# Patient Record
Sex: MALE | Race: WHITE | NOT HISPANIC OR LATINO | Employment: UNEMPLOYED | URBAN - NONMETROPOLITAN AREA
[De-identification: names, ages, dates, MRNs, and addresses within clinical notes are randomized per-mention and may not be internally consistent; named-entity substitution may affect disease eponyms.]

---

## 2018-05-25 ENCOUNTER — OFFICE VISIT (OUTPATIENT)
Dept: UROLOGY | Facility: CLINIC | Age: 48
End: 2018-05-25

## 2018-05-25 VITALS — HEIGHT: 72 IN | WEIGHT: 163 LBS | BODY MASS INDEX: 22.08 KG/M2

## 2018-05-25 DIAGNOSIS — Z98.52 VASECTOMY STATUS: Primary | ICD-10-CM

## 2018-05-25 DIAGNOSIS — R53.83 FATIGUE, UNSPECIFIED TYPE: ICD-10-CM

## 2018-05-25 LAB
BASOPHILS # BLD AUTO: 0.03 10*3/MM3 (ref 0–0.3)
BASOPHILS NFR BLD AUTO: 0.4 % (ref 0–2)
DEPRECATED RDW RBC AUTO: 45.3 FL (ref 37–54)
EOSINOPHIL # BLD AUTO: 0.14 10*3/MM3 (ref 0–0.7)
EOSINOPHIL NFR BLD AUTO: 1.9 % (ref 0–5)
ERYTHROCYTE [DISTWIDTH] IN BLOOD BY AUTOMATED COUNT: 12.8 % (ref 11.5–14.5)
HCT VFR BLD AUTO: 40.3 % (ref 42–52)
HGB BLD-MCNC: 13.8 G/DL (ref 14–18)
IMM GRANULOCYTES # BLD: 0.01 10*3/MM3 (ref 0–0.03)
IMM GRANULOCYTES NFR BLD: 0.1 % (ref 0–0.5)
LYMPHOCYTES # BLD AUTO: 3 10*3/MM3 (ref 1–3)
LYMPHOCYTES NFR BLD AUTO: 40.7 % (ref 21–51)
MCH RBC QN AUTO: 33.2 PG (ref 27–33)
MCHC RBC AUTO-ENTMCNC: 34.2 G/DL (ref 33–37)
MCV RBC AUTO: 96.9 FL (ref 80–94)
MONOCYTES # BLD AUTO: 0.87 10*3/MM3 (ref 0.1–0.9)
MONOCYTES NFR BLD AUTO: 11.8 % (ref 0–10)
NEUTROPHILS # BLD AUTO: 3.33 10*3/MM3 (ref 1.4–6.5)
NEUTROPHILS NFR BLD AUTO: 45.1 % (ref 30–70)
PLATELET # BLD AUTO: 289 10*3/MM3 (ref 130–400)
PMV BLD AUTO: 9 FL (ref 6–10)
PSA SERPL-MCNC: 0.57 NG/ML (ref 0–4)
RBC # BLD AUTO: 4.16 10*6/MM3 (ref 4.7–6.1)
TESTOST SERPL-MCNC: 1395.92 NG/DL (ref 123.06–813.86)
WBC NRBC COR # BLD: 7.38 10*3/MM3 (ref 4.5–12.5)

## 2018-05-25 PROCEDURE — 84153 ASSAY OF PSA TOTAL: CPT | Performed by: UROLOGY

## 2018-05-25 PROCEDURE — 85025 COMPLETE CBC W/AUTO DIFF WBC: CPT | Performed by: UROLOGY

## 2018-05-25 PROCEDURE — 99213 OFFICE O/P EST LOW 20 MIN: CPT | Performed by: UROLOGY

## 2018-05-25 PROCEDURE — 84403 ASSAY OF TOTAL TESTOSTERONE: CPT | Performed by: UROLOGY

## 2018-05-25 PROCEDURE — 99406 BEHAV CHNG SMOKING 3-10 MIN: CPT | Performed by: UROLOGY

## 2018-05-25 RX ORDER — TESTOSTERONE CYPIONATE 200 MG/ML
INJECTION, SOLUTION INTRAMUSCULAR
Qty: 10 ML | Refills: 2 | Status: SHIPPED | OUTPATIENT
Start: 2018-05-25 | End: 2020-09-14

## 2018-05-25 RX ORDER — GABAPENTIN 600 MG/1
800 TABLET ORAL 3 TIMES DAILY
COMMUNITY
End: 2022-01-01

## 2018-05-25 RX ORDER — DIAZEPAM 5 MG/1
TABLET ORAL EVERY 8 HOURS SCHEDULED
COMMUNITY
End: 2020-08-10 | Stop reason: SDUPTHER

## 2018-05-25 RX ORDER — IBUPROFEN 800 MG/1
TABLET ORAL
COMMUNITY

## 2018-05-25 RX ORDER — HYDROCODONE BITARTRATE AND ACETAMINOPHEN 10; 325 MG/1; MG/1
TABLET ORAL EVERY 8 HOURS SCHEDULED
COMMUNITY
End: 2020-09-15 | Stop reason: SDUPTHER

## 2018-05-25 NOTE — PROGRESS NOTES
"Chief Complaint:          Chief Complaint   Patient presents with   • Flank Pain       HPI:   47 y.o. male.  7-year-old white male well known to me from the [facility] with right testalgia over several years.  He was on testosterone but he was changed by Karyn River to half a cc once a month and is not doing well he has a seizure disorders testosterone started at 89 he had an ultrasound showing a left abnormal testis in the right testis was normal as PSA was 0.54.  Today he complains of persistent right-sided testalgia we have previously discussed orchiectomy I went ahead after appropriate informed consent did a right cord block with 10 cc of 1% Xylocaine and complete resolution of the right testicular pain I'm very hesitant to remove a right testicle is perfectly more normal and in fact his more volume than the contralateral testicle I will also restart him and testosterone for a positive JUDSON-androgen deficiency in the age male questionnaire  The patient was queried regarding the androgen deficiency in the age male questionnaire.  This is a validated questionnaire that was performed on a set of 314 [place] male physicians when it was positive it correlated directly with a 94% chance of low testosterone.  Patient indicates there is a decrease in libido or sex drive, a lack of energy, Decreased  strength and endurance, a decreased \"enjoyment of life\", sad and grumpy feelings with significant difficulty maintaining erections.  He is also been a recent deterioration regarding work performance.  Low TestosteroneThis pleasant male patient presents today with signs and symptoms that are consistent with low testosterone he has positive Judson questionnaire by history this includes both the sexual and nonsexual side effects.  Sexual side effects include inability to achieve and maintain an erection, in ability to maintain his erection and decreased interest and sexual activity.  Nonsexual " symptomatology includes fatigue, difficulty completing a job, tiredness.  He has a discussion of the various forms testosterone available including parenteral, topical, and the form of a patch.  We discussed the efficacy of the gels, and the injections.  As well as the cost and benefits analysis.  We discussed the the studies a talked about heart disease and its effect on prostate cancer both of which are negligible.  He gives verbal consent to proceed with treatment.  He understands the risks and benefits of length he also completed his attempts at fertility he understands the partial effect on spermatogenesis    Past Medical History:      History reviewed. No pertinent past medical history.      Current Meds:     Current Outpatient Prescriptions   Medication Sig Dispense Refill   • aspirin 81 MG tablet aspirin 81 mg tablet,delayed release   Take 1 tablet every day by oral route.     • diazePAM (VALIUM) 5 MG tablet Every 8 (Eight) Hours.     • gabapentin (NEURONTIN) 600 MG tablet Every 8 (Eight) Hours.     • HYDROcodone-acetaminophen (NORCO)  MG per tablet Every 8 (Eight) Hours.     • ibuprofen (ADVIL,MOTRIN) 800 MG tablet ibuprofen 800 mg tablet   TAKE ONE TABLET BY MOUTH THREE TIMES A DAY       No current facility-administered medications for this visit.         Allergies:      Allergies   Allergen Reactions   • Tramadol Hives        Past Surgical History:     Past Surgical History:   Procedure Laterality Date   • HERNIA REPAIR           Social History:     Social History     Social History   • Marital status:      Spouse name: N/A   • Number of children: N/A   • Years of education: N/A     Occupational History   • Not on file.     Social History Main Topics   • Smoking status: Current Every Day Smoker   • Smokeless tobacco: Former User   • Alcohol use No   • Drug use: No   • Sexual activity: Not on file     Other Topics Concern   • Not on file     Social History Narrative   • No narrative on file        Family History:     Family History   Problem Relation Age of Onset   • No Known Problems Father    • No Known Problems Mother        Review of Systems:     Review of Systems   Constitutional: Positive for fatigue.   HENT: Negative.    Eyes: Negative.    Respiratory: Negative.    Cardiovascular: Negative.    Gastrointestinal: Negative.    Endocrine: Negative.    Genitourinary: Positive for testicular pain.   Musculoskeletal: Negative.    Allergic/Immunologic: Negative.    Neurological: Negative.    Hematological: Negative.    Psychiatric/Behavioral: Negative.        Physical Exam:     Physical Exam   Constitutional: He is oriented to person, place, and time. He appears well-developed and well-nourished.   HENT:   Head: Normocephalic and atraumatic.   Eyes: Conjunctivae and EOM are normal. Pupils are equal, round, and reactive to light.   Neck: Normal range of motion.   Cardiovascular: Normal rate, regular rhythm, normal heart sounds and intact distal pulses.    Pulmonary/Chest: Effort normal and breath sounds normal.   Abdominal: Soft. Bowel sounds are normal.   Genitourinary:   Genitourinary Comments: Phallus with a right testalgia posteriorly normal contralateral testicle   Musculoskeletal: Normal range of motion.   Neurological: He is alert and oriented to person, place, and time. He has normal reflexes.   Skin: Skin is warm and dry.   Psychiatric: He has a normal mood and affect. His behavior is normal. Judgment and thought content normal.   Nursing note and vitals reviewed.      I have reviewed the following portions of the patient's history: allergies, current medications, past family history, past medical history, past social history, past surgical history, problem list and ROS and confirm it's accurate.      Procedure:       Assessment/Plan:   Testalgia- status post a testicular block with great results indicating likely epididymal focus of infection  Low TestosteroneThis pleasant male patient presents  today with signs and symptoms that are consistent with low testosterone he has positive Judson questionnaire by history this includes both the sexual and nonsexual side effects.  Sexual side effects include inability to achieve and maintain an erection, in ability to maintain his erection and decreased interest and sexual activity.  Nonsexual symptomatology includes fatigue, difficulty completing a job, tiredness.  He has a discussion of the various forms testosterone available including parenteral, topical, and the form of a patch.  We discussed the efficacy of the gels, and the injections.  As well as the cost and benefits analysis.  We discussed the the studies a talked about heart disease and its effect on prostate cancer both of which are negligible.  He gives verbal consent to proceed with treatment.  He understands the risks and benefits of length he also completed his attempts at fertility he understands the partial effect on spermatogenesis     Patient's Body mass index is 22.11 kg/m². BMI is within normal parameters. No follow-up required.    I advised the patient of the risks in continuing to use tobacco, and I provided this patient with smoking cessation educational materials.    During this visit, I spent 3-10 minutes counseling the patient regarding smoking cessation.        This document has been electronically signed by ANNE CORNELIUS MD May 25, 2018 8:24 AM

## 2018-06-04 ENCOUNTER — OFFICE VISIT (OUTPATIENT)
Dept: UROLOGY | Facility: CLINIC | Age: 48
End: 2018-06-04

## 2018-06-04 VITALS — BODY MASS INDEX: 22.08 KG/M2 | WEIGHT: 163 LBS | HEIGHT: 72 IN

## 2018-06-04 DIAGNOSIS — N45.1 EPIDIDYMITIS: Primary | ICD-10-CM

## 2018-06-04 PROCEDURE — 99214 OFFICE O/P EST MOD 30 MIN: CPT | Performed by: UROLOGY

## 2018-06-04 RX ORDER — OXYCODONE AND ACETAMINOPHEN 10; 325 MG/1; MG/1
1 TABLET ORAL EVERY 6 HOURS PRN
Qty: 15 TABLET | Refills: 0 | Status: SHIPPED | OUTPATIENT
Start: 2018-06-04 | End: 2018-06-25

## 2018-06-04 NOTE — PROGRESS NOTES
Chief Complaint:          Chief Complaint   Patient presents with   • Pain After Block       HPI:   47 y.o. male.  47-year-old white male returns today with significant pain.  He had a cord block that took the pain away completely but it came back he would like surgical therapy.  He has had 2 years of conservative treatment which include warm soaks and elevation.  We'll arrange an epididymectomy for him I discussed the risks and benefits of the surgery including anesthesia, bleeding, infection, failure of relief of the pain.  Repeated his block per his request    Past Medical History:      History reviewed. No pertinent past medical history.      Current Meds:     Current Outpatient Prescriptions   Medication Sig Dispense Refill   • aspirin 81 MG tablet aspirin 81 mg tablet,delayed release   Take 1 tablet every day by oral route.     • diazePAM (VALIUM) 5 MG tablet Every 8 (Eight) Hours.     • gabapentin (NEURONTIN) 600 MG tablet Every 8 (Eight) Hours.     • HYDROcodone-acetaminophen (NORCO)  MG per tablet Every 8 (Eight) Hours.     • ibuprofen (ADVIL,MOTRIN) 800 MG tablet ibuprofen 800 mg tablet   TAKE ONE TABLET BY MOUTH THREE TIMES A DAY     • Syringe, Disposable, 3 ML misc Use 3 ml syringe with a 25 gauge  5/8 inch needle 24 each 6   • Testosterone Cypionate (DEPO-TESTOSTERONE) 200 MG/ML injection He is to use 1/2 cc every Monday and Thursday SQ 10 mL 2     No current facility-administered medications for this visit.         Allergies:      Allergies   Allergen Reactions   • Tramadol Hives        Past Surgical History:     Past Surgical History:   Procedure Laterality Date   • HERNIA REPAIR           Social History:     Social History     Social History   • Marital status:      Spouse name: N/A   • Number of children: N/A   • Years of education: N/A     Occupational History   • Not on file.     Social History Main Topics   • Smoking status: Current Every Day Smoker   • Smokeless tobacco: Former User    • Alcohol use No   • Drug use: No   • Sexual activity: Not on file     Other Topics Concern   • Not on file     Social History Narrative   • No narrative on file       Family History:     Family History   Problem Relation Age of Onset   • No Known Problems Father    • No Known Problems Mother        Review of Systems:     Review of Systems   Constitutional: Negative.  Negative for chills, fatigue and fever.   HENT: Negative.    Eyes: Negative.    Respiratory: Negative.  Negative for cough, shortness of breath and wheezing.    Cardiovascular: Negative.  Negative for leg swelling.   Gastrointestinal: Negative.  Negative for abdominal pain, nausea and vomiting.   Endocrine: Negative.    Genitourinary: Positive for testicular pain.   Musculoskeletal: Negative.  Negative for back pain and joint swelling.   Allergic/Immunologic: Negative.    Neurological: Negative.  Negative for dizziness and headaches.   Hematological: Negative.    Psychiatric/Behavioral: Negative.  Negative for confusion.       Physical Exam:     Physical Exam   Constitutional: He is oriented to person, place, and time. He appears well-developed and well-nourished.   HENT:   Head: Normocephalic and atraumatic.   Eyes: Conjunctivae and EOM are normal. Pupils are equal, round, and reactive to light.   Neck: Normal range of motion.   Cardiovascular: Normal rate, regular rhythm, normal heart sounds and intact distal pulses.    Pulmonary/Chest: Effort normal and breath sounds normal.   Abdominal: Soft. Bowel sounds are normal.   Genitourinary: Penis normal.   Genitourinary Comments: Right epididymitis   Musculoskeletal: Normal range of motion.   Neurological: He is alert and oriented to person, place, and time. He has normal reflexes.   Skin: Skin is warm and dry.   Psychiatric: He has a normal mood and affect. His behavior is normal. Judgment and thought content normal.   Nursing note and vitals reviewed.      I have reviewed the following portions of the  patient's history: allergies, current medications, past family history, past medical history, past social history, past surgical history, problem list and ROS and confirm it's accurate.      Procedure:       Assessment/Plan:   Epididymitis-he is desirous of surgical correction he had extensive discussion of the significant risks and benefits of this procedure     Patient's Body mass index is 22.1 kg/m². BMI is within normal parameters. No follow-up required.          This document has been electronically signed by ANNE CORNELIUS MD June 4, 2018 1:21 PM

## 2018-06-06 PROBLEM — N45.1 EPIDIDYMITIS: Status: ACTIVE | Noted: 2018-06-06

## 2018-06-18 ENCOUNTER — TELEPHONE (OUTPATIENT)
Dept: GASTROENTEROLOGY | Facility: CLINIC | Age: 48
End: 2018-06-18

## 2018-06-18 NOTE — TELEPHONE ENCOUNTER
Patient is calling to speak to Jacqueline, he said someone was supposed to call him about getting a surgery scheduled. Please give him a call @ [phone]

## 2018-06-19 ENCOUNTER — TELEPHONE (OUTPATIENT)
Dept: UROLOGY | Facility: CLINIC | Age: 48
End: 2018-06-19

## 2018-06-19 NOTE — TELEPHONE ENCOUNTER
I called the patient to let him know that his surgery was scheduled for 6/27 and I would call him back on Friday with times and dates. That I was off for vacation last week.

## 2018-06-20 ENCOUNTER — TELEPHONE (OUTPATIENT)
Dept: UROLOGY | Facility: CLINIC | Age: 48
End: 2018-06-20

## 2018-06-20 RX ORDER — GENTAMICIN SULFATE 80 MG/100ML
80 INJECTION, SOLUTION INTRAVENOUS ONCE
Status: CANCELLED | OUTPATIENT
Start: 2018-06-27 | End: 2018-06-27

## 2018-06-20 NOTE — TELEPHONE ENCOUNTER
I called the patient with PAT and Surgery time. I also gave the patient detailed instructions for the night before and morning of procedure. Patient expressed understanding.

## 2018-06-25 ENCOUNTER — APPOINTMENT (OUTPATIENT)
Dept: PREADMISSION TESTING | Facility: HOSPITAL | Age: 48
End: 2018-06-25

## 2018-06-25 LAB
DEPRECATED RDW RBC AUTO: 40.5 FL (ref 37–54)
ERYTHROCYTE [DISTWIDTH] IN BLOOD BY AUTOMATED COUNT: 11.8 % (ref 11.5–14.5)
HCT VFR BLD AUTO: 39.9 % (ref 42–52)
HGB BLD-MCNC: 14 G/DL (ref 14–18)
MCH RBC QN AUTO: 33.5 PG (ref 27–33)
MCHC RBC AUTO-ENTMCNC: 35.1 G/DL (ref 33–37)
MCV RBC AUTO: 95.5 FL (ref 80–94)
PLATELET # BLD AUTO: 312 10*3/MM3 (ref 130–400)
PMV BLD AUTO: 9.4 FL (ref 6–10)
RBC # BLD AUTO: 4.18 10*6/MM3 (ref 4.7–6.1)
WBC NRBC COR # BLD: 10.39 10*3/MM3 (ref 4.5–12.5)

## 2018-06-25 PROCEDURE — 36415 COLL VENOUS BLD VENIPUNCTURE: CPT

## 2018-06-25 PROCEDURE — 85027 COMPLETE CBC AUTOMATED: CPT | Performed by: UROLOGY

## 2018-06-25 NOTE — DISCHARGE INSTRUCTIONS
TAKE the following medications the morning of surgery:  All heart or blood pressure medications    Please discontinue all blood thinners and anticoagulants (except aspirin) prior to surgery as per your surgeon and cardiologist instructions.  Aspirin may be continued up to the day prior to surgery.    HOLD all diabetic medications the morning of surgery as order by physician.    Arrival time for surgery on 6/27/2018 will be called to you by Dr. liu's office.    General Instructions:  • Do NOT eat or drink after midnight 6/26/2018 which includes water, mints, or gum.  • You may brush your teeth. Dental appliances that are removable must be taken out day of surgery.  • Do NOT smoke, chew tobacco, or drink alcohol within 24 hours prior to surgery.  • Bring medications in original bottles, any inhalers and if applicable your C-PAP/BI-PAP machine  • Bring any papers given to you in the doctor’s office  • Wear clean, comfortable clothes and socks  • Do NOT wear contact lenses or make-up or dark nail polish.  Bring a case for your glasses if applicable.  • Bring crutches or walker if applicable  • Leave all other valuables and jewelry at home  • If you were given a blood bank armband, continue to wear it until discharged.    Preventing a Surgical Site Infection:  • Shower the night before surgery (unless instructed otherwise) using a fresh bar of anti-bacterial soap (such as Dial) and clean washcloth.  Dry with a clean towel and dress in clean clothing.  • For 2 to 3 days before surgery, avoid shaving with a razor near where you will have surgery because the razor can irritate skin and make it easier to develop an infection.  Ask your surgeon if you will be receiving antibiotics prior to surgery.  • Make sure you, your family, and all healthcare providers clean their hands with soap and water or an alcohol-based hand  before caring for you or your wound.  • If at all possible, quit smoking as many days before  surgery as you can.    Day of Surgery:  Upon arrival, a pre-op nurse and anesthesiologist will review your health history, obtain vital signs, and answer questions you may have.  The only belongings needed at this time will be your home medications and if applicable you C-PAP/BI-PAP machine.  If you are staying overnight, your family can leave the rest of your belongings in the car and bring them to your room later.  A pre-op nurse will start an IV and you may receive medication in preparation for surgery.  Due to patient privacy and limited space, only one member of your family will be able to accompany you in the pre-op area.  While you are in surgery your family should notify the waiting room  if they leave the waiting room area and provide a contact number.  Please be aware that surgery does come with discomfort.  We want to make every effort to control your discomfort so please discuss any uncontrolled symptoms with your nurse.  Your doctor will most likely have prescribed pain medications.  If you are going home after surgery you will receive individualized written care instructions before being discharged.  A responsible adult must drive you to and from the hospital on the day of surgery and stay with you for 24 hours.  If you are staying overnight following surgery, you will be transported to your hospital room following the recovery period.

## 2018-06-27 ENCOUNTER — ANESTHESIA (OUTPATIENT)
Dept: PERIOP | Facility: HOSPITAL | Age: 48
End: 2018-06-27

## 2018-06-27 ENCOUNTER — ANESTHESIA EVENT (OUTPATIENT)
Dept: PERIOP | Facility: HOSPITAL | Age: 48
End: 2018-06-27

## 2018-06-27 ENCOUNTER — HOSPITAL ENCOUNTER (OUTPATIENT)
Facility: HOSPITAL | Age: 48
Discharge: HOME | End: 2018-06-27
Attending: UROLOGY | Admitting: UROLOGY

## 2018-06-27 VITALS
OXYGEN SATURATION: 99 % | BODY MASS INDEX: 22.08 KG/M2 | RESPIRATION RATE: 14 BRPM | DIASTOLIC BLOOD PRESSURE: 74 MMHG | SYSTOLIC BLOOD PRESSURE: 120 MMHG | WEIGHT: 163 LBS | HEIGHT: 72 IN | TEMPERATURE: 97.6 F | HEART RATE: 76 BPM

## 2018-06-27 DIAGNOSIS — N45.1 EPIDIDYMITIS: ICD-10-CM

## 2018-06-27 PROCEDURE — 25010000002 ONDANSETRON PER 1 MG: Performed by: NURSE ANESTHETIST, CERTIFIED REGISTERED

## 2018-06-27 PROCEDURE — 25010000002 GENTAMICIN PER 80 MG: Performed by: UROLOGY

## 2018-06-27 PROCEDURE — 25010000002 PROPOFOL 10 MG/ML EMULSION: Performed by: NURSE ANESTHETIST, CERTIFIED REGISTERED

## 2018-06-27 PROCEDURE — 25010000002 FENTANYL CITRATE (PF) 100 MCG/2ML SOLUTION: Performed by: NURSE ANESTHETIST, CERTIFIED REGISTERED

## 2018-06-27 PROCEDURE — 25010000002 DEXAMETHASONE PER 1 MG: Performed by: NURSE ANESTHETIST, CERTIFIED REGISTERED

## 2018-06-27 PROCEDURE — 25010000002 MIDAZOLAM PER 1 MG: Performed by: NURSE ANESTHETIST, CERTIFIED REGISTERED

## 2018-06-27 PROCEDURE — 54860 REMOVAL OF EPIDIDYMIS: CPT | Performed by: UROLOGY

## 2018-06-27 RX ORDER — IPRATROPIUM BROMIDE AND ALBUTEROL SULFATE 2.5; .5 MG/3ML; MG/3ML
3 SOLUTION RESPIRATORY (INHALATION) ONCE AS NEEDED
Status: DISCONTINUED | OUTPATIENT
Start: 2018-06-27 | End: 2018-06-27 | Stop reason: HOSPADM

## 2018-06-27 RX ORDER — FENTANYL CITRATE 50 UG/ML
INJECTION, SOLUTION INTRAMUSCULAR; INTRAVENOUS AS NEEDED
Status: DISCONTINUED | OUTPATIENT
Start: 2018-06-27 | End: 2018-06-27 | Stop reason: SURG

## 2018-06-27 RX ORDER — MAGNESIUM HYDROXIDE 1200 MG/15ML
LIQUID ORAL AS NEEDED
Status: DISCONTINUED | OUTPATIENT
Start: 2018-06-27 | End: 2018-06-27 | Stop reason: HOSPADM

## 2018-06-27 RX ORDER — MEPERIDINE HYDROCHLORIDE 50 MG/ML
12.5 INJECTION INTRAMUSCULAR; INTRAVENOUS; SUBCUTANEOUS
Status: DISCONTINUED | OUTPATIENT
Start: 2018-06-27 | End: 2018-06-27 | Stop reason: HOSPADM

## 2018-06-27 RX ORDER — LIDOCAINE HYDROCHLORIDE 20 MG/ML
INJECTION, SOLUTION EPIDURAL; INFILTRATION; INTRACAUDAL; PERINEURAL AS NEEDED
Status: DISCONTINUED | OUTPATIENT
Start: 2018-06-27 | End: 2018-06-27 | Stop reason: SURG

## 2018-06-27 RX ORDER — FENTANYL CITRATE 50 UG/ML
50 INJECTION, SOLUTION INTRAMUSCULAR; INTRAVENOUS
Status: DISCONTINUED | OUTPATIENT
Start: 2018-06-27 | End: 2018-06-27 | Stop reason: HOSPADM

## 2018-06-27 RX ORDER — OXYCODONE AND ACETAMINOPHEN 10; 325 MG/1; MG/1
1 TABLET ORAL EVERY 4 HOURS PRN
Qty: 15 TABLET | Refills: 0 | Status: SHIPPED | OUTPATIENT
Start: 2018-06-27 | End: 2020-09-14

## 2018-06-27 RX ORDER — CEPHALEXIN 500 MG/1
500 CAPSULE ORAL 2 TIMES DAILY
Qty: 8 CAPSULE | Refills: 0 | Status: SHIPPED | OUTPATIENT
Start: 2018-06-27 | End: 2018-07-01

## 2018-06-27 RX ORDER — CARBAMAZEPINE 200 MG/1
200 TABLET ORAL 2 TIMES DAILY
COMMUNITY

## 2018-06-27 RX ORDER — ONDANSETRON 2 MG/ML
4 INJECTION INTRAMUSCULAR; INTRAVENOUS ONCE AS NEEDED
Status: DISCONTINUED | OUTPATIENT
Start: 2018-06-27 | End: 2018-06-27 | Stop reason: HOSPADM

## 2018-06-27 RX ORDER — DEXAMETHASONE SODIUM PHOSPHATE 4 MG/ML
INJECTION, SOLUTION INTRA-ARTICULAR; INTRALESIONAL; INTRAMUSCULAR; INTRAVENOUS; SOFT TISSUE AS NEEDED
Status: DISCONTINUED | OUTPATIENT
Start: 2018-06-27 | End: 2018-06-27 | Stop reason: SURG

## 2018-06-27 RX ORDER — SODIUM CHLORIDE, SODIUM LACTATE, POTASSIUM CHLORIDE, CALCIUM CHLORIDE 600; 310; 30; 20 MG/100ML; MG/100ML; MG/100ML; MG/100ML
125 INJECTION, SOLUTION INTRAVENOUS CONTINUOUS
Status: DISCONTINUED | OUTPATIENT
Start: 2018-06-27 | End: 2018-06-27 | Stop reason: HOSPADM

## 2018-06-27 RX ORDER — SODIUM CHLORIDE 0.9 % (FLUSH) 0.9 %
1-10 SYRINGE (ML) INJECTION AS NEEDED
Status: DISCONTINUED | OUTPATIENT
Start: 2018-06-27 | End: 2018-06-27 | Stop reason: HOSPADM

## 2018-06-27 RX ORDER — GENTAMICIN SULFATE 80 MG/100ML
80 INJECTION, SOLUTION INTRAVENOUS ONCE
Status: COMPLETED | OUTPATIENT
Start: 2018-06-27 | End: 2018-06-27

## 2018-06-27 RX ORDER — PROPOFOL 10 MG/ML
VIAL (ML) INTRAVENOUS AS NEEDED
Status: DISCONTINUED | OUTPATIENT
Start: 2018-06-27 | End: 2018-06-27 | Stop reason: SURG

## 2018-06-27 RX ORDER — BUPIVACAINE HYDROCHLORIDE 2.5 MG/ML
INJECTION, SOLUTION INFILTRATION; PERINEURAL AS NEEDED
Status: DISCONTINUED | OUTPATIENT
Start: 2018-06-27 | End: 2018-06-27 | Stop reason: HOSPADM

## 2018-06-27 RX ORDER — OXYCODONE HYDROCHLORIDE AND ACETAMINOPHEN 5; 325 MG/1; MG/1
1 TABLET ORAL ONCE
Status: COMPLETED | OUTPATIENT
Start: 2018-06-27 | End: 2018-06-27

## 2018-06-27 RX ORDER — MIDAZOLAM HYDROCHLORIDE 1 MG/ML
INJECTION INTRAMUSCULAR; INTRAVENOUS AS NEEDED
Status: DISCONTINUED | OUTPATIENT
Start: 2018-06-27 | End: 2018-06-27 | Stop reason: SURG

## 2018-06-27 RX ORDER — ONDANSETRON 2 MG/ML
INJECTION INTRAMUSCULAR; INTRAVENOUS AS NEEDED
Status: DISCONTINUED | OUTPATIENT
Start: 2018-06-27 | End: 2018-06-27 | Stop reason: SURG

## 2018-06-27 RX ADMIN — LIDOCAINE HYDROCHLORIDE 40 MG: 20 INJECTION, SOLUTION EPIDURAL; INFILTRATION; INTRACAUDAL; PERINEURAL at 07:34

## 2018-06-27 RX ADMIN — GENTAMICIN SULFATE 80 MG: 80 INJECTION, SOLUTION INTRAVENOUS at 07:28

## 2018-06-27 RX ADMIN — OXYCODONE HYDROCHLORIDE AND ACETAMINOPHEN 1 TABLET: 5; 325 TABLET ORAL at 09:16

## 2018-06-27 RX ADMIN — PROPOFOL 100 MG: 10 INJECTION, EMULSION INTRAVENOUS at 07:34

## 2018-06-27 RX ADMIN — PROPOFOL 30 MG: 10 INJECTION, EMULSION INTRAVENOUS at 08:03

## 2018-06-27 RX ADMIN — DEXAMETHASONE SODIUM PHOSPHATE 8 MG: 4 INJECTION, SOLUTION INTRAMUSCULAR; INTRAVENOUS at 07:36

## 2018-06-27 RX ADMIN — FENTANYL CITRATE 50 MCG: 50 INJECTION INTRAMUSCULAR; INTRAVENOUS at 07:30

## 2018-06-27 RX ADMIN — ONDANSETRON 4 MG: 2 INJECTION, SOLUTION INTRAMUSCULAR; INTRAVENOUS at 07:28

## 2018-06-27 RX ADMIN — FENTANYL CITRATE 50 MCG: 50 INJECTION INTRAMUSCULAR; INTRAVENOUS at 07:32

## 2018-06-27 RX ADMIN — SODIUM CHLORIDE, POTASSIUM CHLORIDE, SODIUM LACTATE AND CALCIUM CHLORIDE: 600; 310; 30; 20 INJECTION, SOLUTION INTRAVENOUS at 07:28

## 2018-06-27 RX ADMIN — MIDAZOLAM HYDROCHLORIDE 2 MG: 1 INJECTION, SOLUTION INTRAMUSCULAR; INTRAVENOUS at 07:28

## 2018-06-27 NOTE — ANESTHESIA PREPROCEDURE EVALUATION
Anesthesia Evaluation     Patient summary reviewed and Nursing notes reviewed   no history of anesthetic complications:  NPO Solid Status: > 8 hours  NPO Liquid Status: > 8 hours           Airway   Mallampati: II  TM distance: >3 FB  Neck ROM: full  no difficulty expected  Dental - normal exam   (+) edentulous    Pulmonary - normal exam   (+) a smoker Current Smoked day of surgery,   (-) asthma  Cardiovascular - normal exam  Exercise tolerance: good (4-7 METS)    NYHA Classification: II    (+) hyperlipidemia,       Neuro/Psych  (+) seizures (last one 10 years ago), numbness,     GI/Hepatic/Renal/Endo    (+)  GERD,      Musculoskeletal     (+) back pain, chronic pain,   Abdominal  - normal exam    Bowel sounds: normal.   Substance History - negative use     OB/GYN negative ob/gyn ROS         Other   (+) arthritis                     Anesthesia Plan    ASA 2     general     intravenous induction   Anesthetic plan and risks discussed with patient.  Use of blood products discussed with patient  Consented to blood products.

## 2018-06-27 NOTE — ANESTHESIA PROCEDURE NOTES
Airway  Urgency: elective    Airway not difficult    General Information and Staff    Patient location during procedure: OR    Indications and Patient Condition  Indications for airway management: airway protection    Preoxygenated: yes  MILS maintained throughout  Mask difficulty assessment: 0 - not attempted    Final Airway Details  Final airway type: supraglottic airway      Successful airway: unique  Size 4

## 2018-06-27 NOTE — ANESTHESIA POSTPROCEDURE EVALUATION
Patient: Vipin Oakley    Procedure Summary     Date:  06/27/18 Room / Location:  [facility] OR 02 /  [facility] OR    Anesthesia Start:  0728 Anesthesia Stop:  0812    Procedure:  EPIDIDYMECTOMY (Right ) Diagnosis:       Epididymitis      (Epididymitis [N45.1])    Surgeon:  Chino Aviles MD Provider:  Andre Dela Cruz MD    Anesthesia Type:  general ASA Status:  2          Anesthesia Type: general  Last vitals  BP   95/56 (06/27/18 0828)   Temp   97.5 °F (36.4 °C) (06/27/18 0813)   Pulse   59 (06/27/18 0828)   Resp   12 (06/27/18 0828)     SpO2   97 % (06/27/18 0828)     Post Anesthesia Care and Evaluation    Patient location during evaluation: PHASE II  Patient participation: complete - patient participated  Level of consciousness: awake and alert  Pain score: 1  Pain management: adequate  Airway patency: patent  Anesthetic complications: No anesthetic complications  PONV Status: controlled  Cardiovascular status: acceptable  Respiratory status: acceptable  Hydration status: acceptable

## 2018-07-02 LAB
LAB AP CASE REPORT: NORMAL
PATH REPORT.FINAL DX SPEC: NORMAL

## 2018-07-05 ENCOUNTER — OFFICE VISIT (OUTPATIENT)
Dept: UROLOGY | Facility: CLINIC | Age: 48
End: 2018-07-05

## 2018-07-05 ENCOUNTER — TELEPHONE (OUTPATIENT)
Dept: UROLOGY | Facility: CLINIC | Age: 48
End: 2018-07-05

## 2018-07-05 VITALS — WEIGHT: 163 LBS | BODY MASS INDEX: 22.08 KG/M2 | HEIGHT: 72 IN

## 2018-07-05 DIAGNOSIS — N45.1 EPIDIDYMITIS: Primary | ICD-10-CM

## 2018-07-05 PROCEDURE — 99024 POSTOP FOLLOW-UP VISIT: CPT | Performed by: UROLOGY

## 2018-07-05 NOTE — PROGRESS NOTES
Chief Complaint:          Chief Complaint   Patient presents with   • Surgery Follow Up       HPI:   47 y.o. male.  47-year-old white male status post a right epididymectomy.  His pain is completely resolved he feels great.  There is no erythema or induration or tenderness.    Past Medical History:        Past Medical History:   Diagnosis Date   • Arthritis    • Back pain    • Carpal tunnel syndrome     bilateral   • Elevated cholesterol    • Frequency of urination    • GERD (gastroesophageal reflux disease)    • Heartburn    • Seizures (CMS/HCC)          Current Meds:     Current Outpatient Prescriptions   Medication Sig Dispense Refill   • aspirin 81 MG tablet aspirin 81 mg tablet,delayed release   Take 1 tablet every day by oral route.     • carBAMazepine (TEGretol) 200 MG tablet Take 200 mg by mouth 2 (Two) Times a Day. Takes 1.5 tabs in am and 2 tabs at hs     • diazePAM (VALIUM) 5 MG tablet Every 8 (Eight) Hours.     • gabapentin (NEURONTIN) 600 MG tablet Every 8 (Eight) Hours.     • HYDROcodone-acetaminophen (NORCO)  MG per tablet Every 8 (Eight) Hours.     • ibuprofen (ADVIL,MOTRIN) 800 MG tablet ibuprofen 800 mg tablet   TAKE ONE TABLET BY MOUTH THREE TIMES A DAY     • oxyCODONE-acetaminophen (PERCOCET)  MG per tablet Take 1 tablet by mouth Every 4 (Four) Hours As Needed for Moderate Pain  (Pain). 15 tablet 0   • Syringe, Disposable, 3 ML misc Use 3 ml syringe with a 25 gauge  5/8 inch needle 24 each 6   • Testosterone Cypionate (DEPO-TESTOSTERONE) 200 MG/ML injection He is to use 1/2 cc every Monday and Thursday SQ 10 mL 2     No current facility-administered medications for this visit.         Allergies:      Allergies   Allergen Reactions   • Tramadol Other (See Comments)     Seizures  Ultram          Past Surgical History:     Past Surgical History:   Procedure Laterality Date   • ABDOMINAL SURGERY     • EPIDIDYMECTOMY Right 6/27/2018    Procedure: EPIDIDYMECTOMY;  Surgeon: Chino CLOUD  MD Traci;  Location: [facility];  Service: Urology   • HERNIA REPAIR     • MOUTH SURGERY      teeth          Social History:     Social History     Social History   • Marital status:      Spouse name: N/A   • Number of children: N/A   • Years of education: N/A     Occupational History   • Not on file.     Social History Main Topics   • Smoking status: Current Every Day Smoker     Packs/day: 0.50     Years: 14.00     Types: Cigarettes   • Smokeless tobacco: Current User     Types: Snuff   • Alcohol use No   • Drug use: No   • Sexual activity: Defer     Other Topics Concern   • Not on file     Social History Narrative   • No narrative on file       Family History:     Family History   Problem Relation Age of Onset   • Cancer Father    • Hypertension Father    • Diabetes Father    • Hypertension Mother    • Cancer Maternal Uncle    • Heart disease Maternal Uncle    • Heart disease Paternal Aunt    • Cancer Maternal Grandmother    • Heart disease Maternal Grandmother        Review of Systems:     Review of Systems   Constitutional: Negative.    HENT: Negative.    Eyes: Negative.    Respiratory: Negative.    Cardiovascular: Negative.    Gastrointestinal: Negative.    Endocrine: Negative.    Musculoskeletal: Negative.    Allergic/Immunologic: Negative.    Neurological: Negative.    Hematological: Negative.    Psychiatric/Behavioral: Negative.        Physical Exam:     Physical Exam   Constitutional: He is oriented to person, place, and time. He appears well-developed and well-nourished.   HENT:   Head: Normocephalic and atraumatic.   Eyes: Conjunctivae and EOM are normal. Pupils are equal, round, and reactive to light.   Neck: Normal range of motion.   Cardiovascular: Normal rate, regular rhythm, normal heart sounds and intact distal pulses.    Pulmonary/Chest: Effort normal and breath sounds normal.   Abdominal: Soft. Bowel sounds are normal.   Genitourinary:   Genitourinary Comments: Well-healed right  hemiscrotal incision without erythema or induration tenderness   Musculoskeletal: Normal range of motion.   Neurological: He is alert and oriented to person, place, and time. He has normal reflexes.   Skin: Skin is warm and dry.   Psychiatric: He has a normal mood and affect. His behavior is normal. Judgment and thought content normal.   Nursing note and vitals reviewed.      I have reviewed the following portions of the patient's history: allergies, current medications, past family history, past medical history, past social history, past surgical history, problem list and ROS and confirm it's accurate.      Procedure:       Assessment/Plan:   Epididymitis-for postop check     Patient's Body mass index is 22.11 kg/m². BMI is within normal parameters. No follow-up required.      I advised Vipin of the risks of continuing to use tobacco, and I provided him with tobacco cessation educational materials in the After Visit Summary.     During this visit, I spent 3-10 minutes counseling the patient regarding tobacco cessation.      This document has been electronically signed by ANNE CORNELIUS MD July 5, 2018 8:19 AM

## 2018-07-10 ENCOUNTER — TELEPHONE (OUTPATIENT)
Dept: GASTROENTEROLOGY | Facility: CLINIC | Age: 48
End: 2018-07-10

## 2018-07-10 NOTE — TELEPHONE ENCOUNTER
Francy, patients wife called today to check on status of FMLA that was given to you by Liliya on Friday 7/05/18, I looked on your desk , in the faxes , Sonya looked with her stuff, we couldn't find it. I told patient you probably had not got a chance to get it filled out yet if you just got it on Friday. Told her you would probably be here on Monday 7/16/18 and she could call then . They said they need these faxed to ATTN: Julianne [phone] as soon as you can. Thank you!

## 2018-07-16 ENCOUNTER — DOCUMENTATION (OUTPATIENT)
Dept: UROLOGY | Facility: CLINIC | Age: 48
End: 2018-07-16

## 2018-07-16 NOTE — PROGRESS NOTES
Patient notified of completed [facility] paperwork has been faxed to his employer, the original has been left at  for , patient paid the paperwork completion fee when he dropped the papers off.

## 2018-10-01 ENCOUNTER — APPOINTMENT (OUTPATIENT)
Dept: GENERAL RADIOLOGY | Facility: HOSPITAL | Age: 48
End: 2018-10-01

## 2018-10-01 ENCOUNTER — HOSPITAL ENCOUNTER (EMERGENCY)
Facility: HOSPITAL | Age: 48
Discharge: HOME | End: 2018-10-02
Attending: FAMILY MEDICINE | Admitting: FAMILY MEDICINE

## 2018-10-01 DIAGNOSIS — R07.9 CHEST PAIN WITH LOW RISK FOR CARDIAC ETIOLOGY: Primary | ICD-10-CM

## 2018-10-01 LAB
ALBUMIN SERPL-MCNC: 4.4 G/DL (ref 3.5–5)
ALBUMIN/GLOB SERPL: 1.6 G/DL (ref 1.5–2.5)
ALP SERPL-CCNC: 69 U/L (ref 40–129)
ALT SERPL W P-5'-P-CCNC: 10 U/L (ref 10–44)
ANION GAP SERPL CALCULATED.3IONS-SCNC: 5 MMOL/L (ref 3.6–11.2)
AST SERPL-CCNC: 13 U/L (ref 10–34)
BASOPHILS # BLD AUTO: 0.04 10*3/MM3 (ref 0–0.3)
BASOPHILS NFR BLD AUTO: 0.5 % (ref 0–2)
BILIRUB SERPL-MCNC: 0.3 MG/DL (ref 0.2–1.8)
BUN BLD-MCNC: 10 MG/DL (ref 7–21)
BUN/CREAT SERPL: 11 (ref 7–25)
CALCIUM SPEC-SCNC: 9.3 MG/DL (ref 7.7–10)
CHLORIDE SERPL-SCNC: 107 MMOL/L (ref 99–112)
CK MB SERPL-CCNC: 0.48 NG/ML (ref 0–5)
CK MB SERPL-RTO: 0.2 % (ref 0–3)
CK SERPL-CCNC: 202 U/L (ref 24–204)
CO2 SERPL-SCNC: 27 MMOL/L (ref 24.3–31.9)
CREAT BLD-MCNC: 0.91 MG/DL (ref 0.43–1.29)
DEPRECATED RDW RBC AUTO: 43 FL (ref 37–54)
EOSINOPHIL # BLD AUTO: 0.12 10*3/MM3 (ref 0–0.7)
EOSINOPHIL NFR BLD AUTO: 1.4 % (ref 0–5)
ERYTHROCYTE [DISTWIDTH] IN BLOOD BY AUTOMATED COUNT: 12.4 % (ref 11.5–14.5)
GFR SERPL CREATININE-BSD FRML MDRD: 90 ML/MIN/1.73
GLOBULIN UR ELPH-MCNC: 2.8 GM/DL
GLUCOSE BLD-MCNC: 126 MG/DL (ref 70–110)
HCT VFR BLD AUTO: 40.6 % (ref 42–52)
HGB BLD-MCNC: 13.9 G/DL (ref 14–18)
HOLD SPECIMEN: NORMAL
HOLD SPECIMEN: NORMAL
IMM GRANULOCYTES # BLD: 0.01 10*3/MM3 (ref 0–0.03)
IMM GRANULOCYTES NFR BLD: 0.1 % (ref 0–0.5)
LYMPHOCYTES # BLD AUTO: 3.61 10*3/MM3 (ref 1–3)
LYMPHOCYTES NFR BLD AUTO: 42.9 % (ref 21–51)
MCH RBC QN AUTO: 33.3 PG (ref 27–33)
MCHC RBC AUTO-ENTMCNC: 34.2 G/DL (ref 33–37)
MCV RBC AUTO: 97.1 FL (ref 80–94)
MONOCYTES # BLD AUTO: 0.74 10*3/MM3 (ref 0.1–0.9)
MONOCYTES NFR BLD AUTO: 8.8 % (ref 0–10)
NEUTROPHILS # BLD AUTO: 3.89 10*3/MM3 (ref 1.4–6.5)
NEUTROPHILS NFR BLD AUTO: 46.3 % (ref 30–70)
OSMOLALITY SERPL CALC.SUM OF ELEC: 278.1 MOSM/KG (ref 273–305)
PLATELET # BLD AUTO: 305 10*3/MM3 (ref 130–400)
PMV BLD AUTO: 8.7 FL (ref 6–10)
POTASSIUM BLD-SCNC: 3.6 MMOL/L (ref 3.5–5.3)
PROT SERPL-MCNC: 7.2 G/DL (ref 6–8)
RBC # BLD AUTO: 4.18 10*6/MM3 (ref 4.7–6.1)
SODIUM BLD-SCNC: 139 MMOL/L (ref 135–153)
TROPONIN I SERPL-MCNC: <0.006 NG/ML
WBC NRBC COR # BLD: 8.41 10*3/MM3 (ref 4.5–12.5)
WHOLE BLOOD HOLD SPECIMEN: NORMAL
WHOLE BLOOD HOLD SPECIMEN: NORMAL

## 2018-10-01 PROCEDURE — 85025 COMPLETE CBC W/AUTO DIFF WBC: CPT | Performed by: FAMILY MEDICINE

## 2018-10-01 PROCEDURE — 71046 X-RAY EXAM CHEST 2 VIEWS: CPT

## 2018-10-01 PROCEDURE — 93010 ELECTROCARDIOGRAM REPORT: CPT | Performed by: INTERNAL MEDICINE

## 2018-10-01 PROCEDURE — 93005 ELECTROCARDIOGRAM TRACING: CPT | Performed by: FAMILY MEDICINE

## 2018-10-01 PROCEDURE — 36415 COLL VENOUS BLD VENIPUNCTURE: CPT

## 2018-10-01 PROCEDURE — 82553 CREATINE MB FRACTION: CPT | Performed by: FAMILY MEDICINE

## 2018-10-01 PROCEDURE — 71046 X-RAY EXAM CHEST 2 VIEWS: CPT | Performed by: RADIOLOGY

## 2018-10-01 PROCEDURE — 84484 ASSAY OF TROPONIN QUANT: CPT | Performed by: FAMILY MEDICINE

## 2018-10-01 PROCEDURE — 99284 EMERGENCY DEPT VISIT MOD MDM: CPT

## 2018-10-01 PROCEDURE — 80053 COMPREHEN METABOLIC PANEL: CPT | Performed by: FAMILY MEDICINE

## 2018-10-01 PROCEDURE — 82550 ASSAY OF CK (CPK): CPT | Performed by: FAMILY MEDICINE

## 2018-10-01 RX ORDER — ACETAMINOPHEN 500 MG
1000 TABLET ORAL ONCE
Status: COMPLETED | OUTPATIENT
Start: 2018-10-01 | End: 2018-10-02

## 2018-10-02 ENCOUNTER — TRANSCRIBE ORDERS (OUTPATIENT)
Dept: ADMINISTRATIVE | Facility: HOSPITAL | Age: 48
End: 2018-10-02

## 2018-10-02 VITALS
TEMPERATURE: 98 F | OXYGEN SATURATION: 98 % | WEIGHT: 161 LBS | RESPIRATION RATE: 18 BRPM | SYSTOLIC BLOOD PRESSURE: 131 MMHG | DIASTOLIC BLOOD PRESSURE: 82 MMHG | BODY MASS INDEX: 21.81 KG/M2 | HEIGHT: 72 IN | HEART RATE: 90 BPM

## 2018-10-02 DIAGNOSIS — R07.9 CHEST PAIN WITH LOW RISK FOR CARDIAC ETIOLOGY: Primary | ICD-10-CM

## 2018-10-02 LAB
CK MB SERPL-CCNC: 0.5 NG/ML (ref 0–5)
CK MB SERPL-RTO: 0.5 % (ref 0–3)
CK SERPL-CCNC: 101 U/L (ref 24–204)
TROPONIN I SERPL-MCNC: <0.006 NG/ML

## 2018-10-02 PROCEDURE — 82553 CREATINE MB FRACTION: CPT | Performed by: FAMILY MEDICINE

## 2018-10-02 PROCEDURE — 82550 ASSAY OF CK (CPK): CPT | Performed by: FAMILY MEDICINE

## 2018-10-02 PROCEDURE — 84484 ASSAY OF TROPONIN QUANT: CPT | Performed by: FAMILY MEDICINE

## 2018-10-02 RX ADMIN — ACETAMINOPHEN 1000 MG: 500 TABLET, FILM COATED ORAL at 00:34

## 2018-10-02 NOTE — ED PROVIDER NOTES
Subjective   The white male with past medical history of hypertension, gastroesophageal reflux disease presents emergency department after leaving a ED in [place] diagnosis of pleuritic chest pain        History provided by:  Patient  Chest Pain   Pain location:  L chest  Pain quality: aching    Pain radiates to:  L shoulder  Pain severity:  Mild  Onset quality:  Gradual  Duration:  2 weeks  Timing:  Intermittent  Progression:  Worsening  Chronicity:  New  Context: at rest    Relieved by:  Nothing  Worsened by:  Certain positions  Ineffective treatments:  Rest  Associated symptoms: shortness of breath    Associated symptoms: no abdominal pain, no altered mental status, no anorexia, no anxiety, no back pain, no dizziness, no dysphagia, no fatigue, no numbness, no orthopnea, no palpitations and no PND        Review of Systems   Constitutional: Negative for fatigue.   HENT: Negative for trouble swallowing.    Respiratory: Positive for shortness of breath.    Cardiovascular: Positive for chest pain. Negative for palpitations, orthopnea and PND.   Gastrointestinal: Negative for abdominal pain and anorexia.   Musculoskeletal: Negative for back pain.   Neurological: Negative for dizziness and numbness.       Past Medical History:   Diagnosis Date   • Arthritis    • Back pain    • Carpal tunnel syndrome     bilateral   • Elevated cholesterol    • Frequency of urination    • GERD (gastroesophageal reflux disease)    • Heartburn    • Seizures (CMS/HCC)        Allergies   Allergen Reactions   • Tramadol Other (See Comments)     Seizures  Ultram         Past Surgical History:   Procedure Laterality Date   • ABDOMINAL SURGERY     • EPIDIDYMECTOMY Right 6/27/2018    Procedure: EPIDIDYMECTOMY;  Surgeon: Chino Aviles MD;  Location: [facility];  Service: Urology   • HERNIA REPAIR     • MOUTH SURGERY      teeth        Family History   Problem Relation Age of Onset   • Cancer Father    • Hypertension Father    • Diabetes Father     • Hypertension Mother    • Cancer Maternal Uncle    • Heart disease Maternal Uncle    • Heart disease Paternal Aunt    • Cancer Maternal Grandmother    • Heart disease Maternal Grandmother        Social History     Social History   • Marital status:      Social History Main Topics   • Smoking status: Current Every Day Smoker     Packs/day: 0.50     Years: 14.00     Types: Cigarettes   • Smokeless tobacco: Current User     Types: Snuff   • Alcohol use No   • Drug use: No   • Sexual activity: Defer     Other Topics Concern   • Not on file           Objective   Physical Exam   Constitutional: He is oriented to person, place, and time. He appears well-developed and well-nourished.   HENT:   Head: Normocephalic and atraumatic.   Right Ear: External ear normal.   Left Ear: External ear normal.   Mouth/Throat: Oropharynx is clear and moist.   Eyes: Pupils are equal, round, and reactive to light. EOM are normal.   Neck: Neck supple.   Cardiovascular: Normal rate and regular rhythm.    Pulmonary/Chest: Effort normal.   Abdominal: Soft. Bowel sounds are normal.   Musculoskeletal: Normal range of motion.   Neurological: He is alert and oriented to person, place, and time.   Skin: Skin is warm. Capillary refill takes less than 2 seconds.   Psychiatric: He has a normal mood and affect. His behavior is normal. Judgment and thought content normal.   Nursing note and vitals reviewed.      Procedures           ED Course  ED Course as of Oct 04 0847   Mon Oct 01, 2018   2131 EKG interpretation time is 2131 normal sinus rhythm 65 bpm QRS duration is 98  QTc is 386 no evidence of acute ischemic change ECG 12 Lead []   Tue Oct 02, 2018   0000 Records reviewed from [facility] patient had 2 sets of cardiac enzymes and EKG done home similar story was diagnosed with pleuritic chest pain and advised to follow up outpatient with cardiology  []   0200 Heart score- 3  [MH]      ED Course User Index  [MH] Ritu Casiano  DO Nikolay                  [facility]  Number of Diagnoses or Management Options  Chest pain with low risk for cardiac etiology: new and requires workup     Amount and/or Complexity of Data Reviewed  Clinical lab tests: ordered and reviewed  Tests in the radiology section of CPT®: ordered and reviewed  Tests in the medicine section of CPT®: ordered and reviewed  Discuss the patient with other providers: yes  Independent visualization of images, tracings, or specimens: yes    Risk of Complications, Morbidity, and/or Mortality  Presenting problems: high  Diagnostic procedures: moderate  Management options: moderate    Patient Progress  Patient progress: stable        Final diagnoses:   Chest pain with low risk for cardiac etiology            Ritu Casiano DO  10/04/18 0848

## 2018-10-04 ENCOUNTER — HOSPITAL ENCOUNTER (OUTPATIENT)
Dept: NUCLEAR MEDICINE | Facility: HOSPITAL | Age: 48
Discharge: HOME | End: 2018-10-04
Attending: FAMILY MEDICINE

## 2018-10-04 ENCOUNTER — HOSPITAL ENCOUNTER (OUTPATIENT)
Dept: CARDIOLOGY | Facility: HOSPITAL | Age: 48
Discharge: HOME | End: 2018-10-04
Attending: FAMILY MEDICINE

## 2018-10-04 DIAGNOSIS — R07.9 CHEST PAIN WITH LOW RISK FOR CARDIAC ETIOLOGY: ICD-10-CM

## 2018-10-04 LAB
BH CV NUCLEAR PRIOR STUDY: 3
BH CV STRESS BP STAGE 1: NORMAL
BH CV STRESS BP STAGE 2: NORMAL
BH CV STRESS BP STAGE 3: NORMAL
BH CV STRESS BP STAGE 4: NORMAL
BH CV STRESS DURATION MIN STAGE 1: 3
BH CV STRESS DURATION MIN STAGE 2: 3
BH CV STRESS DURATION MIN STAGE 3: 3
BH CV STRESS DURATION MIN STAGE 4: 3
BH CV STRESS DURATION SEC STAGE 1: 0
BH CV STRESS DURATION SEC STAGE 2: 0
BH CV STRESS DURATION SEC STAGE 3: 0
BH CV STRESS DURATION SEC STAGE 4: 0
BH CV STRESS GRADE STAGE 1: 10
BH CV STRESS GRADE STAGE 2: 12
BH CV STRESS GRADE STAGE 3: 14
BH CV STRESS GRADE STAGE 4: 16
BH CV STRESS HR STAGE 1: 102
BH CV STRESS HR STAGE 2: 109
BH CV STRESS HR STAGE 3: 130
BH CV STRESS HR STAGE 4: 154
BH CV STRESS METS STAGE 1: 5
BH CV STRESS METS STAGE 2: 7.5
BH CV STRESS METS STAGE 3: 10
BH CV STRESS METS STAGE 4: 13.5
BH CV STRESS PROTOCOL 1: NORMAL
BH CV STRESS RECOVERY BP: NORMAL MMHG
BH CV STRESS RECOVERY HR: 77 BPM
BH CV STRESS SPEED STAGE 1: 1.7
BH CV STRESS SPEED STAGE 2: 2.5
BH CV STRESS SPEED STAGE 3: 3.4
BH CV STRESS SPEED STAGE 4: 4.2
BH CV STRESS STAGE 1: 1
BH CV STRESS STAGE 2: 2
BH CV STRESS STAGE 3: 3
BH CV STRESS STAGE 4: 4
LV EF NUC BP: 65 %
MAXIMAL PREDICTED HEART RATE: 174 BPM
PERCENT MAX PREDICTED HR: 88.51 %
STRESS BASELINE BP: NORMAL MMHG
STRESS BASELINE HR: 80 BPM
STRESS PERCENT HR: 104 %
STRESS POST ESTIMATED WORKLOAD: 12.8 METS
STRESS POST EXERCISE DUR MIN: 10 MIN
STRESS POST EXERCISE DUR SEC: 30 SEC
STRESS POST PEAK BP: NORMAL MMHG
STRESS POST PEAK HR: 154 BPM
STRESS TARGET HR: 148 BPM

## 2018-10-04 PROCEDURE — 78452 HT MUSCLE IMAGE SPECT MULT: CPT

## 2018-10-04 PROCEDURE — A9500 TC99M SESTAMIBI: HCPCS | Performed by: FAMILY MEDICINE

## 2018-10-04 PROCEDURE — 93018 CV STRESS TEST I&R ONLY: CPT | Performed by: INTERNAL MEDICINE

## 2018-10-04 PROCEDURE — 0 TECHNETIUM SESTAMIBI: Performed by: FAMILY MEDICINE

## 2018-10-04 PROCEDURE — 93017 CV STRESS TEST TRACING ONLY: CPT

## 2018-10-04 PROCEDURE — 78452 HT MUSCLE IMAGE SPECT MULT: CPT | Performed by: INTERNAL MEDICINE

## 2018-10-04 RX ADMIN — TECHNETIUM TC 99M SESTAMIBI 1 DOSE: 1 INJECTION INTRAVENOUS at 09:15

## 2018-10-04 RX ADMIN — TECHNETIUM TC 99M SESTAMIBI 1 DOSE: 1 INJECTION INTRAVENOUS at 07:40

## 2019-02-05 DIAGNOSIS — Z98.52 VASECTOMY STATUS: ICD-10-CM

## 2019-02-05 RX ORDER — TESTOSTERONE CYPIONATE 200 MG/ML
INJECTION, SOLUTION INTRAMUSCULAR
Qty: 10 ML | Refills: 2 | OUTPATIENT
Start: 2019-02-05

## 2019-02-18 ENCOUNTER — TELEPHONE (OUTPATIENT)
Dept: UROLOGY | Facility: CLINIC | Age: 49
End: 2019-02-18

## 2020-08-10 ENCOUNTER — OFFICE VISIT (OUTPATIENT)
Dept: UROLOGY | Facility: CLINIC | Age: 50
End: 2020-08-10

## 2020-08-10 VITALS — HEIGHT: 72 IN | WEIGHT: 165 LBS | BODY MASS INDEX: 22.35 KG/M2

## 2020-08-10 DIAGNOSIS — N42.9 DISORDER OF PROSTATE: Primary | ICD-10-CM

## 2020-08-10 DIAGNOSIS — N45.1 EPIDIDYMITIS: ICD-10-CM

## 2020-08-10 DIAGNOSIS — R53.83 OTHER FATIGUE: ICD-10-CM

## 2020-08-10 PROCEDURE — 85027 COMPLETE CBC AUTOMATED: CPT | Performed by: UROLOGY

## 2020-08-10 PROCEDURE — 84153 ASSAY OF PSA TOTAL: CPT | Performed by: UROLOGY

## 2020-08-10 PROCEDURE — 82670 ASSAY OF TOTAL ESTRADIOL: CPT | Performed by: UROLOGY

## 2020-08-10 PROCEDURE — 99213 OFFICE O/P EST LOW 20 MIN: CPT | Performed by: UROLOGY

## 2020-08-10 PROCEDURE — 84403 ASSAY OF TOTAL TESTOSTERONE: CPT | Performed by: UROLOGY

## 2020-08-10 RX ORDER — DIAZEPAM 5 MG/1
5 TABLET ORAL EVERY 12 HOURS PRN
Qty: 30 TABLET | Refills: 3 | Status: SHIPPED | OUTPATIENT
Start: 2020-08-10 | End: 2020-10-12 | Stop reason: SDUPTHER

## 2020-08-10 RX ORDER — DIAZEPAM 5 MG/1
5 TABLET ORAL EVERY 12 HOURS PRN
Qty: 30 TABLET | Refills: 3 | Status: SHIPPED | OUTPATIENT
Start: 2020-08-10 | End: 2020-08-10

## 2020-08-10 NOTE — PROGRESS NOTES
Chief Complaint:          Chief Complaint   Patient presents with   • Testicle Pain       HPI:   50 y.o. male known to me having had an epididymectomy is done well he is referred for spasm and severe pain he says the area is painful but is perfectly normal palpably.  I am to go ahead and set up block form and follow-up with him based on this      Past Medical History:        Past Medical History:   Diagnosis Date   • Arthritis    • Back pain    • Carpal tunnel syndrome     bilateral   • Elevated cholesterol    • Frequency of urination    • GERD (gastroesophageal reflux disease)    • Heartburn    • Seizures (CMS/HCC)          Current Meds:     Current Outpatient Medications   Medication Sig Dispense Refill   • aspirin 81 MG tablet aspirin 81 mg tablet,delayed release   Take 1 tablet every day by oral route.     • carBAMazepine (TEGretol) 200 MG tablet Take 200 mg by mouth 2 (Two) Times a Day. Takes 1.5 tabs in am and 2 tabs at hs     • diazePAM (VALIUM) 5 MG tablet Every 8 (Eight) Hours.     • gabapentin (NEURONTIN) 600 MG tablet Every 8 (Eight) Hours.     • HYDROcodone-acetaminophen (NORCO)  MG per tablet Every 8 (Eight) Hours.     • ibuprofen (ADVIL,MOTRIN) 800 MG tablet ibuprofen 800 mg tablet   TAKE ONE TABLET BY MOUTH THREE TIMES A DAY     • oxyCODONE-acetaminophen (PERCOCET)  MG per tablet Take 1 tablet by mouth Every 4 (Four) Hours As Needed for Moderate Pain  (Pain). 15 tablet 0   • Syringe, Disposable, 3 ML misc Use 3 ml syringe with a 25 gauge  5/8 inch needle 24 each 6   • Testosterone Cypionate (DEPO-TESTOSTERONE) 200 MG/ML injection He is to use 1/2 cc every Monday and Thursday SQ 10 mL 2     No current facility-administered medications for this visit.         Allergies:      Allergies   Allergen Reactions   • Tramadol Other (See Comments)     Seizures  Ultram          Past Surgical History:     Past Surgical History:   Procedure Laterality Date   • ABDOMINAL SURGERY     • EPIDIDYMECTOMY  Right 6/27/2018    Procedure: EPIDIDYMECTOMY;  Surgeon: Chino Aviles MD;  Location: [facility];  Service: Urology   • HERNIA REPAIR     • MOUTH SURGERY      teeth          Social History:     Social History     Socioeconomic History   • Marital status:      Spouse name: Not on file   • Number of children: Not on file   • Years of education: Not on file   • Highest education level: Not on file   Tobacco Use   • Smoking status: Current Every Day Smoker     Packs/day: 0.50     Years: 14.00     Pack years: 7.00     Types: Cigarettes   • Smokeless tobacco: Current User     Types: Snuff   Substance and Sexual Activity   • Alcohol use: No   • Drug use: No   • Sexual activity: Defer     Birth control/protection: None       Family History:     Family History   Problem Relation Age of Onset   • Cancer Father    • Hypertension Father    • Diabetes Father    • Hypertension Mother    • Cancer Maternal Uncle    • Heart disease Maternal Uncle    • Heart disease Paternal Aunt    • Cancer Maternal Grandmother    • Heart disease Maternal Grandmother        Review of Systems:     Review of Systems   Constitutional: Negative.  Negative for chills, fatigue and fever.   HENT: Negative.    Eyes: Negative.    Respiratory: Negative.  Negative for cough, shortness of breath and wheezing.    Cardiovascular: Negative.  Negative for leg swelling.   Gastrointestinal: Positive for abdominal pain.   Endocrine: Negative.    Genitourinary: Positive for testicular pain.   Musculoskeletal: Negative.    Allergic/Immunologic: Negative.    Neurological: Negative.    Hematological: Negative.    Psychiatric/Behavioral: Negative.        Physical Exam:     Physical Exam   Constitutional: He is oriented to person, place, and time. He appears well-developed and well-nourished.   HENT:   Head: Normocephalic and atraumatic.   Eyes: Pupils are equal, round, and reactive to light. Conjunctivae and EOM are normal.   Neck: Normal range of motion.      Cardiovascular: Normal rate, regular rhythm, normal heart sounds and intact distal pulses.   Pulmonary/Chest: Effort normal and breath sounds normal.   Abdominal: Soft. Bowel sounds are normal.   Genitourinary:   Genitourinary Comments: Normal right testicle with a painful area over the epididymis used to be   Musculoskeletal: Normal range of motion.   Neurological: He is alert and oriented to person, place, and time. He has normal reflexes.   Skin: Skin is warm and dry.   Psychiatric: He has a normal mood and affect. His behavior is normal. Judgment and thought content normal.   Nursing note and vitals reviewed.      I have reviewed the following portions of the patient's history: allergies, current medications, past family history, past medical history, past social history, past surgical history, problem list and ROS and confirm it's accurate.      Procedure:       Assessment/Plan:   Testalgia consequence of a right epididymectomy that was doing very well until recently.  I am to go ahead and set him up for a block            Patient's There is no height or weight on file to calculate BMI. BMI is 22.4 this is normal.              This document has been electronically signed by ANNE CORNELIUS MD August 10, 2020 14:35

## 2020-08-11 LAB
DEPRECATED RDW RBC AUTO: 45.1 FL (ref 37–54)
ERYTHROCYTE [DISTWIDTH] IN BLOOD BY AUTOMATED COUNT: 12.7 % (ref 12.3–15.4)
ESTRADIOL SERPL HS-MCNC: 27.2 PG/ML
HCT VFR BLD AUTO: 40.6 % (ref 37.5–51)
HGB BLD-MCNC: 14.3 G/DL (ref 13–17.7)
MCH RBC QN AUTO: 34 PG (ref 26.6–33)
MCHC RBC AUTO-ENTMCNC: 35.2 G/DL (ref 31.5–35.7)
MCV RBC AUTO: 96.7 FL (ref 79–97)
PLATELET # BLD AUTO: 339 10*3/MM3 (ref 140–450)
PMV BLD AUTO: 9.4 FL (ref 6–12)
PSA SERPL-MCNC: 1.38 NG/ML (ref 0–4)
RBC # BLD AUTO: 4.2 10*6/MM3 (ref 4.14–5.8)
TESTOST SERPL-MCNC: 653 NG/DL (ref 249–836)
WBC # BLD AUTO: 9.18 10*3/MM3 (ref 3.4–10.8)

## 2020-09-14 ENCOUNTER — PROCEDURE VISIT (OUTPATIENT)
Dept: UROLOGY | Facility: CLINIC | Age: 50
End: 2020-09-14

## 2020-09-14 VITALS — TEMPERATURE: 97.9 F | BODY MASS INDEX: 22.21 KG/M2 | WEIGHT: 164 LBS | HEIGHT: 72 IN

## 2020-09-14 DIAGNOSIS — N45.1 EPIDIDYMITIS: Primary | ICD-10-CM

## 2020-09-14 PROCEDURE — 64425 NJX AA&/STRD II IH NERVES: CPT | Performed by: UROLOGY

## 2020-09-14 PROCEDURE — 99024 POSTOP FOLLOW-UP VISIT: CPT | Performed by: UROLOGY

## 2020-09-14 RX ORDER — SILDENAFIL CITRATE 20 MG/1
TABLET ORAL
Qty: 40 TABLET | Refills: 6 | Status: ON HOLD | OUTPATIENT
Start: 2020-09-14 | End: 2021-01-01

## 2020-09-14 RX ORDER — ASPIRIN 81 MG/1
81 TABLET, FILM COATED ORAL DAILY
Status: ON HOLD | COMMUNITY
Start: 2020-08-10 | End: 2021-01-01

## 2020-09-14 NOTE — PROGRESS NOTES
Chief Complaint:          Chief Complaint   Patient presents with   • Epididymitis       HPI:   50 y.o. male who is status post a prior epididymectomy.  He had significant onset of right testalgia presents for testicular block I did an informed consent did a right sided cord block with 10 cc of 1% Xylocaine completely uncomplicated and complete resolution of the pain.  He will follow back up with me based on this.      Past Medical History:        Past Medical History:   Diagnosis Date   • Arthritis    • Back pain    • Carpal tunnel syndrome     bilateral   • Elevated cholesterol    • Frequency of urination    • GERD (gastroesophageal reflux disease)    • Heartburn    • Seizures (CMS/HCC)          Current Meds:     Current Outpatient Medications   Medication Sig Dispense Refill   • Adult Aspirin Regimen 81 MG EC tablet Take 81 mg by mouth Daily.     • carBAMazepine (TEGretol) 200 MG tablet Take 200 mg by mouth 2 (Two) Times a Day. Takes 1.5 tabs in am and 2 tabs at hs     • diazePAM (VALIUM) 5 MG tablet Take 1 tablet by mouth Every 12 (Twelve) Hours As Needed for Anxiety. 30 tablet 3   • gabapentin (NEURONTIN) 600 MG tablet Every 8 (Eight) Hours.     • HYDROcodone-acetaminophen (NORCO)  MG per tablet Every 8 (Eight) Hours.     • ibuprofen (ADVIL,MOTRIN) 800 MG tablet ibuprofen 800 mg tablet   TAKE ONE TABLET BY MOUTH THREE TIMES A DAY       No current facility-administered medications for this visit.         Allergies:      Allergies   Allergen Reactions   • Tramadol Other (See Comments)     Seizures  Ultram          Past Surgical History:     Past Surgical History:   Procedure Laterality Date   • ABDOMINAL SURGERY     • EPIDIDYMECTOMY Right 6/27/2018    Procedure: EPIDIDYMECTOMY;  Surgeon: Chino Aviles MD;  Location: [facility];  Service: Urology   • HERNIA REPAIR     • MOUTH SURGERY      teeth          Social History:     Social History     Socioeconomic History   • Marital status:       Spouse name: Not on file   • Number of children: Not on file   • Years of education: Not on file   • Highest education level: Not on file   Tobacco Use   • Smoking status: Current Every Day Smoker     Packs/day: 0.50     Years: 14.00     Pack years: 7.00     Types: Cigarettes   • Smokeless tobacco: Current User     Types: Snuff   Substance and Sexual Activity   • Alcohol use: No   • Drug use: No   • Sexual activity: Defer     Birth control/protection: None       Family History:     Family History   Problem Relation Age of Onset   • Cancer Father    • Hypertension Father    • Diabetes Father    • Hypertension Mother    • Cancer Maternal Uncle    • Heart disease Maternal Uncle    • Heart disease Paternal Aunt    • Cancer Maternal Grandmother    • Heart disease Maternal Grandmother        Review of Systems:     Review of Systems   Constitutional: Negative.    HENT: Negative.    Eyes: Negative.    Respiratory: Negative.    Cardiovascular: Negative.    Gastrointestinal: Negative.    Endocrine: Negative.    Genitourinary: Positive for scrotal swelling and testicular pain.   Musculoskeletal: Negative.    Allergic/Immunologic: Negative.    Neurological: Negative.    Hematological: Negative.    Psychiatric/Behavioral: Negative.        Physical Exam:     Physical Exam  Vitals signs and nursing note reviewed.   Constitutional:       Appearance: He is well-developed.   HENT:      Head: Normocephalic and atraumatic.   Eyes:      Conjunctiva/sclera: Conjunctivae normal.      Pupils: Pupils are equal, round, and reactive to light.   Neck:      Musculoskeletal: Normal range of motion.   Cardiovascular:      Rate and Rhythm: Normal rate and regular rhythm.      Heart sounds: Normal heart sounds.   Pulmonary:      Effort: Pulmonary effort is normal.      Breath sounds: Normal breath sounds.   Abdominal:      General: Bowel sounds are normal.      Palpations: Abdomen is soft.   Genitourinary:     Comments: Although palpably normal  there is severe testalgia  Musculoskeletal: Normal range of motion.   Skin:     General: Skin is warm and dry.   Neurological:      Mental Status: He is alert and oriented to person, place, and time.      Deep Tendon Reflexes: Reflexes are normal and symmetric.   Psychiatric:         Behavior: Behavior normal.         Thought Content: Thought content normal.         Judgment: Judgment normal.         I have reviewed the following portions of the patient's history: allergies, current medications, past family history, past medical history, past social history, past surgical history, problem list and ROS and confirm it's accurate.      Procedure:     Total cord block: After appropriate informed consent including the risk of exacerbation etc. he is prepped and draped in a sterile fashion I used a 27-gauge needle to the skin wheal and injected a total of 5 cc of 1% Xylocaine with epinephrine and around the cord without complication with complete resolution of the pain  Assessment/Plan:   Right-sided testalgia- scrotal cord block performed without complication            Patient's Body mass index is 22.24 kg/m². BMI is within normal parameters. No follow-up required..              This document has been electronically signed by ANNE CORNELIUS MD September 14, 2020 14:11 EDT

## 2020-09-15 ENCOUNTER — OFFICE VISIT (OUTPATIENT)
Dept: UROLOGY | Facility: CLINIC | Age: 50
End: 2020-09-15

## 2020-09-15 VITALS — HEIGHT: 72 IN | BODY MASS INDEX: 22.21 KG/M2 | WEIGHT: 164 LBS | TEMPERATURE: 98.1 F

## 2020-09-15 DIAGNOSIS — N45.1 EPIDIDYMITIS: Primary | ICD-10-CM

## 2020-09-15 PROCEDURE — 99213 OFFICE O/P EST LOW 20 MIN: CPT | Performed by: UROLOGY

## 2020-09-15 RX ORDER — HYDROCODONE BITARTRATE AND ACETAMINOPHEN 10; 325 MG/1; MG/1
1 TABLET ORAL EVERY 6 HOURS PRN
Qty: 16 TABLET | Refills: 0 | Status: SHIPPED | OUTPATIENT
Start: 2020-09-15 | End: 2020-10-14 | Stop reason: SDUPTHER

## 2020-09-15 NOTE — PROGRESS NOTES
Chief Complaint:          Chief Complaint   Patient presents with   • Testicle Pain       HPI:   50 y.o. male returns today 1 day after his block howling in pain stating his cystoscopy is hurt his urethra I explained to him extensively he did not have a cystoscopy he and I had nothing but a block his exam is completely normal he would like orchiectomy I told him this is really not an option with a normal feeling testicle I gave him a short course of pain medication per his request and I will see him back in 1 month.  In the absence of demonstrable pathology I do not recommend intervention      Past Medical History:        Past Medical History:   Diagnosis Date   • Arthritis    • Back pain    • Carpal tunnel syndrome     bilateral   • Elevated cholesterol    • Frequency of urination    • GERD (gastroesophageal reflux disease)    • Heartburn    • Seizures (CMS/HCC)          Current Meds:     Current Outpatient Medications   Medication Sig Dispense Refill   • Adult Aspirin Regimen 81 MG EC tablet Take 81 mg by mouth Daily.     • carBAMazepine (TEGretol) 200 MG tablet Take 200 mg by mouth 2 (Two) Times a Day. Takes 1.5 tabs in am and 2 tabs at hs     • diazePAM (VALIUM) 5 MG tablet Take 1 tablet by mouth Every 12 (Twelve) Hours As Needed for Anxiety. 30 tablet 3   • gabapentin (NEURONTIN) 600 MG tablet Every 8 (Eight) Hours.     • HYDROcodone-acetaminophen (NORCO)  MG per tablet Every 8 (Eight) Hours.     • ibuprofen (ADVIL,MOTRIN) 800 MG tablet ibuprofen 800 mg tablet   TAKE ONE TABLET BY MOUTH THREE TIMES A DAY     • sildenafil (REVATIO) 20 MG tablet May take up to 5 by mouth one hour prior to intercourse on an empty stomach 40 tablet 6     No current facility-administered medications for this visit.         Allergies:      Allergies   Allergen Reactions   • Tramadol Other (See Comments)     Seizures  Ultram          Past Surgical History:     Past Surgical History:   Procedure Laterality Date   • ABDOMINAL  SURGERY     • EPIDIDYMECTOMY Right 6/27/2018    Procedure: EPIDIDYMECTOMY;  Surgeon: Chino Aviles MD;  Location: [facility];  Service: Urology   • HERNIA REPAIR     • MOUTH SURGERY      teeth          Social History:     Social History     Socioeconomic History   • Marital status:      Spouse name: Not on file   • Number of children: Not on file   • Years of education: Not on file   • Highest education level: Not on file   Tobacco Use   • Smoking status: Current Every Day Smoker     Packs/day: 0.50     Years: 14.00     Pack years: 7.00     Types: Cigarettes   • Smokeless tobacco: Current User     Types: Snuff   Substance and Sexual Activity   • Alcohol use: No   • Drug use: No   • Sexual activity: Defer     Birth control/protection: None       Family History:     Family History   Problem Relation Age of Onset   • Cancer Father    • Hypertension Father    • Diabetes Father    • Hypertension Mother    • Cancer Maternal Uncle    • Heart disease Maternal Uncle    • Heart disease Paternal Aunt    • Cancer Maternal Grandmother    • Heart disease Maternal Grandmother        Review of Systems:     Review of Systems   Constitutional: Negative.  Negative for chills, fatigue and fever.   HENT: Negative.    Eyes: Negative.    Respiratory: Negative.  Negative for cough, shortness of breath and wheezing.    Cardiovascular: Negative.  Negative for leg swelling.   Gastrointestinal: Negative.  Negative for abdominal pain, nausea and vomiting.   Endocrine: Negative.    Genitourinary: Positive for testicular pain.   Musculoskeletal: Negative.  Negative for back pain and joint swelling.   Allergic/Immunologic: Negative.    Neurological: Negative.  Negative for dizziness and headaches.   Hematological: Negative.    Psychiatric/Behavioral: Negative.  Negative for confusion.       Physical Exam:     Physical Exam  Vitals signs and nursing note reviewed.   Constitutional:       Appearance: He is well-developed.   HENT:       Head: Normocephalic and atraumatic.   Eyes:      Conjunctiva/sclera: Conjunctivae normal.      Pupils: Pupils are equal, round, and reactive to light.   Neck:      Musculoskeletal: Normal range of motion.   Cardiovascular:      Rate and Rhythm: Normal rate and regular rhythm.      Heart sounds: Normal heart sounds.   Pulmonary:      Effort: Pulmonary effort is normal.      Breath sounds: Normal breath sounds.   Abdominal:      General: Bowel sounds are normal.      Palpations: Abdomen is soft.   Genitourinary:     Penis: Normal.       Scrotum/Testes: Normal.   Musculoskeletal: Normal range of motion.   Skin:     General: Skin is warm and dry.   Neurological:      Mental Status: He is alert and oriented to person, place, and time.      Deep Tendon Reflexes: Reflexes are normal and symmetric.   Psychiatric:         Behavior: Behavior normal.         Thought Content: Thought content normal.         Judgment: Judgment normal.         I have reviewed the following portions of the patient's history: allergies, current medications, past family history, past medical history, past social history, past surgical history, problem list and ROS and confirm it's accurate.      Procedure:       Assessment/Plan:   Testalgia-she had a prescription lidocaine block of minimal significance and no complications coming back wanting pain medication because he thought he had a cystoscopy.  I explained to him he did not have a cystoscopy I am not going to do an orchiectomy on a palpably normal testis.  Narcotic pain medication-patient has significant acute pain that I believe would be an indication for the use of narcotic pain medication.  I discussed the significant risks of pain medication and the fact that this will be a short only option and I will give her no more than a three-day supply of pain medication.  And I will not plan long-term medication and that this will be sent to a pain clinic if at all becomes necessary.  We discussed  signing a pain medication agreement and the fact that we're going to run a state JUNAID review to be sure the patient is not getting pain medication from elsewhere.  If this is the case we will not give pain medication.  As part of the patient's treatment plan of there being prescribed a controlled substance with potential for abuse.  This patient has been wait aware of the appropriate dose of such medications including, the risk for somnolence, limited ability to drive and/or safety and the significant potential for overdose.  It is been made clear that these medications are for the prescribed patient only without concomitant use of alcohol or other sepsis unless prescribed by the medical provider.  Has completed prescribing agreement detailing the terms of continue prescribing him a controlled substance.  Including monitoring Junaid ports, the possibility of urine drug screens and pedal counts.  The patient is aware that we reviewed JUNAID reports on a regular basis and scan them into the chart.  History and physical examination exhibited continued safe and appropriate use of controlled substances.  Also discussed the fact that the new [place] legislation allows only a three-day prescription for pain medication.  In this situation he will be referred to a chronic pain clinic.            Patient's Body mass index is 22.24 kg/m². BMI is within normal parameters. No follow-up required..              This document has been electronically signed by ANNE CORNELIUS MD September 15, 2020 13:15 EDT

## 2020-09-21 ENCOUNTER — TELEPHONE (OUTPATIENT)
Dept: GASTROENTEROLOGY | Facility: CLINIC | Age: 50
End: 2020-09-21

## 2020-09-21 NOTE — TELEPHONE ENCOUNTER
"Patient called and said that he is still having severe testicle pain and wants to know if he can just get scheduled to have \"it out\"? He wants someone to call him please.    He can be reached at [phone]  "

## 2020-10-12 ENCOUNTER — OFFICE VISIT (OUTPATIENT)
Dept: UROLOGY | Facility: CLINIC | Age: 50
End: 2020-10-12

## 2020-10-12 VITALS — TEMPERATURE: 98.7 F | BODY MASS INDEX: 21.67 KG/M2 | HEIGHT: 72 IN | WEIGHT: 160 LBS

## 2020-10-12 DIAGNOSIS — N42.9 DISORDER OF PROSTATE: ICD-10-CM

## 2020-10-12 DIAGNOSIS — N45.1 EPIDIDYMITIS: Primary | ICD-10-CM

## 2020-10-12 DIAGNOSIS — N50.811 PAIN IN RIGHT TESTICLE: ICD-10-CM

## 2020-10-12 PROCEDURE — 99214 OFFICE O/P EST MOD 30 MIN: CPT | Performed by: UROLOGY

## 2020-10-12 RX ORDER — DIAZEPAM 5 MG/1
5 TABLET ORAL EVERY 12 HOURS PRN
Qty: 30 TABLET | Refills: 3 | Status: SHIPPED | OUTPATIENT
Start: 2020-10-12 | End: 2021-01-01 | Stop reason: SDUPTHER

## 2020-10-12 RX ORDER — FAMOTIDINE 20 MG/1
1 TABLET, FILM COATED ORAL 2 TIMES DAILY
COMMUNITY
Start: 2020-09-15

## 2020-10-12 NOTE — H&P (VIEW-ONLY)
Chief Complaint:          Chief Complaint   Patient presents with   • Epididymitis     1 mnth f/u       HPI:   50 y.o. male returns today he has severe testalgia despite of block.  He says he cannot function he wants an orchiectomy he understands that he will even with a solitary testicle that has long-term complications of low testosterone but he Apsley does not care he cannot live on pain medication.  He can even wash himself set him up for an orchiectomy as soon as possible per his request      Past Medical History:        Past Medical History:   Diagnosis Date   • Arthritis    • Back pain    • Carpal tunnel syndrome     bilateral   • Elevated cholesterol    • Frequency of urination    • GERD (gastroesophageal reflux disease)    • Heartburn    • Seizures (CMS/[facility])          Current Meds:     Current Outpatient Medications   Medication Sig Dispense Refill   • Adult Aspirin Regimen 81 MG EC tablet Take 81 mg by mouth Daily.     • carBAMazepine (TEGretol) 200 MG tablet Take 200 mg by mouth 2 (Two) Times a Day. Takes 1.5 tabs in am and 2 tabs at hs     • diazePAM (VALIUM) 5 MG tablet Take 1 tablet by mouth Every 12 (Twelve) Hours As Needed for Anxiety. 30 tablet 3   • famotidine (PEPCID) 20 MG tablet Take 1 tablet by mouth 2 (two) times a day.     • gabapentin (NEURONTIN) 600 MG tablet Every 8 (Eight) Hours.     • HYDROcodone-acetaminophen (NORCO)  MG per tablet Take 1 tablet by mouth Every 6 (Six) Hours As Needed for Moderate Pain . 16 tablet 0   • ibuprofen (ADVIL,MOTRIN) 800 MG tablet ibuprofen 800 mg tablet   TAKE ONE TABLET BY MOUTH THREE TIMES A DAY     • sildenafil (REVATIO) 20 MG tablet May take up to 5 by mouth one hour prior to intercourse on an empty stomach 40 tablet 6     No current facility-administered medications for this visit.         Allergies:      Allergies   Allergen Reactions   • Tramadol Other (See Comments)     Seizures  Ultram          Past Surgical History:     Past Surgical History:    Procedure Laterality Date   • ABDOMINAL SURGERY     • EPIDIDYMECTOMY Right 6/27/2018    Procedure: EPIDIDYMECTOMY;  Surgeon: Chino Aviles MD;  Location: [facility];  Service: Urology   • HERNIA REPAIR     • MOUTH SURGERY      teeth          Social History:     Social History     Socioeconomic History   • Marital status:      Spouse name: Not on file   • Number of children: Not on file   • Years of education: Not on file   • Highest education level: Not on file   Tobacco Use   • Smoking status: Current Every Day Smoker     Packs/day: 0.50     Years: 14.00     Pack years: 7.00     Types: Cigarettes   • Smokeless tobacco: Current User     Types: Snuff   Substance and Sexual Activity   • Alcohol use: No   • Drug use: No   • Sexual activity: Defer     Birth control/protection: None       Family History:     Family History   Problem Relation Age of Onset   • Cancer Father    • Hypertension Father    • Diabetes Father    • Hypertension Mother    • Cancer Maternal Uncle    • Heart disease Maternal Uncle    • Heart disease Paternal Aunt    • Cancer Maternal Grandmother    • Heart disease Maternal Grandmother        Review of Systems:     Review of Systems   Constitutional: Negative.    HENT: Negative.    Eyes: Negative.    Respiratory: Negative.    Cardiovascular: Negative.    Gastrointestinal: Negative.    Endocrine: Negative.    Genitourinary: Positive for testicular pain.   Musculoskeletal: Negative.    Allergic/Immunologic: Negative.    Neurological: Negative.    Hematological: Negative.    Psychiatric/Behavioral: Negative.        Physical Exam:     Physical Exam  Vitals signs and nursing note reviewed.   Constitutional:       Appearance: He is well-developed.   HENT:      Head: Normocephalic and atraumatic.   Eyes:      Conjunctiva/sclera: Conjunctivae normal.      Pupils: Pupils are equal, round, and reactive to light.   Neck:      Musculoskeletal: Normal range of motion.   Cardiovascular:      Rate  and Rhythm: Normal rate and regular rhythm.      Heart sounds: Normal heart sounds.   Pulmonary:      Effort: Pulmonary effort is normal.      Breath sounds: Normal breath sounds.   Abdominal:      General: Bowel sounds are normal.      Palpations: Abdomen is soft.   Genitourinary:     Comments: Phallus bilateral descended testicles with severe right-sided epididymal pain at the site of the prior epididymectomy done several years ago.  Musculoskeletal: Normal range of motion.   Skin:     General: Skin is warm and dry.   Neurological:      Mental Status: He is alert and oriented to person, place, and time.      Deep Tendon Reflexes: Reflexes are normal and symmetric.   Psychiatric:         Behavior: Behavior normal.         Thought Content: Thought content normal.         Judgment: Judgment normal.         I have reviewed the following portions of the patient's history: allergies, current medications, past family history, past medical history, past social history, past surgical history, problem list and ROS and confirm it's accurate.      Procedure:       Assessment/Plan:   End-stage right testalgia he is desirous of an orchiectomy, he understands the long-term consequences of pain, low testosterone manifestations etc. he is adamant about a orchiectomy            Patient's Body mass index is 22.24 kg/m². BMI is within normal parameters. No follow-up required..              This document has been electronically signed by ANNE CORNELIUS MD October 12, 2020 13:29 EDT

## 2020-10-12 NOTE — PROGRESS NOTES
Chief Complaint:          Chief Complaint   Patient presents with   • Epididymitis     1 mnth f/u       HPI:   50 y.o. male returns today he has severe testalgia despite of block.  He says he cannot function he wants an orchiectomy he understands that he will even with a solitary testicle that has long-term complications of low testosterone but he A[place]ley does not care he cannot live on pain medication.  He can even wash himself set him up for an orchiectomy as soon as possible per his request      Past Medical History:        Past Medical History:   Diagnosis Date   • Arthritis    • Back pain    • Carpal tunnel syndrome     bilateral   • Elevated cholesterol    • Frequency of urination    • GERD (gastroesophageal reflux disease)    • Heartburn    • Seizures (CMS/[facility])          Current Meds:     Current Outpatient Medications   Medication Sig Dispense Refill   • Adult Aspirin Regimen 81 MG EC tablet Take 81 mg by mouth Daily.     • carBAMazepine (TEGretol) 200 MG tablet Take 200 mg by mouth 2 (Two) Times a Day. Takes 1.5 tabs in am and 2 tabs at hs     • diazePAM (VALIUM) 5 MG tablet Take 1 tablet by mouth Every 12 (Twelve) Hours As Needed for Anxiety. 30 tablet 3   • famotidine (PEPCID) 20 MG tablet Take 1 tablet by mouth 2 (two) times a day.     • gabapentin (NEURONTIN) 600 MG tablet Every 8 (Eight) Hours.     • HYDROcodone-acetaminophen (NORCO)  MG per tablet Take 1 tablet by mouth Every 6 (Six) Hours As Needed for Moderate Pain . 16 tablet 0   • ibuprofen (ADVIL,MOTRIN) 800 MG tablet ibuprofen 800 mg tablet   TAKE ONE TABLET BY MOUTH THREE TIMES A DAY     • sildenafil (REVATIO) 20 MG tablet May take up to 5 by mouth one hour prior to intercourse on an empty stomach 40 tablet 6     No current facility-administered medications for this visit.         Allergies:      Allergies   Allergen Reactions   • Tramadol Other (See Comments)     Seizures  Ultram          Past Surgical History:     Past Surgical History:      Procedure Laterality Date   • ABDOMINAL SURGERY     • EPIDIDYMECTOMY Right 6/27/2018    Procedure: EPIDIDYMECTOMY;  Surgeon: Chino Aviles MD;  Location: [facility];  Service: Urology   • HERNIA REPAIR     • MOUTH SURGERY      teeth          Social History:     Social History     Socioeconomic History   • Marital status:      Spouse name: Not on file   • Number of children: Not on file   • Years of education: Not on file   • Highest education level: Not on file   Tobacco Use   • Smoking status: Current Every Day Smoker     Packs/day: 0.50     Years: 14.00     Pack years: 7.00     Types: Cigarettes   • Smokeless tobacco: Current User     Types: Snuff   Substance and Sexual Activity   • Alcohol use: No   • Drug use: No   • Sexual activity: Defer     Birth control/protection: None       Family History:     Family History   Problem Relation Age of Onset   • Cancer Father    • Hypertension Father    • Diabetes Father    • Hypertension Mother    • Cancer Maternal Uncle    • Heart disease Maternal Uncle    • Heart disease Paternal Aunt    • Cancer Maternal Grandmother    • Heart disease Maternal Grandmother        Review of Systems:     Review of Systems   Constitutional: Negative.    HENT: Negative.    Eyes: Negative.    Respiratory: Negative.    Cardiovascular: Negative.    Gastrointestinal: Negative.    Endocrine: Negative.    Genitourinary: Positive for testicular pain.   Musculoskeletal: Negative.    Allergic/Immunologic: Negative.    Neurological: Negative.    Hematological: Negative.    Psychiatric/Behavioral: Negative.        Physical Exam:     Physical Exam  Vitals signs and nursing note reviewed.   Constitutional:       Appearance: He is well-developed.   HENT:      Head: Normocephalic and atraumatic.   Eyes:      Conjunctiva/sclera: Conjunctivae normal.      Pupils: Pupils are equal, round, and reactive to light.   Neck:      Musculoskeletal: Normal range of motion.   Cardiovascular:      Rate  and Rhythm: Normal rate and regular rhythm.      Heart sounds: Normal heart sounds.   Pulmonary:      Effort: Pulmonary effort is normal.      Breath sounds: Normal breath sounds.   Abdominal:      General: Bowel sounds are normal.      Palpations: Abdomen is soft.   Genitourinary:     Comments: Phallus bilateral descended testicles with severe right-sided epididymal pain at the site of the prior epididymectomy done several years ago.  Musculoskeletal: Normal range of motion.   Skin:     General: Skin is warm and dry.   Neurological:      Mental Status: He is alert and oriented to person, place, and time.      Deep Tendon Reflexes: Reflexes are normal and symmetric.   Psychiatric:         Behavior: Behavior normal.         Thought Content: Thought content normal.         Judgment: Judgment normal.         I have reviewed the following portions of the patient's history: allergies, current medications, past family history, past medical history, past social history, past surgical history, problem list and ROS and confirm it's accurate.      Procedure:       Assessment/Plan:   End-stage right testalgia he is desirous of an orchiectomy, he understands the long-term consequences of pain, low testosterone manifestations etc. he is adamant about a orchiectomy            Patient's Body mass index is 22.24 kg/m². BMI is within normal parameters. No follow-up required..              This document has been electronically signed by ANNE CORNELIUS MD October 12, 2020 13:29 EDT

## 2020-10-13 DIAGNOSIS — N45.1 EPIDIDYMITIS: Primary | ICD-10-CM

## 2020-10-13 PROBLEM — N50.811 PAIN IN RIGHT TESTICLE: Status: ACTIVE | Noted: 2020-10-13

## 2020-10-13 RX ORDER — GENTAMICIN SULFATE 80 MG/100ML
80 INJECTION, SOLUTION INTRAVENOUS ONCE
Status: CANCELLED | OUTPATIENT
Start: 2020-10-13 | End: 2020-10-13

## 2020-10-14 RX ORDER — HYDROCODONE BITARTRATE AND ACETAMINOPHEN 10; 325 MG/1; MG/1
1 TABLET ORAL EVERY 6 HOURS PRN
Qty: 16 TABLET | Refills: 0 | Status: SHIPPED | OUTPATIENT
Start: 2020-10-14 | End: 2021-01-01

## 2020-10-16 ENCOUNTER — LAB (OUTPATIENT)
Dept: LAB | Facility: HOSPITAL | Age: 50
End: 2020-10-16

## 2020-10-16 ENCOUNTER — APPOINTMENT (OUTPATIENT)
Dept: PREADMISSION TESTING | Facility: HOSPITAL | Age: 50
End: 2020-10-16

## 2020-10-16 DIAGNOSIS — N45.1 EPIDIDYMITIS: ICD-10-CM

## 2020-10-16 LAB
ANION GAP SERPL CALCULATED.3IONS-SCNC: 7.5 MMOL/L (ref 5–15)
BUN SERPL-MCNC: 16 MG/DL (ref 6–20)
BUN/CREAT SERPL: 20.8 (ref 7–25)
CALCIUM SPEC-SCNC: 9.7 MG/DL (ref 8.6–10.5)
CHLORIDE SERPL-SCNC: 106 MMOL/L (ref 98–107)
CO2 SERPL-SCNC: 26.5 MMOL/L (ref 22–29)
CREAT SERPL-MCNC: 0.77 MG/DL (ref 0.76–1.27)
DEPRECATED RDW RBC AUTO: 44.2 FL (ref 37–54)
ERYTHROCYTE [DISTWIDTH] IN BLOOD BY AUTOMATED COUNT: 12.4 % (ref 12.3–15.4)
GFR SERPL CREATININE-BSD FRML MDRD: 108 ML/MIN/1.73
GLUCOSE SERPL-MCNC: 89 MG/DL (ref 65–99)
HCT VFR BLD AUTO: 41.4 % (ref 37.5–51)
HGB BLD-MCNC: 13.8 G/DL (ref 13–17.7)
MCH RBC QN AUTO: 32.5 PG (ref 26.6–33)
MCHC RBC AUTO-ENTMCNC: 33.3 G/DL (ref 31.5–35.7)
MCV RBC AUTO: 97.4 FL (ref 79–97)
PLATELET # BLD AUTO: 321 10*3/MM3 (ref 140–450)
PMV BLD AUTO: 8.5 FL (ref 6–12)
POTASSIUM SERPL-SCNC: 3.9 MMOL/L (ref 3.5–5.2)
RBC # BLD AUTO: 4.25 10*6/MM3 (ref 4.14–5.8)
SARS-COV-2 RNA RESP QL NAA+PROBE: NOT DETECTED
SODIUM SERPL-SCNC: 140 MMOL/L (ref 136–145)
WBC # BLD AUTO: 11.04 10*3/MM3 (ref 3.4–10.8)

## 2020-10-16 PROCEDURE — C9803 HOPD COVID-19 SPEC COLLECT: HCPCS

## 2020-10-16 PROCEDURE — U0004 COV-19 TEST NON-CDC HGH THRU: HCPCS

## 2020-10-16 PROCEDURE — 85027 COMPLETE CBC AUTOMATED: CPT | Performed by: ANESTHESIOLOGY

## 2020-10-16 PROCEDURE — 36415 COLL VENOUS BLD VENIPUNCTURE: CPT

## 2020-10-16 PROCEDURE — 80048 BASIC METABOLIC PNL TOTAL CA: CPT | Performed by: ANESTHESIOLOGY

## 2020-10-16 RX ORDER — EPINASTINE HCL 0.05 %
1 DROPS OPHTHALMIC (EYE) 2 TIMES DAILY
COMMUNITY
End: 2020-10-16

## 2020-10-16 NOTE — DISCHARGE INSTRUCTIONS
TAKE the following medications the morning of surgery:    All heart or blood pressure medications    Please discontinue all blood thinners and anticoagulants (except aspirin) prior to surgery as per your surgeon and cardiologist instructions.  Aspirin may be continued up to the day prior to surgery.    HOLD all diabetic medications the morning of surgery as order by physician.    Arrival time for surgery on 10/19/20 will be given to you by Dr. Aviles's office.    A RESPONSIBLE PERSON MUST REMAIN IN THE WAITING ROOM DURING YOUR PROCEDURE AND A RESPONSIBLE  MUST BE AVAILABLE UPON YOUR DISCHARGE.    General Instructions:  • Do NOT eat or drink after midnight 10/18/20 which includes water, mints, or gum.  • You may brush your teeth. Dental appliances that are removable must be taken out day of surgery.  • Do NOT smoke, chew tobacco, or drink alcohol within 24 hours prior to surgery.  • Bring medications in original bottles, any inhalers and if applicable your C-PAP/BI-PAP machine  • Bring any papers given to you in the doctor’s office  • Wear clean, comfortable clothes and socks  • Do NOT wear contact lenses or make-up or dark nail polish.  Bring a case for your glasses if applicable.  • Bring crutches or walker if applicable  • Leave all other valuables and jewelry at home  • If you were given a blood bank armband, continue to wear it until discharged.    Preventing a Surgical Site Infection:  • Shower the night before surgery (unless instructed otherwise) using a fresh bar of anti-bacterial soap (such as Dial) and clean washcloth.  Dry with a clean towel and dress in clean clothing.  • For 2 to 3 days before surgery, avoid shaving with a razor near where you will have surgery because the razor can irritate skin and make it easier to develop an infection.  Ask your surgeon if you will be receiving antibiotics prior to surgery.  • Make sure you, your family, and all healthcare providers clean their hands with  soap and water or an alcohol-based hand  before caring for you or your wound.  • If at all possible, quit smoking as many days before surgery as you can.    Day of Surgery:  Upon arrival, a pre-op nurse and anesthesiologist will review your health history, obtain vital signs, and answer questions you may have.  The only belongings needed at this time will be your home medications and if applicable you C-PAP/BI-PAP machine.  If you are staying overnight, your family can leave the rest of your belongings in the car and bring them to your room later.  A pre-op nurse will start an IV and you may receive medication in preparation for surgery.  Due to patient privacy and limited space, only one member of your family will be able to accompany you in the pre-op area.  While you are in surgery your family should notify the waiting room  if they leave the waiting room area and provide a contact number.  Please be aware that surgery does come with discomfort.  We want to make every effort to control your discomfort so please discuss any uncontrolled symptoms with your nurse.  Your doctor will most likely have prescribed pain medications.  If you are going home after surgery you will receive individualized written care instructions before being discharged.  A responsible adult must drive you to and from the hospital on the day of surgery and stay with you for 24 hours.  If you are staying overnight following surgery, you will be transported to your hospital room following the recovery period.

## 2020-10-19 ENCOUNTER — ANESTHESIA EVENT (OUTPATIENT)
Dept: PERIOP | Facility: HOSPITAL | Age: 50
End: 2020-10-19

## 2020-10-19 ENCOUNTER — HOSPITAL ENCOUNTER (OUTPATIENT)
Facility: HOSPITAL | Age: 50
Discharge: HOME | End: 2020-10-19
Attending: UROLOGY | Admitting: UROLOGY

## 2020-10-19 ENCOUNTER — ANESTHESIA (OUTPATIENT)
Dept: PERIOP | Facility: HOSPITAL | Age: 50
End: 2020-10-19

## 2020-10-19 VITALS
RESPIRATION RATE: 18 BRPM | TEMPERATURE: 97.4 F | HEIGHT: 72 IN | WEIGHT: 154.25 LBS | DIASTOLIC BLOOD PRESSURE: 87 MMHG | OXYGEN SATURATION: 98 % | SYSTOLIC BLOOD PRESSURE: 125 MMHG | BODY MASS INDEX: 20.89 KG/M2 | HEART RATE: 83 BPM

## 2020-10-19 DIAGNOSIS — N45.1 EPIDIDYMITIS: ICD-10-CM

## 2020-10-19 DIAGNOSIS — N50.811 PAIN IN RIGHT TESTICLE: ICD-10-CM

## 2020-10-19 PROCEDURE — 25010000002 GENTAMICIN PER 80 MG: Performed by: UROLOGY

## 2020-10-19 PROCEDURE — 25010000002 PROPOFOL 10 MG/ML EMULSION: Performed by: NURSE ANESTHETIST, CERTIFIED REGISTERED

## 2020-10-19 PROCEDURE — 54520 REMOVAL OF TESTIS: CPT | Performed by: UROLOGY

## 2020-10-19 PROCEDURE — 25010000002 FENTANYL CITRATE (PF) 100 MCG/2ML SOLUTION: Performed by: NURSE ANESTHETIST, CERTIFIED REGISTERED

## 2020-10-19 PROCEDURE — 25010000002 MIDAZOLAM PER 1 MG: Performed by: NURSE ANESTHETIST, CERTIFIED REGISTERED

## 2020-10-19 PROCEDURE — 25010000002 ONDANSETRON PER 1 MG: Performed by: NURSE ANESTHETIST, CERTIFIED REGISTERED

## 2020-10-19 RX ORDER — SODIUM CHLORIDE, SODIUM LACTATE, POTASSIUM CHLORIDE, CALCIUM CHLORIDE 600; 310; 30; 20 MG/100ML; MG/100ML; MG/100ML; MG/100ML
125 INJECTION, SOLUTION INTRAVENOUS CONTINUOUS
Status: DISCONTINUED | OUTPATIENT
Start: 2020-10-19 | End: 2020-10-19 | Stop reason: HOSPADM

## 2020-10-19 RX ORDER — ONDANSETRON 2 MG/ML
INJECTION INTRAMUSCULAR; INTRAVENOUS AS NEEDED
Status: DISCONTINUED | OUTPATIENT
Start: 2020-10-19 | End: 2020-10-19 | Stop reason: SURG

## 2020-10-19 RX ORDER — HYDROCODONE BITARTRATE AND ACETAMINOPHEN 10; 325 MG/1; MG/1
1 TABLET ORAL EVERY 4 HOURS PRN
Qty: 12 TABLET | Refills: 0 | Status: SHIPPED | OUTPATIENT
Start: 2020-10-19 | End: 2020-11-02 | Stop reason: SDUPTHER

## 2020-10-19 RX ORDER — GENTAMICIN SULFATE 80 MG/100ML
80 INJECTION, SOLUTION INTRAVENOUS ONCE
Status: COMPLETED | OUTPATIENT
Start: 2020-10-19 | End: 2020-10-19

## 2020-10-19 RX ORDER — IPRATROPIUM BROMIDE AND ALBUTEROL SULFATE 2.5; .5 MG/3ML; MG/3ML
3 SOLUTION RESPIRATORY (INHALATION) ONCE AS NEEDED
Status: DISCONTINUED | OUTPATIENT
Start: 2020-10-19 | End: 2020-10-19 | Stop reason: HOSPADM

## 2020-10-19 RX ORDER — FENTANYL CITRATE 50 UG/ML
INJECTION, SOLUTION INTRAMUSCULAR; INTRAVENOUS AS NEEDED
Status: DISCONTINUED | OUTPATIENT
Start: 2020-10-19 | End: 2020-10-19 | Stop reason: SURG

## 2020-10-19 RX ORDER — MEPERIDINE HYDROCHLORIDE 25 MG/ML
12.5 INJECTION INTRAMUSCULAR; INTRAVENOUS; SUBCUTANEOUS
Status: DISCONTINUED | OUTPATIENT
Start: 2020-10-19 | End: 2020-10-19 | Stop reason: HOSPADM

## 2020-10-19 RX ORDER — LIDOCAINE HYDROCHLORIDE AND EPINEPHRINE 10; 10 MG/ML; UG/ML
INJECTION, SOLUTION INFILTRATION; PERINEURAL AS NEEDED
Status: DISCONTINUED | OUTPATIENT
Start: 2020-10-19 | End: 2020-10-19 | Stop reason: HOSPADM

## 2020-10-19 RX ORDER — SODIUM CHLORIDE 0.9 % (FLUSH) 0.9 %
10 SYRINGE (ML) INJECTION EVERY 12 HOURS SCHEDULED
Status: DISCONTINUED | OUTPATIENT
Start: 2020-10-19 | End: 2020-10-19 | Stop reason: HOSPADM

## 2020-10-19 RX ORDER — LIDOCAINE HYDROCHLORIDE 20 MG/ML
INJECTION, SOLUTION INFILTRATION; PERINEURAL AS NEEDED
Status: DISCONTINUED | OUTPATIENT
Start: 2020-10-19 | End: 2020-10-19 | Stop reason: SURG

## 2020-10-19 RX ORDER — BACITRACIN, NEOMYCIN, POLYMYXIN B 400; 3.5; 5 [USP'U]/G; MG/G; [USP'U]/G
OINTMENT TOPICAL AS NEEDED
Status: DISCONTINUED | OUTPATIENT
Start: 2020-10-19 | End: 2020-10-19 | Stop reason: HOSPADM

## 2020-10-19 RX ORDER — MAGNESIUM HYDROXIDE 1200 MG/15ML
LIQUID ORAL AS NEEDED
Status: DISCONTINUED | OUTPATIENT
Start: 2020-10-19 | End: 2020-10-19 | Stop reason: HOSPADM

## 2020-10-19 RX ORDER — OXYCODONE HYDROCHLORIDE AND ACETAMINOPHEN 5; 325 MG/1; MG/1
1 TABLET ORAL ONCE
Status: COMPLETED | OUTPATIENT
Start: 2020-10-19 | End: 2020-10-19

## 2020-10-19 RX ORDER — SODIUM CHLORIDE 0.9 % (FLUSH) 0.9 %
10 SYRINGE (ML) INJECTION AS NEEDED
Status: DISCONTINUED | OUTPATIENT
Start: 2020-10-19 | End: 2020-10-19 | Stop reason: HOSPADM

## 2020-10-19 RX ORDER — MIDAZOLAM HYDROCHLORIDE 1 MG/ML
1 INJECTION INTRAMUSCULAR; INTRAVENOUS
Status: DISCONTINUED | OUTPATIENT
Start: 2020-10-19 | End: 2020-10-19 | Stop reason: HOSPADM

## 2020-10-19 RX ORDER — PROPOFOL 10 MG/ML
VIAL (ML) INTRAVENOUS AS NEEDED
Status: DISCONTINUED | OUTPATIENT
Start: 2020-10-19 | End: 2020-10-19 | Stop reason: SURG

## 2020-10-19 RX ORDER — MIDAZOLAM HYDROCHLORIDE 1 MG/ML
INJECTION INTRAMUSCULAR; INTRAVENOUS AS NEEDED
Status: DISCONTINUED | OUTPATIENT
Start: 2020-10-19 | End: 2020-10-19 | Stop reason: SURG

## 2020-10-19 RX ORDER — ONDANSETRON 2 MG/ML
4 INJECTION INTRAMUSCULAR; INTRAVENOUS AS NEEDED
Status: DISCONTINUED | OUTPATIENT
Start: 2020-10-19 | End: 2020-10-19 | Stop reason: HOSPADM

## 2020-10-19 RX ORDER — FAMOTIDINE 10 MG/ML
INJECTION, SOLUTION INTRAVENOUS AS NEEDED
Status: DISCONTINUED | OUTPATIENT
Start: 2020-10-19 | End: 2020-10-19 | Stop reason: SURG

## 2020-10-19 RX ORDER — FENTANYL CITRATE 50 UG/ML
50 INJECTION, SOLUTION INTRAMUSCULAR; INTRAVENOUS
Status: DISCONTINUED | OUTPATIENT
Start: 2020-10-19 | End: 2020-10-19 | Stop reason: HOSPADM

## 2020-10-19 RX ADMIN — MIDAZOLAM HYDROCHLORIDE 2 MG: 1 INJECTION, SOLUTION INTRAMUSCULAR; INTRAVENOUS at 10:00

## 2020-10-19 RX ADMIN — LIDOCAINE HYDROCHLORIDE 40 MG: 20 INJECTION, SOLUTION INFILTRATION; PERINEURAL at 10:05

## 2020-10-19 RX ADMIN — PROPOFOL 200 MG: 10 INJECTION, EMULSION INTRAVENOUS at 10:05

## 2020-10-19 RX ADMIN — FENTANYL CITRATE 100 MCG: 50 INJECTION INTRAMUSCULAR; INTRAVENOUS at 10:05

## 2020-10-19 RX ADMIN — FAMOTIDINE 20 MG: 10 INJECTION INTRAVENOUS at 10:00

## 2020-10-19 RX ADMIN — SODIUM CHLORIDE, POTASSIUM CHLORIDE, SODIUM LACTATE AND CALCIUM CHLORIDE 125 ML/HR: 600; 310; 30; 20 INJECTION, SOLUTION INTRAVENOUS at 09:52

## 2020-10-19 RX ADMIN — ONDANSETRON 4 MG: 2 INJECTION INTRAMUSCULAR; INTRAVENOUS at 10:05

## 2020-10-19 RX ADMIN — OXYCODONE HYDROCHLORIDE AND ACETAMINOPHEN 1 TABLET: 5; 325 TABLET ORAL at 11:14

## 2020-10-19 RX ADMIN — GENTAMICIN SULFATE 80 MG: 80 INJECTION, SOLUTION INTRAVENOUS at 10:02

## 2020-10-19 NOTE — ANESTHESIA PREPROCEDURE EVALUATION
Anesthesia Evaluation     Patient summary reviewed and Nursing notes reviewed   no history of anesthetic complications:  NPO Solid Status: > 8 hours  NPO Liquid Status: > 8 hours           Airway   Mallampati: II  TM distance: >3 FB  Neck ROM: full  no difficulty expected  Dental - normal exam   (+) edentulous    Pulmonary - normal exam   (+) a smoker Current Smoked day of surgery,   (-) asthma  Cardiovascular - normal exam  Exercise tolerance: good (4-7 METS)    NYHA Classification: II    (+) hyperlipidemia,       Neuro/Psych  (+) seizures (last one about 12 years ago), numbness,     GI/Hepatic/Renal/Endo    (+)  GERD,      Musculoskeletal     (+) back pain, chronic pain,   Abdominal  - normal exam    Bowel sounds: normal.   Substance History - negative use     OB/GYN negative ob/gyn ROS         Other   arthritis,                      Anesthesia Plan    ASA 2     general     intravenous induction     Anesthetic plan, all risks, benefits, and alternatives have been provided, discussed and informed consent has been obtained with: patient.  Use of blood products discussed with patient  Consented to blood products.

## 2020-10-19 NOTE — ANESTHESIA POSTPROCEDURE EVALUATION
Patient: Vipin Oakley    Procedure Summary     Date: 10/19/20 Room / Location: [facility] OR 06 /  [facility] OR    Anesthesia Start: 0958 Anesthesia Stop: 1034    Procedure: ORCHIECTOMY (Right Scrotum) Diagnosis:       Epididymitis      Pain in right testicle      (Epididymitis [N45.1])      (Pain in right testicle [N50.811])    Surgeon: Chino Aviles MD Provider: Idris Koch MD    Anesthesia Type: general ASA Status: 2          Anesthesia Type: general    Vitals  Vitals Value Taken Time   /76 10/19/20 1101   Temp 97.9 °F (36.6 °C) 10/19/20 1034   Pulse 73 10/19/20 1101   Resp 16 10/19/20 1101   SpO2 100 % 10/19/20 1101           Post Anesthesia Care and Evaluation    Patient location during evaluation: PHASE II  Patient participation: complete - patient participated  Level of consciousness: awake and alert  Pain score: 0  Pain management: adequate  Airway patency: patent  Anesthetic complications: No anesthetic complications    Cardiovascular status: acceptable  Respiratory status: acceptable  Hydration status: acceptable

## 2020-10-19 NOTE — ANESTHESIA PROCEDURE NOTES
Airway  Urgency: elective    Date/Time: 10/19/2020 10:06 AM    General Information and Staff    Patient location during procedure: OR  CRNA: Yulia Fisher CRNA    Indications and Patient Condition    Preoxygenated: yes      Final Airway Details  Final airway type: supraglottic airway      Successful airway: unique  Size 4    Number of attempts at approach: 1  Assessment: lips, teeth, and gum same as pre-op and atraumatic intubation

## 2020-10-19 NOTE — OP NOTE
ORCHIECTOMY  Procedure Note    Vipin Oakley  10/19/2020    Pre-op Diagnosis:   Epididymitis [N45.1]  Pain in right testicle [N50.811]    Post-op Diagnosis:     Post-Op Diagnosis Codes:     * Epididymitis [N45.1]     * Pain in right testicle [N50.811]    Procedure/CPT® Codes:  50-year-old white male with right severe testalgia.  He had an epididymectomy successful for several years presented with severe pain I did a nerve block he said that completely took the pain away but he is insistent on an orchiectomy he understands long-term complications including low testosterone etc. he also understands the possibility of a prosthetic testicle following an extensive informed consent is brought the operative suite after unremarkable general anesthesia the appropriate site was marked being the right side I then shaved and prepped him and draped him in a sterile fashion I made a high inguinal incision.  Whereupon I infiltrated a total of 20 cc of 1% Marcaine with epinephrine and in the subcu area identified the spermatic cord as it exited the external ring and delivered the testis in the operative field the doubly ligated with a suture ligature and 0 silk suture.  Hemostasis was again excellent no complications were encountered I completed a small block with about 5 cc of 1% with epinephrine.  Then closed the incision including Rodri's fascia with an interrupted 0 Vicryl stitch deep subcutaneous sutures and a subcuticular stitch no complications were encountered    Procedure(s):  ORCHIECTOMY    Surgeon(s):  Chino Aviles MD    Anesthesia: see anesthesia record    Staff:   Circulator: Filomena Owen RN  Scrub Person: Dionne Garcia Tracie A    Estimated Blood Loss: none  Urine Voided: * No values recorded between 10/19/2020  9:59 AM and 10/19/2020 10:33 AM *    Specimens:                ID Type Source Tests Collected by Time   A (Not marked as sent) :  Tissue Testis, Right TISSUE PATHOLOGY EXAM  Chino Aviles MD 10/19/2020 1017         Drains: None    Findings: Chronic pain secondary to chronic epididymitis    Blood: N/A    Complications: None    Grafts and Implants: None    Chino Aviles MD     Date: 10/19/2020  Time: 10:37 EDT

## 2020-10-22 LAB
LAB AP CASE REPORT: NORMAL
PATH REPORT.FINAL DX SPEC: NORMAL

## 2020-10-27 ENCOUNTER — TELEPHONE (OUTPATIENT)
Dept: GASTROENTEROLOGY | Facility: CLINIC | Age: 50
End: 2020-10-27

## 2020-10-28 ENCOUNTER — TELEPHONE (OUTPATIENT)
Dept: UROLOGY | Facility: CLINIC | Age: 50
End: 2020-10-28

## 2020-10-28 NOTE — TELEPHONE ENCOUNTER
Called patients /wife in return for her phone call. and to check on how he was doing.     Also answered questions relating to [employer] [facility] paper works. Discussed return to work 11/02/20

## 2020-11-02 ENCOUNTER — TELEPHONE (OUTPATIENT)
Dept: GASTROENTEROLOGY | Facility: CLINIC | Age: 50
End: 2020-11-02

## 2020-11-02 NOTE — TELEPHONE ENCOUNTER
Patient's employer called stating that they received medical records but not his actual FMLA paper work. She said that it was due on last Saturday. Fax number is [phone].

## 2020-11-03 ENCOUNTER — TELEPHONE (OUTPATIENT)
Dept: UROLOGY | Facility: CLINIC | Age: 50
End: 2020-11-03

## 2020-11-03 NOTE — TELEPHONE ENCOUNTER
Patient requesting to extend his FMLA until he is seen on Monday. He was rescheduled due to having a new cough. He said he is still in a lot of pain.

## 2021-01-01 ENCOUNTER — LAB (OUTPATIENT)
Dept: LAB | Facility: HOSPITAL | Age: 51
End: 2021-01-01

## 2021-01-01 ENCOUNTER — APPOINTMENT (OUTPATIENT)
Dept: CT IMAGING | Facility: HOSPITAL | Age: 51
End: 2021-01-01

## 2021-01-01 ENCOUNTER — OFFICE VISIT (OUTPATIENT)
Dept: ONCOLOGY | Facility: CLINIC | Age: 51
End: 2021-01-01

## 2021-01-01 ENCOUNTER — TELEPHONE (OUTPATIENT)
Dept: ONCOLOGY | Facility: CLINIC | Age: 51
End: 2021-01-01

## 2021-01-01 ENCOUNTER — DOCUMENTATION (OUTPATIENT)
Dept: ONCOLOGY | Facility: CLINIC | Age: 51
End: 2021-01-01

## 2021-01-01 ENCOUNTER — HOSPITAL ENCOUNTER (OUTPATIENT)
Dept: RADIATION ONCOLOGY | Facility: HOSPITAL | Age: 51
Setting detail: RADIATION/ONCOLOGY SERIES
Discharge: HOME | End: 2021-11-03

## 2021-01-01 ENCOUNTER — HOSPITAL ENCOUNTER (OUTPATIENT)
Dept: GENERAL RADIOLOGY | Facility: HOSPITAL | Age: 51
Discharge: HOME | End: 2021-10-28

## 2021-01-01 ENCOUNTER — OFFICE VISIT (OUTPATIENT)
Dept: PSYCHIATRY | Facility: CLINIC | Age: 51
End: 2021-01-01

## 2021-01-01 ENCOUNTER — TELEPHONE (OUTPATIENT)
Dept: ONCOLOGY | Facility: HOSPITAL | Age: 51
End: 2021-01-01

## 2021-01-01 ENCOUNTER — DOCUMENTATION (OUTPATIENT)
Dept: PULMONOLOGY | Facility: CLINIC | Age: 51
End: 2021-01-01

## 2021-01-01 ENCOUNTER — TELEPHONE (OUTPATIENT)
Dept: NEUROSURGERY | Facility: CLINIC | Age: 51
End: 2021-01-01

## 2021-01-01 ENCOUNTER — APPOINTMENT (OUTPATIENT)
Dept: ONCOLOGY | Facility: HOSPITAL | Age: 51
End: 2021-01-01

## 2021-01-01 ENCOUNTER — OFFICE VISIT (OUTPATIENT)
Dept: RADIATION ONCOLOGY | Facility: HOSPITAL | Age: 51
End: 2021-01-01

## 2021-01-01 ENCOUNTER — APPOINTMENT (OUTPATIENT)
Dept: RADIATION ONCOLOGY | Facility: HOSPITAL | Age: 51
End: 2021-01-01

## 2021-01-01 ENCOUNTER — LAB (OUTPATIENT)
Dept: ONCOLOGY | Facility: CLINIC | Age: 51
End: 2021-01-01

## 2021-01-01 ENCOUNTER — NURSE NAVIGATOR (OUTPATIENT)
Dept: ONCOLOGY | Facility: HOSPITAL | Age: 51
End: 2021-01-01

## 2021-01-01 ENCOUNTER — HOSPITAL ENCOUNTER (OUTPATIENT)
Dept: CT IMAGING | Facility: HOSPITAL | Age: 51
Discharge: HOME | End: 2021-06-18

## 2021-01-01 ENCOUNTER — HOSPITAL ENCOUNTER (OUTPATIENT)
Dept: RADIATION ONCOLOGY | Facility: HOSPITAL | Age: 51
Discharge: HOME | End: 2021-07-30

## 2021-01-01 ENCOUNTER — INFUSION (OUTPATIENT)
Dept: ONCOLOGY | Facility: HOSPITAL | Age: 51
End: 2021-01-01

## 2021-01-01 ENCOUNTER — HOSPITAL ENCOUNTER (OUTPATIENT)
Facility: HOSPITAL | Age: 51
Setting detail: HOSPITAL OUTPATIENT SURGERY
Discharge: HOME | End: 2021-05-10
Attending: INTERNAL MEDICINE | Admitting: INTERNAL MEDICINE

## 2021-01-01 ENCOUNTER — ANESTHESIA EVENT (OUTPATIENT)
Dept: PERIOP | Facility: HOSPITAL | Age: 51
End: 2021-01-01

## 2021-01-01 ENCOUNTER — HOSPITAL ENCOUNTER (OUTPATIENT)
Dept: RADIATION ONCOLOGY | Facility: HOSPITAL | Age: 51
Discharge: HOME | End: 2021-07-28

## 2021-01-01 ENCOUNTER — CONSULT (OUTPATIENT)
Dept: ONCOLOGY | Facility: CLINIC | Age: 51
End: 2021-01-01

## 2021-01-01 ENCOUNTER — TELEPHONE (OUTPATIENT)
Dept: UROLOGY | Facility: CLINIC | Age: 51
End: 2021-01-01

## 2021-01-01 ENCOUNTER — CONSULT (OUTPATIENT)
Dept: RADIATION ONCOLOGY | Facility: HOSPITAL | Age: 51
End: 2021-01-01

## 2021-01-01 ENCOUNTER — RADIATION ONCOLOGY WEEKLY ASSESSMENT (OUTPATIENT)
Dept: RADIATION ONCOLOGY | Facility: HOSPITAL | Age: 51
End: 2021-01-01

## 2021-01-01 ENCOUNTER — ANESTHESIA (OUTPATIENT)
Dept: PERIOP | Facility: HOSPITAL | Age: 51
End: 2021-01-01

## 2021-01-01 ENCOUNTER — HOSPITAL ENCOUNTER (OUTPATIENT)
Dept: MRI IMAGING | Facility: HOSPITAL | Age: 51
Discharge: HOME | End: 2021-10-04
Admitting: INTERNAL MEDICINE

## 2021-01-01 ENCOUNTER — TELEPHONE (OUTPATIENT)
Dept: RADIATION ONCOLOGY | Facility: HOSPITAL | Age: 51
End: 2021-01-01

## 2021-01-01 ENCOUNTER — TRANSCRIBE ORDERS (OUTPATIENT)
Dept: ADMINISTRATIVE | Facility: HOSPITAL | Age: 51
End: 2021-01-01

## 2021-01-01 ENCOUNTER — TELEPHONE (OUTPATIENT)
Dept: ONCOLOGY | Facility: OTHER | Age: 51
End: 2021-01-01

## 2021-01-01 ENCOUNTER — HOSPITAL ENCOUNTER (OUTPATIENT)
Facility: HOSPITAL | Age: 51
Setting detail: HOSPITAL OUTPATIENT SURGERY
Discharge: HOME | End: 2021-05-26
Attending: INTERNAL MEDICINE | Admitting: INTERNAL MEDICINE

## 2021-01-01 ENCOUNTER — HOSPITAL ENCOUNTER (OUTPATIENT)
Dept: CT IMAGING | Facility: HOSPITAL | Age: 51
Discharge: HOME | End: 2021-09-22

## 2021-01-01 ENCOUNTER — OFFICE VISIT (OUTPATIENT)
Dept: NEUROSURGERY | Facility: CLINIC | Age: 51
End: 2021-01-01

## 2021-01-01 ENCOUNTER — APPOINTMENT (OUTPATIENT)
Dept: MRI IMAGING | Facility: HOSPITAL | Age: 51
End: 2021-01-01

## 2021-01-01 ENCOUNTER — OFFICE VISIT (OUTPATIENT)
Dept: PULMONOLOGY | Facility: CLINIC | Age: 51
End: 2021-01-01

## 2021-01-01 ENCOUNTER — HOSPITAL ENCOUNTER (OUTPATIENT)
Dept: CT IMAGING | Facility: HOSPITAL | Age: 51
Discharge: HOME | End: 2021-12-30

## 2021-01-01 ENCOUNTER — HOSPITAL ENCOUNTER (OUTPATIENT)
Dept: NUCLEAR MEDICINE | Facility: HOSPITAL | Age: 51
Discharge: HOME | End: 2021-06-21

## 2021-01-01 ENCOUNTER — APPOINTMENT (OUTPATIENT)
Dept: GENERAL RADIOLOGY | Facility: HOSPITAL | Age: 51
End: 2021-01-01

## 2021-01-01 ENCOUNTER — APPOINTMENT (OUTPATIENT)
Dept: PREADMISSION TESTING | Facility: HOSPITAL | Age: 51
End: 2021-01-01

## 2021-01-01 ENCOUNTER — OFFICE VISIT (OUTPATIENT)
Dept: UROLOGY | Facility: CLINIC | Age: 51
End: 2021-01-01

## 2021-01-01 ENCOUNTER — HOSPITAL ENCOUNTER (OUTPATIENT)
Dept: MRI IMAGING | Facility: HOSPITAL | Age: 51
Discharge: HOME | End: 2021-11-03
Admitting: NEUROLOGICAL SURGERY

## 2021-01-01 ENCOUNTER — HOSPITAL ENCOUNTER (OUTPATIENT)
Facility: HOSPITAL | Age: 51
Setting detail: HOSPITAL OUTPATIENT SURGERY
Discharge: HOME | End: 2021-05-18
Attending: OTOLARYNGOLOGY | Admitting: OTOLARYNGOLOGY

## 2021-01-01 ENCOUNTER — HOSPITAL ENCOUNTER (OUTPATIENT)
Dept: CARDIOLOGY | Facility: HOSPITAL | Age: 51
Discharge: HOME | End: 2021-09-24
Admitting: INTERNAL MEDICINE

## 2021-01-01 ENCOUNTER — HOSPITAL ENCOUNTER (OUTPATIENT)
Dept: MRI IMAGING | Facility: HOSPITAL | Age: 51
Discharge: HOME | End: 2021-06-25
Admitting: INTERNAL MEDICINE

## 2021-01-01 ENCOUNTER — PRE-ADMISSION TESTING (OUTPATIENT)
Dept: PREADMISSION TESTING | Facility: HOSPITAL | Age: 51
End: 2021-01-01

## 2021-01-01 ENCOUNTER — HOSPITAL ENCOUNTER (OUTPATIENT)
Dept: NUCLEAR MEDICINE | Facility: HOSPITAL | Age: 51
Discharge: HOME | End: 2021-09-18

## 2021-01-01 ENCOUNTER — HOSPITAL ENCOUNTER (EMERGENCY)
Facility: HOSPITAL | Age: 51
Discharge: HOME | End: 2021-04-05
Attending: EMERGENCY MEDICINE | Admitting: EMERGENCY MEDICINE

## 2021-01-01 ENCOUNTER — HOSPITAL ENCOUNTER (OUTPATIENT)
Dept: RADIATION ONCOLOGY | Facility: HOSPITAL | Age: 51
Discharge: HOME | End: 2021-07-14

## 2021-01-01 ENCOUNTER — HOSPITAL ENCOUNTER (OUTPATIENT)
Facility: HOSPITAL | Age: 51
Setting detail: HOSPITAL OUTPATIENT SURGERY
Discharge: HOME | End: 2021-07-13
Attending: SURGERY | Admitting: SURGERY

## 2021-01-01 ENCOUNTER — HOSPITAL ENCOUNTER (OUTPATIENT)
Dept: RADIATION ONCOLOGY | Facility: HOSPITAL | Age: 51
Setting detail: RADIATION/ONCOLOGY SERIES
Discharge: HOME | End: 2021-07-12

## 2021-01-01 ENCOUNTER — TELEPHONE (OUTPATIENT)
Dept: PULMONOLOGY | Facility: CLINIC | Age: 51
End: 2021-01-01

## 2021-01-01 ENCOUNTER — LAB (OUTPATIENT)
Dept: FAMILY MEDICINE CLINIC | Facility: CLINIC | Age: 51
End: 2021-01-01

## 2021-01-01 ENCOUNTER — OFFICE VISIT (OUTPATIENT)
Dept: SURGERY | Facility: CLINIC | Age: 51
End: 2021-01-01

## 2021-01-01 ENCOUNTER — HOSPITAL ENCOUNTER (OUTPATIENT)
Dept: RADIATION ONCOLOGY | Facility: HOSPITAL | Age: 51
Discharge: HOME | End: 2021-07-29

## 2021-01-01 ENCOUNTER — HOSPITAL ENCOUNTER (OUTPATIENT)
Dept: MRI IMAGING | Facility: HOSPITAL | Age: 51
Discharge: HOME | End: 2021-07-18
Admitting: RADIOLOGY

## 2021-01-01 VITALS
TEMPERATURE: 98.9 F | WEIGHT: 140.1 LBS | BODY MASS INDEX: 19 KG/M2 | SYSTOLIC BLOOD PRESSURE: 117 MMHG | DIASTOLIC BLOOD PRESSURE: 79 MMHG | WEIGHT: 140.1 LBS | SYSTOLIC BLOOD PRESSURE: 117 MMHG | HEART RATE: 114 BPM | OXYGEN SATURATION: 98 % | OXYGEN SATURATION: 98 % | TEMPERATURE: 98.9 F | HEART RATE: 114 BPM | RESPIRATION RATE: 18 BRPM | BODY MASS INDEX: 19 KG/M2 | DIASTOLIC BLOOD PRESSURE: 79 MMHG | RESPIRATION RATE: 18 BRPM

## 2021-01-01 VITALS
RESPIRATION RATE: 18 BRPM | DIASTOLIC BLOOD PRESSURE: 84 MMHG | HEIGHT: 72 IN | BODY MASS INDEX: 20.24 KG/M2 | SYSTOLIC BLOOD PRESSURE: 111 MMHG | TEMPERATURE: 97.5 F | OXYGEN SATURATION: 98 % | WEIGHT: 149.4 LBS | HEART RATE: 103 BPM

## 2021-01-01 VITALS
BODY MASS INDEX: 22.48 KG/M2 | HEIGHT: 72 IN | TEMPERATURE: 97.1 F | SYSTOLIC BLOOD PRESSURE: 110 MMHG | DIASTOLIC BLOOD PRESSURE: 66 MMHG | WEIGHT: 166 LBS

## 2021-01-01 VITALS
SYSTOLIC BLOOD PRESSURE: 104 MMHG | BODY MASS INDEX: 22.24 KG/M2 | HEIGHT: 72 IN | WEIGHT: 164.2 LBS | HEART RATE: 97 BPM | DIASTOLIC BLOOD PRESSURE: 62 MMHG

## 2021-01-01 VITALS
WEIGHT: 148.5 LBS | BODY MASS INDEX: 20.14 KG/M2 | TEMPERATURE: 98.4 F | OXYGEN SATURATION: 97 % | DIASTOLIC BLOOD PRESSURE: 67 MMHG | RESPIRATION RATE: 18 BRPM | HEART RATE: 92 BPM | SYSTOLIC BLOOD PRESSURE: 111 MMHG

## 2021-01-01 VITALS
OXYGEN SATURATION: 97 % | DIASTOLIC BLOOD PRESSURE: 69 MMHG | TEMPERATURE: 97.3 F | WEIGHT: 143 LBS | SYSTOLIC BLOOD PRESSURE: 109 MMHG | RESPIRATION RATE: 18 BRPM | HEART RATE: 85 BPM | BODY MASS INDEX: 19.39 KG/M2

## 2021-01-01 VITALS
SYSTOLIC BLOOD PRESSURE: 111 MMHG | WEIGHT: 149.4 LBS | RESPIRATION RATE: 18 BRPM | TEMPERATURE: 97.5 F | DIASTOLIC BLOOD PRESSURE: 84 MMHG | BODY MASS INDEX: 20.24 KG/M2 | HEART RATE: 103 BPM | OXYGEN SATURATION: 98 % | HEIGHT: 72 IN

## 2021-01-01 VITALS
WEIGHT: 143.8 LBS | HEIGHT: 72 IN | SYSTOLIC BLOOD PRESSURE: 103 MMHG | OXYGEN SATURATION: 97 % | BODY MASS INDEX: 19.48 KG/M2 | HEART RATE: 113 BPM | DIASTOLIC BLOOD PRESSURE: 70 MMHG | TEMPERATURE: 97.6 F | RESPIRATION RATE: 18 BRPM

## 2021-01-01 VITALS
TEMPERATURE: 98 F | OXYGEN SATURATION: 97 % | BODY MASS INDEX: 28.35 KG/M2 | SYSTOLIC BLOOD PRESSURE: 119 MMHG | HEART RATE: 104 BPM | DIASTOLIC BLOOD PRESSURE: 83 MMHG | RESPIRATION RATE: 18 BRPM | WEIGHT: 155 LBS

## 2021-01-01 VITALS
HEIGHT: 72 IN | OXYGEN SATURATION: 97 % | BODY MASS INDEX: 19.48 KG/M2 | RESPIRATION RATE: 18 BRPM | SYSTOLIC BLOOD PRESSURE: 103 MMHG | WEIGHT: 143.8 LBS | DIASTOLIC BLOOD PRESSURE: 70 MMHG | HEART RATE: 113 BPM | TEMPERATURE: 97.6 F

## 2021-01-01 VITALS
WEIGHT: 164 LBS | SYSTOLIC BLOOD PRESSURE: 124 MMHG | BODY MASS INDEX: 22.21 KG/M2 | HEART RATE: 87 BPM | OXYGEN SATURATION: 98 % | DIASTOLIC BLOOD PRESSURE: 87 MMHG | TEMPERATURE: 98.3 F | HEIGHT: 72 IN | RESPIRATION RATE: 18 BRPM

## 2021-01-01 VITALS
WEIGHT: 157.8 LBS | OXYGEN SATURATION: 99 % | BODY MASS INDEX: 21.37 KG/M2 | SYSTOLIC BLOOD PRESSURE: 123 MMHG | HEIGHT: 72 IN | RESPIRATION RATE: 18 BRPM | TEMPERATURE: 97.5 F | HEART RATE: 112 BPM | DIASTOLIC BLOOD PRESSURE: 76 MMHG

## 2021-01-01 VITALS
BODY MASS INDEX: 22.59 KG/M2 | HEART RATE: 68 BPM | RESPIRATION RATE: 18 BRPM | HEIGHT: 72 IN | TEMPERATURE: 97.2 F | WEIGHT: 166.8 LBS | OXYGEN SATURATION: 98 % | DIASTOLIC BLOOD PRESSURE: 88 MMHG | SYSTOLIC BLOOD PRESSURE: 126 MMHG

## 2021-01-01 VITALS
OXYGEN SATURATION: 98 % | RESPIRATION RATE: 18 BRPM | SYSTOLIC BLOOD PRESSURE: 130 MMHG | HEART RATE: 101 BPM | DIASTOLIC BLOOD PRESSURE: 74 MMHG | TEMPERATURE: 98 F

## 2021-01-01 VITALS
RESPIRATION RATE: 14 BRPM | TEMPERATURE: 97.8 F | HEART RATE: 62 BPM | OXYGEN SATURATION: 100 % | DIASTOLIC BLOOD PRESSURE: 78 MMHG | SYSTOLIC BLOOD PRESSURE: 115 MMHG

## 2021-01-01 VITALS
DIASTOLIC BLOOD PRESSURE: 75 MMHG | BODY MASS INDEX: 19.37 KG/M2 | TEMPERATURE: 97.3 F | HEART RATE: 95 BPM | SYSTOLIC BLOOD PRESSURE: 117 MMHG | OXYGEN SATURATION: 99 % | RESPIRATION RATE: 18 BRPM | WEIGHT: 143 LBS | HEIGHT: 72 IN

## 2021-01-01 VITALS
DIASTOLIC BLOOD PRESSURE: 80 MMHG | HEART RATE: 96 BPM | HEART RATE: 106 BPM | SYSTOLIC BLOOD PRESSURE: 128 MMHG | RESPIRATION RATE: 18 BRPM | BODY MASS INDEX: 26.68 KG/M2 | SYSTOLIC BLOOD PRESSURE: 124 MMHG | OXYGEN SATURATION: 98 % | DIASTOLIC BLOOD PRESSURE: 87 MMHG | TEMPERATURE: 98.2 F | WEIGHT: 145.8 LBS | HEIGHT: 62 IN | OXYGEN SATURATION: 99 % | BODY MASS INDEX: 19.77 KG/M2 | WEIGHT: 145 LBS | RESPIRATION RATE: 18 BRPM

## 2021-01-01 VITALS
BODY MASS INDEX: 22.75 KG/M2 | RESPIRATION RATE: 18 BRPM | WEIGHT: 168 LBS | SYSTOLIC BLOOD PRESSURE: 132 MMHG | HEART RATE: 83 BPM | DIASTOLIC BLOOD PRESSURE: 81 MMHG | OXYGEN SATURATION: 98 % | HEIGHT: 72 IN | TEMPERATURE: 98.2 F

## 2021-01-01 VITALS
WEIGHT: 155.4 LBS | DIASTOLIC BLOOD PRESSURE: 79 MMHG | OXYGEN SATURATION: 97 % | BODY MASS INDEX: 21.08 KG/M2 | HEART RATE: 112 BPM | RESPIRATION RATE: 18 BRPM | TEMPERATURE: 98.6 F | SYSTOLIC BLOOD PRESSURE: 120 MMHG

## 2021-01-01 VITALS
OXYGEN SATURATION: 97 % | DIASTOLIC BLOOD PRESSURE: 77 MMHG | SYSTOLIC BLOOD PRESSURE: 123 MMHG | TEMPERATURE: 98.4 F | RESPIRATION RATE: 18 BRPM | WEIGHT: 155 LBS | BODY MASS INDEX: 28.35 KG/M2 | HEART RATE: 108 BPM

## 2021-01-01 VITALS
TEMPERATURE: 98.7 F | OXYGEN SATURATION: 100 % | BODY MASS INDEX: 23.11 KG/M2 | SYSTOLIC BLOOD PRESSURE: 131 MMHG | RESPIRATION RATE: 16 BRPM | WEIGHT: 156 LBS | DIASTOLIC BLOOD PRESSURE: 68 MMHG | HEIGHT: 69 IN | HEART RATE: 80 BPM

## 2021-01-01 VITALS
BODY MASS INDEX: 19.37 KG/M2 | WEIGHT: 143 LBS | HEIGHT: 72 IN | RESPIRATION RATE: 16 BRPM | SYSTOLIC BLOOD PRESSURE: 117 MMHG | OXYGEN SATURATION: 99 % | TEMPERATURE: 97.3 F | DIASTOLIC BLOOD PRESSURE: 75 MMHG | HEART RATE: 95 BPM

## 2021-01-01 VITALS — WEIGHT: 155.2 LBS | BODY MASS INDEX: 21.05 KG/M2

## 2021-01-01 VITALS
SYSTOLIC BLOOD PRESSURE: 114 MMHG | DIASTOLIC BLOOD PRESSURE: 79 MMHG | HEIGHT: 72 IN | TEMPERATURE: 98.4 F | RESPIRATION RATE: 18 BRPM | WEIGHT: 142.8 LBS | OXYGEN SATURATION: 98 % | BODY MASS INDEX: 19.34 KG/M2 | HEART RATE: 109 BPM

## 2021-01-01 VITALS
DIASTOLIC BLOOD PRESSURE: 73 MMHG | HEART RATE: 108 BPM | RESPIRATION RATE: 18 BRPM | WEIGHT: 157.4 LBS | BODY MASS INDEX: 21.35 KG/M2 | OXYGEN SATURATION: 95 % | SYSTOLIC BLOOD PRESSURE: 125 MMHG | TEMPERATURE: 97.5 F

## 2021-01-01 VITALS
HEART RATE: 101 BPM | SYSTOLIC BLOOD PRESSURE: 120 MMHG | TEMPERATURE: 98.7 F | OXYGEN SATURATION: 96 % | DIASTOLIC BLOOD PRESSURE: 79 MMHG | RESPIRATION RATE: 18 BRPM

## 2021-01-01 VITALS — HEIGHT: 72 IN | BODY MASS INDEX: 21.13 KG/M2 | TEMPERATURE: 97.8 F | WEIGHT: 156 LBS

## 2021-01-01 VITALS
HEIGHT: 72 IN | WEIGHT: 164.3 LBS | DIASTOLIC BLOOD PRESSURE: 75 MMHG | BODY MASS INDEX: 22.26 KG/M2 | OXYGEN SATURATION: 97 % | SYSTOLIC BLOOD PRESSURE: 132 MMHG | RESPIRATION RATE: 16 BRPM | HEART RATE: 97 BPM | TEMPERATURE: 97.8 F

## 2021-01-01 VITALS
TEMPERATURE: 98.4 F | WEIGHT: 142.8 LBS | BODY MASS INDEX: 19.34 KG/M2 | RESPIRATION RATE: 18 BRPM | HEIGHT: 72 IN | SYSTOLIC BLOOD PRESSURE: 114 MMHG | HEART RATE: 109 BPM | DIASTOLIC BLOOD PRESSURE: 79 MMHG | OXYGEN SATURATION: 98 %

## 2021-01-01 VITALS
HEIGHT: 72 IN | RESPIRATION RATE: 18 BRPM | BODY MASS INDEX: 20.14 KG/M2 | SYSTOLIC BLOOD PRESSURE: 116 MMHG | TEMPERATURE: 98.4 F | HEART RATE: 101 BPM | DIASTOLIC BLOOD PRESSURE: 72 MMHG | OXYGEN SATURATION: 95 %

## 2021-01-01 VITALS
BODY MASS INDEX: 21.71 KG/M2 | RESPIRATION RATE: 15 BRPM | TEMPERATURE: 98 F | DIASTOLIC BLOOD PRESSURE: 68 MMHG | WEIGHT: 160.3 LBS | SYSTOLIC BLOOD PRESSURE: 107 MMHG | OXYGEN SATURATION: 93 % | HEIGHT: 72 IN | HEART RATE: 105 BPM

## 2021-01-01 VITALS
TEMPERATURE: 98 F | OXYGEN SATURATION: 98 % | HEIGHT: 72 IN | BODY MASS INDEX: 22.16 KG/M2 | SYSTOLIC BLOOD PRESSURE: 130 MMHG | DIASTOLIC BLOOD PRESSURE: 87 MMHG | HEART RATE: 96 BPM | WEIGHT: 163.6 LBS | RESPIRATION RATE: 18 BRPM

## 2021-01-01 VITALS
WEIGHT: 155 LBS | HEART RATE: 108 BPM | DIASTOLIC BLOOD PRESSURE: 77 MMHG | OXYGEN SATURATION: 97 % | TEMPERATURE: 98.4 F | SYSTOLIC BLOOD PRESSURE: 123 MMHG | RESPIRATION RATE: 18 BRPM | BODY MASS INDEX: 28.35 KG/M2

## 2021-01-01 VITALS
HEIGHT: 72 IN | SYSTOLIC BLOOD PRESSURE: 123 MMHG | OXYGEN SATURATION: 99 % | HEART RATE: 112 BPM | WEIGHT: 157.8 LBS | BODY MASS INDEX: 21.37 KG/M2 | TEMPERATURE: 97.5 F | RESPIRATION RATE: 18 BRPM | DIASTOLIC BLOOD PRESSURE: 76 MMHG

## 2021-01-01 VITALS
TEMPERATURE: 97.9 F | DIASTOLIC BLOOD PRESSURE: 87 MMHG | HEART RATE: 87 BPM | SYSTOLIC BLOOD PRESSURE: 118 MMHG | BODY MASS INDEX: 20.99 KG/M2 | HEIGHT: 72 IN | OXYGEN SATURATION: 98 % | WEIGHT: 155 LBS | RESPIRATION RATE: 16 BRPM

## 2021-01-01 VITALS
HEART RATE: 109 BPM | RESPIRATION RATE: 18 BRPM | DIASTOLIC BLOOD PRESSURE: 82 MMHG | OXYGEN SATURATION: 98 % | WEIGHT: 139.6 LBS | BODY MASS INDEX: 18.93 KG/M2 | SYSTOLIC BLOOD PRESSURE: 121 MMHG | TEMPERATURE: 98.4 F

## 2021-01-01 DIAGNOSIS — C79.51 METASTASIS TO BONE (HCC): ICD-10-CM

## 2021-01-01 DIAGNOSIS — C79.31 METASTASIS TO BRAIN (HCC): Primary | ICD-10-CM

## 2021-01-01 DIAGNOSIS — R53.83 FATIGUE, UNSPECIFIED TYPE: ICD-10-CM

## 2021-01-01 DIAGNOSIS — C79.31 METASTASIS TO BRAIN (HCC): ICD-10-CM

## 2021-01-01 DIAGNOSIS — C78.7 METASTASIS TO LIVER (HCC): ICD-10-CM

## 2021-01-01 DIAGNOSIS — C34.92 SQUAMOUS CELL CARCINOMA OF LEFT LUNG (HCC): ICD-10-CM

## 2021-01-01 DIAGNOSIS — G89.3 NEOPLASM RELATED PAIN: ICD-10-CM

## 2021-01-01 DIAGNOSIS — C78.7 METASTASIS TO LIVER (HCC): Primary | ICD-10-CM

## 2021-01-01 DIAGNOSIS — R59.0 HILAR ADENOPATHY: ICD-10-CM

## 2021-01-01 DIAGNOSIS — F43.23 ADJUSTMENT DISORDER WITH MIXED ANXIETY AND DEPRESSED MOOD: ICD-10-CM

## 2021-01-01 DIAGNOSIS — R91.8 LUNG MASS: ICD-10-CM

## 2021-01-01 DIAGNOSIS — F43.0 ACUTE REACTION TO STRESS: Primary | ICD-10-CM

## 2021-01-01 DIAGNOSIS — R59.0 MEDIASTINAL LYMPHADENOPATHY: ICD-10-CM

## 2021-01-01 DIAGNOSIS — K21.9 CHRONIC GERD: ICD-10-CM

## 2021-01-01 DIAGNOSIS — J38.7 MASS OF EPIGLOTTIS: Primary | ICD-10-CM

## 2021-01-01 DIAGNOSIS — R50.9 FEVER, UNSPECIFIED FEVER CAUSE: ICD-10-CM

## 2021-01-01 DIAGNOSIS — C34.32 MALIGNANT NEOPLASM OF LOWER LOBE OF LEFT LUNG (HCC): Primary | ICD-10-CM

## 2021-01-01 DIAGNOSIS — J38.00 VOCAL CORD PARALYSIS: ICD-10-CM

## 2021-01-01 DIAGNOSIS — N42.9 DISORDER OF PROSTATE: ICD-10-CM

## 2021-01-01 DIAGNOSIS — C78.01 MALIGNANT NEOPLASM METASTATIC TO RIGHT LUNG (HCC): ICD-10-CM

## 2021-01-01 DIAGNOSIS — M62.838 NIGHT MUSCLE SPASMS: ICD-10-CM

## 2021-01-01 DIAGNOSIS — C79.51 METASTASIS TO BONE (HCC): Primary | ICD-10-CM

## 2021-01-01 DIAGNOSIS — R59.0 MEDIASTINAL LYMPHADENOPATHY: Primary | ICD-10-CM

## 2021-01-01 DIAGNOSIS — R63.4 WEIGHT LOSS: ICD-10-CM

## 2021-01-01 DIAGNOSIS — Z01.818 PRE-OPERATIVE CLEARANCE: ICD-10-CM

## 2021-01-01 DIAGNOSIS — C34.92 SQUAMOUS CELL CARCINOMA OF LEFT LUNG (HCC): Primary | ICD-10-CM

## 2021-01-01 DIAGNOSIS — Z01.818 PRE-OP TESTING: Primary | ICD-10-CM

## 2021-01-01 DIAGNOSIS — F32.A DEPRESSION, UNSPECIFIED DEPRESSION TYPE: ICD-10-CM

## 2021-01-01 DIAGNOSIS — F41.9 ANXIETY: ICD-10-CM

## 2021-01-01 DIAGNOSIS — N50.811 PAIN IN RIGHT TESTICLE: ICD-10-CM

## 2021-01-01 DIAGNOSIS — R59.0 MEDIASTINAL ADENOPATHY: ICD-10-CM

## 2021-01-01 DIAGNOSIS — Z01.818 PRE-OPERATIVE CLEARANCE: Primary | ICD-10-CM

## 2021-01-01 DIAGNOSIS — R79.89 LOW TESTOSTERONE: ICD-10-CM

## 2021-01-01 DIAGNOSIS — Z01.812 PRE-OPERATIVE LABORATORY EXAMINATION: Primary | ICD-10-CM

## 2021-01-01 DIAGNOSIS — I31.39 PERICARDIAL EFFUSION: ICD-10-CM

## 2021-01-01 DIAGNOSIS — F32.1 CURRENT MODERATE EPISODE OF MAJOR DEPRESSIVE DISORDER WITHOUT PRIOR EPISODE (HCC): Primary | ICD-10-CM

## 2021-01-01 DIAGNOSIS — Z01.818 OTHER SPECIFIED PRE-OPERATIVE EXAMINATION: Primary | ICD-10-CM

## 2021-01-01 DIAGNOSIS — R45.851 PASSIVE SUICIDAL IDEATIONS: ICD-10-CM

## 2021-01-01 DIAGNOSIS — Z01.818 PRE-OP TESTING: ICD-10-CM

## 2021-01-01 DIAGNOSIS — Z72.0 TOBACCO ABUSE: ICD-10-CM

## 2021-01-01 DIAGNOSIS — J38.7 OTHER DISEASES OF LARYNX: ICD-10-CM

## 2021-01-01 DIAGNOSIS — R59.0 HILAR ADENOPATHY: Primary | ICD-10-CM

## 2021-01-01 DIAGNOSIS — R11.0 NAUSEA: ICD-10-CM

## 2021-01-01 DIAGNOSIS — R13.14 DYSPHAGIA, PHARYNGOESOPHAGEAL PHASE: ICD-10-CM

## 2021-01-01 DIAGNOSIS — N45.1 EPIDIDYMITIS: Primary | ICD-10-CM

## 2021-01-01 DIAGNOSIS — C79.2 MALIGNANT NEOPLASM METASTATIC TO SKIN (HCC): ICD-10-CM

## 2021-01-01 DIAGNOSIS — B37.0 ORAL CANDIDIASIS: Primary | ICD-10-CM

## 2021-01-01 DIAGNOSIS — Z01.812 PRE-OPERATIVE LABORATORY EXAMINATION: ICD-10-CM

## 2021-01-01 DIAGNOSIS — Z01.818 OTHER SPECIFIED PRE-OPERATIVE EXAMINATION: ICD-10-CM

## 2021-01-01 DIAGNOSIS — F17.210 CIGARETTE NICOTINE DEPENDENCE, UNCOMPLICATED: ICD-10-CM

## 2021-01-01 DIAGNOSIS — I31.39 PERICARDIAL EFFUSION: Primary | ICD-10-CM

## 2021-01-01 DIAGNOSIS — R11.2 NON-INTRACTABLE VOMITING WITH NAUSEA, UNSPECIFIED VOMITING TYPE: ICD-10-CM

## 2021-01-01 DIAGNOSIS — R45.851 SUICIDAL IDEATION: ICD-10-CM

## 2021-01-01 DIAGNOSIS — R50.9 FEVER, UNSPECIFIED FEVER CAUSE: Primary | ICD-10-CM

## 2021-01-01 DIAGNOSIS — B37.0 ORAL CANDIDIASIS: ICD-10-CM

## 2021-01-01 DIAGNOSIS — R63.4 LOSS OF WEIGHT: ICD-10-CM

## 2021-01-01 DIAGNOSIS — J44.1 COPD EXACERBATION (HCC): Primary | ICD-10-CM

## 2021-01-01 DIAGNOSIS — F41.9 ANXIETY: Primary | ICD-10-CM

## 2021-01-01 LAB
6-ACETYL MORPHINE: NEGATIVE
ACID FAST STN SPEC: NEGATIVE
ALBUMIN SERPL-MCNC: 3.54 G/DL (ref 3.5–5.2)
ALBUMIN SERPL-MCNC: 3.56 G/DL (ref 3.5–5.2)
ALBUMIN SERPL-MCNC: 3.57 G/DL (ref 3.5–5.2)
ALBUMIN SERPL-MCNC: 3.74 G/DL (ref 3.5–5.2)
ALBUMIN SERPL-MCNC: 3.78 G/DL (ref 3.5–5.2)
ALBUMIN SERPL-MCNC: 3.89 G/DL (ref 3.5–5.2)
ALBUMIN SERPL-MCNC: 4.07 G/DL (ref 3.5–5.2)
ALBUMIN SERPL-MCNC: 4.1 G/DL (ref 3.5–5.2)
ALBUMIN SERPL-MCNC: 4.15 G/DL (ref 3.5–5.2)
ALBUMIN SERPL-MCNC: 4.27 G/DL (ref 3.5–5.2)
ALBUMIN SERPL-MCNC: 4.33 G/DL (ref 3.5–5.2)
ALBUMIN SERPL-MCNC: 4.38 G/DL (ref 3.5–5.2)
ALBUMIN SERPL-MCNC: 4.4 G/DL (ref 3.5–5.2)
ALBUMIN/GLOB SERPL: 0.9 G/DL
ALBUMIN/GLOB SERPL: 1 G/DL
ALBUMIN/GLOB SERPL: 1 G/DL
ALBUMIN/GLOB SERPL: 1.1 G/DL
ALBUMIN/GLOB SERPL: 1.2 G/DL
ALBUMIN/GLOB SERPL: 1.3 G/DL
ALBUMIN/GLOB SERPL: 1.5 G/DL
ALBUMIN/GLOB SERPL: 1.6 G/DL
ALBUMIN/GLOB SERPL: 1.6 G/DL
ALP SERPL-CCNC: 108 U/L (ref 39–117)
ALP SERPL-CCNC: 121 U/L (ref 39–117)
ALP SERPL-CCNC: 122 U/L (ref 39–117)
ALP SERPL-CCNC: 125 U/L (ref 39–117)
ALP SERPL-CCNC: 130 U/L (ref 39–117)
ALP SERPL-CCNC: 144 U/L (ref 39–117)
ALP SERPL-CCNC: 153 U/L (ref 39–117)
ALP SERPL-CCNC: 153 U/L (ref 39–117)
ALP SERPL-CCNC: 190 U/L (ref 39–117)
ALP SERPL-CCNC: 247 U/L (ref 39–117)
ALP SERPL-CCNC: 256 U/L (ref 39–117)
ALP SERPL-CCNC: 343 U/L (ref 39–117)
ALP SERPL-CCNC: 83 U/L (ref 39–117)
ALT SERPL W P-5'-P-CCNC: 11 U/L (ref 1–41)
ALT SERPL W P-5'-P-CCNC: 12 U/L (ref 1–41)
ALT SERPL W P-5'-P-CCNC: 13 U/L (ref 1–41)
ALT SERPL W P-5'-P-CCNC: 14 U/L (ref 1–41)
ALT SERPL W P-5'-P-CCNC: 15 U/L (ref 1–41)
ALT SERPL W P-5'-P-CCNC: 15 U/L (ref 1–41)
ALT SERPL W P-5'-P-CCNC: 17 U/L (ref 1–41)
ALT SERPL W P-5'-P-CCNC: 17 U/L (ref 1–41)
ALT SERPL W P-5'-P-CCNC: 19 U/L (ref 1–41)
ALT SERPL W P-5'-P-CCNC: 26 U/L (ref 1–41)
ALT SERPL W P-5'-P-CCNC: 8 U/L (ref 1–41)
AMPHET+METHAMPHET UR QL: NEGATIVE
ANION GAP SERPL CALCULATED.3IONS-SCNC: 10.8 MMOL/L (ref 5–15)
ANION GAP SERPL CALCULATED.3IONS-SCNC: 11 MMOL/L (ref 5–15)
ANION GAP SERPL CALCULATED.3IONS-SCNC: 11.3 MMOL/L (ref 5–15)
ANION GAP SERPL CALCULATED.3IONS-SCNC: 11.4 MMOL/L (ref 5–15)
ANION GAP SERPL CALCULATED.3IONS-SCNC: 11.7 MMOL/L (ref 5–15)
ANION GAP SERPL CALCULATED.3IONS-SCNC: 12.8 MMOL/L (ref 5–15)
ANION GAP SERPL CALCULATED.3IONS-SCNC: 13.4 MMOL/L (ref 5–15)
ANION GAP SERPL CALCULATED.3IONS-SCNC: 8.4 MMOL/L (ref 5–15)
ANION GAP SERPL CALCULATED.3IONS-SCNC: 9 MMOL/L (ref 5–15)
ANION GAP SERPL CALCULATED.3IONS-SCNC: 9.1 MMOL/L (ref 5–15)
ANION GAP SERPL CALCULATED.3IONS-SCNC: 9.1 MMOL/L (ref 5–15)
ANION GAP SERPL CALCULATED.3IONS-SCNC: 9.4 MMOL/L (ref 5–15)
ANION GAP SERPL CALCULATED.3IONS-SCNC: 9.5 MMOL/L (ref 5–15)
ANION GAP SERPL CALCULATED.3IONS-SCNC: 9.6 MMOL/L (ref 5–15)
ANISOCYTOSIS BLD QL: ABNORMAL
ANISOCYTOSIS BLD QL: ABNORMAL
ANISOCYTOSIS BLD QL: NORMAL
ANISOCYTOSIS BLD QL: NORMAL
AST SERPL-CCNC: 10 U/L (ref 1–40)
AST SERPL-CCNC: 11 U/L (ref 1–40)
AST SERPL-CCNC: 11 U/L (ref 1–40)
AST SERPL-CCNC: 12 U/L (ref 1–40)
AST SERPL-CCNC: 13 U/L (ref 1–40)
AST SERPL-CCNC: 14 U/L (ref 1–40)
AST SERPL-CCNC: 17 U/L (ref 1–40)
AST SERPL-CCNC: 20 U/L (ref 1–40)
AST SERPL-CCNC: 7 U/L (ref 1–40)
AST SERPL-CCNC: 8 U/L (ref 1–40)
BACTERIA SPEC AEROBE CULT: NO GROWTH
BARBITURATES UR QL SCN: NEGATIVE
BASOPHILS # BLD AUTO: 0.02 10*3/MM3 (ref 0–0.2)
BASOPHILS # BLD AUTO: 0.03 10*3/MM3 (ref 0–0.2)
BASOPHILS # BLD AUTO: 0.03 10*3/MM3 (ref 0–0.2)
BASOPHILS # BLD AUTO: 0.04 10*3/MM3 (ref 0–0.2)
BASOPHILS # BLD AUTO: 0.06 10*3/MM3 (ref 0–0.2)
BASOPHILS # BLD AUTO: 0.08 10*3/MM3 (ref 0–0.2)
BASOPHILS # BLD AUTO: 0.09 10*3/MM3 (ref 0–0.2)
BASOPHILS # BLD AUTO: 0.1 10*3/MM3 (ref 0–0.2)
BASOPHILS # BLD AUTO: 0.12 10*3/MM3 (ref 0–0.2)
BASOPHILS NFR BLD AUTO: 0.2 % (ref 0–1.5)
BASOPHILS NFR BLD AUTO: 0.2 % (ref 0–1.5)
BASOPHILS NFR BLD AUTO: 0.4 % (ref 0–1.5)
BASOPHILS NFR BLD AUTO: 0.4 % (ref 0–1.5)
BASOPHILS NFR BLD AUTO: 0.5 % (ref 0–1.5)
BASOPHILS NFR BLD AUTO: 0.5 % (ref 0–1.5)
BASOPHILS NFR BLD AUTO: 0.6 % (ref 0–1.5)
BASOPHILS NFR BLD AUTO: 0.6 % (ref 0–1.5)
BASOPHILS NFR BLD AUTO: 0.9 % (ref 0–1.5)
BASOPHILS NFR BLD AUTO: 1 % (ref 0–1.5)
BASOPHILS NFR BLD AUTO: 1 % (ref 0–1.5)
BENZODIAZ UR QL SCN: NEGATIVE
BH CV ECHO MEAS - % IVS THICK: 1.8 %
BH CV ECHO MEAS - % LVPW THICK: 21.9 %
BH CV ECHO MEAS - ACS: 2.5 CM
BH CV ECHO MEAS - AO MAX PG: 4.8 MMHG
BH CV ECHO MEAS - AO MEAN PG: 2 MMHG
BH CV ECHO MEAS - AO ROOT AREA (BSA CORRECTED): 1.8
BH CV ECHO MEAS - AO ROOT AREA: 8.8 CM^2
BH CV ECHO MEAS - AO ROOT DIAM: 3.4 CM
BH CV ECHO MEAS - AO V2 MAX: 110 CM/SEC
BH CV ECHO MEAS - AO V2 MEAN: 72.3 CM/SEC
BH CV ECHO MEAS - AO V2 VTI: 17.4 CM
BH CV ECHO MEAS - BSA(HAYCOCK): 1.8 M^2
BH CV ECHO MEAS - BSA: 1.8 M^2
BH CV ECHO MEAS - BZI_BMI: 19.3 KILOGRAMS/M^2
BH CV ECHO MEAS - BZI_METRIC_HEIGHT: 182.9 CM
BH CV ECHO MEAS - BZI_METRIC_WEIGHT: 64.4 KG
BH CV ECHO MEAS - EDV(CUBED): 73 ML
BH CV ECHO MEAS - EDV(MOD-SP4): 44.6 ML
BH CV ECHO MEAS - EDV(TEICH): 77.7 ML
BH CV ECHO MEAS - EF(CUBED): 57.4 %
BH CV ECHO MEAS - EF(MOD-SP4): 65.9 %
BH CV ECHO MEAS - EF(TEICH): 49.5 %
BH CV ECHO MEAS - ESV(CUBED): 31.1 ML
BH CV ECHO MEAS - ESV(MOD-SP4): 15.2 ML
BH CV ECHO MEAS - ESV(TEICH): 39.3 ML
BH CV ECHO MEAS - FS: 24.8 %
BH CV ECHO MEAS - IVS/LVPW: 0.79
BH CV ECHO MEAS - IVSD: 0.88 CM
BH CV ECHO MEAS - IVSS: 0.9 CM
BH CV ECHO MEAS - LA DIMENSION: 2.2 CM
BH CV ECHO MEAS - LA/AO: 0.66
BH CV ECHO MEAS - LV DIASTOLIC VOL/BSA (35-75): 24.2 ML/M^2
BH CV ECHO MEAS - LV MASS(C)D: 136.1 GRAMS
BH CV ECHO MEAS - LV MASS(C)DI: 73.9 GRAMS/M^2
BH CV ECHO MEAS - LV MASS(C)S: 105.9 GRAMS
BH CV ECHO MEAS - LV MASS(C)SI: 57.5 GRAMS/M^2
BH CV ECHO MEAS - LV SYSTOLIC VOL/BSA (12-30): 8.3 ML/M^2
BH CV ECHO MEAS - LVIDD: 4.2 CM
BH CV ECHO MEAS - LVIDS: 3.1 CM
BH CV ECHO MEAS - LVLD AP4: 5.8 CM
BH CV ECHO MEAS - LVLS AP4: 5 CM
BH CV ECHO MEAS - LVOT AREA (M): 3.5 CM^2
BH CV ECHO MEAS - LVOT AREA: 3.5 CM^2
BH CV ECHO MEAS - LVOT DIAM: 2.1 CM
BH CV ECHO MEAS - LVPWD: 1.1 CM
BH CV ECHO MEAS - LVPWS: 1.4 CM
BH CV ECHO MEAS - PA ACC TIME: 0.07 SEC
BH CV ECHO MEAS - PA PR(ACCEL): 47.5 MMHG
BH CV ECHO MEAS - RAP SYSTOLE: 10 MMHG
BH CV ECHO MEAS - RVSP: 30.8 MMHG
BH CV ECHO MEAS - SI(AO): 83.3 ML/M^2
BH CV ECHO MEAS - SI(CUBED): 22.8 ML/M^2
BH CV ECHO MEAS - SI(MOD-SP4): 16 ML/M^2
BH CV ECHO MEAS - SI(TEICH): 20.9 ML/M^2
BH CV ECHO MEAS - SV(AO): 153.4 ML
BH CV ECHO MEAS - SV(CUBED): 41.9 ML
BH CV ECHO MEAS - SV(MOD-SP4): 29.4 ML
BH CV ECHO MEAS - SV(TEICH): 38.4 ML
BH CV ECHO MEAS - TR MAX VEL: 228 CM/SEC
BILIRUB SERPL-MCNC: 0.2 MG/DL (ref 0–1.2)
BILIRUB SERPL-MCNC: 0.3 MG/DL (ref 0–1.2)
BILIRUB SERPL-MCNC: 0.4 MG/DL (ref 0–1.2)
BILIRUB SERPL-MCNC: <0.2 MG/DL (ref 0–1.2)
BILIRUB UR QL STRIP: NEGATIVE
BUN SERPL-MCNC: 10 MG/DL (ref 6–20)
BUN SERPL-MCNC: 11 MG/DL (ref 6–20)
BUN SERPL-MCNC: 11 MG/DL (ref 6–20)
BUN SERPL-MCNC: 12 MG/DL (ref 6–20)
BUN SERPL-MCNC: 13 MG/DL (ref 6–20)
BUN SERPL-MCNC: 15 MG/DL (ref 6–20)
BUN SERPL-MCNC: 16 MG/DL (ref 6–20)
BUN SERPL-MCNC: 17 MG/DL (ref 6–20)
BUN SERPL-MCNC: 18 MG/DL (ref 6–20)
BUN SERPL-MCNC: 19 MG/DL (ref 6–20)
BUN SERPL-MCNC: 7 MG/DL (ref 6–20)
BUN SERPL-MCNC: 8 MG/DL (ref 6–20)
BUN SERPL-MCNC: 8 MG/DL (ref 6–20)
BUN SERPL-MCNC: 9 MG/DL (ref 6–20)
BUN/CREAT SERPL: 11.3 (ref 7–25)
BUN/CREAT SERPL: 12.3 (ref 7–25)
BUN/CREAT SERPL: 12.7 (ref 7–25)
BUN/CREAT SERPL: 14.1 (ref 7–25)
BUN/CREAT SERPL: 14.6 (ref 7–25)
BUN/CREAT SERPL: 16.1 (ref 7–25)
BUN/CREAT SERPL: 16.7 (ref 7–25)
BUN/CREAT SERPL: 21.1 (ref 7–25)
BUN/CREAT SERPL: 21.3 (ref 7–25)
BUN/CREAT SERPL: 22.2 (ref 7–25)
BUN/CREAT SERPL: 22.5 (ref 7–25)
BUN/CREAT SERPL: 23.2 (ref 7–25)
BUN/CREAT SERPL: 31.7 (ref 7–25)
BUN/CREAT SERPL: 9.5 (ref 7–25)
BUPRENORPHINE SERPL-MCNC: NEGATIVE NG/ML
CALCIUM SPEC-SCNC: 10.6 MG/DL (ref 8.6–10.5)
CALCIUM SPEC-SCNC: 13.4 MG/DL (ref 8.6–10.5)
CALCIUM SPEC-SCNC: 8.6 MG/DL (ref 8.6–10.5)
CALCIUM SPEC-SCNC: 8.7 MG/DL (ref 8.6–10.5)
CALCIUM SPEC-SCNC: 8.8 MG/DL (ref 8.6–10.5)
CALCIUM SPEC-SCNC: 9.2 MG/DL (ref 8.6–10.5)
CALCIUM SPEC-SCNC: 9.2 MG/DL (ref 8.6–10.5)
CALCIUM SPEC-SCNC: 9.3 MG/DL (ref 8.6–10.5)
CALCIUM SPEC-SCNC: 9.3 MG/DL (ref 8.6–10.5)
CALCIUM SPEC-SCNC: 9.4 MG/DL (ref 8.6–10.5)
CALCIUM SPEC-SCNC: 9.5 MG/DL (ref 8.6–10.5)
CALCIUM SPEC-SCNC: 9.6 MG/DL (ref 8.6–10.5)
CALCIUM SPEC-SCNC: 9.8 MG/DL (ref 8.6–10.5)
CALCIUM SPEC-SCNC: 9.9 MG/DL (ref 8.6–10.5)
CANNABINOIDS SERPL QL: NEGATIVE
CHLORIDE SERPL-SCNC: 100 MMOL/L (ref 98–107)
CHLORIDE SERPL-SCNC: 100 MMOL/L (ref 98–107)
CHLORIDE SERPL-SCNC: 101 MMOL/L (ref 98–107)
CHLORIDE SERPL-SCNC: 101 MMOL/L (ref 98–107)
CHLORIDE SERPL-SCNC: 102 MMOL/L (ref 98–107)
CHLORIDE SERPL-SCNC: 102 MMOL/L (ref 98–107)
CHLORIDE SERPL-SCNC: 104 MMOL/L (ref 98–107)
CHLORIDE SERPL-SCNC: 106 MMOL/L (ref 98–107)
CHLORIDE SERPL-SCNC: 93 MMOL/L (ref 98–107)
CHLORIDE SERPL-SCNC: 94 MMOL/L (ref 98–107)
CHLORIDE SERPL-SCNC: 95 MMOL/L (ref 98–107)
CHLORIDE SERPL-SCNC: 95 MMOL/L (ref 98–107)
CHLORIDE SERPL-SCNC: 98 MMOL/L (ref 98–107)
CHLORIDE SERPL-SCNC: 98 MMOL/L (ref 98–107)
CLARITY UR: CLEAR
CO2 SERPL-SCNC: 19.7 MMOL/L (ref 22–29)
CO2 SERPL-SCNC: 22 MMOL/L (ref 22–29)
CO2 SERPL-SCNC: 22.2 MMOL/L (ref 22–29)
CO2 SERPL-SCNC: 22.3 MMOL/L (ref 22–29)
CO2 SERPL-SCNC: 22.9 MMOL/L (ref 22–29)
CO2 SERPL-SCNC: 23.4 MMOL/L (ref 22–29)
CO2 SERPL-SCNC: 23.6 MMOL/L (ref 22–29)
CO2 SERPL-SCNC: 24.2 MMOL/L (ref 22–29)
CO2 SERPL-SCNC: 24.6 MMOL/L (ref 22–29)
CO2 SERPL-SCNC: 25.5 MMOL/L (ref 22–29)
CO2 SERPL-SCNC: 26 MMOL/L (ref 22–29)
CO2 SERPL-SCNC: 26.6 MMOL/L (ref 22–29)
CO2 SERPL-SCNC: 27.9 MMOL/L (ref 22–29)
CO2 SERPL-SCNC: 30.6 MMOL/L (ref 22–29)
COCAINE UR QL: NEGATIVE
COLOR UR: YELLOW
CREAT SERPL-MCNC: 0.56 MG/DL (ref 0.76–1.27)
CREAT SERPL-MCNC: 0.56 MG/DL (ref 0.76–1.27)
CREAT SERPL-MCNC: 0.6 MG/DL (ref 0.76–1.27)
CREAT SERPL-MCNC: 0.63 MG/DL (ref 0.76–1.27)
CREAT SERPL-MCNC: 0.65 MG/DL (ref 0.76–1.27)
CREAT SERPL-MCNC: 0.66 MG/DL (ref 0.76–1.27)
CREAT SERPL-MCNC: 0.71 MG/DL (ref 0.76–1.27)
CREAT SERPL-MCNC: 0.71 MG/DL (ref 0.76–1.27)
CREAT SERPL-MCNC: 0.72 MG/DL (ref 0.76–1.27)
CREAT SERPL-MCNC: 0.74 MG/DL (ref 0.76–1.27)
CREAT SERPL-MCNC: 0.8 MG/DL (ref 0.76–1.27)
CREAT SERPL-MCNC: 0.8 MG/DL (ref 0.76–1.27)
CREAT SERPL-MCNC: 0.82 MG/DL (ref 0.76–1.27)
CREAT SERPL-MCNC: 0.97 MG/DL (ref 0.76–1.27)
CRP SERPL-MCNC: <0.3 MG/DL (ref 0–0.5)
D DIMER PPP FEU-MCNC: <0.27 MCGFEU/ML (ref 0–0.5)
DEPRECATED RDW RBC AUTO: 41.5 FL (ref 37–54)
DEPRECATED RDW RBC AUTO: 41.8 FL (ref 37–54)
DEPRECATED RDW RBC AUTO: 42.4 FL (ref 37–54)
DEPRECATED RDW RBC AUTO: 42.8 FL (ref 37–54)
DEPRECATED RDW RBC AUTO: 43.9 FL (ref 37–54)
DEPRECATED RDW RBC AUTO: 45.1 FL (ref 37–54)
DEPRECATED RDW RBC AUTO: 46 FL (ref 37–54)
DEPRECATED RDW RBC AUTO: 46.2 FL (ref 37–54)
DEPRECATED RDW RBC AUTO: 48.5 FL (ref 37–54)
DEPRECATED RDW RBC AUTO: 49.5 FL (ref 37–54)
DEPRECATED RDW RBC AUTO: 72.2 FL (ref 37–54)
DEPRECATED RDW RBC AUTO: 73 FL (ref 37–54)
DEPRECATED RDW RBC AUTO: 83.6 FL (ref 37–54)
DEPRECATED RDW RBC AUTO: 86.3 FL (ref 37–54)
EOSINOPHIL # BLD AUTO: 0 10*3/MM3 (ref 0–0.4)
EOSINOPHIL # BLD AUTO: 0.02 10*3/MM3 (ref 0–0.4)
EOSINOPHIL # BLD AUTO: 0.02 10*3/MM3 (ref 0–0.4)
EOSINOPHIL # BLD AUTO: 0.03 10*3/MM3 (ref 0–0.4)
EOSINOPHIL # BLD AUTO: 0.05 10*3/MM3 (ref 0–0.4)
EOSINOPHIL # BLD AUTO: 0.09 10*3/MM3 (ref 0–0.4)
EOSINOPHIL # BLD AUTO: 0.36 10*3/MM3 (ref 0–0.4)
EOSINOPHIL # BLD AUTO: 0.43 10*3/MM3 (ref 0–0.4)
EOSINOPHIL # BLD AUTO: 0.49 10*3/MM3 (ref 0–0.4)
EOSINOPHIL # BLD AUTO: 0.54 10*3/MM3 (ref 0–0.4)
EOSINOPHIL # BLD AUTO: 0.61 10*3/MM3 (ref 0–0.4)
EOSINOPHIL NFR BLD AUTO: 0 % (ref 0.3–6.2)
EOSINOPHIL NFR BLD AUTO: 0.1 % (ref 0.3–6.2)
EOSINOPHIL NFR BLD AUTO: 0.3 % (ref 0.3–6.2)
EOSINOPHIL NFR BLD AUTO: 0.4 % (ref 0.3–6.2)
EOSINOPHIL NFR BLD AUTO: 0.7 % (ref 0.3–6.2)
EOSINOPHIL NFR BLD AUTO: 1.3 % (ref 0.3–6.2)
EOSINOPHIL NFR BLD AUTO: 2.6 % (ref 0.3–6.2)
EOSINOPHIL NFR BLD AUTO: 4 % (ref 0.3–6.2)
EOSINOPHIL NFR BLD AUTO: 4.1 % (ref 0.3–6.2)
EOSINOPHIL NFR BLD AUTO: 4.3 % (ref 0.3–6.2)
EOSINOPHIL NFR BLD AUTO: 5.8 % (ref 0.3–6.2)
ERYTHROCYTE [DISTWIDTH] IN BLOOD BY AUTOMATED COUNT: 11.9 % (ref 12.3–15.4)
ERYTHROCYTE [DISTWIDTH] IN BLOOD BY AUTOMATED COUNT: 12 % (ref 12.3–15.4)
ERYTHROCYTE [DISTWIDTH] IN BLOOD BY AUTOMATED COUNT: 12 % (ref 12.3–15.4)
ERYTHROCYTE [DISTWIDTH] IN BLOOD BY AUTOMATED COUNT: 12.4 % (ref 12.3–15.4)
ERYTHROCYTE [DISTWIDTH] IN BLOOD BY AUTOMATED COUNT: 12.4 % (ref 12.3–15.4)
ERYTHROCYTE [DISTWIDTH] IN BLOOD BY AUTOMATED COUNT: 12.7 % (ref 12.3–15.4)
ERYTHROCYTE [DISTWIDTH] IN BLOOD BY AUTOMATED COUNT: 12.7 % (ref 12.3–15.4)
ERYTHROCYTE [DISTWIDTH] IN BLOOD BY AUTOMATED COUNT: 13 % (ref 12.3–15.4)
ERYTHROCYTE [DISTWIDTH] IN BLOOD BY AUTOMATED COUNT: 13.2 % (ref 12.3–15.4)
ERYTHROCYTE [DISTWIDTH] IN BLOOD BY AUTOMATED COUNT: 17.1 % (ref 12.3–15.4)
ERYTHROCYTE [DISTWIDTH] IN BLOOD BY AUTOMATED COUNT: 21 % (ref 12.3–15.4)
ERYTHROCYTE [DISTWIDTH] IN BLOOD BY AUTOMATED COUNT: 21.4 % (ref 12.3–15.4)
ERYTHROCYTE [DISTWIDTH] IN BLOOD BY AUTOMATED COUNT: 21.4 % (ref 12.3–15.4)
ERYTHROCYTE [DISTWIDTH] IN BLOOD BY AUTOMATED COUNT: 22.8 % (ref 12.3–15.4)
ERYTHROCYTE [SEDIMENTATION RATE] IN BLOOD: 7 MM/HR (ref 0–15)
FENTANYL SERPL-MCNC: 1.3 NG/ML (ref 0.3–1.5)
FERRITIN SERPL-MCNC: 171.1 NG/ML (ref 30–400)
FLUAV RNA RESP QL NAA+PROBE: NOT DETECTED
FLUBV RNA RESP QL NAA+PROBE: NOT DETECTED
FOLATE SERPL-MCNC: 4.79 NG/ML (ref 4.78–24.2)
FUNGUS SPEC CULT: NORMAL
FUNGUS SPEC FUNGUS STN: NORMAL
GFR SERPL CREATININE-BSD FRML MDRD: 100 ML/MIN/1.73
GFR SERPL CREATININE-BSD FRML MDRD: 103 ML/MIN/1.73
GFR SERPL CREATININE-BSD FRML MDRD: 103 ML/MIN/1.73
GFR SERPL CREATININE-BSD FRML MDRD: 113 ML/MIN/1.73
GFR SERPL CREATININE-BSD FRML MDRD: 116 ML/MIN/1.73
GFR SERPL CREATININE-BSD FRML MDRD: 118 ML/MIN/1.73
GFR SERPL CREATININE-BSD FRML MDRD: 118 ML/MIN/1.73
GFR SERPL CREATININE-BSD FRML MDRD: 128 ML/MIN/1.73
GFR SERPL CREATININE-BSD FRML MDRD: 131 ML/MIN/1.73
GFR SERPL CREATININE-BSD FRML MDRD: 135 ML/MIN/1.73
GFR SERPL CREATININE-BSD FRML MDRD: 143 ML/MIN/1.73
GFR SERPL CREATININE-BSD FRML MDRD: 83 ML/MIN/1.73
GFR SERPL CREATININE-BSD FRML MDRD: >150 ML/MIN/1.73
GFR SERPL CREATININE-BSD FRML MDRD: >150 ML/MIN/1.73
GLOBULIN UR ELPH-MCNC: 2.7 GM/DL
GLOBULIN UR ELPH-MCNC: 2.8 GM/DL
GLOBULIN UR ELPH-MCNC: 3 GM/DL
GLOBULIN UR ELPH-MCNC: 3 GM/DL
GLOBULIN UR ELPH-MCNC: 3.2 GM/DL
GLOBULIN UR ELPH-MCNC: 3.3 GM/DL
GLOBULIN UR ELPH-MCNC: 3.4 GM/DL
GLOBULIN UR ELPH-MCNC: 3.4 GM/DL
GLOBULIN UR ELPH-MCNC: 3.5 GM/DL
GLOBULIN UR ELPH-MCNC: 3.5 GM/DL
GLOBULIN UR ELPH-MCNC: 4 GM/DL
GLUCOSE SERPL-MCNC: 104 MG/DL (ref 65–99)
GLUCOSE SERPL-MCNC: 109 MG/DL (ref 65–99)
GLUCOSE SERPL-MCNC: 113 MG/DL (ref 65–99)
GLUCOSE SERPL-MCNC: 119 MG/DL (ref 65–99)
GLUCOSE SERPL-MCNC: 120 MG/DL (ref 65–99)
GLUCOSE SERPL-MCNC: 121 MG/DL (ref 65–99)
GLUCOSE SERPL-MCNC: 124 MG/DL (ref 65–99)
GLUCOSE SERPL-MCNC: 126 MG/DL (ref 65–99)
GLUCOSE SERPL-MCNC: 127 MG/DL (ref 65–99)
GLUCOSE SERPL-MCNC: 128 MG/DL (ref 65–99)
GLUCOSE SERPL-MCNC: 141 MG/DL (ref 65–99)
GLUCOSE SERPL-MCNC: 97 MG/DL (ref 65–99)
GLUCOSE UR STRIP-MCNC: NEGATIVE MG/DL
GRAM STN SPEC: NORMAL
GRAM STN SPEC: NORMAL
HCT VFR BLD AUTO: 29.6 % (ref 37.5–51)
HCT VFR BLD AUTO: 31.1 % (ref 37.5–51)
HCT VFR BLD AUTO: 31.2 % (ref 37.5–51)
HCT VFR BLD AUTO: 34.6 % (ref 37.5–51)
HCT VFR BLD AUTO: 34.8 % (ref 37.5–51)
HCT VFR BLD AUTO: 35.9 % (ref 37.5–51)
HCT VFR BLD AUTO: 36.5 % (ref 37.5–51)
HCT VFR BLD AUTO: 36.8 % (ref 37.5–51)
HCT VFR BLD AUTO: 36.9 % (ref 37.5–51)
HCT VFR BLD AUTO: 38.2 % (ref 37.5–51)
HCT VFR BLD AUTO: 38.6 % (ref 37.5–51)
HCT VFR BLD AUTO: 39.3 % (ref 37.5–51)
HCT VFR BLD AUTO: 40.1 % (ref 37.5–51)
HCT VFR BLD AUTO: 40.5 % (ref 37.5–51)
HGB BLD-MCNC: 10.2 G/DL (ref 13–17.7)
HGB BLD-MCNC: 10.2 G/DL (ref 13–17.7)
HGB BLD-MCNC: 10.5 G/DL (ref 13–17.7)
HGB BLD-MCNC: 11.5 G/DL (ref 13–17.7)
HGB BLD-MCNC: 11.6 G/DL (ref 13–17.7)
HGB BLD-MCNC: 11.6 G/DL (ref 13–17.7)
HGB BLD-MCNC: 11.9 G/DL (ref 13–17.7)
HGB BLD-MCNC: 12 G/DL (ref 13–17.7)
HGB BLD-MCNC: 12.2 G/DL (ref 13–17.7)
HGB BLD-MCNC: 12.9 G/DL (ref 13–17.7)
HGB BLD-MCNC: 12.9 G/DL (ref 13–17.7)
HGB BLD-MCNC: 13 G/DL (ref 13–17.7)
HGB BLD-MCNC: 13.4 G/DL (ref 13–17.7)
HGB BLD-MCNC: 13.6 G/DL (ref 13–17.7)
HGB UR QL STRIP.AUTO: NEGATIVE
HYPOCHROMIA BLD QL: NORMAL
IMM GRANULOCYTES # BLD AUTO: 0.02 10*3/MM3 (ref 0–0.05)
IMM GRANULOCYTES # BLD AUTO: 0.02 10*3/MM3 (ref 0–0.05)
IMM GRANULOCYTES # BLD AUTO: 0.03 10*3/MM3 (ref 0–0.05)
IMM GRANULOCYTES # BLD AUTO: 0.04 10*3/MM3 (ref 0–0.05)
IMM GRANULOCYTES # BLD AUTO: 0.07 10*3/MM3 (ref 0–0.05)
IMM GRANULOCYTES # BLD AUTO: 0.08 10*3/MM3 (ref 0–0.05)
IMM GRANULOCYTES # BLD AUTO: 0.09 10*3/MM3 (ref 0–0.05)
IMM GRANULOCYTES NFR BLD AUTO: 0.2 % (ref 0–0.5)
IMM GRANULOCYTES NFR BLD AUTO: 0.3 % (ref 0–0.5)
IMM GRANULOCYTES NFR BLD AUTO: 0.5 % (ref 0–0.5)
IMM GRANULOCYTES NFR BLD AUTO: 0.6 % (ref 0–0.5)
IMM GRANULOCYTES NFR BLD AUTO: 0.6 % (ref 0–0.5)
IMM GRANULOCYTES NFR BLD AUTO: 1.1 % (ref 0–0.5)
IRON 24H UR-MRATE: 35 MCG/DL (ref 59–158)
IRON SATN MFR SERPL: 11 % (ref 20–50)
KETONES UR QL STRIP: NEGATIVE
LAB AP CASE REPORT: NORMAL
LDH SERPL-CCNC: 278 U/L (ref 135–225)
LEUKOCYTE ESTERASE UR QL STRIP.AUTO: NEGATIVE
LYMPHOCYTES # BLD AUTO: 1.21 10*3/MM3 (ref 0.7–3.1)
LYMPHOCYTES # BLD AUTO: 1.4 10*3/MM3 (ref 0.7–3.1)
LYMPHOCYTES # BLD AUTO: 1.4 10*3/MM3 (ref 0.7–3.1)
LYMPHOCYTES # BLD AUTO: 1.48 10*3/MM3 (ref 0.7–3.1)
LYMPHOCYTES # BLD AUTO: 1.57 10*3/MM3 (ref 0.7–3.1)
LYMPHOCYTES # BLD AUTO: 1.57 10*3/MM3 (ref 0.7–3.1)
LYMPHOCYTES # BLD AUTO: 2.07 10*3/MM3 (ref 0.7–3.1)
LYMPHOCYTES # BLD AUTO: 2.27 10*3/MM3 (ref 0.7–3.1)
LYMPHOCYTES # BLD AUTO: 2.48 10*3/MM3 (ref 0.7–3.1)
LYMPHOCYTES # BLD AUTO: 2.6 10*3/MM3 (ref 0.7–3.1)
LYMPHOCYTES # BLD AUTO: 2.95 10*3/MM3 (ref 0.7–3.1)
LYMPHOCYTES # BLD MANUAL: 1.74 10*3/MM3 (ref 0.7–3.1)
LYMPHOCYTES # BLD MANUAL: 2 10*3/MM3 (ref 0.7–3.1)
LYMPHOCYTES NFR BLD AUTO: 12 % (ref 19.6–45.3)
LYMPHOCYTES NFR BLD AUTO: 17.4 % (ref 19.6–45.3)
LYMPHOCYTES NFR BLD AUTO: 18.5 % (ref 19.6–45.3)
LYMPHOCYTES NFR BLD AUTO: 19 % (ref 19.6–45.3)
LYMPHOCYTES NFR BLD AUTO: 19.3 % (ref 19.6–45.3)
LYMPHOCYTES NFR BLD AUTO: 19.6 % (ref 19.6–45.3)
LYMPHOCYTES NFR BLD AUTO: 19.8 % (ref 19.6–45.3)
LYMPHOCYTES NFR BLD AUTO: 20.7 % (ref 19.6–45.3)
LYMPHOCYTES NFR BLD AUTO: 26.2 % (ref 19.6–45.3)
LYMPHOCYTES NFR BLD AUTO: 30.1 % (ref 19.6–45.3)
LYMPHOCYTES NFR BLD AUTO: 8.6 % (ref 19.6–45.3)
LYMPHOCYTES NFR BLD MANUAL: 12 % (ref 5–12)
LYMPHOCYTES NFR BLD MANUAL: 13 % (ref 5–12)
LYMPHOCYTES NFR BLD MANUAL: 24 % (ref 19.6–45.3)
MACROCYTES BLD QL SMEAR: ABNORMAL
MACROCYTES BLD QL SMEAR: ABNORMAL
MACROCYTES BLD QL SMEAR: NORMAL
MACROCYTES BLD QL SMEAR: NORMAL
MAGNESIUM SERPL-MCNC: 1.4 MG/DL (ref 1.6–2.6)
MAGNESIUM SERPL-MCNC: 1.9 MG/DL (ref 1.6–2.6)
MAGNESIUM SERPL-MCNC: 2 MG/DL (ref 1.6–2.6)
MAGNESIUM SERPL-MCNC: 2 MG/DL (ref 1.6–2.6)
MAGNESIUM SERPL-MCNC: 2.2 MG/DL (ref 1.6–2.6)
MAXIMAL PREDICTED HEART RATE: 171 BPM
MCH RBC QN AUTO: 30.1 PG (ref 26.6–33)
MCH RBC QN AUTO: 30.7 PG (ref 26.6–33)
MCH RBC QN AUTO: 30.9 PG (ref 26.6–33)
MCH RBC QN AUTO: 31.3 PG (ref 26.6–33)
MCH RBC QN AUTO: 31.6 PG (ref 26.6–33)
MCH RBC QN AUTO: 31.8 PG (ref 26.6–33)
MCH RBC QN AUTO: 32.6 PG (ref 26.6–33)
MCH RBC QN AUTO: 32.8 PG (ref 26.6–33)
MCH RBC QN AUTO: 33.1 PG (ref 26.6–33)
MCH RBC QN AUTO: 33.2 PG (ref 26.6–33)
MCH RBC QN AUTO: 33.6 PG (ref 26.6–33)
MCH RBC QN AUTO: 33.9 PG (ref 26.6–33)
MCH RBC QN AUTO: 34.3 PG (ref 26.6–33)
MCH RBC QN AUTO: 36.7 PG (ref 26.6–33)
MCHC RBC AUTO-ENTMCNC: 31.4 G/DL (ref 31.5–35.7)
MCHC RBC AUTO-ENTMCNC: 32.4 G/DL (ref 31.5–35.7)
MCHC RBC AUTO-ENTMCNC: 32.6 G/DL (ref 31.5–35.7)
MCHC RBC AUTO-ENTMCNC: 32.8 G/DL (ref 31.5–35.7)
MCHC RBC AUTO-ENTMCNC: 33 G/DL (ref 31.5–35.7)
MCHC RBC AUTO-ENTMCNC: 33.1 G/DL (ref 31.5–35.7)
MCHC RBC AUTO-ENTMCNC: 33.4 G/DL (ref 31.5–35.7)
MCHC RBC AUTO-ENTMCNC: 33.4 G/DL (ref 31.5–35.7)
MCHC RBC AUTO-ENTMCNC: 33.5 G/DL (ref 31.5–35.7)
MCHC RBC AUTO-ENTMCNC: 33.6 G/DL (ref 31.5–35.7)
MCHC RBC AUTO-ENTMCNC: 33.7 G/DL (ref 31.5–35.7)
MCHC RBC AUTO-ENTMCNC: 33.8 G/DL (ref 31.5–35.7)
MCHC RBC AUTO-ENTMCNC: 34.1 G/DL (ref 31.5–35.7)
MCHC RBC AUTO-ENTMCNC: 34.5 G/DL (ref 31.5–35.7)
MCV RBC AUTO: 104.7 FL (ref 79–97)
MCV RBC AUTO: 111.2 FL (ref 79–97)
MCV RBC AUTO: 93.3 FL (ref 79–97)
MCV RBC AUTO: 94.6 FL (ref 79–97)
MCV RBC AUTO: 94.6 FL (ref 79–97)
MCV RBC AUTO: 94.8 FL (ref 79–97)
MCV RBC AUTO: 95.8 FL (ref 79–97)
MCV RBC AUTO: 96 FL (ref 79–97)
MCV RBC AUTO: 96.5 FL (ref 79–97)
MCV RBC AUTO: 97.5 FL (ref 79–97)
MCV RBC AUTO: 98.3 FL (ref 79–97)
MCV RBC AUTO: 98.5 FL (ref 79–97)
MCV RBC AUTO: 98.5 FL (ref 79–97)
MCV RBC AUTO: 99.5 FL (ref 79–97)
METAMYELOCYTES NFR BLD MANUAL: 1 % (ref 0–0)
METHADONE UR QL SCN: NEGATIVE
MONOCYTES # BLD AUTO: 0.53 10*3/MM3 (ref 0.1–0.9)
MONOCYTES # BLD AUTO: 0.54 10*3/MM3 (ref 0.1–0.9)
MONOCYTES # BLD AUTO: 0.69 10*3/MM3 (ref 0.1–0.9)
MONOCYTES # BLD AUTO: 0.69 10*3/MM3 (ref 0.1–0.9)
MONOCYTES # BLD AUTO: 0.83 10*3/MM3 (ref 0.1–0.9)
MONOCYTES # BLD AUTO: 0.87 10*3/MM3 (ref 0.1–0.9)
MONOCYTES # BLD AUTO: 1.01 10*3/MM3 (ref 0.1–0.9)
MONOCYTES # BLD AUTO: 1.03 10*3/MM3 (ref 0.1–0.9)
MONOCYTES # BLD AUTO: 1.08 10*3/MM3 (ref 0.1–0.9)
MONOCYTES # BLD AUTO: 1.14 10*3/MM3 (ref 0.1–0.9)
MONOCYTES # BLD AUTO: 1.2 10*3/MM3 (ref 0.1–0.9)
MONOCYTES # BLD AUTO: 1.58 10*3/MM3 (ref 0.1–0.9)
MONOCYTES # BLD: 0.67 10*3/MM3 (ref 0.1–0.9)
MONOCYTES NFR BLD AUTO: 11.5 % (ref 5–12)
MONOCYTES NFR BLD AUTO: 12.1 % (ref 5–12)
MONOCYTES NFR BLD AUTO: 13.3 % (ref 5–12)
MONOCYTES NFR BLD AUTO: 7 % (ref 5–12)
MONOCYTES NFR BLD AUTO: 7.2 % (ref 5–12)
MONOCYTES NFR BLD AUTO: 8.4 % (ref 5–12)
MONOCYTES NFR BLD AUTO: 8.4 % (ref 5–12)
MONOCYTES NFR BLD AUTO: 8.6 % (ref 5–12)
MONOCYTES NFR BLD AUTO: 9.7 % (ref 5–12)
MYCOBACTERIUM SPEC QL CULT: NEGATIVE
MYELOCYTES NFR BLD MANUAL: 2 % (ref 0–0)
NEUTROPHILS # BLD AUTO: 2.36 10*3/MM3 (ref 1.7–7)
NEUTROPHILS # BLD AUTO: 4.57 10*3/MM3 (ref 1.7–7)
NEUTROPHILS NFR BLD AUTO: 11.33 10*3/MM3 (ref 1.7–7)
NEUTROPHILS NFR BLD AUTO: 11.7 10*3/MM3 (ref 1.7–7)
NEUTROPHILS NFR BLD AUTO: 4.65 10*3/MM3 (ref 1.7–7)
NEUTROPHILS NFR BLD AUTO: 4.87 10*3/MM3 (ref 1.7–7)
NEUTROPHILS NFR BLD AUTO: 5.33 10*3/MM3 (ref 1.7–7)
NEUTROPHILS NFR BLD AUTO: 5.39 10*3/MM3 (ref 1.7–7)
NEUTROPHILS NFR BLD AUTO: 5.91 10*3/MM3 (ref 1.7–7)
NEUTROPHILS NFR BLD AUTO: 5.95 10*3/MM3 (ref 1.7–7)
NEUTROPHILS NFR BLD AUTO: 59.9 % (ref 42.7–76)
NEUTROPHILS NFR BLD AUTO: 6.47 10*3/MM3 (ref 1.7–7)
NEUTROPHILS NFR BLD AUTO: 61.7 % (ref 42.7–76)
NEUTROPHILS NFR BLD AUTO: 61.7 % (ref 42.7–76)
NEUTROPHILS NFR BLD AUTO: 63.9 % (ref 42.7–76)
NEUTROPHILS NFR BLD AUTO: 66 % (ref 42.7–76)
NEUTROPHILS NFR BLD AUTO: 68.2 % (ref 42.7–76)
NEUTROPHILS NFR BLD AUTO: 70 % (ref 42.7–76)
NEUTROPHILS NFR BLD AUTO: 73.3 % (ref 42.7–76)
NEUTROPHILS NFR BLD AUTO: 73.4 % (ref 42.7–76)
NEUTROPHILS NFR BLD AUTO: 74.3 % (ref 42.7–76)
NEUTROPHILS NFR BLD AUTO: 8.3 10*3/MM3 (ref 1.7–7)
NEUTROPHILS NFR BLD AUTO: 80.7 % (ref 42.7–76)
NEUTROPHILS NFR BLD AUTO: 9.17 10*3/MM3 (ref 1.7–7)
NEUTROPHILS NFR BLD MANUAL: 46 % (ref 42.7–76)
NEUTROPHILS NFR BLD MANUAL: 61 % (ref 42.7–76)
NEUTS BAND NFR BLD MANUAL: 2 % (ref 0–5)
NITRITE UR QL STRIP: NEGATIVE
NORFENTANYL SERPL-MCNC: 1.3 NG/ML
NRBC BLD AUTO-RTO: 0 /100 WBC (ref 0–0.2)
NRBC SPEC MANUAL: 1 /100 WBC (ref 0–0.2)
NT-PROBNP SERPL-MCNC: 35.4 PG/ML (ref 0–450)
OPIATES UR QL: POSITIVE
OXYCODONE SERPL-MCNC: 8 NG/ML (ref 10–100)
OXYCODONE UR QL SCN: NEGATIVE
OXYMORPHONE SERPL-MCNC: <1 NG/ML (ref 1–8)
PCP UR QL SCN: NEGATIVE
PH UR STRIP.AUTO: 6.5 [PH] (ref 5–8)
PHOSPHATE SERPL-MCNC: 2.1 MG/DL (ref 2.5–4.5)
PHOSPHATE SERPL-MCNC: 2.8 MG/DL (ref 2.5–4.5)
PHOSPHATE SERPL-MCNC: 2.8 MG/DL (ref 2.5–4.5)
PHOSPHATE SERPL-MCNC: 3 MG/DL (ref 2.5–4.5)
PHOSPHATE SERPL-MCNC: 3.3 MG/DL (ref 2.5–4.5)
PHOSPHATE SERPL-MCNC: 3.8 MG/DL (ref 2.5–4.5)
PLAT MORPH BLD: NORMAL
PLATELET # BLD AUTO: 118 10*3/MM3 (ref 140–450)
PLATELET # BLD AUTO: 136 10*3/MM3 (ref 140–450)
PLATELET # BLD AUTO: 171 10*3/MM3 (ref 140–450)
PLATELET # BLD AUTO: 295 10*3/MM3 (ref 140–450)
PLATELET # BLD AUTO: 324 10*3/MM3 (ref 140–450)
PLATELET # BLD AUTO: 384 10*3/MM3 (ref 140–450)
PLATELET # BLD AUTO: 385 10*3/MM3 (ref 140–450)
PLATELET # BLD AUTO: 388 10*3/MM3 (ref 140–450)
PLATELET # BLD AUTO: 397 10*3/MM3 (ref 140–450)
PLATELET # BLD AUTO: 433 10*3/MM3 (ref 140–450)
PLATELET # BLD AUTO: 441 10*3/MM3 (ref 140–450)
PLATELET # BLD AUTO: 446 10*3/MM3 (ref 140–450)
PLATELET # BLD AUTO: 464 10*3/MM3 (ref 140–450)
PLATELET # BLD AUTO: 484 10*3/MM3 (ref 140–450)
PMV BLD AUTO: 7.4 FL (ref 6–12)
PMV BLD AUTO: 7.4 FL (ref 6–12)
PMV BLD AUTO: 7.6 FL (ref 6–12)
PMV BLD AUTO: 7.8 FL (ref 6–12)
PMV BLD AUTO: 7.9 FL (ref 6–12)
PMV BLD AUTO: 8.2 FL (ref 6–12)
PMV BLD AUTO: 8.3 FL (ref 6–12)
PMV BLD AUTO: 8.4 FL (ref 6–12)
PMV BLD AUTO: 8.7 FL (ref 6–12)
POLYCHROMASIA BLD QL SMEAR: ABNORMAL
POTASSIUM SERPL-SCNC: 2.9 MMOL/L (ref 3.5–5.2)
POTASSIUM SERPL-SCNC: 3.4 MMOL/L (ref 3.5–5.2)
POTASSIUM SERPL-SCNC: 3.5 MMOL/L (ref 3.5–5.2)
POTASSIUM SERPL-SCNC: 3.7 MMOL/L (ref 3.5–5.2)
POTASSIUM SERPL-SCNC: 4 MMOL/L (ref 3.5–5.2)
POTASSIUM SERPL-SCNC: 4 MMOL/L (ref 3.5–5.2)
POTASSIUM SERPL-SCNC: 4.1 MMOL/L (ref 3.5–5.2)
POTASSIUM SERPL-SCNC: 4.1 MMOL/L (ref 3.5–5.2)
POTASSIUM SERPL-SCNC: 4.2 MMOL/L (ref 3.5–5.2)
POTASSIUM SERPL-SCNC: 4.4 MMOL/L (ref 3.5–5.2)
POTASSIUM SERPL-SCNC: 4.5 MMOL/L (ref 3.5–5.2)
POTASSIUM SERPL-SCNC: 4.7 MMOL/L (ref 3.5–5.2)
POTASSIUM SERPL-SCNC: 4.8 MMOL/L (ref 3.5–5.2)
POTASSIUM SERPL-SCNC: 4.8 MMOL/L (ref 3.5–5.2)
PROT SERPL-MCNC: 7 G/DL (ref 6–8.5)
PROT SERPL-MCNC: 7.1 G/DL (ref 6–8.5)
PROT SERPL-MCNC: 7.2 G/DL (ref 6–8.5)
PROT SERPL-MCNC: 7.3 G/DL (ref 6–8.5)
PROT SERPL-MCNC: 7.4 G/DL (ref 6–8.5)
PROT SERPL-MCNC: 7.5 G/DL (ref 6–8.5)
PROT SERPL-MCNC: 7.6 G/DL (ref 6–8.5)
PROT SERPL-MCNC: 7.7 G/DL (ref 6–8.5)
PROT UR QL STRIP: NEGATIVE
QT INTERVAL: 310 MS
QTC INTERVAL: 401 MS
RBC # BLD AUTO: 2.97 10*6/MM3 (ref 4.14–5.8)
RBC # BLD AUTO: 3.01 10*6/MM3 (ref 4.14–5.8)
RBC # BLD AUTO: 3.13 10*6/MM3 (ref 4.14–5.8)
RBC # BLD AUTO: 3.2 10*6/MM3 (ref 4.14–5.8)
RBC # BLD AUTO: 3.71 10*6/MM3 (ref 4.14–5.8)
RBC # BLD AUTO: 3.74 10*6/MM3 (ref 4.14–5.8)
RBC # BLD AUTO: 3.85 10*6/MM3 (ref 4.14–5.8)
RBC # BLD AUTO: 3.86 10*6/MM3 (ref 4.14–5.8)
RBC # BLD AUTO: 3.88 10*6/MM3 (ref 4.14–5.8)
RBC # BLD AUTO: 3.88 10*6/MM3 (ref 4.14–5.8)
RBC # BLD AUTO: 3.96 10*6/MM3 (ref 4.14–5.8)
RBC # BLD AUTO: 3.99 10*6/MM3 (ref 4.14–5.8)
RBC # BLD AUTO: 4.11 10*6/MM3 (ref 4.14–5.8)
RBC # BLD AUTO: 4.24 10*6/MM3 (ref 4.14–5.8)
SARS-COV-2 RNA NOSE QL NAA+PROBE: NOT DETECTED
SARS-COV-2 RNA PNL SPEC NAA+PROBE: NOT DETECTED
SARS-COV-2 RNA RESP QL NAA+PROBE: NOT DETECTED
SARS-COV-2 RNA RESP QL NAA+PROBE: NOT DETECTED
SCAN SLIDE: NORMAL
SODIUM SERPL-SCNC: 126 MMOL/L (ref 136–145)
SODIUM SERPL-SCNC: 128 MMOL/L (ref 136–145)
SODIUM SERPL-SCNC: 130 MMOL/L (ref 136–145)
SODIUM SERPL-SCNC: 134 MMOL/L (ref 136–145)
SODIUM SERPL-SCNC: 134 MMOL/L (ref 136–145)
SODIUM SERPL-SCNC: 135 MMOL/L (ref 136–145)
SODIUM SERPL-SCNC: 136 MMOL/L (ref 136–145)
SODIUM SERPL-SCNC: 138 MMOL/L (ref 136–145)
SODIUM SERPL-SCNC: 139 MMOL/L (ref 136–145)
SP GR UR STRIP: <=1.005 (ref 1–1.03)
SPECIMEN PREPARATION: NORMAL
STRESS TARGET HR: 145 BPM
T4 FREE SERPL-MCNC: 1.51 NG/DL (ref 0.93–1.7)
TIBC SERPL-MCNC: 311 MCG/DL (ref 298–536)
TOXIC GRANULATION: NORMAL
TRANSFERRIN SERPL-MCNC: 209 MG/DL (ref 200–360)
TROPONIN T SERPL-MCNC: <0.01 NG/ML (ref 0–0.03)
TROPONIN T SERPL-MCNC: <0.01 NG/ML (ref 0–0.03)
TSH SERPL DL<=0.05 MIU/L-ACNC: 0.27 UIU/ML (ref 0.27–4.2)
TSH SERPL DL<=0.05 MIU/L-ACNC: 0.5 UIU/ML (ref 0.27–4.2)
URATE SERPL-MCNC: 3.8 MG/DL (ref 3.4–7)
UROBILINOGEN UR QL STRIP: NORMAL
VARIANT LYMPHS NFR BLD MANUAL: 39 % (ref 19.6–45.3)
VIT B12 BLD-MCNC: 511 PG/ML (ref 211–946)
WBC # BLD AUTO: 10.47 10*3/MM3 (ref 3.4–10.8)
WBC # BLD AUTO: 12.33 10*3/MM3 (ref 3.4–10.8)
WBC # BLD AUTO: 13.02 10*3/MM3 (ref 3.4–10.8)
WBC # BLD AUTO: 14.04 10*3/MM3 (ref 3.4–10.8)
WBC # BLD AUTO: 15.93 10*3/MM3 (ref 3.4–10.8)
WBC # BLD AUTO: 7.13 10*3/MM3 (ref 3.4–10.8)
WBC # BLD AUTO: 7.26 10*3/MM3 (ref 3.4–10.8)
WBC # BLD AUTO: 7.53 10*3/MM3 (ref 3.4–10.8)
WBC # BLD AUTO: 7.6 10*3/MM3 (ref 3.4–10.8)
WBC # BLD AUTO: 8.06 10*3/MM3 (ref 3.4–10.8)
WBC # BLD AUTO: 8.15 10*3/MM3 (ref 3.4–10.8)
WBC # BLD AUTO: 8.74 10*3/MM3 (ref 3.4–10.8)
WBC # BLD AUTO: 9.93 10*3/MM3 (ref 3.4–10.8)
WBC NRBC COR # BLD: 5.14 10*3/MM3 (ref 3.4–10.8)

## 2021-01-01 PROCEDURE — 85025 COMPLETE CBC W/AUTO DIFF WBC: CPT | Performed by: INTERNAL MEDICINE

## 2021-01-01 PROCEDURE — 0 GADOBENATE DIMEGLUMINE 529 MG/ML SOLUTION

## 2021-01-01 PROCEDURE — 96367 TX/PROPH/DG ADDL SEQ IV INF: CPT

## 2021-01-01 PROCEDURE — 96415 CHEMO IV INFUSION ADDL HR: CPT

## 2021-01-01 PROCEDURE — 77290 THER RAD SIMULAJ FIELD CPLX: CPT | Performed by: RADIOLOGY

## 2021-01-01 PROCEDURE — 36415 COLL VENOUS BLD VENIPUNCTURE: CPT

## 2021-01-01 PROCEDURE — 77300 RADIATION THERAPY DOSE PLAN: CPT | Performed by: RADIOLOGY

## 2021-01-01 PROCEDURE — 87071 CULTURE AEROBIC QUANT OTHER: CPT | Performed by: INTERNAL MEDICINE

## 2021-01-01 PROCEDURE — 84443 ASSAY THYROID STIM HORMONE: CPT | Performed by: INTERNAL MEDICINE

## 2021-01-01 PROCEDURE — 90834 PSYTX W PT 45 MINUTES: CPT | Performed by: SOCIAL WORKER

## 2021-01-01 PROCEDURE — 99214 OFFICE O/P EST MOD 30 MIN: CPT | Performed by: NURSE PRACTITIONER

## 2021-01-01 PROCEDURE — 77334 RADIATION TREATMENT AID(S): CPT | Performed by: RADIOLOGY

## 2021-01-01 PROCEDURE — 96372 THER/PROPH/DIAG INJ SC/IM: CPT

## 2021-01-01 PROCEDURE — 86140 C-REACTIVE PROTEIN: CPT | Performed by: EMERGENCY MEDICINE

## 2021-01-01 PROCEDURE — 80053 COMPREHEN METABOLIC PANEL: CPT

## 2021-01-01 PROCEDURE — 96413 CHEMO IV INFUSION 1 HR: CPT

## 2021-01-01 PROCEDURE — 85025 COMPLETE CBC W/AUTO DIFF WBC: CPT

## 2021-01-01 PROCEDURE — 77336 RADIATION PHYSICS CONSULT: CPT | Performed by: RADIOLOGY

## 2021-01-01 PROCEDURE — 77412 RADIATION TX DELIVERY LVL 3: CPT

## 2021-01-01 PROCEDURE — 83735 ASSAY OF MAGNESIUM: CPT

## 2021-01-01 PROCEDURE — 76000 FLUOROSCOPY <1 HR PHYS/QHP: CPT

## 2021-01-01 PROCEDURE — 82728 ASSAY OF FERRITIN: CPT | Performed by: INTERNAL MEDICINE

## 2021-01-01 PROCEDURE — 77263 THER RADIOLOGY TX PLNG CPLX: CPT | Performed by: RADIOLOGY

## 2021-01-01 PROCEDURE — 71260 CT THORAX DX C+: CPT

## 2021-01-01 PROCEDURE — 25010000002 DEXAMETHASONE SODIUM PHOSPHATE 20 MG/5ML SOLUTION: Performed by: NURSE PRACTITIONER

## 2021-01-01 PROCEDURE — 96375 TX/PRO/DX INJ NEW DRUG ADDON: CPT

## 2021-01-01 PROCEDURE — 83735 ASSAY OF MAGNESIUM: CPT | Performed by: INTERNAL MEDICINE

## 2021-01-01 PROCEDURE — 78306 BONE IMAGING WHOLE BODY: CPT | Performed by: RADIOLOGY

## 2021-01-01 PROCEDURE — 80307 DRUG TEST PRSMV CHEM ANLYZR: CPT | Performed by: EMERGENCY MEDICINE

## 2021-01-01 PROCEDURE — 25010000002 PACLITAXEL PER 1 MG: Performed by: NURSE PRACTITIONER

## 2021-01-01 PROCEDURE — 87116 MYCOBACTERIA CULTURE: CPT | Performed by: INTERNAL MEDICINE

## 2021-01-01 PROCEDURE — 99205 OFFICE O/P NEW HI 60 MIN: CPT | Performed by: INTERNAL MEDICINE

## 2021-01-01 PROCEDURE — 99214 OFFICE O/P EST MOD 30 MIN: CPT | Performed by: INTERNAL MEDICINE

## 2021-01-01 PROCEDURE — 25010000002 IOPAMIDOL 61 % SOLUTION: Performed by: INTERNAL MEDICINE

## 2021-01-01 PROCEDURE — 25010000002 CARBOPLATIN PER 50 MG: Performed by: NURSE PRACTITIONER

## 2021-01-01 PROCEDURE — 96365 THER/PROPH/DIAG IV INF INIT: CPT

## 2021-01-01 PROCEDURE — 25010000002 DENOSUMAB 120 MG/1.7ML SOLUTION: Performed by: INTERNAL MEDICINE

## 2021-01-01 PROCEDURE — 80053 COMPREHEN METABOLIC PANEL: CPT | Performed by: INTERNAL MEDICINE

## 2021-01-01 PROCEDURE — 99212-NC PR NO CHARGE CBC OFFICE OUTPATIENT VISIT 10 MINUTES

## 2021-01-01 PROCEDURE — 99243 OFF/OP CNSLTJ NEW/EST LOW 30: CPT | Performed by: SURGERY

## 2021-01-01 PROCEDURE — 82746 ASSAY OF FOLIC ACID SERUM: CPT | Performed by: INTERNAL MEDICINE

## 2021-01-01 PROCEDURE — C9803 HOPD COVID-19 SPEC COLLECT: HCPCS

## 2021-01-01 PROCEDURE — 25010000002 NEOSTIGMINE 10 MG/10ML SOLUTION: Performed by: NURSE ANESTHETIST, CERTIFIED REGISTERED

## 2021-01-01 PROCEDURE — 76000 FLUOROSCOPY <1 HR PHYS/QHP: CPT | Performed by: RADIOLOGY

## 2021-01-01 PROCEDURE — 77280 THER RAD SIMULAJ FIELD SMPL: CPT | Performed by: RADIOLOGY

## 2021-01-01 PROCEDURE — 25010000002 MIDAZOLAM PER 1 MG: Performed by: NURSE ANESTHETIST, CERTIFIED REGISTERED

## 2021-01-01 PROCEDURE — U0004 COV-19 TEST NON-CDC HGH THRU: HCPCS

## 2021-01-01 PROCEDURE — 99204 OFFICE O/P NEW MOD 45 MIN: CPT | Performed by: NEUROLOGICAL SURGERY

## 2021-01-01 PROCEDURE — 99204 OFFICE O/P NEW MOD 45 MIN: CPT | Performed by: RADIOLOGY

## 2021-01-01 PROCEDURE — 84100 ASSAY OF PHOSPHORUS: CPT

## 2021-01-01 PROCEDURE — 77295 3-D RADIOTHERAPY PLAN: CPT | Performed by: RADIOLOGY

## 2021-01-01 PROCEDURE — 71045 X-RAY EXAM CHEST 1 VIEW: CPT

## 2021-01-01 PROCEDURE — 0 TECHNETIUM OXIDRONATE KIT: Performed by: INTERNAL MEDICINE

## 2021-01-01 PROCEDURE — 31625 BRONCHOSCOPY W/BIOPSY(S): CPT | Performed by: INTERNAL MEDICINE

## 2021-01-01 PROCEDURE — 99215 OFFICE O/P EST HI 40 MIN: CPT | Performed by: INTERNAL MEDICINE

## 2021-01-01 PROCEDURE — C1788 PORT, INDWELLING, IMP: HCPCS | Performed by: SURGERY

## 2021-01-01 PROCEDURE — U0004 COV-19 TEST NON-CDC HGH THRU: HCPCS | Performed by: SURGERY

## 2021-01-01 PROCEDURE — 25010000002 DIPHENHYDRAMINE PER 50 MG: Performed by: INTERNAL MEDICINE

## 2021-01-01 PROCEDURE — 99215 OFFICE O/P EST HI 40 MIN: CPT | Performed by: NURSE PRACTITIONER

## 2021-01-01 PROCEDURE — 77412 RADIATION TX DELIVERY LVL 3: CPT | Performed by: RADIOLOGY

## 2021-01-01 PROCEDURE — 74177 CT ABD & PELVIS W/CONTRAST: CPT | Performed by: RADIOLOGY

## 2021-01-01 PROCEDURE — 83540 ASSAY OF IRON: CPT | Performed by: INTERNAL MEDICINE

## 2021-01-01 PROCEDURE — 85007 BL SMEAR W/DIFF WBC COUNT: CPT

## 2021-01-01 PROCEDURE — 25010000002 EPINEPHRINE PER 0.1 MG: Performed by: OTOLARYNGOLOGY

## 2021-01-01 PROCEDURE — 77387 GUIDANCE FOR RADJ TX DLVR: CPT | Performed by: RADIOLOGY

## 2021-01-01 PROCEDURE — G0463 HOSPITAL OUTPT CLINIC VISIT: HCPCS

## 2021-01-01 PROCEDURE — 71045 X-RAY EXAM CHEST 1 VIEW: CPT | Performed by: RADIOLOGY

## 2021-01-01 PROCEDURE — 77307 TELETHX ISODOSE PLAN CPLX: CPT | Performed by: RADIOLOGY

## 2021-01-01 PROCEDURE — 94799 UNLISTED PULMONARY SVC/PX: CPT

## 2021-01-01 PROCEDURE — 25010000002 FOSAPREPITANT PER 1 MG: Performed by: INTERNAL MEDICINE

## 2021-01-01 PROCEDURE — 31623 DX BRONCHOSCOPE/BRUSH: CPT | Performed by: INTERNAL MEDICINE

## 2021-01-01 PROCEDURE — 80307 DRUG TEST PRSMV CHEM ANLYZR: CPT

## 2021-01-01 PROCEDURE — 85027 COMPLETE CBC AUTOMATED: CPT

## 2021-01-01 PROCEDURE — 77332 RADIATION TREATMENT AID(S): CPT | Performed by: RADIOLOGY

## 2021-01-01 PROCEDURE — 84439 ASSAY OF FREE THYROXINE: CPT | Performed by: EMERGENCY MEDICINE

## 2021-01-01 PROCEDURE — 96366 THER/PROPH/DIAG IV INF ADDON: CPT

## 2021-01-01 PROCEDURE — 85007 BL SMEAR W/DIFF WBC COUNT: CPT | Performed by: INTERNAL MEDICINE

## 2021-01-01 PROCEDURE — 96417 CHEMO IV INFUS EACH ADDL SEQ: CPT

## 2021-01-01 PROCEDURE — 25010000002 PACLITAXEL PER 1 MG: Performed by: INTERNAL MEDICINE

## 2021-01-01 PROCEDURE — 77300 RADIATION THERAPY DOSE PLAN: CPT

## 2021-01-01 PROCEDURE — 0 GADOBENATE DIMEGLUMINE 529 MG/ML SOLUTION: Performed by: INTERNAL MEDICINE

## 2021-01-01 PROCEDURE — 83735 ASSAY OF MAGNESIUM: CPT | Performed by: EMERGENCY MEDICINE

## 2021-01-01 PROCEDURE — 83880 ASSAY OF NATRIURETIC PEPTIDE: CPT | Performed by: EMERGENCY MEDICINE

## 2021-01-01 PROCEDURE — 25010000002 DEXAMETHASONE SODIUM PHOSPHATE 20 MG/5ML SOLUTION: Performed by: INTERNAL MEDICINE

## 2021-01-01 PROCEDURE — 70553 MRI BRAIN STEM W/O & W/DYE: CPT

## 2021-01-01 PROCEDURE — 25010000002 FENTANYL CITRATE (PF) 100 MCG/2ML SOLUTION: Performed by: NURSE ANESTHETIST, CERTIFIED REGISTERED

## 2021-01-01 PROCEDURE — 77373 STRTCTC BDY RAD THER TX DLVR: CPT | Performed by: RADIOLOGY

## 2021-01-01 PROCEDURE — 71250 CT THORAX DX C-: CPT

## 2021-01-01 PROCEDURE — 77399 UNLISTED PX MED RADJ PHYSICS: CPT | Performed by: RADIOLOGY

## 2021-01-01 PROCEDURE — 82607 VITAMIN B-12: CPT | Performed by: INTERNAL MEDICINE

## 2021-01-01 PROCEDURE — 25010000002 PALONOSETRON PER 25 MCG: Performed by: INTERNAL MEDICINE

## 2021-01-01 PROCEDURE — 73552 X-RAY EXAM OF FEMUR 2/>: CPT | Performed by: RADIOLOGY

## 2021-01-01 PROCEDURE — 25010000002 DENOSUMAB 120 MG/1.7ML SOLUTION: Performed by: NURSE PRACTITIONER

## 2021-01-01 PROCEDURE — 25010000002 HEPARIN LOCK FLUSH PER 10 UNITS: Performed by: NURSE PRACTITIONER

## 2021-01-01 PROCEDURE — 84100 ASSAY OF PHOSPHORUS: CPT | Performed by: INTERNAL MEDICINE

## 2021-01-01 PROCEDURE — 99284 EMERGENCY DEPT VISIT MOD MDM: CPT

## 2021-01-01 PROCEDURE — 99213 OFFICE O/P EST LOW 20 MIN: CPT | Performed by: NURSE PRACTITIONER

## 2021-01-01 PROCEDURE — 81003 URINALYSIS AUTO W/O SCOPE: CPT | Performed by: EMERGENCY MEDICINE

## 2021-01-01 PROCEDURE — 74177 CT ABD & PELVIS W/CONTRAST: CPT

## 2021-01-01 PROCEDURE — 31525 DX LARYNGOSCOPY EXCL NB: CPT | Performed by: INTERNAL MEDICINE

## 2021-01-01 PROCEDURE — 87636 SARSCOV2 & INF A&B AMP PRB: CPT | Performed by: EMERGENCY MEDICINE

## 2021-01-01 PROCEDURE — 85025 COMPLETE CBC W/AUTO DIFF WBC: CPT | Performed by: EMERGENCY MEDICINE

## 2021-01-01 PROCEDURE — 25010000002 PROPOFOL 10 MG/ML EMULSION: Performed by: NURSE ANESTHETIST, CERTIFIED REGISTERED

## 2021-01-01 PROCEDURE — 85652 RBC SED RATE AUTOMATED: CPT | Performed by: EMERGENCY MEDICINE

## 2021-01-01 PROCEDURE — 25010000002 DEXAMETHASONE PER 1 MG: Performed by: NURSE ANESTHETIST, CERTIFIED REGISTERED

## 2021-01-01 PROCEDURE — 25010000002 MAGNESIUM SULFATE 2 GM/50ML SOLUTION: Performed by: NURSE PRACTITIONER

## 2021-01-01 PROCEDURE — 70553 MRI BRAIN STEM W/O & W/DYE: CPT | Performed by: RADIOLOGY

## 2021-01-01 PROCEDURE — 25010000002 FENTANYL CITRATE (PF) 50 MCG/ML SOLUTION: Performed by: NURSE ANESTHETIST, CERTIFIED REGISTERED

## 2021-01-01 PROCEDURE — A9577 INJ MULTIHANCE: HCPCS | Performed by: INTERNAL MEDICINE

## 2021-01-01 PROCEDURE — 36561 INSERT TUNNELED CV CATH: CPT | Performed by: SURGERY

## 2021-01-01 PROCEDURE — 71260 CT THORAX DX C+: CPT | Performed by: RADIOLOGY

## 2021-01-01 PROCEDURE — 31652 BRONCH EBUS SAMPLNG 1/2 NODE: CPT | Performed by: INTERNAL MEDICINE

## 2021-01-01 PROCEDURE — 84466 ASSAY OF TRANSFERRIN: CPT | Performed by: INTERNAL MEDICINE

## 2021-01-01 PROCEDURE — 90839 PSYTX CRISIS INITIAL 60 MIN: CPT | Performed by: SOCIAL WORKER

## 2021-01-01 PROCEDURE — 77295 3-D RADIOTHERAPY PLAN: CPT

## 2021-01-01 PROCEDURE — A9561 TC99M OXIDRONATE: HCPCS | Performed by: INTERNAL MEDICINE

## 2021-01-01 PROCEDURE — 61797 SRS CRAN LES SIMPLE ADDL: CPT | Performed by: NEUROLOGICAL SURGERY

## 2021-01-01 PROCEDURE — 25010000002 ONDANSETRON PER 1 MG: Performed by: NURSE ANESTHETIST, CERTIFIED REGISTERED

## 2021-01-01 PROCEDURE — 87102 FUNGUS ISOLATION CULTURE: CPT | Performed by: INTERNAL MEDICINE

## 2021-01-01 PROCEDURE — 78306 BONE IMAGING WHOLE BODY: CPT

## 2021-01-01 PROCEDURE — A9577 INJ MULTIHANCE: HCPCS

## 2021-01-01 PROCEDURE — 25010000002 CARBOPLATIN PER 50 MG: Performed by: INTERNAL MEDICINE

## 2021-01-01 PROCEDURE — A9577 INJ MULTIHANCE: HCPCS | Performed by: RADIOLOGY

## 2021-01-01 PROCEDURE — 87206 SMEAR FLUORESCENT/ACID STAI: CPT | Performed by: INTERNAL MEDICINE

## 2021-01-01 PROCEDURE — 72170 X-RAY EXAM OF PELVIS: CPT | Performed by: RADIOLOGY

## 2021-01-01 PROCEDURE — 77427 RADIATION TX MANAGEMENT X5: CPT | Performed by: RADIOLOGY

## 2021-01-01 PROCEDURE — 31624 DX BRONCHOSCOPE/LAVAGE: CPT | Performed by: INTERNAL MEDICINE

## 2021-01-01 PROCEDURE — 77470 SPECIAL RADIATION TREATMENT: CPT | Performed by: RADIOLOGY

## 2021-01-01 PROCEDURE — 87205 SMEAR GRAM STAIN: CPT | Performed by: INTERNAL MEDICINE

## 2021-01-01 PROCEDURE — 25010000003 LIDOCAINE 1 % SOLUTION: Performed by: SURGERY

## 2021-01-01 PROCEDURE — 80053 COMPREHEN METABOLIC PANEL: CPT | Performed by: EMERGENCY MEDICINE

## 2021-01-01 PROCEDURE — 90832 PSYTX W PT 30 MINUTES: CPT | Performed by: SOCIAL WORKER

## 2021-01-01 PROCEDURE — 0 GADOBENATE DIMEGLUMINE 529 MG/ML SOLUTION: Performed by: RADIOLOGY

## 2021-01-01 PROCEDURE — 93010 ELECTROCARDIOGRAM REPORT: CPT | Performed by: INTERNAL MEDICINE

## 2021-01-01 PROCEDURE — 93306 TTE W/DOPPLER COMPLETE: CPT

## 2021-01-01 PROCEDURE — 77001 FLUOROGUIDE FOR VEIN DEVICE: CPT | Performed by: SURGERY

## 2021-01-01 PROCEDURE — 84443 ASSAY THYROID STIM HORMONE: CPT | Performed by: EMERGENCY MEDICINE

## 2021-01-01 PROCEDURE — 80048 BASIC METABOLIC PNL TOTAL CA: CPT

## 2021-01-01 PROCEDURE — 0 GADOBENATE DIMEGLUMINE 529 MG/ML SOLUTION: Performed by: NEUROLOGICAL SURGERY

## 2021-01-01 PROCEDURE — 94640 AIRWAY INHALATION TREATMENT: CPT

## 2021-01-01 PROCEDURE — 87635 SARS-COV-2 COVID-19 AMP PRB: CPT | Performed by: UROLOGY

## 2021-01-01 PROCEDURE — 25010000002 HEPARIN (PORCINE) PER 1000 UNITS: Performed by: SURGERY

## 2021-01-01 PROCEDURE — 99214 OFFICE O/P EST MOD 30 MIN: CPT | Performed by: NEUROLOGICAL SURGERY

## 2021-01-01 PROCEDURE — U0004 COV-19 TEST NON-CDC HGH THRU: HCPCS | Performed by: INTERNAL MEDICINE

## 2021-01-01 PROCEDURE — G0480 DRUG TEST DEF 1-7 CLASSES: HCPCS

## 2021-01-01 PROCEDURE — 84550 ASSAY OF BLOOD/URIC ACID: CPT | Performed by: INTERNAL MEDICINE

## 2021-01-01 PROCEDURE — 93005 ELECTROCARDIOGRAM TRACING: CPT | Performed by: EMERGENCY MEDICINE

## 2021-01-01 PROCEDURE — 25010000002 DIPHENHYDRAMINE PER 50 MG: Performed by: NURSE PRACTITIONER

## 2021-01-01 PROCEDURE — 25010000002 FOSAPREPITANT PER 1 MG: Performed by: NURSE PRACTITIONER

## 2021-01-01 PROCEDURE — A9577 INJ MULTIHANCE: HCPCS | Performed by: NEUROLOGICAL SURGERY

## 2021-01-01 PROCEDURE — 93306 TTE W/DOPPLER COMPLETE: CPT | Performed by: INTERNAL MEDICINE

## 2021-01-01 PROCEDURE — 72170 X-RAY EXAM OF PELVIS: CPT

## 2021-01-01 PROCEDURE — 73552 X-RAY EXAM OF FEMUR 2/>: CPT

## 2021-01-01 PROCEDURE — 25010000002 PALONOSETRON PER 25 MCG: Performed by: NURSE PRACTITIONER

## 2021-01-01 PROCEDURE — 99214 OFFICE O/P EST MOD 30 MIN: CPT | Performed by: UROLOGY

## 2021-01-01 PROCEDURE — 61796 SRS CRANIAL LESION SIMPLE: CPT | Performed by: NEUROLOGICAL SURGERY

## 2021-01-01 PROCEDURE — 85379 FIBRIN DEGRADATION QUANT: CPT | Performed by: EMERGENCY MEDICINE

## 2021-01-01 PROCEDURE — 83615 LACTATE (LD) (LDH) ENZYME: CPT | Performed by: INTERNAL MEDICINE

## 2021-01-01 PROCEDURE — 25010000002 SUCCINYLCHOLINE PER 20 MG: Performed by: NURSE ANESTHETIST, CERTIFIED REGISTERED

## 2021-01-01 PROCEDURE — 25010000002 PALONOSETRON 0.25 MG/5ML SOLUTION PREFILLED SYRINGE: Performed by: INTERNAL MEDICINE

## 2021-01-01 PROCEDURE — 90471 IMMUNIZATION ADMIN: CPT | Performed by: INTERNAL MEDICINE

## 2021-01-01 PROCEDURE — 84484 ASSAY OF TROPONIN QUANT: CPT | Performed by: EMERGENCY MEDICINE

## 2021-01-01 PROCEDURE — 25010000003 CEFAZOLIN SODIUM-DEXTROSE 2-3 GM-%(50ML) RECONSTITUTED SOLUTION: Performed by: SURGERY

## 2021-01-01 PROCEDURE — 90670 PCV13 VACCINE IM: CPT | Performed by: INTERNAL MEDICINE

## 2021-01-01 DEVICE — VACCESS CT POWER-INJECTABLE IMPLANTABLE PORT (WITH SUTURE PLUGS) (8F)
Type: IMPLANTABLE DEVICE | Site: SUBCLAVIAN | Status: FUNCTIONAL
Brand: VACCESS

## 2021-01-01 RX ORDER — PALONOSETRON 0.05 MG/ML
0.25 INJECTION, SOLUTION INTRAVENOUS ONCE
Status: COMPLETED | OUTPATIENT
Start: 2021-01-01 | End: 2021-01-01

## 2021-01-01 RX ORDER — FLUCONAZOLE 100 MG/1
200 TABLET ORAL DAILY
Qty: 14 TABLET | Refills: 0 | Status: SHIPPED | OUTPATIENT
Start: 2021-01-01

## 2021-01-01 RX ORDER — FAMOTIDINE 10 MG/ML
20 INJECTION, SOLUTION INTRAVENOUS ONCE
Status: COMPLETED | OUTPATIENT
Start: 2021-01-01 | End: 2021-01-01

## 2021-01-01 RX ORDER — SODIUM CHLORIDE 9 MG/ML
250 INJECTION, SOLUTION INTRAVENOUS ONCE
Status: COMPLETED | OUTPATIENT
Start: 2021-01-01 | End: 2021-01-01

## 2021-01-01 RX ORDER — ONDANSETRON 2 MG/ML
4 INJECTION INTRAMUSCULAR; INTRAVENOUS AS NEEDED
Status: DISCONTINUED | OUTPATIENT
Start: 2021-01-01 | End: 2021-01-01 | Stop reason: HOSPADM

## 2021-01-01 RX ORDER — FENTANYL 100 UG/H
2 PATCH TRANSDERMAL
Qty: 10 PATCH | Refills: 0 | Status: SHIPPED | OUTPATIENT
Start: 2021-01-01 | End: 2021-01-01 | Stop reason: SDUPTHER

## 2021-01-01 RX ORDER — FENTANYL CITRATE 50 UG/ML
50 INJECTION, SOLUTION INTRAMUSCULAR; INTRAVENOUS
Status: DISCONTINUED | OUTPATIENT
Start: 2021-01-01 | End: 2021-01-01 | Stop reason: HOSPADM

## 2021-01-01 RX ORDER — SODIUM CHLORIDE 9 MG/ML
250 INJECTION, SOLUTION INTRAVENOUS ONCE
Status: CANCELLED | OUTPATIENT
Start: 2021-01-01

## 2021-01-01 RX ORDER — PROPOFOL 10 MG/ML
VIAL (ML) INTRAVENOUS AS NEEDED
Status: DISCONTINUED | OUTPATIENT
Start: 2021-01-01 | End: 2021-01-01 | Stop reason: SURG

## 2021-01-01 RX ORDER — FAMOTIDINE 10 MG/ML
20 INJECTION, SOLUTION INTRAVENOUS AS NEEDED
Status: CANCELLED | OUTPATIENT
Start: 2021-01-01

## 2021-01-01 RX ORDER — FAMOTIDINE 10 MG/ML
20 INJECTION, SOLUTION INTRAVENOUS ONCE
Status: CANCELLED | OUTPATIENT
Start: 2021-01-01

## 2021-01-01 RX ORDER — ACETAMINOPHEN 500 MG
1000 TABLET ORAL ONCE
Status: COMPLETED | OUTPATIENT
Start: 2021-01-01 | End: 2021-01-01

## 2021-01-01 RX ORDER — FENTANYL 100 UG/H
2 PATCH TRANSDERMAL
Qty: 20 PATCH | Refills: 0 | Status: SHIPPED | OUTPATIENT
Start: 2021-01-01 | End: 2021-01-01 | Stop reason: SDUPTHER

## 2021-01-01 RX ORDER — HEPARIN SODIUM (PORCINE) LOCK FLUSH IV SOLN 100 UNIT/ML 100 UNIT/ML
500 SOLUTION INTRAVENOUS AS NEEDED
Status: DISCONTINUED | OUTPATIENT
Start: 2021-01-01 | End: 2021-01-01 | Stop reason: HOSPADM

## 2021-01-01 RX ORDER — ONDANSETRON HYDROCHLORIDE 8 MG/1
8 TABLET, FILM COATED ORAL 3 TIMES DAILY PRN
Qty: 30 TABLET | Refills: 5 | Status: CANCELLED | OUTPATIENT
Start: 2021-01-01

## 2021-01-01 RX ORDER — SODIUM CHLORIDE 0.9 % (FLUSH) 0.9 %
10 SYRINGE (ML) INJECTION AS NEEDED
Status: CANCELLED | OUTPATIENT
Start: 2021-01-01

## 2021-01-01 RX ORDER — NEOSTIGMINE METHYLSULFATE 1 MG/ML
INJECTION, SOLUTION INTRAVENOUS AS NEEDED
Status: DISCONTINUED | OUTPATIENT
Start: 2021-01-01 | End: 2021-01-01 | Stop reason: SURG

## 2021-01-01 RX ORDER — HYDROCODONE BITARTRATE AND ACETAMINOPHEN 7.5; 325 MG/1; MG/1
1 TABLET ORAL 4 TIMES DAILY PRN
Qty: 8 TABLET | Refills: 0 | Status: SHIPPED | OUTPATIENT
Start: 2021-01-01 | End: 2021-01-01

## 2021-01-01 RX ORDER — MIDAZOLAM HYDROCHLORIDE 1 MG/ML
1 INJECTION INTRAMUSCULAR; INTRAVENOUS
Status: DISCONTINUED | OUTPATIENT
Start: 2021-01-01 | End: 2021-01-01 | Stop reason: HOSPADM

## 2021-01-01 RX ORDER — HEPARIN SODIUM 5000 [USP'U]/ML
INJECTION, SOLUTION INTRAVENOUS; SUBCUTANEOUS AS NEEDED
Status: DISCONTINUED | OUTPATIENT
Start: 2021-01-01 | End: 2021-01-01 | Stop reason: HOSPADM

## 2021-01-01 RX ORDER — FAMOTIDINE 10 MG/ML
INJECTION, SOLUTION INTRAVENOUS AS NEEDED
Status: DISCONTINUED | OUTPATIENT
Start: 2021-01-01 | End: 2021-01-01 | Stop reason: SURG

## 2021-01-01 RX ORDER — METHYLPREDNISOLONE SODIUM SUCCINATE 125 MG/2ML
80 INJECTION, POWDER, LYOPHILIZED, FOR SOLUTION INTRAMUSCULAR; INTRAVENOUS ONCE
Status: DISCONTINUED | OUTPATIENT
Start: 2021-01-01 | End: 2021-01-01

## 2021-01-01 RX ORDER — MEPERIDINE HYDROCHLORIDE 25 MG/ML
12.5 INJECTION INTRAMUSCULAR; INTRAVENOUS; SUBCUTANEOUS
Status: DISCONTINUED | OUTPATIENT
Start: 2021-01-01 | End: 2021-01-01 | Stop reason: HOSPADM

## 2021-01-01 RX ORDER — METHOCARBAMOL 500 MG/1
500 TABLET, FILM COATED ORAL 2 TIMES DAILY
COMMUNITY

## 2021-01-01 RX ORDER — DOXYCYCLINE HYCLATE 100 MG/1
100 CAPSULE ORAL DAILY
COMMUNITY
Start: 2021-01-01 | End: 2021-01-01

## 2021-01-01 RX ORDER — DROPERIDOL 2.5 MG/ML
0.62 INJECTION, SOLUTION INTRAMUSCULAR; INTRAVENOUS ONCE AS NEEDED
Status: DISCONTINUED | OUTPATIENT
Start: 2021-01-01 | End: 2021-01-01 | Stop reason: HOSPADM

## 2021-01-01 RX ORDER — POLYETHYLENE GLYCOL 3350 17 G
4 POWDER IN PACKET (EA) ORAL AS NEEDED
Qty: 72 LOZENGE | Refills: 5 | Status: SHIPPED | OUTPATIENT
Start: 2021-01-01

## 2021-01-01 RX ORDER — FLUCONAZOLE 100 MG/1
200 TABLET ORAL DAILY
Qty: 14 TABLET | Refills: 0 | Status: SHIPPED | OUTPATIENT
Start: 2021-01-01 | End: 2021-01-01 | Stop reason: SDUPTHER

## 2021-01-01 RX ORDER — FENTANYL 50 UG/H
1 PATCH TRANSDERMAL
Qty: 10 EACH | Refills: 0 | Status: SHIPPED | OUTPATIENT
Start: 2021-01-01 | End: 2021-01-01 | Stop reason: DRUGHIGH

## 2021-01-01 RX ORDER — SODIUM CHLORIDE 9 MG/ML
INJECTION, SOLUTION INTRAVENOUS AS NEEDED
Status: DISCONTINUED | OUTPATIENT
Start: 2021-01-01 | End: 2021-01-01 | Stop reason: HOSPADM

## 2021-01-01 RX ORDER — ROCURONIUM BROMIDE 10 MG/ML
INJECTION, SOLUTION INTRAVENOUS AS NEEDED
Status: DISCONTINUED | OUTPATIENT
Start: 2021-01-01 | End: 2021-01-01 | Stop reason: SURG

## 2021-01-01 RX ORDER — OXYCODONE HYDROCHLORIDE 20 MG/1
20 TABLET ORAL EVERY 4 HOURS PRN
Qty: 112 TABLET | Refills: 0 | Status: SHIPPED | OUTPATIENT
Start: 2021-01-01 | End: 2021-01-01 | Stop reason: SDUPTHER

## 2021-01-01 RX ORDER — SODIUM CHLORIDE, SODIUM LACTATE, POTASSIUM CHLORIDE, CALCIUM CHLORIDE 600; 310; 30; 20 MG/100ML; MG/100ML; MG/100ML; MG/100ML
100 INJECTION, SOLUTION INTRAVENOUS ONCE AS NEEDED
Status: DISCONTINUED | OUTPATIENT
Start: 2021-01-01 | End: 2021-01-01 | Stop reason: HOSPADM

## 2021-01-01 RX ORDER — CEFAZOLIN SODIUM 2 G/50ML
2 SOLUTION INTRAVENOUS ONCE
Status: COMPLETED | OUTPATIENT
Start: 2021-01-01 | End: 2021-01-01

## 2021-01-01 RX ORDER — DIAZEPAM 5 MG/1
5 TABLET ORAL EVERY 8 HOURS PRN
Qty: 90 TABLET | Refills: 0 | Status: SHIPPED | OUTPATIENT
Start: 2021-01-01 | End: 2021-01-01 | Stop reason: SDUPTHER

## 2021-01-01 RX ORDER — ONDANSETRON 2 MG/ML
INJECTION INTRAMUSCULAR; INTRAVENOUS AS NEEDED
Status: DISCONTINUED | OUTPATIENT
Start: 2021-01-01 | End: 2021-01-01 | Stop reason: SURG

## 2021-01-01 RX ORDER — FENTANYL 75 UG/H
2 PATCH TRANSDERMAL
Qty: 20 EACH | Refills: 0 | Status: SHIPPED | OUTPATIENT
Start: 2021-01-01 | End: 2021-01-01 | Stop reason: DRUGHIGH

## 2021-01-01 RX ORDER — LIDOCAINE AND PRILOCAINE 25; 25 MG/G; MG/G
CREAM TOPICAL
Qty: 30 G | Refills: 3 | Status: SHIPPED | OUTPATIENT
Start: 2021-01-01 | End: 2021-01-01 | Stop reason: SDUPTHER

## 2021-01-01 RX ORDER — OXYCODONE HYDROCHLORIDE 20 MG/1
20 TABLET ORAL EVERY 4 HOURS PRN
Qty: 84 TABLET | Refills: 0 | Status: SHIPPED | OUTPATIENT
Start: 2021-01-01 | End: 2021-01-01 | Stop reason: SDUPTHER

## 2021-01-01 RX ORDER — HEPARIN SODIUM (PORCINE) LOCK FLUSH IV SOLN 100 UNIT/ML 100 UNIT/ML
500 SOLUTION INTRAVENOUS AS NEEDED
Status: CANCELLED | OUTPATIENT
Start: 2021-01-01

## 2021-01-01 RX ORDER — ONDANSETRON 8 MG/1
8 TABLET, ORALLY DISINTEGRATING ORAL EVERY 8 HOURS PRN
Qty: 60 TABLET | Refills: 5 | Status: SHIPPED | OUTPATIENT
Start: 2021-01-01

## 2021-01-01 RX ORDER — OXYCODONE HYDROCHLORIDE 30 MG/1
30 TABLET, FILM COATED, EXTENDED RELEASE ORAL EVERY 12 HOURS SCHEDULED
Qty: 30 TABLET | Refills: 0 | Status: SHIPPED | OUTPATIENT
Start: 2021-01-01 | End: 2021-01-01

## 2021-01-01 RX ORDER — SODIUM CHLORIDE 0.9 % (FLUSH) 0.9 %
10 SYRINGE (ML) INJECTION EVERY 12 HOURS SCHEDULED
Status: DISCONTINUED | OUTPATIENT
Start: 2021-01-01 | End: 2021-01-01 | Stop reason: HOSPADM

## 2021-01-01 RX ORDER — HEPARIN SODIUM (PORCINE) LOCK FLUSH IV SOLN 100 UNIT/ML 100 UNIT/ML
500 SOLUTION INTRAVENOUS AS NEEDED
Status: CANCELLED | OUTPATIENT
Start: 2022-01-01

## 2021-01-01 RX ORDER — METHOCARBAMOL 500 MG/1
500 TABLET, FILM COATED ORAL 2 TIMES DAILY
Status: ON HOLD | COMMUNITY
End: 2021-01-01

## 2021-01-01 RX ORDER — OXYCODONE HYDROCHLORIDE 20 MG/1
20 TABLET ORAL EVERY 4 HOURS PRN
Qty: 84 TABLET | Refills: 0 | OUTPATIENT
Start: 2021-01-01

## 2021-01-01 RX ORDER — SODIUM CHLORIDE 0.9 % (FLUSH) 0.9 %
10 SYRINGE (ML) INJECTION AS NEEDED
Status: DISCONTINUED | OUTPATIENT
Start: 2021-01-01 | End: 2021-01-01 | Stop reason: HOSPADM

## 2021-01-01 RX ORDER — OXYCODONE HYDROCHLORIDE 10 MG/1
TABLET ORAL
Qty: 200 TABLET | Refills: 0 | Status: SHIPPED | OUTPATIENT
Start: 2021-01-01 | End: 2021-01-01 | Stop reason: SDUPTHER

## 2021-01-01 RX ORDER — GLYCOPYRROLATE 0.2 MG/ML
INJECTION INTRAMUSCULAR; INTRAVENOUS AS NEEDED
Status: DISCONTINUED | OUTPATIENT
Start: 2021-01-01 | End: 2021-01-01 | Stop reason: SURG

## 2021-01-01 RX ORDER — PROCHLORPERAZINE MALEATE 10 MG
TABLET ORAL
Qty: 60 TABLET | Refills: 3 | Status: SHIPPED | OUTPATIENT
Start: 2021-01-01 | End: 2021-01-01 | Stop reason: SDUPTHER

## 2021-01-01 RX ORDER — PALONOSETRON 0.05 MG/ML
0.25 INJECTION, SOLUTION INTRAVENOUS ONCE
Status: CANCELLED | OUTPATIENT
Start: 2021-01-01

## 2021-01-01 RX ORDER — ESCITALOPRAM OXALATE 10 MG/1
10 TABLET ORAL DAILY
Qty: 30 TABLET | Refills: 11 | Status: SHIPPED | OUTPATIENT
Start: 2021-01-01

## 2021-01-01 RX ORDER — FENTANYL 100 UG/H
2 PATCH TRANSDERMAL
Qty: 5 PATCH | Refills: 0 | Status: SHIPPED | OUTPATIENT
Start: 2021-01-01 | End: 2021-01-01 | Stop reason: SDUPTHER

## 2021-01-01 RX ORDER — OXYCODONE HYDROCHLORIDE 20 MG/1
20 TABLET ORAL EVERY 4 HOURS PRN
Qty: 28 TABLET | Refills: 0 | Status: SHIPPED | OUTPATIENT
Start: 2021-01-01 | End: 2021-01-01 | Stop reason: SDUPTHER

## 2021-01-01 RX ORDER — MIDAZOLAM HYDROCHLORIDE 1 MG/ML
INJECTION INTRAMUSCULAR; INTRAVENOUS AS NEEDED
Status: DISCONTINUED | OUTPATIENT
Start: 2021-01-01 | End: 2021-01-01 | Stop reason: SURG

## 2021-01-01 RX ORDER — MORPHINE SULFATE 30 MG/1
30 TABLET, FILM COATED, EXTENDED RELEASE ORAL EVERY 8 HOURS SCHEDULED
Qty: 90 TABLET | Refills: 0 | Status: SHIPPED | OUTPATIENT
Start: 2021-01-01 | End: 2021-01-01

## 2021-01-01 RX ORDER — KETOROLAC TROMETHAMINE 30 MG/ML
30 INJECTION, SOLUTION INTRAMUSCULAR; INTRAVENOUS ONCE
Status: DISCONTINUED | OUTPATIENT
Start: 2021-01-01 | End: 2021-01-01

## 2021-01-01 RX ORDER — IPRATROPIUM BROMIDE AND ALBUTEROL SULFATE 2.5; .5 MG/3ML; MG/3ML
3 SOLUTION RESPIRATORY (INHALATION) ONCE AS NEEDED
Status: DISCONTINUED | OUTPATIENT
Start: 2021-01-01 | End: 2021-01-01 | Stop reason: HOSPADM

## 2021-01-01 RX ORDER — SUCCINYLCHOLINE CHLORIDE 20 MG/ML
INJECTION INTRAMUSCULAR; INTRAVENOUS AS NEEDED
Status: DISCONTINUED | OUTPATIENT
Start: 2021-01-01 | End: 2021-01-01 | Stop reason: SURG

## 2021-01-01 RX ORDER — POTASSIUM CHLORIDE 20 MEQ/1
TABLET, EXTENDED RELEASE ORAL
Qty: 14 TABLET | Refills: 0 | Status: SHIPPED | OUTPATIENT
Start: 2021-01-01 | End: 2021-01-01

## 2021-01-01 RX ORDER — MAGNESIUM HYDROXIDE 1200 MG/15ML
LIQUID ORAL AS NEEDED
Status: DISCONTINUED | OUTPATIENT
Start: 2021-01-01 | End: 2021-01-01 | Stop reason: HOSPADM

## 2021-01-01 RX ORDER — LIDOCAINE HYDROCHLORIDE 10 MG/ML
INJECTION, SOLUTION INFILTRATION; PERINEURAL AS NEEDED
Status: DISCONTINUED | OUTPATIENT
Start: 2021-01-01 | End: 2021-01-01 | Stop reason: HOSPADM

## 2021-01-01 RX ORDER — MAGNESIUM SULFATE HEPTAHYDRATE 40 MG/ML
2 INJECTION, SOLUTION INTRAVENOUS ONCE
Status: COMPLETED | OUTPATIENT
Start: 2021-01-01 | End: 2021-01-01

## 2021-01-01 RX ORDER — SODIUM PHOSPHATE, DIBASIC, ANHYDROUS, POTASSIUM PHOSPHATE, MONOBASIC, AND SODIUM PHOSPHATE, MONOBASIC, MONOHYDRATE 852; 155; 130 MG/1; MG/1; MG/1
TABLET, COATED ORAL
COMMUNITY

## 2021-01-01 RX ORDER — CEFAZOLIN SODIUM 2 G/50ML
2 SOLUTION INTRAVENOUS ONCE
Status: CANCELLED | OUTPATIENT
Start: 2021-01-01 | End: 2021-01-01

## 2021-01-01 RX ORDER — ALBUTEROL SULFATE 2.5 MG/3ML
2.5 SOLUTION RESPIRATORY (INHALATION) ONCE
Status: COMPLETED | OUTPATIENT
Start: 2021-01-01 | End: 2021-01-01

## 2021-01-01 RX ORDER — OXYCODONE HYDROCHLORIDE 20 MG/1
20 TABLET ORAL EVERY 4 HOURS PRN
Qty: 180 TABLET | Refills: 0 | Status: SHIPPED | OUTPATIENT
Start: 2021-01-01 | End: 2021-01-01 | Stop reason: SDUPTHER

## 2021-01-01 RX ORDER — SODIUM CHLORIDE, SODIUM LACTATE, POTASSIUM CHLORIDE, CALCIUM CHLORIDE 600; 310; 30; 20 MG/100ML; MG/100ML; MG/100ML; MG/100ML
INJECTION, SOLUTION INTRAVENOUS CONTINUOUS PRN
Status: DISCONTINUED | OUTPATIENT
Start: 2021-01-01 | End: 2021-01-01 | Stop reason: SURG

## 2021-01-01 RX ORDER — KETOROLAC TROMETHAMINE 30 MG/ML
30 INJECTION, SOLUTION INTRAMUSCULAR; INTRAVENOUS EVERY 6 HOURS PRN
Status: DISCONTINUED | OUTPATIENT
Start: 2021-01-01 | End: 2021-01-01 | Stop reason: HOSPADM

## 2021-01-01 RX ORDER — DIAZEPAM 5 MG/1
5 TABLET ORAL NIGHTLY PRN
Qty: 30 TABLET | Refills: 0 | Status: SHIPPED | OUTPATIENT
Start: 2021-01-01 | End: 2021-01-01 | Stop reason: SDUPTHER

## 2021-01-01 RX ORDER — LACTULOSE 10 G/15ML
20 SOLUTION ORAL 2 TIMES DAILY PRN
Qty: 473 ML | Refills: 1 | Status: SHIPPED | OUTPATIENT
Start: 2021-01-01

## 2021-01-01 RX ORDER — FENTANYL CITRATE 50 UG/ML
INJECTION, SOLUTION INTRAMUSCULAR; INTRAVENOUS AS NEEDED
Status: DISCONTINUED | OUTPATIENT
Start: 2021-01-01 | End: 2021-01-01 | Stop reason: SURG

## 2021-01-01 RX ORDER — SODIUM CHLORIDE, SODIUM LACTATE, POTASSIUM CHLORIDE, CALCIUM CHLORIDE 600; 310; 30; 20 MG/100ML; MG/100ML; MG/100ML; MG/100ML
125 INJECTION, SOLUTION INTRAVENOUS ONCE
Status: COMPLETED | OUTPATIENT
Start: 2021-01-01 | End: 2021-01-01

## 2021-01-01 RX ORDER — ONDANSETRON HYDROCHLORIDE 8 MG/1
8 TABLET, FILM COATED ORAL EVERY 8 HOURS PRN
Qty: 60 TABLET | Refills: 6 | Status: SHIPPED | OUTPATIENT
Start: 2021-01-01

## 2021-01-01 RX ORDER — DIAZEPAM 5 MG/1
5 TABLET ORAL EVERY 8 HOURS PRN
Qty: 90 TABLET | Refills: 0 | Status: SHIPPED | OUTPATIENT
Start: 2021-01-01 | End: 2022-01-01 | Stop reason: SDUPTHER

## 2021-01-01 RX ORDER — SODIUM CHLORIDE 0.9 % (FLUSH) 0.9 %
10 SYRINGE (ML) INJECTION AS NEEDED
Status: CANCELLED | OUTPATIENT
Start: 2022-01-01

## 2021-01-01 RX ORDER — DEXAMETHASONE 4 MG/1
TABLET ORAL
Qty: 6 TABLET | Refills: 3 | Status: CANCELLED | OUTPATIENT
Start: 2021-01-01

## 2021-01-01 RX ORDER — OXYCODONE HYDROCHLORIDE 10 MG/1
10 TABLET ORAL EVERY 4 HOURS PRN
Qty: 180 TABLET | Refills: 0 | Status: CANCELLED | OUTPATIENT
Start: 2021-01-01

## 2021-01-01 RX ORDER — DEXAMETHASONE SODIUM PHOSPHATE 10 MG/ML
INJECTION INTRAMUSCULAR; INTRAVENOUS AS NEEDED
Status: DISCONTINUED | OUTPATIENT
Start: 2021-01-01 | End: 2021-01-01 | Stop reason: SURG

## 2021-01-01 RX ORDER — DOXYCYCLINE 100 MG/1
100 CAPSULE ORAL 2 TIMES DAILY
Qty: 20 CAPSULE | Refills: 0 | Status: SHIPPED | OUTPATIENT
Start: 2021-01-01 | End: 2021-01-01

## 2021-01-01 RX ORDER — DOXYCYCLINE 100 MG/1
100 CAPSULE ORAL 2 TIMES DAILY
Qty: 20 CAPSULE | Refills: 0 | Status: SHIPPED | OUTPATIENT
Start: 2021-01-01 | End: 2021-01-01 | Stop reason: SDUPTHER

## 2021-01-01 RX ORDER — DEXAMETHASONE 1 MG
TABLET ORAL
Qty: 39 TABLET | Refills: 0 | Status: SHIPPED | OUTPATIENT
Start: 2021-01-01 | End: 2022-01-01 | Stop reason: SDUPTHER

## 2021-01-01 RX ORDER — BACTERIOSTATIC SODIUM CHLORIDE 0.9 %
VIAL (ML) INJECTION AS NEEDED
Status: DISCONTINUED | OUTPATIENT
Start: 2021-01-01 | End: 2021-01-01 | Stop reason: HOSPADM

## 2021-01-01 RX ORDER — MORPHINE SULFATE 15 MG/1
15 TABLET, FILM COATED, EXTENDED RELEASE ORAL EVERY 8 HOURS SCHEDULED
Qty: 90 TABLET | Refills: 0 | Status: SHIPPED | OUTPATIENT
Start: 2021-01-01 | End: 2021-01-01 | Stop reason: SDUPTHER

## 2021-01-01 RX ORDER — FENTANYL 100 UG/H
2 PATCH TRANSDERMAL
Qty: 10 PATCH | Refills: 0 | Status: SHIPPED | OUTPATIENT
Start: 2021-01-01 | End: 2022-01-01 | Stop reason: SDUPTHER

## 2021-01-01 RX ORDER — METHOCARBAMOL 750 MG/1
750 TABLET, FILM COATED ORAL 4 TIMES DAILY PRN
COMMUNITY
End: 2021-01-01

## 2021-01-01 RX ORDER — DIPHENHYDRAMINE HYDROCHLORIDE 50 MG/ML
50 INJECTION INTRAMUSCULAR; INTRAVENOUS AS NEEDED
Status: CANCELLED | OUTPATIENT
Start: 2021-01-01

## 2021-01-01 RX ORDER — HYDROCODONE BITARTRATE AND ACETAMINOPHEN 10; 325 MG/1; MG/1
1 TABLET ORAL EVERY 6 HOURS PRN
Qty: 20 TABLET | Refills: 0 | Status: ON HOLD | OUTPATIENT
Start: 2021-01-01 | End: 2021-01-01

## 2021-01-01 RX ORDER — SODIUM CHLORIDE, SODIUM LACTATE, POTASSIUM CHLORIDE, CALCIUM CHLORIDE 600; 310; 30; 20 MG/100ML; MG/100ML; MG/100ML; MG/100ML
125 INJECTION, SOLUTION INTRAVENOUS ONCE
Status: DISCONTINUED | OUTPATIENT
Start: 2021-01-01 | End: 2021-01-01 | Stop reason: HOSPADM

## 2021-01-01 RX ORDER — TESTOSTERONE CYPIONATE 200 MG/ML
INJECTION, SOLUTION INTRAMUSCULAR
Qty: 10 ML | Refills: 2 | Status: SHIPPED | OUTPATIENT
Start: 2021-01-01

## 2021-01-01 RX ORDER — OXYCODONE HYDROCHLORIDE 10 MG/1
TABLET ORAL
Qty: 200 TABLET | Refills: 0 | Status: CANCELLED | OUTPATIENT
Start: 2021-01-01

## 2021-01-01 RX ORDER — VECURONIUM BROMIDE 1 MG/ML
INJECTION, POWDER, LYOPHILIZED, FOR SOLUTION INTRAVENOUS AS NEEDED
Status: DISCONTINUED | OUTPATIENT
Start: 2021-01-01 | End: 2021-01-01 | Stop reason: SURG

## 2021-01-01 RX ORDER — PROCHLORPERAZINE MALEATE 10 MG
10 TABLET ORAL EVERY 6 HOURS PRN
Qty: 60 TABLET | Refills: 3 | Status: SHIPPED | OUTPATIENT
Start: 2021-01-01 | End: 2021-01-01 | Stop reason: SDUPTHER

## 2021-01-01 RX ORDER — NICOTINE 21 MG/24HR
1 PATCH, TRANSDERMAL 24 HOURS TRANSDERMAL EVERY 24 HOURS
Qty: 28 EACH | Refills: 0 | Status: SHIPPED | OUTPATIENT
Start: 2021-01-01

## 2021-01-01 RX ORDER — LIDOCAINE HYDROCHLORIDE 20 MG/ML
INJECTION, SOLUTION INFILTRATION; PERINEURAL AS NEEDED
Status: DISCONTINUED | OUTPATIENT
Start: 2021-01-01 | End: 2021-01-01 | Stop reason: SURG

## 2021-01-01 RX ORDER — PROPOFOL 10 MG/ML
VIAL (ML) INTRAVENOUS CONTINUOUS PRN
Status: DISCONTINUED | OUTPATIENT
Start: 2021-01-01 | End: 2021-01-01 | Stop reason: SURG

## 2021-01-01 RX ORDER — ONDANSETRON HYDROCHLORIDE 8 MG/1
8 TABLET, FILM COATED ORAL EVERY 8 HOURS PRN
Qty: 60 TABLET | Refills: 6 | Status: SHIPPED | OUTPATIENT
Start: 2021-01-01 | End: 2021-01-01 | Stop reason: SDUPTHER

## 2021-01-01 RX ORDER — OXYCODONE HYDROCHLORIDE 10 MG/1
10 TABLET ORAL EVERY 4 HOURS PRN
Qty: 180 TABLET | Refills: 0 | Status: SHIPPED | OUTPATIENT
Start: 2021-01-01 | End: 2021-01-01 | Stop reason: SDUPTHER

## 2021-01-01 RX ORDER — FENTANYL 100 UG/H
1 PATCH TRANSDERMAL
Qty: 10 EACH | Refills: 0 | Status: SHIPPED | OUTPATIENT
Start: 2021-01-01 | End: 2021-01-01 | Stop reason: SDUPTHER

## 2021-01-01 RX ORDER — EPINEPHRINE 1 MG/ML
INJECTION, SOLUTION, CONCENTRATE INTRAVENOUS AS NEEDED
Status: DISCONTINUED | OUTPATIENT
Start: 2021-01-01 | End: 2021-01-01 | Stop reason: HOSPADM

## 2021-01-01 RX ORDER — OXYCODONE HYDROCHLORIDE 5 MG/1
TABLET ORAL
Qty: 100 TABLET | Refills: 0 | Status: SHIPPED | OUTPATIENT
Start: 2021-01-01 | End: 2021-01-01

## 2021-01-01 RX ORDER — FENTANYL 100 UG/H
1 PATCH TRANSDERMAL
Qty: 10 EACH | Refills: 0 | Status: SHIPPED | OUTPATIENT
Start: 2021-01-01 | End: 2021-01-01 | Stop reason: DRUGHIGH

## 2021-01-01 RX ORDER — OXYCODONE AND ACETAMINOPHEN 10; 325 MG/1; MG/1
1 TABLET ORAL EVERY 6 HOURS PRN
Qty: 60 TABLET | Refills: 0 | Status: ON HOLD | OUTPATIENT
Start: 2021-01-01 | End: 2021-01-01

## 2021-01-01 RX ORDER — PROCHLORPERAZINE MALEATE 10 MG
10 TABLET ORAL EVERY 6 HOURS PRN
Qty: 60 TABLET | Refills: 3 | Status: SHIPPED | OUTPATIENT
Start: 2021-01-01 | End: 2021-01-01

## 2021-01-01 RX ORDER — OXYCODONE HCL 40 MG/1
40 TABLET, FILM COATED, EXTENDED RELEASE ORAL EVERY 12 HOURS
Qty: 60 TABLET | Refills: 0 | Status: SHIPPED | OUTPATIENT
Start: 2021-01-01 | End: 2021-01-01

## 2021-01-01 RX ORDER — PROCHLORPERAZINE MALEATE 10 MG
10 TABLET ORAL EVERY 6 HOURS PRN
Qty: 60 TABLET | Refills: 3 | Status: SHIPPED | OUTPATIENT
Start: 2021-01-01

## 2021-01-01 RX ORDER — DOCUSATE SODIUM 50 MG AND SENNOSIDES 8.6 MG 8.6; 5 MG/1; MG/1
TABLET, FILM COATED ORAL
Qty: 120 TABLET | Refills: 5 | Status: SHIPPED | OUTPATIENT
Start: 2021-01-01

## 2021-01-01 RX ORDER — OXYCODONE HYDROCHLORIDE 10 MG/1
TABLET ORAL
Qty: 60 TABLET | Refills: 0 | Status: SHIPPED | OUTPATIENT
Start: 2021-01-01 | End: 2021-01-01 | Stop reason: DRUGHIGH

## 2021-01-01 RX ORDER — FENTANYL 100 UG/H
1 PATCH TRANSDERMAL
Qty: 10 EACH | Refills: 0 | Status: CANCELLED | OUTPATIENT
Start: 2021-01-01

## 2021-01-01 RX ORDER — OXYCODONE HYDROCHLORIDE 5 MG/1
TABLET ORAL
Qty: 200 TABLET | Refills: 0 | Status: SHIPPED | OUTPATIENT
Start: 2021-01-01 | End: 2021-01-01 | Stop reason: DRUGHIGH

## 2021-01-01 RX ORDER — AMOXICILLIN 250 MG
2 CAPSULE ORAL 2 TIMES DAILY
Qty: 120 TABLET | Refills: 5 | Status: SHIPPED | OUTPATIENT
Start: 2021-01-01 | End: 2021-01-01

## 2021-01-01 RX ORDER — FENTANYL 100 UG/H
2 PATCH TRANSDERMAL
Qty: 10 PATCH | Refills: 0 | Status: CANCELLED | OUTPATIENT
Start: 2021-01-01

## 2021-01-01 RX ORDER — DIAZEPAM 5 MG/1
5 TABLET ORAL EVERY 12 HOURS PRN
Qty: 30 TABLET | Refills: 3 | Status: ON HOLD | OUTPATIENT
Start: 2021-01-01 | End: 2021-01-01

## 2021-01-01 RX ORDER — OXYMETAZOLINE HYDROCHLORIDE 0.05 G/100ML
SPRAY NASAL AS NEEDED
Status: DISCONTINUED | OUTPATIENT
Start: 2021-01-01 | End: 2021-01-01 | Stop reason: HOSPADM

## 2021-01-01 RX ORDER — DEXAMETHASONE 4 MG/1
4 TABLET ORAL 2 TIMES DAILY WITH MEALS
Qty: 60 TABLET | Refills: 3 | Status: SHIPPED | OUTPATIENT
Start: 2021-01-01 | End: 2021-01-01 | Stop reason: SDUPTHER

## 2021-01-01 RX ORDER — OXYCODONE HYDROCHLORIDE 20 MG/1
20 TABLET ORAL EVERY 4 HOURS PRN
Qty: 112 TABLET | Refills: 0 | Status: SHIPPED | OUTPATIENT
Start: 2021-01-01 | End: 2022-01-01 | Stop reason: SDUPTHER

## 2021-01-01 RX ORDER — LIDOCAINE AND PRILOCAINE 25; 25 MG/G; MG/G
CREAM TOPICAL
Qty: 30 G | Refills: 3 | Status: SHIPPED | OUTPATIENT
Start: 2021-01-01

## 2021-01-01 RX ORDER — ALBUTEROL SULFATE 2.5 MG/3ML
2.5 SOLUTION RESPIRATORY (INHALATION) 2 TIMES DAILY
COMMUNITY
Start: 2021-01-01

## 2021-01-01 RX ORDER — FENTANYL 100 UG/H
2 PATCH TRANSDERMAL
Qty: 20 PATCH | Refills: 0 | OUTPATIENT
Start: 2021-01-01

## 2021-01-01 RX ORDER — PREDNISONE 20 MG/1
20 TABLET ORAL
Qty: 15 TABLET | Refills: 0 | Status: SHIPPED | OUTPATIENT
Start: 2021-01-01 | End: 2021-01-01

## 2021-01-01 RX ORDER — FENTANYL 100 UG/H
2 PATCH TRANSDERMAL
Qty: 4 PATCH | Refills: 0 | Status: SHIPPED | OUTPATIENT
Start: 2021-01-01 | End: 2021-01-01 | Stop reason: SDUPTHER

## 2021-01-01 RX ORDER — AMOXICILLIN 250 MG
2 CAPSULE ORAL 2 TIMES DAILY
Qty: 120 TABLET | Refills: 5 | Status: SHIPPED | OUTPATIENT
Start: 2021-01-01

## 2021-01-01 RX ORDER — SODIUM CHLORIDE 0.9 % (FLUSH) 0.9 %
10 SYRINGE (ML) INJECTION AS NEEDED
Status: DISCONTINUED | OUTPATIENT
Start: 2021-01-01 | End: 2021-01-01

## 2021-01-01 RX ADMIN — FENTANYL CITRATE 100 MCG: 50 INJECTION INTRAMUSCULAR; INTRAVENOUS at 09:22

## 2021-01-01 RX ADMIN — FAMOTIDINE 20 MG: 10 INJECTION INTRAVENOUS at 10:28

## 2021-01-01 RX ADMIN — PALONOSETRON HYDROCHLORIDE 0.25 MG: 0.25 INJECTION INTRAVENOUS at 09:50

## 2021-01-01 RX ADMIN — PALONOSETRON HYDROCHLORIDE 0.25 MG: 0.25 INJECTION INTRAVENOUS at 10:27

## 2021-01-01 RX ADMIN — FAMOTIDINE 20 MG: 10 INJECTION INTRAVENOUS at 09:50

## 2021-01-01 RX ADMIN — PACLITAXEL 345 MG: 6 INJECTION, SOLUTION INTRAVENOUS at 11:17

## 2021-01-01 RX ADMIN — NEOSTIGMINE 3 MG: 1 INJECTION INTRAVENOUS at 09:44

## 2021-01-01 RX ADMIN — FAMOTIDINE 20 MG: 10 INJECTION INTRAVENOUS at 12:41

## 2021-01-01 RX ADMIN — SODIUM CHLORIDE 250 ML: 9 INJECTION, SOLUTION INTRAVENOUS at 09:49

## 2021-01-01 RX ADMIN — CARBOPLATIN 900 MG: 10 INJECTION, SOLUTION INTRAVENOUS at 14:35

## 2021-01-01 RX ADMIN — PACLITAXEL 370 MG: 6 INJECTION, SOLUTION INTRAVENOUS at 12:40

## 2021-01-01 RX ADMIN — SODIUM CHLORIDE, PRESERVATIVE FREE 10 ML: 5 INJECTION INTRAVENOUS at 15:40

## 2021-01-01 RX ADMIN — GADOBENATE DIMEGLUMINE 14 ML: 529 INJECTION, SOLUTION INTRAVENOUS at 10:27

## 2021-01-01 RX ADMIN — DEXAMETHASONE SODIUM PHOSPHATE 20 MG: 4 INJECTION, SOLUTION INTRAMUSCULAR; INTRAVENOUS at 11:30

## 2021-01-01 RX ADMIN — SODIUM CHLORIDE 250 ML: 9 INJECTION, SOLUTION INTRAVENOUS at 10:27

## 2021-01-01 RX ADMIN — Medication 500 UNITS: at 14:36

## 2021-01-01 RX ADMIN — NEOSTIGMINE 2 MG: 1 INJECTION INTRAVENOUS at 13:12

## 2021-01-01 RX ADMIN — DENOSUMAB 120 MG: 120 INJECTION SUBCUTANEOUS at 14:32

## 2021-01-01 RX ADMIN — SODIUM CHLORIDE, POTASSIUM CHLORIDE, SODIUM LACTATE AND CALCIUM CHLORIDE: 600; 310; 30; 20 INJECTION, SOLUTION INTRAVENOUS at 09:45

## 2021-01-01 RX ADMIN — PROPOFOL 150 MG: 10 INJECTION, EMULSION INTRAVENOUS at 09:50

## 2021-01-01 RX ADMIN — FENTANYL CITRATE 100 MCG: 50 INJECTION INTRAMUSCULAR; INTRAVENOUS at 12:45

## 2021-01-01 RX ADMIN — FAMOTIDINE 20 MG: 10 INJECTION INTRAVENOUS at 09:51

## 2021-01-01 RX ADMIN — GADOBENATE DIMEGLUMINE 12 ML: 529 INJECTION, SOLUTION INTRAVENOUS at 13:50

## 2021-01-01 RX ADMIN — CARBOPLATIN 820 MG: 10 INJECTION, SOLUTION INTRAVENOUS at 13:49

## 2021-01-01 RX ADMIN — SODIUM CHLORIDE, PRESERVATIVE FREE 10 ML: 5 INJECTION INTRAVENOUS at 16:40

## 2021-01-01 RX ADMIN — DENOSUMAB 120 MG: 120 INJECTION SUBCUTANEOUS at 12:21

## 2021-01-01 RX ADMIN — LIDOCAINE HYDROCHLORIDE 60 MG: 20 INJECTION, SOLUTION INFILTRATION; PERINEURAL at 09:50

## 2021-01-01 RX ADMIN — PALONOSETRON HYDROCHLORIDE 0.25 MG: 0.25 INJECTION INTRAVENOUS at 09:16

## 2021-01-01 RX ADMIN — IOPAMIDOL 90 ML: 612 INJECTION, SOLUTION INTRAVENOUS at 11:22

## 2021-01-01 RX ADMIN — ACETAMINOPHEN 1000 MG: 500 TABLET ORAL at 05:07

## 2021-01-01 RX ADMIN — TECHNETIUM TC 99M OXIDRONATE 1 DOSE: 3.15 INJECTION, POWDER, LYOPHILIZED, FOR SOLUTION INTRAVENOUS at 11:25

## 2021-01-01 RX ADMIN — CARBOPLATIN 900 MG: 10 INJECTION, SOLUTION INTRAVENOUS at 16:31

## 2021-01-01 RX ADMIN — SODIUM CHLORIDE, POTASSIUM CHLORIDE, SODIUM LACTATE AND CALCIUM CHLORIDE: 600; 310; 30; 20 INJECTION, SOLUTION INTRAVENOUS at 12:41

## 2021-01-01 RX ADMIN — DEXAMETHASONE SODIUM PHOSPHATE 20 MG: 4 INJECTION, SOLUTION INTRAMUSCULAR; INTRAVENOUS at 09:36

## 2021-01-01 RX ADMIN — FAMOTIDINE 20 MG: 10 INJECTION INTRAVENOUS at 09:16

## 2021-01-01 RX ADMIN — LIDOCAINE HYDROCHLORIDE 60 MG: 20 INJECTION, SOLUTION INFILTRATION; PERINEURAL at 09:22

## 2021-01-01 RX ADMIN — MIDAZOLAM 2 MG: 1 INJECTION INTRAMUSCULAR; INTRAVENOUS at 12:41

## 2021-01-01 RX ADMIN — CARBOPLATIN 900 MG: 10 INJECTION, SOLUTION INTRAVENOUS at 14:52

## 2021-01-01 RX ADMIN — FAMOTIDINE 20 MG: 10 INJECTION INTRAVENOUS at 11:28

## 2021-01-01 RX ADMIN — PALONOSETRON HYDROCHLORIDE 0.25 MG: 0.25 INJECTION INTRAVENOUS at 11:25

## 2021-01-01 RX ADMIN — GADOBENATE DIMEGLUMINE 15 ML: 529 INJECTION, SOLUTION INTRAVENOUS at 12:20

## 2021-01-01 RX ADMIN — FOSAPREPITANT 150 MG: 150 INJECTION, POWDER, LYOPHILIZED, FOR SOLUTION INTRAVENOUS at 10:59

## 2021-01-01 RX ADMIN — DIPHENHYDRAMINE HYDROCHLORIDE 50 MG: 50 INJECTION, SOLUTION INTRAMUSCULAR; INTRAVENOUS at 10:42

## 2021-01-01 RX ADMIN — GADOBENATE DIMEGLUMINE 15 ML: 529 INJECTION, SOLUTION INTRAVENOUS at 11:35

## 2021-01-01 RX ADMIN — SODIUM CHLORIDE, PRESERVATIVE FREE 10 ML: 5 INJECTION INTRAVENOUS at 17:03

## 2021-01-01 RX ADMIN — DIPHENHYDRAMINE HYDROCHLORIDE 50 MG: 50 INJECTION, SOLUTION INTRAMUSCULAR; INTRAVENOUS at 10:10

## 2021-01-01 RX ADMIN — SODIUM CHLORIDE, POTASSIUM CHLORIDE, SODIUM LACTATE AND CALCIUM CHLORIDE 125 ML/HR: 600; 310; 30; 20 INJECTION, SOLUTION INTRAVENOUS at 11:47

## 2021-01-01 RX ADMIN — ONDANSETRON 4 MG: 2 INJECTION INTRAMUSCULAR; INTRAVENOUS at 09:22

## 2021-01-01 RX ADMIN — MIDAZOLAM 2 MG: 1 INJECTION INTRAMUSCULAR; INTRAVENOUS at 09:16

## 2021-01-01 RX ADMIN — ALBUTEROL SULFATE 2.5 MG: 2.5 SOLUTION RESPIRATORY (INHALATION) at 12:11

## 2021-01-01 RX ADMIN — PACLITAXEL 370 MG: 300 INJECTION, SOLUTION INTRAVENOUS at 11:22

## 2021-01-01 RX ADMIN — FAMOTIDINE 20 MG: 10 INJECTION INTRAVENOUS at 11:48

## 2021-01-01 RX ADMIN — DEXAMETHASONE SODIUM PHOSPHATE 20 MG: 4 INJECTION, SOLUTION INTRAMUSCULAR; INTRAVENOUS at 10:15

## 2021-01-01 RX ADMIN — DIPHENHYDRAMINE HYDROCHLORIDE 50 MG: 50 INJECTION INTRAMUSCULAR; INTRAVENOUS at 11:46

## 2021-01-01 RX ADMIN — Medication 500 UNITS: at 15:20

## 2021-01-01 RX ADMIN — SODIUM CHLORIDE, PRESERVATIVE FREE 10 ML: 5 INJECTION INTRAVENOUS at 14:35

## 2021-01-01 RX ADMIN — PACLITAXEL 385 MG: 6 INJECTION, SOLUTION INTRAVENOUS at 13:15

## 2021-01-01 RX ADMIN — IOPAMIDOL 100 ML: 612 INJECTION, SOLUTION INTRAVENOUS at 12:14

## 2021-01-01 RX ADMIN — ONDANSETRON 4 MG: 2 INJECTION INTRAMUSCULAR; INTRAVENOUS at 12:41

## 2021-01-01 RX ADMIN — PROPOFOL 150 MG: 10 INJECTION, EMULSION INTRAVENOUS at 09:22

## 2021-01-01 RX ADMIN — DENOSUMAB 120 MG: 120 INJECTION SUBCUTANEOUS at 15:34

## 2021-01-01 RX ADMIN — FENTANYL CITRATE 100 MCG: 50 INJECTION INTRAMUSCULAR; INTRAVENOUS at 12:41

## 2021-01-01 RX ADMIN — PACLITAXEL 370 MG: 6 INJECTION, SOLUTION INTRAVENOUS at 11:41

## 2021-01-01 RX ADMIN — DEXAMETHASONE SODIUM PHOSPHATE 8 MG: 10 INJECTION INTRAMUSCULAR; INTRAVENOUS at 09:22

## 2021-01-01 RX ADMIN — CEFAZOLIN SODIUM 2 G: 2 SOLUTION INTRAVENOUS at 12:53

## 2021-01-01 RX ADMIN — DENOSUMAB 120 MG: 120 INJECTION SUBCUTANEOUS at 14:03

## 2021-01-01 RX ADMIN — DENOSUMAB 120 MG: 120 INJECTION SUBCUTANEOUS at 09:33

## 2021-01-01 RX ADMIN — HYDROCODONE BITARTRATE AND ACETAMINOPHEN 15 ML: 7.5; 325 SOLUTION ORAL at 10:51

## 2021-01-01 RX ADMIN — DIPHENHYDRAMINE HYDROCHLORIDE 50 MG: 50 INJECTION, SOLUTION INTRAMUSCULAR; INTRAVENOUS at 12:10

## 2021-01-01 RX ADMIN — ROCURONIUM BROMIDE 25 MG: 10 SOLUTION INTRAVENOUS at 12:45

## 2021-01-01 RX ADMIN — PALONOSETRON HYDROCHLORIDE 0.25 MG: 0.05 INJECTION, SOLUTION INTRAVENOUS at 11:47

## 2021-01-01 RX ADMIN — Medication 500 UNITS: at 16:10

## 2021-01-01 RX ADMIN — SODIUM CHLORIDE, POTASSIUM CHLORIDE, SODIUM LACTATE AND CALCIUM CHLORIDE: 600; 310; 30; 20 INJECTION, SOLUTION INTRAVENOUS at 09:16

## 2021-01-01 RX ADMIN — HEPARIN 500 UNITS: 100 SYRINGE at 17:03

## 2021-01-01 RX ADMIN — Medication 500 UNITS: at 15:28

## 2021-01-01 RX ADMIN — FAMOTIDINE 20 MG: 10 INJECTION INTRAVENOUS at 09:17

## 2021-01-01 RX ADMIN — MAGNESIUM SULFATE HEPTAHYDRATE 2 G: 40 INJECTION, SOLUTION INTRAVENOUS at 14:10

## 2021-01-01 RX ADMIN — DIPHENHYDRAMINE HYDROCHLORIDE 50 MG: 50 INJECTION, SOLUTION INTRAMUSCULAR; INTRAVENOUS at 09:58

## 2021-01-01 RX ADMIN — CARBOPLATIN 890 MG: 10 INJECTION, SOLUTION INTRAVENOUS at 14:41

## 2021-01-01 RX ADMIN — SODIUM CHLORIDE 150 MG: 9 INJECTION, SOLUTION INTRAVENOUS at 12:26

## 2021-01-01 RX ADMIN — MIDAZOLAM 2 MG: 1 INJECTION INTRAMUSCULAR; INTRAVENOUS at 09:45

## 2021-01-01 RX ADMIN — TECHNETIUM TC 99M OXIDRONATE 1 DOSE: 3.15 INJECTION, POWDER, LYOPHILIZED, FOR SOLUTION INTRAVENOUS at 09:30

## 2021-01-01 RX ADMIN — FOSAPREPITANT 150 MG: 150 INJECTION, POWDER, LYOPHILIZED, FOR SOLUTION INTRAVENOUS at 10:32

## 2021-01-01 RX ADMIN — SODIUM CHLORIDE 1000 ML: 9 INJECTION, SOLUTION INTRAVENOUS at 14:10

## 2021-01-01 RX ADMIN — SODIUM CHLORIDE, PRESERVATIVE FREE 10 ML: 5 INJECTION INTRAVENOUS at 15:28

## 2021-01-01 RX ADMIN — PROPOFOL 140 MG: 10 INJECTION, EMULSION INTRAVENOUS at 12:45

## 2021-01-01 RX ADMIN — SODIUM CHLORIDE, POTASSIUM CHLORIDE, SODIUM LACTATE AND CALCIUM CHLORIDE: 600; 310; 30; 20 INJECTION, SOLUTION INTRAVENOUS at 12:45

## 2021-01-01 RX ADMIN — EPHEDRINE SULFATE 10 MG: 50 INJECTION, SOLUTION INTRAVENOUS at 09:30

## 2021-01-01 RX ADMIN — PALONOSETRON HYDROCHLORIDE 0.25 MG: 0.25 INJECTION INTRAVENOUS at 09:54

## 2021-01-01 RX ADMIN — DEXAMETHASONE SODIUM PHOSPHATE 20 MG: 4 INJECTION, SOLUTION INTRAMUSCULAR; INTRAVENOUS at 09:54

## 2021-01-01 RX ADMIN — DEXAMETHASONE SODIUM PHOSPHATE 20 MG: 4 INJECTION, SOLUTION INTRAMUSCULAR; INTRAVENOUS at 11:51

## 2021-01-01 RX ADMIN — IOPAMIDOL 100 ML: 612 INJECTION, SOLUTION INTRAVENOUS at 11:11

## 2021-01-01 RX ADMIN — GLYCOPYRROLATE 0.4 MG: 0.2 INJECTION, SOLUTION INTRAMUSCULAR; INTRAVENOUS at 09:44

## 2021-01-01 RX ADMIN — SODIUM CHLORIDE 250 ML: 9 INJECTION, SOLUTION INTRAVENOUS at 09:16

## 2021-01-01 RX ADMIN — DEXAMETHASONE SODIUM PHOSPHATE 20 MG: 4 INJECTION, SOLUTION INTRAMUSCULAR; INTRAVENOUS at 10:30

## 2021-01-01 RX ADMIN — GLYCOPYRROLATE 0.4 MG: 0.2 INJECTION INTRAMUSCULAR; INTRAVENOUS at 13:12

## 2021-01-01 RX ADMIN — SODIUM CHLORIDE 250 ML: 9 INJECTION, SOLUTION INTRAVENOUS at 11:25

## 2021-01-01 RX ADMIN — VECURONIUM BROMIDE 3 MG: 1 INJECTION, POWDER, LYOPHILIZED, FOR SOLUTION INTRAVENOUS at 09:31

## 2021-01-01 RX ADMIN — SODIUM CHLORIDE, PRESERVATIVE FREE 10 ML: 5 INJECTION INTRAVENOUS at 15:20

## 2021-01-01 RX ADMIN — CARBOPLATIN 900 MG: 10 INJECTION, SOLUTION INTRAVENOUS at 15:56

## 2021-01-01 RX ADMIN — FOSAPREPITANT 150 MG: 150 INJECTION, POWDER, LYOPHILIZED, FOR SOLUTION INTRAVENOUS at 10:33

## 2021-01-01 RX ADMIN — SODIUM CHLORIDE 150 MG: 9 INJECTION, SOLUTION INTRAVENOUS at 09:54

## 2021-01-01 RX ADMIN — DIPHENHYDRAMINE HYDROCHLORIDE 50 MG: 50 INJECTION, SOLUTION INTRAMUSCULAR; INTRAVENOUS at 09:20

## 2021-01-01 RX ADMIN — Medication 500 UNITS: at 16:40

## 2021-01-01 RX ADMIN — SODIUM CHLORIDE 150 MG: 9 INJECTION, SOLUTION INTRAVENOUS at 12:02

## 2021-01-01 RX ADMIN — SODIUM CHLORIDE 250 ML: 9 INJECTION, SOLUTION INTRAVENOUS at 09:53

## 2021-01-01 RX ADMIN — SUCCINYLCHOLINE CHLORIDE 100 MG: 20 INJECTION, SOLUTION INTRAMUSCULAR; INTRAVENOUS at 09:50

## 2021-01-01 RX ADMIN — PACLITAXEL 370 MG: 300 INJECTION, SOLUTION INTRAVENOUS at 10:33

## 2021-01-01 RX ADMIN — FENTANYL CITRATE 100 MCG: 50 INJECTION INTRAMUSCULAR; INTRAVENOUS at 09:45

## 2021-01-01 RX ADMIN — MIDAZOLAM 2 MG: 1 INJECTION INTRAMUSCULAR; INTRAVENOUS at 12:45

## 2021-01-01 RX ADMIN — FAMOTIDINE 20 MG: 10 INJECTION INTRAVENOUS at 09:55

## 2021-01-01 RX ADMIN — PROPOFOL 200 MCG/KG/MIN: 10 INJECTION, EMULSION INTRAVENOUS at 12:49

## 2021-01-01 RX ADMIN — SODIUM CHLORIDE 250 ML: 9 INJECTION, SOLUTION INTRAVENOUS at 11:46

## 2021-01-01 RX ADMIN — Medication 500 UNITS: at 15:40

## 2021-02-02 ENCOUNTER — OFFICE VISIT (OUTPATIENT)
Dept: UROLOGY | Facility: CLINIC | Age: 51
End: 2021-02-02

## 2021-02-02 VITALS — HEIGHT: 72 IN | WEIGHT: 156 LBS | BODY MASS INDEX: 21.13 KG/M2 | TEMPERATURE: 98.3 F

## 2021-02-02 DIAGNOSIS — N42.9 DISORDER OF PROSTATE: Primary | ICD-10-CM

## 2021-02-02 DIAGNOSIS — R35.1 BENIGN PROSTATIC HYPERPLASIA WITH NOCTURIA: ICD-10-CM

## 2021-02-02 DIAGNOSIS — N45.1 EPIDIDYMITIS: ICD-10-CM

## 2021-02-02 DIAGNOSIS — N40.1 BENIGN PROSTATIC HYPERPLASIA WITH NOCTURIA: ICD-10-CM

## 2021-02-02 DIAGNOSIS — R53.83 OTHER FATIGUE: ICD-10-CM

## 2021-02-02 DIAGNOSIS — N50.811 PAIN IN RIGHT TESTICLE: ICD-10-CM

## 2021-02-02 LAB
DEPRECATED RDW RBC AUTO: 45.8 FL (ref 37–54)
ERYTHROCYTE [DISTWIDTH] IN BLOOD BY AUTOMATED COUNT: 13.1 % (ref 12.3–15.4)
HCT VFR BLD AUTO: 39 % (ref 37.5–51)
HGB BLD-MCNC: 13.8 G/DL (ref 13–17.7)
MCH RBC QN AUTO: 34.2 PG (ref 26.6–33)
MCHC RBC AUTO-ENTMCNC: 35.4 G/DL (ref 31.5–35.7)
MCV RBC AUTO: 96.8 FL (ref 79–97)
PLATELET # BLD AUTO: 363 10*3/MM3 (ref 140–450)
PMV BLD AUTO: 9.3 FL (ref 6–12)
PSA SERPL-MCNC: 0.86 NG/ML (ref 0–4)
RBC # BLD AUTO: 4.03 10*6/MM3 (ref 4.14–5.8)
TESTOST SERPL-MCNC: 101 NG/DL (ref 249–836)
WBC # BLD AUTO: 9.5 10*3/MM3 (ref 3.4–10.8)

## 2021-02-02 PROCEDURE — 85027 COMPLETE CBC AUTOMATED: CPT | Performed by: UROLOGY

## 2021-02-02 PROCEDURE — 84403 ASSAY OF TOTAL TESTOSTERONE: CPT | Performed by: UROLOGY

## 2021-02-02 PROCEDURE — 99213 OFFICE O/P EST LOW 20 MIN: CPT | Performed by: UROLOGY

## 2021-02-02 PROCEDURE — 84153 ASSAY OF PSA TOTAL: CPT | Performed by: UROLOGY

## 2021-02-02 RX ORDER — TAMSULOSIN HYDROCHLORIDE 0.4 MG/1
1 CAPSULE ORAL NIGHTLY
Qty: 30 CAPSULE | Refills: 5 | Status: ON HOLD | OUTPATIENT
Start: 2021-02-02 | End: 2021-01-01

## 2021-02-02 NOTE — PROGRESS NOTES
Chief Complaint:          Chief Complaint   Patient presents with   • epididymitis     Post op        HPI:   50 y.o. male returns today status post a right-sided orchiectomy for severe pain now presents with frequency in the range of 6-7 times per night and cold sweats I will try him on Flomax for BPH symptomatology.  I will go ahead and check a testosterone level regarding vasomotor instability.      Past Medical History:        Past Medical History:   Diagnosis Date   • Arthritis    • Back pain    • Carpal tunnel syndrome     bilateral   • Elevated cholesterol    • Frequency of urination    • GERD (gastroesophageal reflux disease)    • Heartburn    • Seizures (CMS/HCC)          Current Meds:     Current Outpatient Medications   Medication Sig Dispense Refill   • Adult Aspirin Regimen 81 MG EC tablet Take 81 mg by mouth Daily.     • carBAMazepine (TEGretol) 200 MG tablet Take 200 mg by mouth 2 (Two) Times a Day. Takes 1.5 tabs in am and 2 tabs at hs     • diazePAM (VALIUM) 5 MG tablet Take 1 tablet by mouth Every 12 (Twelve) Hours As Needed for Anxiety. 30 tablet 3   • EPINEPHrine (ADRENALIN) 1 MG/ML injection Inject 1 mcg/mL under the skin into the appropriate area as directed. FOR BEE STINGS     • famotidine (PEPCID) 20 MG tablet Take 1 tablet by mouth 2 (two) times a day.     • gabapentin (NEURONTIN) 600 MG tablet Every 8 (Eight) Hours.     • HYDROcodone-acetaminophen (NORCO)  MG per tablet Take 1 tablet by mouth Every 6 (Six) Hours As Needed for Moderate Pain . 16 tablet 0   • HYDROcodone-acetaminophen (NORCO)  MG per tablet Take 1 tablet by mouth Every 4 (Four) Hours As Needed for Moderate Pain 16 tablet 0   • ibuprofen (ADVIL,MOTRIN) 800 MG tablet ibuprofen 800 mg tablet   TAKE ONE TABLET BY MOUTH THREE TIMES A DAY     • sildenafil (REVATIO) 20 MG tablet May take up to 5 by mouth one hour prior to intercourse on an empty stomach 40 tablet 6     No current facility-administered medications for this  visit.         Allergies:      Allergies   Allergen Reactions   • Bee Venom Anaphylaxis   • Tramadol Other (See Comments)     Seizures  Ultram          Past Surgical History:     Past Surgical History:   Procedure Laterality Date   • ABDOMINAL SURGERY     • CARDIAC CATHETERIZATION     • EPIDIDYMECTOMY Right 6/27/2018    Procedure: EPIDIDYMECTOMY;  Surgeon: Chino Aviles MD;  Location: [facility];  Service: Urology   • FRACTURE SURGERY     • HERNIA REPAIR     • MOUTH SURGERY      teeth    • ORCHIECTOMY Right 10/19/2020    Procedure: ORCHIECTOMY;  Surgeon: Chino Aviles MD;  Location: [facility];  Service: Urology;  Laterality: Right;         Social History:     Social History     Socioeconomic History   • Marital status:      Spouse name: Not on file   • Number of children: Not on file   • Years of education: Not on file   • Highest education level: Not on file   Tobacco Use   • Smoking status: Current Every Day Smoker     Packs/day: 0.50     Years: 14.00     Pack years: 7.00     Types: Cigarettes   • Smokeless tobacco: Current User     Types: Snuff   Substance and Sexual Activity   • Alcohol use: No   • Drug use: No   • Sexual activity: Defer     Birth control/protection: None       Family History:     Family History   Problem Relation Age of Onset   • Cancer Father    • Hypertension Father    • Diabetes Father    • Hypertension Mother    • Cancer Maternal Uncle    • Heart disease Maternal Uncle    • Heart disease Paternal Aunt    • Cancer Maternal Grandmother    • Heart disease Maternal Grandmother        Review of Systems:     Review of Systems   Constitutional: Negative.    HENT: Negative.    Eyes: Negative.    Respiratory: Negative.    Cardiovascular: Negative.    Gastrointestinal: Negative.    Endocrine: Negative.    Musculoskeletal: Negative.    Allergic/Immunologic: Negative.    Neurological: Negative.    Hematological: Negative.    Psychiatric/Behavioral: Negative.        Physical  Exam:     Physical Exam  Vitals signs and nursing note reviewed.   Constitutional:       Appearance: He is well-developed.   HENT:      Head: Normocephalic and atraumatic.   Eyes:      Conjunctiva/sclera: Conjunctivae normal.      Pupils: Pupils are equal, round, and reactive to light.   Neck:      Musculoskeletal: Normal range of motion.   Cardiovascular:      Rate and Rhythm: Normal rate and regular rhythm.      Heart sounds: Normal heart sounds.   Pulmonary:      Effort: Pulmonary effort is normal.      Breath sounds: Normal breath sounds.   Abdominal:      General: Bowel sounds are normal.      Palpations: Abdomen is soft.   Genitourinary:     Comments: Well-healed right hemiscrotum neuro erythema induration tenderness  Musculoskeletal: Normal range of motion.   Skin:     General: Skin is warm and dry.   Neurological:      Mental Status: He is alert and oriented to person, place, and time.      Deep Tendon Reflexes: Reflexes are normal and symmetric.   Psychiatric:         Behavior: Behavior normal.         Thought Content: Thought content normal.         Judgment: Judgment normal.         I have reviewed the following portions of the patient's history: allergies, current medications, past family history, past medical history, past social history, past surgical history, problem list and ROS and confirm it's accurate.      Procedure:       Assessment/Plan:   Chronic epididymitis-status post right-sided orchiectomy  BPH: Discussed the pathophysiology of BPH and obstruction.  We discussed the static and dynamic effect effects of BPH as well as using 5 alpha reductase inhibitors versus alpha blockade.  We discussed the indications for transurethral surgery as well.  And/ or other therapeutic options available including all of the newer techniques.  Start Flomax  Vasomotor instability-check testosterone level            Patient's Body mass index is 21.16 kg/m². BMI is within normal parameters. No follow-up  required..              This document has been electronically signed by ANNE CORNELIUS MD February 2, 2021 08:14 EST

## 2021-02-03 PROBLEM — N40.1 BENIGN PROSTATIC HYPERPLASIA WITH NOCTURIA: Status: ACTIVE | Noted: 2021-02-03

## 2021-02-03 PROBLEM — R35.1 BENIGN PROSTATIC HYPERPLASIA WITH NOCTURIA: Status: ACTIVE | Noted: 2021-02-03

## 2021-03-16 NOTE — PROGRESS NOTES
"Chief Complaint:          Chief Complaint   Patient presents with   • Prostate disorder     follow up        HPI:   50 y.o. male returns today status post an unremarkable circumcision still with extreme tiredness and a positive JUDSON-androgen deficiency in the age male questionnaire  The patient was queried regarding the androgen deficiency in the age male questionnaire.  This is a validated questionnaire that was performed on a set of 314 [place] male physicians when it was positive it correlated directly with a 94% chance of low testosterone.  Patient indicates there is a decrease in libido or sex drive, a lack of energy, Decreased  strength and endurance, a decreased \"enjoyment of life\", sad and grumpy feelings with significant difficulty maintaining erections.  He is also been a recent deterioration regarding work performance. His labs show a profoundly low testosterone. At 89 with a PSA of 0.863 he is desirous of beginning testosterone I think he be a great candidate. Low TestosteroneThis pleasant male patient presents today with signs and symptoms that are consistent with low testosterone he has positive Judson questionnaire by history this includes both the sexual and nonsexual side effects.  Sexual side effects include inability to achieve and maintain an erection, in ability to maintain his erection and decreased interest and sexual activity.  Nonsexual symptomatology includes fatigue, difficulty completing a job, tiredness.  He has a discussion of the various forms testosterone available including parenteral, topical, and the form of a patch.  We discussed the efficacy of the gels, and the injections.  As well as the cost and benefits analysis.  We discussed the the studies a talked about heart disease and its effect on prostate cancer both of which are negligible.  He gives verbal consent to proceed with treatment.  He understands the risks and benefits of length he also completed his attempts at fertility " he understands the partial effect on spermatogenesis      Past Medical History:        Past Medical History:   Diagnosis Date   • Arthritis    • Back pain    • Carpal tunnel syndrome     bilateral   • Elevated cholesterol    • Frequency of urination    • GERD (gastroesophageal reflux disease)    • Heartburn    • Seizures (CMS/HCC)          Current Meds:     Current Outpatient Medications   Medication Sig Dispense Refill   • Adult Aspirin Regimen 81 MG EC tablet Take 81 mg by mouth Daily.     • carBAMazepine (TEGretol) 200 MG tablet Take 200 mg by mouth 2 (Two) Times a Day. Takes 1.5 tabs in am and 2 tabs at hs     • diazePAM (VALIUM) 5 MG tablet Take 1 tablet by mouth Every 12 (Twelve) Hours As Needed for Anxiety. 30 tablet 3   • EPINEPHrine (ADRENALIN) 1 MG/ML injection Inject 1 mcg/mL under the skin into the appropriate area as directed. FOR BEE STINGS     • famotidine (PEPCID) 20 MG tablet Take 1 tablet by mouth 2 (two) times a day.     • gabapentin (NEURONTIN) 600 MG tablet Every 8 (Eight) Hours.     • HYDROcodone-acetaminophen (NORCO)  MG per tablet Take 1 tablet by mouth Every 6 (Six) Hours As Needed for Moderate Pain . 16 tablet 0   • HYDROcodone-acetaminophen (NORCO)  MG per tablet Take 1 tablet by mouth Every 4 (Four) Hours As Needed for Moderate Pain 16 tablet 0   • ibuprofen (ADVIL,MOTRIN) 800 MG tablet ibuprofen 800 mg tablet   TAKE ONE TABLET BY MOUTH THREE TIMES A DAY     • sildenafil (REVATIO) 20 MG tablet May take up to 5 by mouth one hour prior to intercourse on an empty stomach 40 tablet 6   • tamsulosin (Flomax) 0.4 MG capsule 24 hr capsule Take 1 capsule by mouth Every Night. 30 capsule 5     No current facility-administered medications for this visit.        Allergies:      Allergies   Allergen Reactions   • Bee Venom Anaphylaxis   • Tramadol Other (See Comments)     Seizures  Ultram          Past Surgical History:     Past Surgical History:   Procedure Laterality Date   • ABDOMINAL  SURGERY     • CARDIAC CATHETERIZATION     • EPIDIDYMECTOMY Right 6/27/2018    Procedure: EPIDIDYMECTOMY;  Surgeon: Chino Aviles MD;  Location: [facility] OR;  Service: Urology   • FRACTURE SURGERY     • HERNIA REPAIR     • MOUTH SURGERY      teeth    • ORCHIECTOMY Right 10/19/2020    Procedure: ORCHIECTOMY;  Surgeon: Chino Aviles MD;  Location: [facility] OR;  Service: Urology;  Laterality: Right;         Social History:     Social History     Socioeconomic History   • Marital status:      Spouse name: Not on file   • Number of children: Not on file   • Years of education: Not on file   • Highest education level: Not on file   Tobacco Use   • Smoking status: Current Every Day Smoker     Packs/day: 0.50     Years: 14.00     Pack years: 7.00     Types: Cigarettes   • Smokeless tobacco: Current User     Types: Snuff   Substance and Sexual Activity   • Alcohol use: No   • Drug use: No   • Sexual activity: Defer     Birth control/protection: None       Family History:     Family History   Problem Relation Age of Onset   • Cancer Father    • Hypertension Father    • Diabetes Father    • Hypertension Mother    • Cancer Maternal Uncle    • Heart disease Maternal Uncle    • Heart disease Paternal Aunt    • Cancer Maternal Grandmother    • Heart disease Maternal Grandmother        Review of Systems:     Review of Systems   Constitutional: Negative.    HENT: Negative.    Eyes: Negative.    Respiratory: Negative.    Cardiovascular: Negative.    Gastrointestinal: Negative.    Endocrine: Negative.    Musculoskeletal: Negative.    Allergic/Immunologic: Negative.    Neurological: Negative.    Hematological: Negative.    Psychiatric/Behavioral: Negative.        Physical Exam:     Physical Exam  Vitals and nursing note reviewed.   Constitutional:       Appearance: He is well-developed.   HENT:      Head: Normocephalic and atraumatic.   Eyes:      Conjunctiva/sclera: Conjunctivae normal.      Pupils: Pupils are  equal, round, and reactive to light.   Cardiovascular:      Rate and Rhythm: Normal rate and regular rhythm.      Heart sounds: Normal heart sounds.   Pulmonary:      Effort: Pulmonary effort is normal.      Breath sounds: Normal breath sounds.   Abdominal:      General: Bowel sounds are normal.      Palpations: Abdomen is soft.   Genitourinary:     Comments: Solitary testicle  Musculoskeletal:         General: Normal range of motion.      Cervical back: Normal range of motion.   Skin:     General: Skin is warm and dry.   Neurological:      Mental Status: He is alert and oriented to person, place, and time.      Deep Tendon Reflexes: Reflexes are normal and symmetric.   Psychiatric:         Behavior: Behavior normal.         Thought Content: Thought content normal.         Judgment: Judgment normal.         I have reviewed the following portions of the patient's history: allergies, current medications, past family history, past medical history, past social history, past surgical history, problem list and ROS and confirm it's accurate.      Procedure:       Assessment/Plan:   Epididymitis-we discussed the etiology of epididymitis from lifting heavy objects to the full spectrum of epididymoorchitis.  I discussed the staging and grading system including a stage I epididymitis meaning confined to the epididymis but in a separation between the epididymis and testicle grade 2 being a concretion of both layers were I can feel the difference and finally grade 3 with scrotal fixation of the skin we discussed the recommendation of warm soaks, elevation and antibiotics were indicated.  I discussed the indication for aggressive intervention such as the presence of an abscess in the presence of significant systemic symptomatology such as fevers and chills.  We also discussed the fact that the thickening and swelling may persist beyond the pain and that sometimes a prolonged course of antibiotics may be indicated finally we  discussed ultimately there may be significant atrophy of the testicle after the episode of epididymitis and its important to watch closely for that so better required an orchiectomy  Low TestosteroneThis pleasant male patient presents today with signs and symptoms that are consistent with low testosterone he has positive Judson questionnaire by history this includes both the sexual and nonsexual side effects.  Sexual side effects include inability to achieve and maintain an erection, in ability to maintain his erection and decreased interest and sexual activity.  Nonsexual symptomatology includes fatigue, difficulty completing a job, tiredness.  He has a discussion of the various forms testosterone available including parenteral, topical, and the form of a patch.  We discussed the efficacy of the gels, and the injections.  As well as the cost and benefits analysis.  We discussed the the studies a talked about heart disease and its effect on prostate cancer both of which are negligible.  He gives verbal consent to proceed with treatment.  He understands the risks and benefits of length he also completed his attempts at fertility he understands the partial effect on spermatogenesis has a profoundly low testosterone I am going initiate therapy today.  Postop muscle spasms in the region of the incision we have been using short courses of diazepam  Narcotic pain medication-patient has significant acute pain that I believe would be an indication for the use of narcotic pain medication.  I discussed the significant risks of pain medication and the fact that this will be a short only option and I will give her no more than a three-day supply of pain medication.  And I will not plan long-term medication and that this will be sent to a pain clinic if at all becomes necessary.  We discussed signing a pain medication agreement and the fact that we're going to run a state JUNAID review to be sure the patient is not getting pain  medication from elsewhere.  If this is the case we will not give pain medication.  As part of the patient's treatment plan of there being prescribed a controlled substance with potential for abuse.  This patient has been wait aware of the appropriate dose of such medications including, the risk for somnolence, limited ability to drive and/or safety and the significant potential for overdose.  It is been made clear that these medications are for the prescribed patient only without concomitant use of alcohol or other sepsis unless prescribed by the medical provider.  Has completed prescribing agreement detailing the terms of continue prescribing him a controlled substance.  Including monitoring Junaid ports, the possibility of urine drug screens and pedal counts.  The patient is aware that we reviewed JUNAID reports on a regular basis and scan them into the chart.  History and physical examination exhibited continued safe and appropriate use of controlled substances.  Also discussed the fact that the new [place] legislation allows only a three-day prescription for pain medication.  In this situation he will be referred to a chronic pain clinic.  Was given a limited prescription for diazepam because of muscle spasms.            Patient's Body mass index is 21.16 kg/m². BMI is within normal parameters. No follow-up required..              This document has been electronically signed by ANNE CORNELIUS MD March 16, 2021 15:14 EDT

## 2021-03-18 PROBLEM — M62.838 NIGHT MUSCLE SPASMS: Status: ACTIVE | Noted: 2021-01-01

## 2021-03-18 PROBLEM — R79.89 LOW TESTOSTERONE: Status: ACTIVE | Noted: 2021-01-01

## 2021-04-05 NOTE — ED PROVIDER NOTES
Subjective     History provided by:  Patient   used: No    Shortness of Breath  Severity:  Mild  Onset quality:  Gradual  Timing:  Constant  Progression:  Worsening  Chronicity:  New  Context: activity    Context: not animal exposure, not emotional upset, not fumes, not known allergens, not occupational exposure, not pollens, not smoke exposure, not strong odors, not URI and not weather changes    Relieved by:  Nothing  Worsened by:  Activity, coughing, exertion, movement and deep breathing  Ineffective treatments:  None tried  Associated symptoms: chest pain and wheezing    Associated symptoms: no abdominal pain, no claudication, no cough, no diaphoresis, no ear pain, no fever, no headaches, no hemoptysis, no neck pain, no PND, no rash, no sore throat, no sputum production, no syncope, no swollen glands and no vomiting    Risk factors: tobacco use    Risk factors: no recent alcohol use, no family hx of DVT, no hx of cancer, no hx of PE/DVT, no obesity, no prolonged immobilization and no recent surgery        Review of Systems   Constitutional: Negative for activity change, appetite change, chills, diaphoresis, fatigue and fever.   HENT: Negative for congestion, ear pain and sore throat.    Eyes: Negative for redness.   Respiratory: Positive for shortness of breath and wheezing. Negative for cough, hemoptysis, sputum production and chest tightness.    Cardiovascular: Positive for chest pain. Negative for palpitations, claudication, leg swelling, syncope and PND.   Gastrointestinal: Negative for abdominal pain, diarrhea, nausea and vomiting.   Genitourinary: Negative for dysuria and urgency.   Musculoskeletal: Negative for arthralgias, back pain, myalgias and neck pain.   Skin: Negative for pallor, rash and wound.   Neurological: Negative for dizziness, speech difficulty, weakness and headaches.   Psychiatric/Behavioral: Negative for agitation, behavioral problems, confusion and decreased  concentration.   All other systems reviewed and are negative.      Past Medical History:   Diagnosis Date   • Arthritis    • Back pain    • Carpal tunnel syndrome     bilateral   • Elevated cholesterol    • Frequency of urination    • GERD (gastroesophageal reflux disease)    • Heartburn    • Seizures (CMS/HCC)        Allergies   Allergen Reactions   • Bee Venom Anaphylaxis   • Tramadol Other (See Comments)     Seizures  Ultram         Past Surgical History:   Procedure Laterality Date   • ABDOMINAL SURGERY     • CARDIAC CATHETERIZATION     • EPIDIDYMECTOMY Right 6/27/2018    Procedure: EPIDIDYMECTOMY;  Surgeon: Chino Aviles MD;  Location: [facility];  Service: Urology   • FRACTURE SURGERY     • HERNIA REPAIR     • MOUTH SURGERY      teeth    • ORCHIECTOMY Right 10/19/2020    Procedure: ORCHIECTOMY;  Surgeon: Chino Aviles MD;  Location: [facility];  Service: Urology;  Laterality: Right;       Family History   Problem Relation Age of Onset   • Cancer Father    • Hypertension Father    • Diabetes Father    • Hypertension Mother    • Cancer Maternal Uncle    • Heart disease Maternal Uncle    • Heart disease Paternal Aunt    • Cancer Maternal Grandmother    • Heart disease Maternal Grandmother        Social History     Socioeconomic History   • Marital status:      Spouse name: Not on file   • Number of children: Not on file   • Years of education: Not on file   • Highest education level: Not on file   Tobacco Use   • Smoking status: Current Every Day Smoker     Packs/day: 0.50     Years: 14.00     Pack years: 7.00     Types: Cigarettes   • Smokeless tobacco: Current User     Types: Snuff   Substance and Sexual Activity   • Alcohol use: No   • Drug use: No   • Sexual activity: Defer     Birth control/protection: None           Objective   Physical Exam  Vitals and nursing note reviewed.   Constitutional:       General: He is not in acute distress.     Appearance: Normal appearance. He is  well-developed. He is not toxic-appearing or diaphoretic.   HENT:      Head: Normocephalic and atraumatic.      Right Ear: External ear normal.      Left Ear: External ear normal.      Nose: Nose normal.      Mouth/Throat:      Pharynx: No oropharyngeal exudate.      Tonsils: No tonsillar exudate.   Eyes:      General: Lids are normal.      Conjunctiva/sclera: Conjunctivae normal.      Pupils: Pupils are equal, round, and reactive to light.   Neck:      Thyroid: No thyromegaly.   Cardiovascular:      Rate and Rhythm: Normal rate and regular rhythm.      Pulses: Normal pulses.      Heart sounds: Normal heart sounds, S1 normal and S2 normal.   Pulmonary:      Effort: Pulmonary effort is normal. No tachypnea or respiratory distress.      Breath sounds: Wheezing present. No decreased breath sounds or rales.   Chest:      Chest wall: Tenderness present.   Abdominal:      General: Bowel sounds are normal. There is no distension.      Palpations: Abdomen is soft.      Tenderness: There is no abdominal tenderness. There is no guarding or rebound.   Musculoskeletal:         General: No tenderness or deformity. Normal range of motion.      Cervical back: Full passive range of motion without pain, normal range of motion and neck supple.   Lymphadenopathy:      Cervical: No cervical adenopathy.   Skin:     General: Skin is warm and dry.      Coloration: Skin is not pale.      Findings: No erythema or rash.   Neurological:      Mental Status: He is alert and oriented to person, place, and time.      GCS: GCS eye subscore is 4. GCS verbal subscore is 5. GCS motor subscore is 6.      Cranial Nerves: No cranial nerve deficit.      Sensory: No sensory deficit.   Psychiatric:         Speech: Speech normal.         Behavior: Behavior normal.         Thought Content: Thought content normal.         Judgment: Judgment normal.         Procedures           ED Course  ED Course as of Apr 05 0424 Mon Apr 05, 2021   2769 IMPRESSION:  No  acute cardiopulmonary findings.   XR Chest 1 View [ES]   0349 Sinus tachycardia  Possible Left atrial enlargement  Borderline ECG  When compared with ECG of 01-OCT-2018 21:28,  Vent. rate has increased BY 36 BPM  Vent. rate 101 BPM  WI interval 138 ms  QRS duration 90 ms  QT/QTc 310/401 ms  P-R-T axes 66 76 68   ECG 12 Lead [ES]   0739 IMPRESSION:     1. COPD with no evidence of acute pneumonia.  2. AP window and probably left hilar and perihilar adenopathy concerning for malignancy. There is some mild extrinsic compression of the proximal left upper and left lower lobe bronchi. Bronchoscopy may be beneficial. While a contrast-enhanced chest CT  will provide some additional diagnostic information, PET CT would be more beneficial.   CT Chest Without Contrast Diagnostic [ES]      ED Course User Index  [ES] Larry Guerra MD                                           MDM  Number of Diagnoses or Management Options  COPD exacerbation (CMS/HCC): new and requires workup  Lung mass: new and requires workup     Amount and/or Complexity of Data Reviewed  Clinical lab tests: reviewed and ordered  Tests in the radiology section of CPT®: reviewed and ordered  Tests in the medicine section of CPT®: reviewed and ordered  Review and summarize past medical records: yes  Independent visualization of images, tracings, or specimens: yes    Risk of Complications, Morbidity, and/or Mortality  Presenting problems: moderate  Diagnostic procedures: moderate  Management options: moderate    Patient Progress  Patient progress: stable      Final diagnoses:   COPD exacerbation (CMS/HCC)   Lung mass       ED Disposition  ED Disposition     ED Disposition Condition Comment    Discharge Stable           Boris Zhang MD  [address] [address]  [phone]    Schedule an appointment as soon as possible for a visit in 1 day  EVALUATE         Medication List      New Prescriptions    doxycycline 100 MG  capsule  Commonly known as: MONODOX  Take 1 capsule by mouth 2 (Two) Times a Day.     predniSONE 20 MG tablet  Commonly known as: DELTASONE  Take 1 tablet by mouth 3 (Three) Times a Day With Meals.           Where to Get Your Medications      These medications were sent to [facility] - [address], [place] [address]    Hours: 8AM-6PM Mon-Fri Phone: [phone]   · doxycycline 100 MG capsule     You can get these medications from any pharmacy    Bring a paper prescription for each of these medications  · predniSONE 20 MG tablet          Larry Guerra MD  04/05/21 0424

## 2021-04-05 NOTE — ED NOTES
pt updated on wait times; verbalized understanding; denies any needs at this time; will continue to monitor; advised to notify RN of any change in condition.       Patel Olivas, RN  04/05/21 0262

## 2021-04-05 NOTE — ED NOTES
pt updated on wait times; verbalized understanding; denies any needs at this time; will continue to monitor; advised to notify RN of any change in condition.       Patel Olivas, RN  04/05/21 024

## 2021-04-07 NOTE — TELEPHONE ENCOUNTER
I talked to Traci and he said the pt had normal range PSA and there was no reason the pt told me that he wanted to have one because he went tot he ER and they found a mass in his lungs and the ER Doc mentioned it. I told him with out the PSA markers there was no indication as to why to do it and the insurance company wouldn't cover it either.

## 2021-04-26 PROBLEM — Z72.0 TOBACCO ABUSE: Status: ACTIVE | Noted: 2021-01-01

## 2021-04-26 PROBLEM — K21.9 CHRONIC GERD: Status: ACTIVE | Noted: 2021-01-01

## 2021-04-26 PROBLEM — R59.0 MEDIASTINAL LYMPHADENOPATHY: Status: ACTIVE | Noted: 2021-01-01

## 2021-04-26 NOTE — PROGRESS NOTES
PULMONARY  NOTE    Chief Complaint     Abnormal chest CT, smoker, cough, GERD    History of Present Illness     51yo WM referred for evaluation of abnormal chest CT.    He has a long history of ongoing tobacco abuse up to 1-1/2ppd.  No plans for smoking cessation at this time.    Became sill a few weeks ago. Was weak and coughing  Initially went to [facility] and was given steroids and Doxy.  Didn't feel better so went to [facility] where he had chest imaging and was given a Z-Pack.    CXR was abnormal and followed with a Chest CT with results as noted below.    Overall he feels better.  Less cough and no sputum production.  He has never had hemoptysis.    He doesn't feel limited by dyspnea.    Weight has been without change.    He has flagrant reflux symptoms and doesn't follow reflux precautions.    Patient Active Problem List   Diagnosis   • Epididymitis   • Pain in right testicle   • Benign prostatic hyperplasia with nocturia   • Low testosterone   • Night muscle spasms   • Left Hilar & mediastinal lymphadenopathy   • Tobacco abuse   • GERD     Allergies   Allergen Reactions   • Bee Venom Anaphylaxis   • Tramadol Other (See Comments)     Seizures  Ultram         Current Outpatient Medications:   •  Adult Aspirin Regimen 81 MG EC tablet, Take 81 mg by mouth Daily., Disp: , Rfl:   •  carBAMazepine (TEGretol) 200 MG tablet, Take 200 mg by mouth 2 (Two) Times a Day. Takes 1.5 tabs in am and 2 tabs at hs, Disp: , Rfl:   •  diazePAM (VALIUM) 5 MG tablet, Take 1 tablet by mouth Every 12 (Twelve) Hours As Needed for Anxiety., Disp: 30 tablet, Rfl: 3  •  doxycycline (MONODOX) 100 MG capsule, Take 1 capsule by mouth 2 (Two) Times a Day., Disp: 20 capsule, Rfl: 0  •  EPINEPHrine (ADRENALIN) 1 MG/ML injection, Inject 1 mcg/mL under the skin into the appropriate area as directed. FOR BEE STINGS, Disp: , Rfl:   •  famotidine (PEPCID) 20 MG tablet, Take 1 tablet by mouth 2 (two) times a day., Disp: , Rfl:   •  gabapentin (NEURONTIN)  "600 MG tablet, Every 8 (Eight) Hours., Disp: , Rfl:   •  HYDROcodone-acetaminophen (NORCO)  MG per tablet, Take 1 tablet by mouth Every 6 (Six) Hours As Needed for Moderate Pain ., Disp: 16 tablet, Rfl: 0  •  HYDROcodone-acetaminophen (NORCO)  MG per tablet, Take 1 tablet by mouth Every 4 (Four) Hours As Needed for Moderate Pain, Disp: 16 tablet, Rfl: 0  •  ibuprofen (ADVIL,MOTRIN) 800 MG tablet, ibuprofen 800 mg tablet  TAKE ONE TABLET BY MOUTH THREE TIMES A DAY, Disp: , Rfl:   •  predniSONE (DELTASONE) 20 MG tablet, Take 1 tablet by mouth 3 (Three) Times a Day With Meals., Disp: 15 tablet, Rfl: 0  •  sildenafil (REVATIO) 20 MG tablet, May take up to 5 by mouth one hour prior to intercourse on an empty stomach, Disp: 40 tablet, Rfl: 6  •  Syringe 25G X 5/8\" 3 ML misc, Use as directed 2 x weekly, Disp: 24 each, Rfl: 3  •  tamsulosin (Flomax) 0.4 MG capsule 24 hr capsule, Take 1 capsule by mouth Every Night., Disp: 30 capsule, Rfl: 5  •  Testosterone Cypionate (Depo-Testosterone) 200 MG/ML injection, Inject 1/2 cc subcutaneously every Monday and Thursday, Disp: 10 mL, Rfl: 2  MEDICATION LIST AND ALLERGIES REVIEWED.    Family History   Problem Relation Age of Onset   • Cancer Father    • Hypertension Father    • Diabetes Father    • Hypertension Mother    • Cancer Maternal Uncle    • Heart disease Maternal Uncle    • Heart disease Paternal Aunt    • Cancer Maternal Grandmother    • Heart disease Maternal Grandmother      Social History     Tobacco Use   • Smoking status: Current Every Day Smoker     Packs/day: 0.50     Years: 14.00     Pack years: 7.00     Types: Cigarettes   • Smokeless tobacco: Current User     Types: Snuff   Substance Use Topics   • Alcohol use: No   • Drug use: No     Social History     Social History Narrative        Works for [employer]    Smokes 1-1/2 ppd    Drinks rarely     FAMILY AND SOCIAL HISTORY REVIEWED.    Review of Systems  ALSO REFER TO SCANNED ROS SHEET FROM SAME " "DATE.    /81   Pulse 83   Temp 98.2 °F (36.8 °C) (Temporal)   Resp 18   Ht 182.9 cm (72\")   Wt 76.2 kg (168 lb)   SpO2 98%   BMI 22.78 kg/m²   Physical Exam  Vitals and nursing note reviewed.   Constitutional:       General: He is not in acute distress.     Appearance: He is well-developed. He is not diaphoretic.   HENT:      Head: Normocephalic and atraumatic.   Neck:      Thyroid: No thyromegaly.   Cardiovascular:      Rate and Rhythm: Normal rate and regular rhythm.      Heart sounds: Normal heart sounds. No murmur heard.     Pulmonary:      Effort: Pulmonary effort is normal.      Breath sounds: Normal breath sounds. No stridor.   Abdominal:      General: Bowel sounds are normal.      Palpations: Abdomen is soft.   Musculoskeletal:         General: Normal range of motion.      Right lower leg: No edema.      Left lower leg: No edema.   Lymphadenopathy:      Cervical: No cervical adenopathy.      Upper Body:      Right upper body: No supraclavicular or epitrochlear adenopathy.      Left upper body: No supraclavicular or epitrochlear adenopathy.   Skin:     General: Skin is warm and dry.   Neurological:      Mental Status: He is alert and oriented to person, place, and time.   Psychiatric:         Behavior: Behavior normal.         Results     CT Chest reviewed on PACS  AP window demonstrates mass or adenopathy. Not much in subcarinal area. Left perihilar fullness.  No discrete parenchymal lesion    There is no immunization history on file for this patient.  Problem List       ICD-10-CM ICD-9-CM   1. Left Hilar & mediastinal lymphadenopathy  R59.0 785.6   2. Tobacco abuse  Z72.0 305.1   3. GERD  K21.9 530.81       Discussion     CT findings worrisome for malignancy although granulomatous disease (or sequela of such) also a consideration.  Given smoking history this needs to be pursued.  PET/CT and labs can be considered and might be performed in the future but won't be diagnostic.  Discussed biopsy " options with patient/wife. Basically surgical vs. Bronch.    Our plan is to do a Bronch with EBUS. Will send specimen for flow cytometry as well.    Discussed smoking cessation strategies.    Discussed reflux precautions.    Level of service justified based on 65 minutes spent in patient care on this date of service including, but not limited to: preparing to see the patient, obtaining and/or reviewing history, performing medically appropriate examination, ordering tests/medicine/procedures, independently interpreting results, documenting clinical information in EHR, and counseling/education of patient/family/caregiver. (Level 4 45-59 minutes; Level 5 60-74 minutes)    Saurabh Johnson MD  Note electronically signed    CC: Kassie River, VITOR

## 2021-04-26 NOTE — PROGRESS NOTES
Nurse Navigator met with patient on this date.  Patient is a 51 y/o male referred for evaluation of abnormal chest CT.  Nurse Navigator spoke with patient and patient's spouse. Provided patient with contact information and patient is aware to call with any questions or concerns.  Nurse Navigator will continue to follow and assist as needed.

## 2021-04-27 NOTE — PROGRESS NOTES
Nurse Navigator spoke to patient on this date.  Patient telephoned Nurse Navigator requesting something stronger for pain.  Patient currently takes Norco  MG every 6 hours as needed for pain prescribed by PMD.  Instructed patient to notify PMD regarding  any medicine dose changes. Patient denies any other complaints   Encouraged patient to try OTC Ibuprofen.  Nurse Navigator made patient aware of Pre-Admission testing appointment on 5/7/21 @ 3:15 pm and is aware to have COVID test performed that day as well.  Questions Answered.  Pt aware of scheduled Bronchoscopy on 5/10/21 and is aware to arrive at 8:30 am.  Patient has Nurse Navigator contact information and is aware to call with any other questions or concerns.  Nurse Navigator will continue to follow and assist as needed.

## 2021-05-10 NOTE — ANESTHESIA POSTPROCEDURE EVALUATION
Patient: Vipin Oakley    Procedure Summary     Date: 05/10/21 Room / Location: [facility] OR 09 /  [facility] OR    Anesthesia Start: 0945 Anesthesia Stop: 1000    Procedure: BRONCHOSCOPY WITH ENDOBRONCHIAL ULTRASOUND (Left Bronchus) Diagnosis:       Mediastinal lymphadenopathy      (Mediastinal lymphadenopathy [R59.0])    Surgeons: Saurabh Johnson MD Provider: Boogie Messina MD    Anesthesia Type: MAC ASA Status: 3          Anesthesia Type: MAC    Vitals  Vitals Value Taken Time   /88 05/10/21 1031   Temp 97.2 °F (36.2 °C) 05/10/21 1001   Pulse 68 05/10/21 1031   Resp 18 05/10/21 1031   SpO2 98 % 05/10/21 1031           Post Anesthesia Care and Evaluation    Patient location during evaluation: PHASE II  Patient participation: complete - patient participated  Level of consciousness: awake  Pain score: 0  Pain management: adequate  Airway patency: patent  Anesthetic complications: No anesthetic complications  PONV Status: none  Cardiovascular status: acceptable  Respiratory status: acceptable  Hydration status: acceptable

## 2021-05-10 NOTE — ANESTHESIA PREPROCEDURE EVALUATION
Anesthesia Evaluation     Patient summary reviewed and Nursing notes reviewed   NPO Solid Status: > 8 hours  NPO Liquid Status: > 4 hours           Airway   Mallampati: II  Dental    (+) edentulous    Pulmonary     breath sounds clear to auscultation  (+) COPD,   Cardiovascular     Rhythm: regular  Rate: normal        Neuro/Psych  (+) seizures (none for 2 years), numbness,     GI/Hepatic/Renal/Endo    (+)  GERD,      Musculoskeletal     (+) back pain,   Abdominal     Abdomen: soft.   Substance History      OB/GYN          Other   arthritis,                      Anesthesia Plan    ASA 3     MAC     intravenous induction     Anesthetic plan, all risks, benefits, and alternatives have been provided, discussed and informed consent has been obtained with: patient.    Plan discussed with CRNA.

## 2021-05-12 PROBLEM — J38.00 VOCAL CORD PARALYSIS: Status: ACTIVE | Noted: 2021-01-01

## 2021-05-12 PROBLEM — J38.7 OTHER DISEASES OF LARYNX: Status: ACTIVE | Noted: 2021-01-01

## 2021-05-12 PROBLEM — R13.14 DYSPHAGIA, PHARYNGOESOPHAGEAL PHASE: Status: ACTIVE | Noted: 2021-01-01

## 2021-05-12 PROBLEM — R63.4 LOSS OF WEIGHT: Status: ACTIVE | Noted: 2021-01-01

## 2021-05-18 NOTE — ANESTHESIA PREPROCEDURE EVALUATION
Anesthesia Evaluation     Patient summary reviewed and Nursing notes reviewed   NPO Solid Status: > 8 hours  NPO Liquid Status: > 8 hours           Airway   Mallampati: II  TM distance: >3 FB  Neck ROM: full  Possible difficult intubation  Dental    (+) edentulous    Pulmonary     breath sounds clear to auscultation  (+) COPD,   Cardiovascular     Rhythm: regular  Rate: normal        Neuro/Psych  (+) seizures, numbness,     GI/Hepatic/Renal/Endo    (+)  GERD,      Musculoskeletal     (+) back pain,   Abdominal     Abdomen: soft.   Substance History      OB/GYN          Other   arthritis,                      Anesthesia Plan    ASA 2     general     intravenous induction     Anesthetic plan, all risks, benefits, and alternatives have been provided, discussed and informed consent has been obtained with: patient.

## 2021-05-18 NOTE — ANESTHESIA PROCEDURE NOTES
Airway  Urgency: elective    Date/Time: 5/18/2021 9:22 AM  Airway not difficult    General Information and Staff    Patient location during procedure: OR  CRNA: Yulia Fisher CRNA    Indications and Patient Condition  Indications for airway management: airway protection    Preoxygenated: yes  MILS maintained throughout  Mask difficulty assessment: 0 - not attempted    Final Airway Details  Final airway type: endotracheal airway      Successful airway: ETT  Cuffed: yes   Successful intubation technique: direct laryngoscopy  Facilitating devices/methods: intubating stylet  Endotracheal tube insertion site: oral  Blade: CMAC  Blade size: 3  ETT size (mm): 5.5  Cormack-Lehane Classification: grade IIa - partial view of glottis  Placement verified by: chest auscultation   Measured from: lips  ETT/EBT  to lips (cm): 21  Number of attempts at approach: 1  Assessment: lips, teeth, and gum same as pre-op and atraumatic intubation

## 2021-05-18 NOTE — ANESTHESIA POSTPROCEDURE EVALUATION
Patient: Vipin Oakley    Procedure Summary     Date: 05/18/21 Room / Location: [facility] OR 09 /  [facility] OR    Anesthesia Start: 0914 Anesthesia Stop: 1005    Procedures:       MICRODIRECT LARYNGOSCOPY (N/A )      BIOPSY SOFT TISSUE BACK / FLANK (N/A ) Diagnosis:       Dysphagia, pharyngoesophageal phase      Loss of weight      Vocal cord paralysis      Other diseases of larynx      (Dysphagia, pharyngoesophageal phase [R13.14])      (Loss of weight [R63.4])      (Vocal cord paralysis [J38.00])      (Other diseases of larynx [J38.7])    Surgeons: Hans Malone MD Provider: Boogie Messina MD    Anesthesia Type: general ASA Status: 2          Anesthesia Type: general    Vitals  Vitals Value Taken Time   /80 05/18/21 1021   Temp 97.4 °F (36.3 °C) 05/18/21 1006   Pulse 74 05/18/21 1021   Resp 11 05/18/21 1021   SpO2 100 % 05/18/21 1021           Post Anesthesia Care and Evaluation    Patient location during evaluation: PHASE II  Patient participation: complete - patient participated  Level of consciousness: responsive to verbal stimuli  Pain score: 2  Pain management: adequate  Airway patency: patent  Anesthetic complications: No anesthetic complications  PONV Status: none  Cardiovascular status: hemodynamically stable  Respiratory status: acceptable  Hydration status: acceptable

## 2021-05-20 NOTE — PROGRESS NOTES
Nurse Navigator telephoned patient on this date to inform him of his upcoming appointments.  Patient is scheduled for PAT/ COVID test on 5/24/21 at 10:15 and a bronchoscopy with endobronchial ultrasound on 5/26/21 with Dr. Johnson and is aware to arrive at 10:00 am. Patient verified understanding.  Questions answered.  Patient has NN contact information and is aware to call with any questions or concerns.  Nurse Navigator will continue to follow and assist patient as needed.

## 2021-05-26 NOTE — ANESTHESIA POSTPROCEDURE EVALUATION
Patient: Vipin Oakley    Procedure Summary     Date: 05/26/21 Room / Location:  [facility] OR 08 /  [facility] OR    Anesthesia Start: 1241 Anesthesia Stop: 1320    Procedure: BRONCHOSCOPY WITH ENDOBRONCHIAL ULTRASOUND (N/A Bronchus) Diagnosis:       Mediastinal lymphadenopathy      (Mediastinal lymphadenopathy [R59.0])    Surgeons: Saurabh Johnson MD Provider: Jonah Barth MD    Anesthesia Type: general ASA Status: 2          Anesthesia Type: general    Vitals  Vitals Value Taken Time   /87 05/26/21 1350   Temp 98.3 °F (36.8 °C) 05/26/21 1320   Pulse 87 05/26/21 1350   Resp 18 05/26/21 1350   SpO2 98 % 05/26/21 1350           Post Anesthesia Care and Evaluation    Patient location during evaluation: PHASE II  Patient participation: complete - patient participated  Level of consciousness: awake and alert  Pain score: 0  Pain management: adequate  Airway patency: patent  Anesthetic complications: No anesthetic complications    Cardiovascular status: acceptable  Respiratory status: acceptable  Hydration status: acceptable

## 2021-05-26 NOTE — ANESTHESIA PROCEDURE NOTES
Airway  Urgency: elective    Date/Time: 5/26/2021 12:46 PM  Airway not difficult    General Information and Staff    Patient location during procedure: OR  CRNA: Mallorie Gamino CRNA    Indications and Patient Condition  Indications for airway management: airway protection    Preoxygenated: yes  MILS maintained throughout  Mask difficulty assessment: 0 - not attempted    Final Airway Details  Final airway type: endotracheal airway      Successful airway: ETT  Cuffed: yes   Successful intubation technique: direct laryngoscopy  Endotracheal tube insertion site: oral  Blade: Aburto  Blade size: 2  ETT size (mm): 8.5  Cormack-Lehane Classification: grade IIa - partial view of glottis  Placement verified by: chest auscultation, capnometry and palpation of cuff   Cuff volume (mL): 8  Measured from: lips  ETT/EBT  to lips (cm): 22  Number of attempts at approach: 1  Assessment: lips, teeth, and gum same as pre-op and atraumatic intubation

## 2021-05-26 NOTE — ANESTHESIA PREPROCEDURE EVALUATION
Anesthesia Evaluation     Patient summary reviewed and Nursing notes reviewed   no history of anesthetic complications:  NPO Solid Status: > 8 hours  NPO Liquid Status: > 8 hours           Airway   Mallampati: II  TM distance: >3 FB  Neck ROM: full  Possible difficult intubation  Dental    (+) edentulous    Pulmonary     breath sounds clear to auscultation  (+) COPD,   Cardiovascular     Rhythm: regular  Rate: normal        Neuro/Psych  (+) seizures, numbness,     GI/Hepatic/Renal/Endo    (+)  GERD,      Musculoskeletal     (+) back pain,   Abdominal     Abdomen: soft.   Substance History      OB/GYN          Other   arthritis,                        Anesthesia Plan    ASA 2     general     intravenous induction     Anesthetic plan, all risks, benefits, and alternatives have been provided, discussed and informed consent has been obtained with: patient.

## 2021-05-27 NOTE — PROGRESS NOTES
Leelee Givens RN Thompson, John Randall, MD Dr. Thompson,   The wife wants you to send the prescription for his oxycontin to [facility] in [place], the other drug store is getting ready to close.    Thank you   Leelee       From: Leelee Givens RN   Sent: 5/26/2021   3:31 PM EDT   To: Saurabh Johnson MD, *     I called the pharmacy and they also told me that they did not receive the E-prescription.  I verified the Drug Store and the information in Epic was correct.  The Status of the E-Prescription says it was received and confirmed by the pharmacy.  So, I am not sure what the issue is?   Dr. Johnson- Do you want to try re-sending it or should I call the wife and see if there is another pharmacy that we can try?   Thank you   Leelee   ----- Message -----   From: Yulia Levi MA   Sent: 5/26/2021   3:06 PM EDT   To: Saurabh Johnson MD, Leelee Givens RN, *     Patients wife is calling said that patient was supposed to receive a prescription for a longer lasting pain medication. I can see that there is an oxycodone prescription in the system that was sent to [facility] but the patients wife is saying that the pharmacy has not received it. Call back number is [phone]

## 2021-06-01 NOTE — PROGRESS NOTES
"The patient underwent a bronchoscopy with EBUS and specimens were positive for Squamous Cell Ca which is similar to the back lesion that was aspirated the week before.  This most likely is a lung primary. He doesn't have discrete parenchymal mass but there was tumor in the PERLA.    I discussed these results with the patients wife on the phone. The patient had instructed me to talk with his wife and \"give her the news\".  The wife and I talked about ways she can communicate these results to Mr. Oakley but ultimately he has to be informed to move forward.    We will work on a H/O referral for consideration of Chemo/XRT.    He will need to have someone take over his pain management; I discussed palliative care with the wife.   "

## 2021-06-08 NOTE — PROGRESS NOTES
"NAME: Vipin Oakley    : 1970    DATE OF CONSULTATION:  2021    REASON FOR REFERRAL: Squamous cell lung cancer    REFERRING PHYSICIAN:  Dr. Saurabh Johnson     CHIEF COMPLAINT:  Squamous Cell lung cancer  Cough       HISTORY OF PRESENT ILLNESS:   Vipin Oakley is a very pleasant 50 y.o. male who is being seen today at the request of Dr. Johnson for evaluation and treatment of squamous cell lung cancer.  He initially started to feel unwell in 2021.  At that time, he presented to an urgent care with cough and was diagnosed with Flu B.  His cough worsened and his voice started to become hoarse.  He ultimately came to the ER where he was diagnosed with FluB again as well as pneumonia and was told he had a lung mass.  From there he saw Dr. Johnson.  Bronchoscopy was delayed because of mucus retention cyst which was removed by Dr. Malone and then he returned to Dr. Johnson and had bronchoscopy.  This revealed heaped up / friable mucosa in thedistal L bronchus.  Brushings and biopsies were done and EBUS biopsies of Station 4L/10L LNs.  This revealed squamous cell carcinoma.  Meanwhile he was found to have a soft tissue lesion on his L upper back and FNA showed metastatic squamous cell carcinoma.  He is here today for further evaluation and treatment.  He tells me he doesn't know his diagnosis because he says he hasn't let anyone tell him yet what it is.  His wife accompanies him and says she knows but said he wouldn't let her tell him.  His wife says he has lost weight, though he doesn't think he has.  He complains of hoarse voice and  Laryngoscopy did show a paralyzed L vocal cord.  .  He has had pain in his back, worse over the L side and cough which is dry or productive of clear sputum.  He has also had R groin pain.  He is agitated today and admits that he has been down / worried.  He completed PHQ9 screening and reported, \"I could kill myself right now.\"  He complains of pain which causes " some difficulty sleeping.  He also says he needs help calm him down. He takes Valium 5 mg prescribed by Dr. Aviles.  He said he wouldn't want to try Lexapro because he had heard too much about this medication.  He reports he used to take Thorazine at some point a long time ago. After some discussion, it seems he may have a  but the details of this aren't clear.  He is very upset about how his illness may affect his ability to work and says he has always worked hard since the age of 14 and says he feels like he is letting his family down and became tearful and also agitated, throwing his hat on the floor.  He has been taking Percocet 10 4x/day for pain, but pain is at times out of control and he sometimes wakes up in pain.        PAST MEDICAL HISTORY:  Past Medical History:   Diagnosis Date   • Arthritis    • Back pain    • Carpal tunnel syndrome     bilateral   • COPD (chronic obstructive pulmonary disease) (CMS/HCC)    • Frequency of urination    • GERD (gastroesophageal reflux disease)    • Heartburn    • Lung cancer (CMS/[facility])    • Seizures (CMS/[facility])    anxiety  Herniated disk neck  Seizures treated with tegretol as child/teenager since 9 months old, well controlled      PAST SURGICAL HISTORY:  Past Surgical History:   Procedure Laterality Date   • ABDOMINAL SURGERY     • BIOPSY OF BACK/FLANK N/A 5/18/2021    Procedure: BIOPSY SOFT TISSUE BACK / FLANK;  Surgeon: Hans Malone MD;  Location: [facility] OR;  Service: ENT;  Laterality: N/A;   • BRONCHOSCOPY N/A 5/26/2021    Procedure: BRONCHOSCOPY WITH ENDOBRONCHIAL ULTRASOUND;  Surgeon: Saurabh Johnson MD;  Location: [facility] OR;  Service: Pulmonary;  Laterality: N/A;   • CARDIAC CATHETERIZATION     • EPIDIDYMECTOMY Right 6/27/2018    Procedure: EPIDIDYMECTOMY;  Surgeon: Chino Aviles MD;  Location: [facility] OR;  Service: Urology   • HERNIA REPAIR     • LARYNGOSCOPY N/A 5/18/2021    Procedure: MICRODIRECT LARYNGOSCOPY;  Surgeon: Kira  Hans SALDAÑA MD;  Location: [facility] OR;  Service: ENT;  Laterality: N/A;   • MOUTH SURGERY      teeth    • ORCHIECTOMY Right 10/19/2020    Procedure: ORCHIECTOMY;  Surgeon: Chino Aviles MD;  Location: [facility] OR;  Service: Urology;  Laterality: Right;   • TESTICLE SURGERY         FAMILY HISTORY:  Family History   Problem Relation Age of Onset   • Cancer Father    • Hypertension Father    • Diabetes Father    • Hypertension Mother    • Cancer Maternal Uncle    • Heart disease Maternal Uncle    • Heart disease Paternal Aunt    • Cancer Maternal Grandmother    • Heart disease Maternal Grandmother    liver father    SOCIAL HISTORY:  Social History     Socioeconomic History   • Marital status:      Spouse name: Not on file   • Number of children: Not on file   • Years of education: Not on file   • Highest education level: Not on file   Tobacco Use   • Smoking status: Current Every Day Smoker     Packs/day: 0.25     Years: 14.00     Pack years: 3.50     Types: Cigarettes   • Smokeless tobacco: Current User     Types: Snuff   Vaping Use   • Vaping Use: Never used   Substance and Sexual Activity   • Alcohol use: No   • Drug use: No   • Sexual activity: Defer     Birth control/protection: None     , lives with wife. 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  Significant findings as mentioned above.  All other systems reviewed and are negative.      MEDICATIONS:  The current medication list was reviewed in the EMR    Current Outpatient Medications:   •  Adult Aspirin Regimen 81 MG EC tablet, Take 81 mg by mouth Daily., Disp: , Rfl:   •  carBAMazepine (TEGretol) 200 MG tablet, Take 200 mg by mouth 2 (Two) Times a Day. Takes 1.5 tabs in am and 2 tabs at hs, Disp: , Rfl:   •  diazePAM (VALIUM) 5 MG tablet, Take 1 tablet by mouth Every 12 (Twelve) Hours As  "Needed for Anxiety., Disp: 30 tablet, Rfl: 3  •  gabapentin (NEURONTIN) 600 MG tablet, 800 mg 3 (Three) Times a Day., Disp: , Rfl:   •  ibuprofen (ADVIL,MOTRIN) 800 MG tablet, ibuprofen 800 mg tablet  TAKE ONE TABLET BY MOUTH THREE TIMES A DAY, Disp: , Rfl:   •  methocarbamol (ROBAXIN) 500 MG tablet, Take 500 mg by mouth 2 (Two) Times a Day., Disp: , Rfl:   •  oxyCODONE-acetaminophen (Percocet)  MG per tablet, Take 1 tablet by mouth Every 6 (Six) Hours As Needed for Moderate Pain ., Disp: 60 tablet, Rfl: 0  •  Syringe 25G X 5/8\" 3 ML misc, Use as directed 2 x weekly, Disp: 24 each, Rfl: 3  •  tamsulosin (Flomax) 0.4 MG capsule 24 hr capsule, Take 1 capsule by mouth Every Night., Disp: 30 capsule, Rfl: 5  •  Testosterone Cypionate (Depo-Testosterone) 200 MG/ML injection, Inject 1/2 cc subcutaneously every Monday and Thursday, Disp: 10 mL, Rfl: 2  •  EPINEPHrine (ADRENALIN) 1 MG/ML injection, Inject 1 mcg/mL under the skin into the appropriate area as directed. FOR BEE STINGS, Disp: , Rfl:   •  famotidine (PEPCID) 20 MG tablet, Take 1 tablet by mouth 2 (two) times a day., Disp: , Rfl:   •  HYDROcodone-acetaminophen (NORCO)  MG per tablet, Take 1 tablet by mouth Every 6 (Six) Hours As Needed for Moderate Pain ., Disp: 16 tablet, Rfl: 0  •  Lidocaine Viscous HCl (XYLOCAINE) 2 % solution, SWISH, SPIT, OR SWALLOW 5 TO 10 ML BY MOUTH EVERY 3 HOURS AS NEEDED., Disp: 100 mL, Rfl: 5  •  oxyCODONE HCl ER (oxyCONTIN) 30 MG tablet extended-release 12 hour, Take 1 tablet by mouth Every 12 (Twelve) Hours., Disp: 30 tablet, Rfl: 0    ALLERGIES:    Allergies   Allergen Reactions   • Bee Venom Anaphylaxis   • Tramadol Other (See Comments)     Seizures  Ultram         PHYSICAL EXAM:  Vitals:    06/09/21 1307   BP: 130/87   Pulse: 96   Resp: 18   Temp: 98 °F (36.7 °C)   SpO2: 98%     Pain Score    06/09/21 1307   PainSc:   5     ECOG score: 0     PHQ-9 Depression Screening  Little interest or pleasure in doing things? 3 "   Feeling down, depressed, or hopeless? 2   Trouble falling or staying asleep, or sleeping too much? 3   Feeling tired or having little energy? 3   Poor appetite or overeating? 0   Feeling bad about yourself - or that you are a failure or have let yourself or your family down? 3   Trouble concentrating on things, such as reading the newspaper or watching television? 0   Moving or speaking so slowly that other people could have noticed? Or the opposite - being so fidgety or restless that you have been moving around a lot more than usual? 0   Thoughts that you would be better off dead, or of hurting yourself in some way? 3   PHQ-9 Total Score 17   If you checked off any problems, how difficult have these problems made it for you to do your work, take care of things at home, or get along with other people? Somewhat difficult     PHQ-9 Total Score: 17        General:  Awake, alert and oriented, somewhat agitated at times, tearful at times, calm at other times.  Cooperative.  HEENT:  Pupils are equal, round and reactive to light and accommodation, Extra-ocular movements full, Oropharyx clear, mucous membranes moist  Neck:  No JVD, thyromegaly or lymphadenopathy  CV:  Regular rate and rhythm, no murmurs, rubs or gallops  Resp:  Lungs are clear to auscultation bilaterally, no wheezes  Abd:  Soft, non-tender, non-distended, bowel sounds present, no organomegaly or masses  Back:  Over his L  Upper back in the archie-scapular area he has a rounded ST mass, approximately 3.5 cm in diameter.  Ext:  No clubbing, cyanosis or edema  Lymph:  No cervical, supraclavicular, axillary, inguinal or femoral adenopathy  Neuro:  MS as above, CN II-XII intact, grossly non-focal exam      PATHOLOGY:  05-18-21 05-26-21              ENDOSCOPY:  Bronchoscopy 05-26-21  Findings: Irregular, friable mucosa in the distal left main stem bronchus at the left upper lobe takeoff      IMAGING:  CT Chest wo contrast 04-05-21  FINDINGS:  Small  pericardial effusion is noted. There is no pleural effusion. There is AP window adenopathy measuring 2.1 cm. There is probably some left hilar adenopathy as well, poorly characterized without IV contrast. There is also some soft tissue abutting the  distal aortic arch and proximal descending aorta suggesting tumor or adenopathy. Upper abdominal images show a contracted gallbladder.     Lung windows show COPD. There is some mild narrowing of both proximal upper and lower lobe bronchi on the left side possibly due to extrinsic compression. Bronchoscopy may be beneficial. No suspicious pulmonary nodules are identified. There is some  scarring in the left upper lobe. The lungs are otherwise clear. No CT findings present to indicate pneumonia. Note: CT may be negative in the earliest stages of COVID-19. There are no suspicious osseous lesions.     IMPRESSION:     1. COPD with no evidence of acute pneumonia.  2. AP window and probably left hilar and perihilar adenopathy concerning for malignancy. There is some mild extrinsic compression of the proximal left upper and left lower lobe bronchi. Bronchoscopy may be beneficial. While a contrast-enhanced chest CT will provide some additional diagnostic information, PET CT would be more beneficial.      RECENT LABS:  Lab Results   Component Value Date    WBC 8.74 05/06/2021    HGB 12.9 (L) 05/06/2021    HCT 38.6 05/06/2021    MCV 99.5 (H) 05/06/2021    RDW 13.2 05/06/2021     05/06/2021    NEUTRORELPCT 73.4 04/05/2021    LYMPHORELPCT 18.5 (L) 04/05/2021    MONORELPCT 7.2 04/05/2021    EOSRELPCT 0.1 (L) 04/05/2021    BASORELPCT 0.2 04/05/2021    NEUTROABS 11.70 (H) 04/05/2021    LYMPHSABS 2.95 04/05/2021       Lab Results   Component Value Date     05/06/2021    K 4.0 05/06/2021    CO2 24.6 05/06/2021     05/06/2021    BUN 17 05/06/2021    CREATININE 0.80 05/06/2021    EGFRIFNONA 103 05/06/2021    GLUCOSE 104 (H) 05/06/2021    CALCIUM 9.3 05/06/2021    ALKPHOS  "83 04/05/2021    AST 13 04/05/2021    ALT 19 04/05/2021    BILITOT 0.2 04/05/2021    ALBUMIN 4.27 04/05/2021    PROTEINTOT 7.0 04/05/2021    MG 2.0 04/05/2021         ASSESSMENT & PLAN:  Vipin Oakley is a very pleasant 50 y.o. male with metastatic squamous cell carcinoma of the LLL with metastases to hilar and mediastinal LNs as well as a soft tissue metastasis to his L posterior back.    1.  Metastatic squamous cell Carcinoma of the lung:  -  Will stage with CT CAP and bone scan as well as MRI Brain so we have a clear understanding of the extent of his disease.  -  Will send tumor biopsy for CARIS testing to evaluate for actionable mutations.  - Check CBCD, CMP    2.  Suicidal ideation:  -  Mr. Oakley is understandably upset given his recent diagnosis.  He is somewhat agitated / irritable and also tearful at times.  He hints at other stressors (\"I could tell stories that would make your  cry.\") and also prior h/o significant mental illness (he reports prior h/o Thorazine use, talks about not wanting a 72 hour hold or to be arrested (it seems this happened previously) and it seems he may have a  though he can't tell me who that is).    -  Given his history and current suicidal ideation, our  Sonam met with him and we made urgent referral to the [facility] where he eventually agreed to be seen today (walked to the facility by our , security staff).    -  Check TSH    3.  Difficulty with working:  -  I think this is at least part of Mr. Oakley's stress.  He says someone filled out paperwork and he is currently on STD.  Given his condition, metastatic disease, very physical job and need for narcotic pain medication, I told him I didn't think he should work at  This time.  Will d/w our . May need to apply for disability.  Will work through this with him over time.  Encouraged him to send us any paperwork that needs to be filled out to keep him off of work at the present " time.    4.  Neoplasm related pain:  -  Currently taking Percocet 10 every 6 hrs with poor pain control.  Will start Morphine ER 15 mg q8h with Oxycodone 5 mg 1-2 tabs q4-6h prn pain.  Take Senna/Colace ii BID for prevention of constipation.     5.  H/o Seizure d/o from the age of 9 months:  -  Says he hasn't had a seizure in a very long time.  Takes Tegretol to prevent seizures.  Will check MRI brain.    6.  Prophylaxis:  Will give 2020 influenza vaccine and Prevnar 13 today.  Will need COVID vaccination in the future.    7.  ACO / VIRI/Other  Quality measures  -  Vipin Oakley did not receive 2020 flu vaccine.  Will give this today.  -  Vipin Oakley reports a pain score of 5.  Given his pain assessment as noted, treatment options were discussed and the following options were decided upon as a follow-up plan to address the patient's pain: prescription for opiod analgesics.  -  Current outpatient and discharge medications have been reconciled for the patient.  Reviewed by: Yu Foster MD     8.  F/u:  -  Check CT CAP, Bone scan, MRI Brain  -  Check lab:  CBCD, CMP, LDH, Uric acid, TSH, Ferritin, iron profile, B12, Folate  -  Urgent psychiatry referral  - Start Morphine ER 15 mg q8h with Oxycodone 5 mg 1-2 q 4-6h prn breakthrough pain  -  Senna /Colace ii BID  -  SW consultation.  May need help applying for disability    This note was scribed for Yu Foster MD by Liliya Brandt RN.    I, Yu Foster MD, personally performed the services described in this documentation as scribed by the above named individual in my presence, and it is both accurate and complete.  06/09/2021      I spent 75 minutes with Vipin Oakley today.  In the office today, more than 50% of this time was spent face-to-face with him  in counseling / coordination of care, reviewing his medical history and counseling on the current treatment plan.  All questions were answered to his satisfaction      Electronically Signed by:  Yu Foster MD      CC:     VITOR Mckee MD Frederick Bunge, MD

## 2021-06-09 NOTE — PROGRESS NOTES
"Vipin Oakley is a very pleasant 50 y.o. male who is being seen today by Dr. Foster, at the request of Saurabh Johnson, * for evaluation and treatment of squamous cell lung cancer.    Patient completed PHQ depression screening in oncology office today.      The patient and I discussed the results of his PHQ depression screening.    PHQ-9 Total Score:  17    Patient answered question:    I. Thoughts that you would be better off dead of hurting yourself in some way-nearly every day. Patient informed DEANN Esteban, \"I could really kill myself right now.\"    Provider request for  to follow up with patient regarding suicidal ideations.    SS spoke with patient and spouse Radha.  Patient lives at home with spouse.  Patient and spouse have one daughter Nina 24 Y/O that lives in [place].    Patient doesn't utilize any home health.    Patient doesn't utilize any durable medical equipment.    Patient employed full-time at [employer], [place].    Patient provides transportation for self.    Patient denies any anxiety or depression.  Patient states that he takes Valium 0.5 mg at bedtime.    Patient informed provider that he would be agreeable to going to emergency room for evaluation by intake, but upon speaking with  patient refused to go to the emergency room for intake.    SS made provider and  aware.      Security Spencer Karimi and Bar Johns present during visit.  Security states that unless provider is willing to do a 72 hour hold that they can not touch patient or make him do anything that he doesn't want to do.    Patient snuck out the side door and left building.  CHARLETTE, DEANN Wood, and DEANN Esteban followed patient to front door.  Patient walking across parking lot.      SS spoke with , Ritu Etienne, who was on the phone with [facility] Brandy Sosa and , [facility].  Ritu request for SS to speak with Jeanna.  " Corine asked if SS would go to parking lot to patient's car to talk to him.  SS explained to Corine that SS afraid to walk to patient's car for my own safety.  Corine request for spouse to contact patient on cell phone.  Patient agreeable to come back into clinic today.  Chipion Santoyo agreeable to see patient in office visit at [facility] today.  Patient agreeable.  Security escorted patient, and spouse to [facility].    SS will follow as needed.

## 2021-06-09 NOTE — CODE DOCUMENTATION
"I spoke with patient about the depression screening he had done on paper.  I asked if he meant to lexi nearly every day on the \" thoughts of being dead, or of hurting yourself in some way\".  The patient verbally told me yes he could kill his self right now he did not care.  His wife then stated its more of his attitude than anything and he shook his head no.  I notified Liliya DE ANDA and Sonam the  about the situation.  "

## 2021-06-10 PROBLEM — C34.90 SQUAMOUS CELL LUNG CANCER (HCC): Status: ACTIVE | Noted: 2021-01-01

## 2021-06-10 NOTE — PROGRESS NOTES
"Date of Service: June 9, 2021  Time In: 3:40 PM  Time Out: 4:30 PM      PROGRESS NOTE  Data:  Vipin Oakley is a 50 y.o. male who met 1: 1 with the undersigned for emergent individual outpatient therapy session at the [facility] following patient making suicidal remarks at his appointment at oncology at [facility] where he was diagnosed with lung cancer.  Patient completed depression screening and made a statement \"I could kill myself right now\" which resulted in this session being prompted.     HPI: Patient was at an appointment for oncology at [facility] where he was diagnosed with lung cancer and became upset and distraught and made a self harming statements on a depression screening which triggered further services.  The patient reports he simply feels extremely overwhelmed and states his biggest fear is that he will not be here to care for his wife and 25-year-old daughter.  Patient states \"my wife is old enough to take care of herself but my daughter needs me\".  Patient reports he does not feel he knows where to turn and states he feels extremely overwhelmed.  In addition, he states his short-term and long-term disability which he has been paying for for several years has been slow to be instigated and has caused financial strain.  Patient is having difficulty adjusting to his diagnosis and the possible ramifications and is struggling with symptoms including sad mood, anhedonia, feelings of anger and irritability, feeling overwhelmed, increased heart rate, and a distinct sense of impending doom.  The patient admits to making a statement that he could kill himself but states this was simply out of frustration and reports he would never harm himself or anyone else.  The patient is able to identify protective factors including sense of responsibility for his family, his [employer] beliefs, and his desire not to harm his family.  The patient is in relatively good spirits and laughs and talks " "throughout the session and is able to verbalize \"I want to live\".  Patient also speaks of the future and states he plans to \"fight like hell\".  Patient reports he continues to adhere to medication regimen as prescribed and vehemently denies any substance use.  The patient further adamantly convincingly denies active suicidal ideation, intent, or plan.  The patient states \"I would never harm myself or anyone else\".      Clinical Maneuvering/Intervention:  Assisted patient in processing above session content; acknowledged and normalized patient’s thoughts, feelings, and concerns.  Allow the patient to discuss/process his feelings, fear, and anger concerning his diagnosis and validated his feelings.  Also utilize cognitive behavioral therapy to attempt to assist the patient and organizing his thoughts and encouraged him to consider this is overwhelming news and will take some time to process.  Further utilized analogies from the patient's life for about he has apparently been a hard worker all of his life and has not backed down from challenges and encouraged him to consider this is simply another challenge in life.  Also strongly encouraged the patient to remind himself there are many unknowns and he continues to have assessment and further treatment and encouraged him to attempt to avoid jumping to conclusions and to except the fact that this must be taken 1 step at a time.  Also challenged the patient to consider although he is very important to his family, he is human also and she does not feel guilty for being ill.  Discussed possibility and availability of the patient to continue to see the undersigned in outpatient therapy at [facility] as this is more convenient to the patient as to which he agreed.  Facilitated having appointment scheduled following the session before the patient left the office.  Also discussed this with the patient's wife in the front office and she agreed to assist " the patient with keeping up with his appointments.  Provided unconditional positive regarding a safe, supportive environment.    Allowed patient to freely discuss issues without interruption or judgment. Provided safe, confidential environment to facilitate the development of positive therapeutic relationship and encourage open, honest communication. Assisted patient in identifying risk factors which would indicate the need for higher level of care including thoughts to harm self or others and/or self-harming behavior and encouraged patient to contact this office, call 911, or present to the nearest emergency room should any of these events occur. Discussed crisis intervention services and means to access.  Patient adamantly and convincingly denies current suicidal or homicidal ideation or perceptual disturbance.        Assessment    Patient was diagnosed with lung cancer on this date which resulted in acute reaction to stress and the patient simply feeling overwhelmed and making a statement that he would like to die.  However, there is no evidence of imminent threat of harm to self or others and the patient is able to identify protective factors.  In addition, the patient is able to laugh and feel austin throughout the session as he and the undersigned discussed such things as fishing and is also able to make positive statements about the future.  As result, there is no evidence of imminent threat of harm to self or others and no further immediate action is warranted.  However, the patient does have severe illness which will cause significant issues.  As result, he can be reasonably expected benefit from treatment and would likely be at increased risk for decompensation otherwise.    Diagnoses and all orders for this visit:    1. Acute reaction to stress (Primary)    2. Passive suicidal ideations    3. Adjustment disorder with mixed anxiety and depressed mood               REVIEW OF SYSTEMS:  Review of Systems        Mental Status Exam:   Appearance: Well groomed  Build: Thin  Hygiene:   good  Cooperation:  Cooperative  Eye Contact:  Good  Psychomotor Behavior:  Appropriate  Affect:  Appropriate  Mood: anxious  Hopelessness: 1  Speech:  Normal  Thought Process:  Linear  Thought Content:  Normal  Suicidal:  Passive suicidal ideation as a direct reaction to stress  Homicidal:  None  Hallucinations:  None  Delusion:  None  Memory:  Intact  Orientation:  Person, Place and Time  Reliability:  good  Insight:  Fair  Judgement:  Fair  Impulse Control:  Fair  Behavior: No behavior issues        Patient's Support Network Includes:  wife, daughter and extended family    Progress toward goal: Not at goal    Functional Status: Severe impairment    Prognosis: Guarded with Ongoing Treatment        Plan     Plan:   Patient will continue in individual outpatient therapy session at [facility] in 2 to 3 weeks.  Patient will continue with primary care provider and adhere to medication regimen as prescribed and report any side effects. Patient will contact this office, call 911 or present to the nearest emergency room should suicidal or homicidal ideations occur. Provide Cognitive Behavioral Therapy and Integrative Therapy to improve functioning, maintain stability, and avoid decompensation and the need for higher level of care.  Patient and his wife was instructed to contact this office if suicidal thoughts return or to present at the nearest emergency room.  Patient was also instructed to contact this office if symptomology increases.             Return in about 3 weeks (around 6/30/2021) for Next scheduled follow up.         This document has been electronically signed by Chip Santoyo LCSW, LCADC   Claudette 10, 2021 07:26 EDT

## 2021-06-11 NOTE — TELEPHONE ENCOUNTER
Caller: Radha Oakley    Relationship: Emergency Contact    Best call back number: [phone]    What form or medical record are you requesting: OFFICE NOTES, DOCTORS NOTE. PATIENT MENTIONED THAT THEY NEED SOMETHING SHOWING HIS DIAGNOSIS IS TERMINAL    Who is requesting this form or medical record from you: [employer] OFFICE    How would you like to receive the form or medical records (pick-up, mail, fax): FAX  If fax, what is the fax number: [phone]  If mail, what is the address:   If pick-up, provide patient with address and location details    Timeframe paperwork needed: ASAP    Additional notes:

## 2021-06-11 NOTE — TELEPHONE ENCOUNTER
"    Caller: Radha Oakley    Relationship: Emergency Contact    Best call back number: [phone]    What form or medical record are you requesting: WORK EXCUSE FOR  OFFICE VISIT (21).  THEY ALSO NEED \"DOCTORS NOTE\" FROM ALEXIS BARONE IN ORDER TO HAVE HIS DISABILITY EXTENDED AS IT  ON 6/10/21.     THEY NEED A NOTE THAT COVERS HIM BEING OFF WORK UNTIL HE IS RELEASED TO RETURN TO WORK FOR EMPLOYER AND DISABILITY OFFICE.     Timeframe paperwork needed: AS SOON AS POSSIBLE.     Additional notes: PLEASE CONTACT MRS OAKLEY WITH ANY OTHER QUESTIONS AND TO INFORM HER OF STATUS OF REQUEST AT NUMBER LISTED.      EMPLOYER: [employer] FAX#: [phone]    DISABILITY COMPANY: [employer] FAX #: [phone]  MRS OAKLEY IS NOT SURE WHICH CLAIM NUMBER IS CORRECT.    CLAIM #[id number]  CLAIM#[id number]          "

## 2021-06-17 NOTE — TELEPHONE ENCOUNTER
Caller: VAHID ZENDEJAS    Relationship: WIFE    Best call back number: [phone]    What is the best time to reach you:     Who are you requesting to speak with (clinical staff, provider,  specific staff member): CLINICAL    Do you know the name of the person who called:     What was the call regarding: PT NEEDS DIRECTIONS FOR CONTRAST FOR TOMORROWS CT SCAN. CONTRAST HAS ALREADY BEEN PICKED UP    Do you require a callback: YES

## 2021-06-21 NOTE — TELEPHONE ENCOUNTER
----- Message from Yulia Levi MA sent at 6/21/2021 12:16 PM EDT -----  Patient wants to know if the oxycodone 5mg prescription can be up'd? He said the morphine is helping him but the oxycodone 5mg is not. His call back is [phone]

## 2021-06-21 NOTE — TELEPHONE ENCOUNTER
"Patient reports he is only taking the morphine \"once at bedtime, maybe twice a day\".  Patient made aware to take morphine every 8 hours as it is a long acting pain medication.  Patient reports taking 1 oxycodone 5 mg tablet every 4-6 hours without relief.  Patient made aware he may take 1-2 tablets of the oxycodone every 4-6 hours as needed for breakthrough pain.  Patient advised to take each medication as prescribed to help with pain relief.  Patient made aware if after taking maximum prescribed amount, pain persists to return call to clinic.  Patient expresses understanding and agrees with plan of care.   "

## 2021-06-28 NOTE — PROGRESS NOTES
"Date of Service: June 24, 2021  Time In: 4:30 PM  Time Out: 5:15 PM      PROGRESS NOTE  Data:  Vipin Oakley is a 50 y.o. male who met 1: 1 with the undersigned for emergent individual outpatient therapy session at the [facility] following patient making suicidal remarks at his appointment at oncology at [facility] where he was diagnosed with lung cancer.  Patient completed depression screening and made a statement \"I could kill myself right now\" which resulted in this session being prompted.     HPI: States he feels as though he is doing significantly better than it was a emergent appointment with the undersigned approximately 2 weeks ago and states although he does not like it, he is coming to terms with his diagnosis.  Patient does report he continues to have difficulty with feeling tired more easily but states he continues to get up every day and be active.  He reports he is struggling with adjusting to what he is fearful will be a terminal diagnosis.  The patient reports symptoms and periods of depression including sad mood, anhedonia, anergia, feelings of anger, feelings of hopelessness, and states he has difficulty not focusing on the fact that if he is no longer here his wife and daughter will struggle as they have always been dependent upon him.  Patient also reports he struggles with not jumping to conclusions and states he is attempting to simply keep his appointments and follow all treatment recommendations and \"see where it goes\".  Patient also reports he struggles with periods of significant anxiety and fear including anxious mood, feeling on edge, feeling overwhelmed, increased heart rate, trembling, and periods of a significant sense of impending doom.  Patient reports he continues to adhere to medication regimen as prescribed and vehemently denies any substance use.  The patient further adamantly convincingly denies active suicidal ideation, intent, or plan.  The patient states \"I would " "never harm myself or anyone else\".      Clinical Maneuvering/Intervention:  Assisted patient in processing above session content; acknowledged and normalized patient’s thoughts, feelings, and concerns.  Utilized motivational interviewing techniques including complex reflections to assist the patient in recognizing he is doing relatively well considering his situation and validated his effort.  Also allow him to discuss/vent his feelings, fears, and frustration with his diagnosis and validated his feelings.  Also utilize cognitive behavioral therapy to assist the patient in restructuring his thought process and encouraged him to \"give himself a break\" into realize his stamina might not be what it once was and not to hold himself to the same standards.  Also discussed the importance of healthy skills of daily living and behavioral activation and strongly urged the patient to follow all treatment recommendations including taking proper care of himself with appropriate diet, adequate sleep, and physical activity.  Validated the patient's fear and frustration but also encouraged him to consider at this point all he can do is keep all appointments and adhere to treatment and keep a positive attitude.  Provided unconditional positive regarding a safe, supportive environment.    Allowed patient to freely discuss issues without interruption or judgment. Provided safe, confidential environment to facilitate the development of positive therapeutic relationship and encourage open, honest communication. Assisted patient in identifying risk factors which would indicate the need for higher level of care including thoughts to harm self or others and/or self-harming behavior and encouraged patient to contact this office, call 911, or present to the nearest emergency room should any of these events occur. Discussed crisis intervention services and means to access.  Patient adamantly and convincingly denies current suicidal or " homicidal ideation or perceptual disturbance.        Assessment    Patient appears to maintain relative stability at this time as compared to his baseline.  However, he continues to struggle with significant depression and anxiety secondary to his recent diagnosis of lung cancer and the difficulty of adjusting to this new reality.  However, the patient does have severe illness which will cause significant issues.  As result, he can be reasonably expected benefit from treatment and would likely be at increased risk for decompensation otherwise.    Diagnoses and all orders for this visit:    1. Current moderate episode of major depressive disorder without prior episode (CMS/[facility]) (Primary)    2. Adjustment disorder with mixed anxiety and depressed mood               REVIEW OF SYSTEMS:  Review of Systems       Mental Status Exam:   Appearance: Well groomed  Build: Thin  Hygiene:   good  Cooperation:  Cooperative  Eye Contact:  Good  Psychomotor Behavior:  Appropriate  Affect:  Appropriate  Mood: anxious  Hopelessness: 1  Speech:  Normal  Thought Process:  Linear  Thought Content:  Normal  Suicidal:  Passive suicidal ideation as a direct reaction to stress  Homicidal:  None  Hallucinations:  None  Delusion:  None  Memory:  Intact  Orientation:  Person, Place and Time  Reliability:  good  Insight:  Fair  Judgement:  Fair  Impulse Control:  Fair  Behavior: No behavior issues        Patient's Support Network Includes:  wife, daughter and extended family    Progress toward goal: Not at goal    Functional Status: Severe impairment    Prognosis: Guarded with Ongoing Treatment        Plan     Plan:   Patient will continue in individual outpatient therapy session at [facility] in 2 to 3 weeks.  Patient will continue with primary care provider and adhere to medication regimen as prescribed and report any side effects. Patient will contact this office, call 911 or present to the nearest emergency room  should suicidal or homicidal ideations occur. Provide Cognitive Behavioral Therapy and Integrative Therapy to improve functioning, maintain stability, and avoid decompensation and the need for higher level of care.  Patient and his wife was instructed to contact this office if suicidal thoughts return or to present at the nearest emergency room.  Patient was also instructed to contact this office if symptomology increases.             Return in about 3 weeks (around 7/15/2021) for Next scheduled follow up.         This document has been electronically signed by Chip Santoyo LCSW, CLAUDIO   June 28, 2021 08:36 EDT

## 2021-07-01 PROBLEM — C79.31 METASTASIS TO BRAIN (HCC): Status: ACTIVE | Noted: 2021-01-01

## 2021-07-01 PROBLEM — C79.51 METASTASIS TO BONE (HCC): Status: ACTIVE | Noted: 2021-01-01

## 2021-07-01 PROBLEM — C78.7 METASTASIS TO LIVER: Status: ACTIVE | Noted: 2021-01-01

## 2021-07-02 NOTE — PROGRESS NOTES
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

## 2021-07-02 NOTE — TELEPHONE ENCOUNTER
Caller: ROMEO HEBERT    Relationship: [facility]    Best call back number: [phone]    What form or medical record are you requesting: OFFICE VISIT 6/30/21 AND ANY OTHER INFO FAXED ON 7/2/21 AS IMAGES RECEIVED WERE DARK AND ILLEGIBLE    Who is requesting this form or medical record from you: Petal    How would you like to receive the form or medical records (pick-up, mail, fax): FAX  If fax, what is the fax number: [phone]

## 2021-07-06 NOTE — PROGRESS NOTES
Liliya Brandt, Yulia Hall MA; Anahy Gamino, MSW  Lol so this one was actually for Sonam, but it wasn't about the papers, it was about the program in general :)     Sonam, TAG! You're it!           Previous Messages       ----- Message -----   From: Yulia Levi MA   Sent: 7/6/2021   1:53 PM EDT   To: Liliya Brandt RN, *     I had called patient  and his wife is asking to speak to whoever was helping with his disability/social security papers?? I wasn't sure which one of you it was and she wasn't sure either [phone]   Yulia Levi MA Hutchinson, Tracy Lynne, BABITA; Anahy Gamino, MSW  I had called patient  and his wife is asking to speak to whoever was helping with his disability/social security papers?? I wasn't sure which one of you it was and she wasn't sure either [phone]   Yu Foster MD Gomez, Sonya Peterson RN; Rozina Khan RegSched Rep; [facility] Oncology Pharmacists Pool; [facility] Op Infusion Clinical Pool; Leelee Givens RN; 3 others  Mr. Oakley is a 51 yo M with unfortunate diagnosis of metastatic squamous cell carcinoma of the lung.  He has wide spread metastatic disease (low volume disease in matteo lungs, thoracic LAD, liver metastases, bony metastases, soft tissue metastasis and brain metastases.       Plan is as follows:   -  Referred to Dr. Moura for radiation to the brain and soft tissue mass on his back.   -  Referred to Dr. Lundy for PAC placement.     AFTER radiation, I would like him to have     Carboplatin/ Taxol q 21d with growth factor support as well as Xgeva monthly for bony metastatic disease (he is edentulous so no dental clearance needed).     Pharmacy team, would you please add growth factor to his plan?   Rozina, would you please work on approvals? (JR report negative for all actionable mutations)     He will need chemo teach.  Not yet sure about the timing of radiation (not sure if they will plan for whole brain  radiation or stereotactic  etc) so waiting on this before systemic therapy.     As an FYI, he struggled badly when he came for initial consult, but has since really settled down.  He has been following with outpatient therapy and is in a MUCH better state of mind.     Thanks!   Jacquelyn OVALLES contacted patient's spouse Radha [phone].  Spouse states that patient applied for social security disability benefits.  Patient has appointment with radiation Dr. Moura tomorrow, July 7, 2021.  Spouse to  letter from last office visit to take to social security office to speed up the disability process.    Spouse had questions about stage and time.    SS will follow.

## 2021-07-07 NOTE — PROGRESS NOTES
Chief Complaint  Lung Cancer (mets to back and brain)     CHIEF COMPLAINT:  Follow up of metastatic squamous cell lung cancer     HISTORY OF PRESENT ILLNESS:   Vipin Oakley is a very pleasant 50 y.o. male who was referred by Dr. Johnson for evaluation and treatment of squamous cell lung cancer.  He initially started to feel unwell in February of 2021.  At that time, he presented to an urgent care with cough and was diagnosed with Flu B.  His cough worsened and his voice started to become hoarse.  He ultimately came to the ER where he was diagnosed with FluB again as well as pneumonia and was told he had a lung mass.  From there he saw Dr. Johnson.  Bronchoscopy was delayed because of mucus retention cyst which was removed by Dr. Malone and then he returned to Dr. Johnson and had bronchoscopy.  This revealed heaped up / friable mucosa in thedistal L bronchus.  Brushings and biopsies were done and EBUS biopsies of Station 4L/10L LNs.  This revealed squamous cell carcinoma.  Meanwhile he was found to have a soft tissue lesion on his L upper back and FNA showed metastatic squamous cell carcinoma.      Path showed: Metastatic squamous cell carcinoma of the LLL with low volume primary disease in the LLL, metastases to hilar and mediastinal LNs, as well as R lung, liver, bone and brain and a soft tissue metastasis to his L posterior back.     Tumor sent for CARIS testing and was negative for ALK, BRAF, EGFR, RET, ROS1, MET, HER2, PD-L1.  TMB was low.  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        Bone scan showed: EXAMINATION: NM BONE SCAN WHOLE BODY-  6/9/21  IMPRESSION:  1. Abnormal tracer activity in the right femoral neck region and left  posterior eighth rib compatible with metastatic disease. Please note,  these appear predominantly lytic on CT.  2. Otherwise physiologic distribution of tracer.    On fentanyl path for his leg and back pain.       PAST SURGICAL HISTORY:  Surgical History[]Expand by Default         Past Surgical  History:   Procedure Laterality Date   • ABDOMINAL SURGERY       • BIOPSY OF BACK/FLANK N/A 5/18/2021     Procedure: BIOPSY SOFT TISSUE BACK / FLANK;  Surgeon: Hans Malone MD;  [facility] OR;  Service: ENT;  Laterality: N/A;   • BRONCHOSCOPY N/A 5/26/2021     Procedure: BRONCHOSCOPY WITH ENDOBRONCHIAL ULTRASOUND;  Surgeon: Saurabh Johnson MD;  Location: [facility] OR;  Service: Pulmonary;  Laterality: N/A;   • CARDIAC CATHETERIZATION       • EPIDIDYMECTOMY Right 6/27/2018     Procedure: EPIDIDYMECTOMY;  Surgeon: Chino Aviles MD;  Location: [facility] OR;  Service: Urology   • HERNIA REPAIR       • LARYNGOSCOPY N/A 5/18/2021     Procedure: MICRODIRECT LARYNGOSCOPY;  Surgeon: Hans Malone MD;  Location: [facility] OR;  Service: ENT;  Laterality: N/A;   • MOUTH SURGERY         teeth    • ORCHIECTOMY Right 10/19/2020     Procedure: ORCHIECTOMY;  Surgeon: Chino Aviles MD;  Location: [facility] OR;  Service: Urology;  Laterality: Right;   • TESTICLE SURGERY                FAMILY HISTORY:        Family History   Problem Relation Age of Onset   • Cancer Father           liver   • Hypertension Father     • Diabetes Father     • Hypertension Mother     • Cancer Maternal Uncle     • Heart disease Maternal Uncle     • Heart disease Paternal Aunt     • Cancer Maternal Grandmother     • Heart disease Maternal Grandmother           SOCIAL HISTORY:  Social History   Social History            Socioeconomic History   • Marital status:        Spouse name: Not on file   • Number of children: Not on file   • Years of education: Not on file   • Highest education level: Not on file   Tobacco Use   • Smoking status: Current Every Day Smoker       Packs/day: 0.25       Years: 14.00       Pack years: 3.50       Types: Cigarettes   • Smokeless tobacco: Current User       Types: Snuff   Vaping Use   • Vaping Use: Never used   Substance and Sexual Activity   • Alcohol use: No   • Drug use: No   • Sexual  "activity: Defer       Birth control/protection: None         Social History          Social History Narrative     , lives with wife. Worked in furniture building, as a salesman,      Exposed to burning rubber at his current job with [employer]     Current smoker 1 ppd         Subjective          Vipin Oakley presents to [facility] RADIATION [facility]COLOGY  History of Present Illness    Objective   Vital Signs:   /75   Pulse 97   Temp 97.8 °F (36.6 °C) (Temporal)   Resp 16   Ht 182.9 cm (72\")   Wt 74.5 kg (164 lb 4.8 oz)   SpO2 97%   BMI 22.28 kg/m²     Physical Exam   Result Review :     General:  Awake, alert and oriented, more relaxed today, not agitated or upset.    HEENT:  Pupils are equal, round and reactive to light and accommodation, Extra-ocular movements full, Oropharyx clear, mucous membranes moist  Neck:  No JVD, thyromegaly or lymphadenopathy  Abd:  Soft, non-tender, non-distended, bowel sounds present, no organomegaly or masses  Back:  Over his L  Upper back in the archie-scapular area he has a rounded ST mass, increased in size, now approximately 5.5 cm in diameter (previously about 3.5 cm).  A spot in 8th rib was very tender to palpation.  Ext:  No clubbing, cyanosis or edema  Right inguinal area and below was very tender to deep palpation, patient had wobbling gait due to right leg pain.  Lymph:  No cervical, supraclavicular, axillary, inguinal or femoral adenopathy  Neuro:  MS as above, CN II-XII intact,  non-focal exam                 CT Chest wo contrast 04-05-21  FINDINGS:  Small pericardial effusion is noted. There is no pleural effusion. There is AP window adenopathy measuring 2.1 cm. There is probably some left hilar adenopathy as well, poorly characterized without IV contrast. There is also some soft tissue abutting the  distal aortic arch and proximal descending aorta suggesting tumor or adenopathy. Upper abdominal images show a contracted gallbladder.     Lung windows show " COPD. There is some mild narrowing of both proximal upper and lower lobe bronchi on the left side possibly due to extrinsic compression. Bronchoscopy may be beneficial. No suspicious pulmonary nodules are identified. There is some  scarring in the left upper lobe. The lungs are otherwise clear. No CT findings present to indicate pneumonia. Note: CT may be negative in the earliest stages of COVID-19. There are no suspicious osseous lesions.     IMPRESSION:     1. COPD with no evidence of acute pneumonia.  2. AP window and probably left hilar and perihilar adenopathy concerning for malignancy. There is some mild extrinsic compression of the proximal left upper and left lower lobe bronchi. Bronchoscopy may be beneficial. While a contrast-enhanced chest CT will provide some additional diagnostic information, PET CT would be more beneficial.        CTCAP 06-18-21  FINDINGS:     LUNGS: 7 mm parenchymal nodule anteriorly in the left lung on image 35  of the axial series.  Airspace disease in the left upper lobe adjacent to the hilum.  1 cm parenchymal nodule in the right lung on image 48 of the axial  series.     HEART: Trace pericardial effusion.     MEDIASTINUM: Abnormal mediastinal adenopathy particularly in the  aorticopulmonary window, azygoesophageal recess and most notably  surrounding the left main pulmonary artery.     PLEURA: No pleural effusion. No pleural mass or abnormal calcification.  No pneumothorax.     VASCULATURE: No evidence of aneurysm. There is extrinsic mass effect on  the left lower lobe pulmonary artery but I do not see a pulmonary  embolus on the presented exam.     BONES: No acute bony abnormality.     VISUALIZED UPPER ABDOMEN:Please see the CT report for the abdomen and  pelvis.     Other: There is bilateral axillary adenopathy. 1.2 cm left axillary  lymph node and a 1.3 cm right axillary lymph node.     IMPRESSION:     1. Mediastinal, left hilar, and bilateral axillary adenopathy.  2. Right  parenchymal nodule and left parenchymal nodule.  3. Also airspace disease in the left upper lobe concerning for  pneumonia.  4. Trace pericardial effusion.     FINDINGS:     Lower thorax: Please see the CT report for the chest.     Abdomen:     Liver: Multiple low-attenuation lesions throughout the liver compatible  with extensive hepatic metastasis.     Gallbladder: No dilation or stone identified.     Pancreas: Unremarkable. No mass or ductal dilatation.     Spleen: Homogeneous. No splenomegaly.     Adrenals: Small bilateral adrenal nodules. Given the history, these are  concerning for metastatic disease.     Kidneys/ureters: No mass. No obstructive uropathy.  No evidence of  urolithiasis.     GI tract: Moderate volume stool.     MESENTERY: No free fluid, walled off fluid collections, mesenteric  stranding, or enlarged lymph nodes         Vasculature: Evidence of atherosclerotic vascular disease.     Abdominal wall: No focal hernia or mass.        Bladder: Mild thickening of the urinary bladder wall.     Reproductive: Unremarkable as visualized     Bones: No acute bony abnormality.     IMPRESSION:     1. Multiple low-attenuation lesions in the liver compatible with  metastatic disease.     2.Small bilateral adrenal nodules, also concerning for metastatic  disease.     3. Mild thickening of the urinary bladder wall.        Bone scan 06-21-21     FINDINGS: Focal area of increased tracer uptake corresponds to known  pathologic lesion of the left posterior eighth rib. Focal area of  increased uptake in the left inguinal region appears to localize to  tracer activity in the adjacent soft tissues or external to the patient.  There is focal area of increased tracer activity in the right femoral  neck which when correlated to previous CT corresponds to pathologic  lesion. Both kidneys are visualized.      IMPRESSION:  1. Abnormal tracer activity in the right femoral neck region and left  posterior eighth rib compatible  with metastatic disease. Please note,  these appear predominantly lytic on CT.  2. Otherwise physiologic distribution of tracer.        MRI Brain w/wo contrast 06-25-21  FINDINGS:    Brain:  There are numerous ring-enhancing cystic type lesions  throughout the cerebral hemispheres compatible with cystic type  metastases in light of patient history.  For example, a right temporal  lobe ring-enhancing lesion is 1.3 cm.  A left inferior medial frontal  lobe lesion is 8.7 mm.  A left parietal lobe region within the  postcentral gyrus is 0.7 cm.  Smaller subcortical and parenchymal  metastases are noted.  No significant mass effect or vasogenic edema  identified.  No obvious cerebellar lesions identified.  No restricted  diffusion to indicate acute infarct.  No hemorrhage.    Midline shift:  There is no midline shift.    Ventricles:  No hydrocephalus.    Bones/joints:  No destructive calvarial lesions identified on MRI.    Sinuses:  Unremarkable as visualized.  No acute sinusitis.    Mastoid air cells:  Fluid in the right mastoid air cells noted.    Orbits:  Unremarkable as visualized.     IMPRESSION:  1.  Numerous predominantly cystic but rim-enhancing lesions throughout  the brain which are most consistent with numerous metastatic lesions.  2.  No significant vasogenic edema identified. No mass effect or midline  shift.  3.  Right mastoid effusion.  4.  No acute intracranial findings identified.    PATHOLOGY:  05-18-21 05-26-21                  Assessment and Plan    There are no diagnoses linked to this encounter.       Vipin Oakley is a very pleasant 50 y.o. 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  Tumor sent for CARIS testing and was negative for ALK, BRAF, EGFR, RET, ROS1, MET,  HER2, PD-L1.  TMB was low.  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    Bone scan showed Abnormal tracer activity in the right femoral neck region and left  posterior eighth rib compatible with metastatic disease. Please note,  these appear predominantly lytic on CT.    He is on fentanyl patch because of his rib and the right femoral neck pain.  He has metastatic disease in the brain the eighth rib and the right femoral neck that needs local therapy which could be delivered by radiation.    It is more beneficial for the patient to have SRS to the multiple brain lesions with [facility] in [facility].  Which will give him better protection for neurotoxicity and bladder disease outcome if combined with proper systemic treatment.  Have referred patient to the [facility] for [facility]Knife to his brain lesions.     We will offer palliative radiation therapy to his right femoral neck with photons and to his eighth rib with possible electrons with a palliative regimen of 4 Gy x 5 the area of interest will be outlined by wires and the markers and CT simulation couch.    Expect minimal toxicity from our local radiation possibly skin irritation  Like sun burn.  We will start simulation next week and hopefully start his local treatment as soon as possible meanwhile he can pursue his had SRS at [facility].      Follow Up   CT sim on 7/13/2021.    Patient was given instructions and counseling regarding his condition or for health maintenance advice. Please see specific information pulled into the AVS if appropriate.

## 2021-07-07 NOTE — PROGRESS NOTES
SS met with patient and spouse this date.  Patient has appointment with radiation physician.  SS provided patient and spouse with last dictation office visit note from Dr. Foster to turn into social security disability office.    Patient request for SS to reschedule counseling appointment with Chip Santoyo LCSW on July 15 th at 4:15 due to having appointment with Dr. Foster on July 15 th at 1:30.    SS contacted Chip Santoyo's office [phone] per Olimpia who states that Chip is only in office on Mondays and Thursdays.  Office personnel request that we call back tomorrow.  Spouse agreeable to calling back if they still want to reschedule.  Spouse states that she thinks they can keep both appointments on July 15 th.  SS provided my name and number to contact if they need any assistance.    SS will follow as needed.

## 2021-07-07 NOTE — TELEPHONE ENCOUNTER
Caller: Vipin Oakley    Relationship: Self    Best call back number: [phone]    What is the best time to reach you: ASAP    Who are you requesting to speak with (clinical staff, provider,  specific staff member): MEGAN    Do you know the name of the person who called:     What was the call regarding: SHORT TERM DISABILITY    Do you require a callback: YES  
tachycardic rate, regular rhythm.  Heart sounds S1, S2.

## 2021-07-08 NOTE — PROGRESS NOTES
SS received call from spouse, Radha, stating that she re-scheduled patient's appointment with Chip Santoyo to August 2 nd at 11:45.    SS will follow.

## 2021-07-09 PROBLEM — C34.92 SQUAMOUS CELL CARCINOMA OF LEFT LUNG: Status: ACTIVE | Noted: 2021-01-01

## 2021-07-09 NOTE — H&P (VIEW-ONLY)
Subjective   Vipin Oakley is a 50 y.o. male here today for possible port placement.    History of Present Illness  Mr. Oakley was seen in the office today for port placement for treatment of his newly diagnosed metastatic squamous cell lung cancer of the left lower lobe.  He has been seen by radiation oncology and medical oncology and port has been recommended.  Allergies   Allergen Reactions   • Bee Venom Anaphylaxis   • Tramadol Other (See Comments)     Seizures  Ultram       Current Outpatient Medications   Medication Sig Dispense Refill   • Adult Aspirin Regimen 81 MG EC tablet Take 81 mg by mouth Daily.     • carBAMazepine (TEGretol) 200 MG tablet Take 200 mg by mouth 2 (Two) Times a Day. Takes 1.5 tabs in am and 2 tabs at hs     • diazePAM (VALIUM) 5 MG tablet Take 1 tablet by mouth Every 12 (Twelve) Hours As Needed for Anxiety. 30 tablet 3   • diazePAM (VALIUM) 5 MG tablet Take 1 tablet by mouth At Night As Needed for Anxiety. 30 tablet 0   • EPINEPHrine (ADRENALIN) 1 MG/ML injection Inject 1 mcg/mL under the skin into the appropriate area as directed. FOR BEE STINGS     • famotidine (PEPCID) 20 MG tablet Take 1 tablet by mouth 2 (two) times a day.     • fentaNYL (DURAGESIC) 50 MCG/HR patch Place 1 patch on the skin as directed by provider Every 72 (Seventy-Two) Hours. 10 each 0   • gabapentin (NEURONTIN) 600 MG tablet 800 mg 3 (Three) Times a Day.     • HYDROcodone-acetaminophen (NORCO)  MG per tablet Take 1 tablet by mouth Every 6 (Six) Hours As Needed for Moderate Pain . 16 tablet 0   • ibuprofen (ADVIL,MOTRIN) 800 MG tablet ibuprofen 800 mg tablet   TAKE ONE TABLET BY MOUTH THREE TIMES A DAY     • Lidocaine Viscous HCl (XYLOCAINE) 2 % solution SWISH, SPIT, OR SWALLOW 5 TO 10 ML BY MOUTH EVERY 3 HOURS AS NEEDED. 100 mL 5   • methocarbamol (ROBAXIN) 500 MG tablet Take 500 mg by mouth 2 (Two) Times a Day.     • ondansetron (Zofran) 8 MG tablet Take 1 tablet by mouth Every 8 (Eight) Hours As Needed for  "Nausea or Vomiting. 60 tablet 6   • oxyCODONE (ROXICODONE) 10 MG tablet Take 1 tablet by mouth Every 4 (Four) Hours As Needed for Moderate Pain . 180 tablet 0   • oxyCODONE-acetaminophen (Percocet)  MG per tablet Take 1 tablet by mouth Every 6 (Six) Hours As Needed for Moderate Pain . 60 tablet 0   • sennosides-docusate (Senna-S) 8.6-50 MG per tablet Take 2 tablets by mouth 2 (Two) Times a Day. 120 tablet 5   • Syringe 25G X 5/8\" 3 ML misc Use as directed 2 x weekly 24 each 3   • tamsulosin (Flomax) 0.4 MG capsule 24 hr capsule Take 1 capsule by mouth Every Night. 30 capsule 5   • Testosterone Cypionate (Depo-Testosterone) 200 MG/ML injection Inject 1/2 cc subcutaneously every Monday and Thursday 10 mL 2     No current facility-administered medications for this visit.     Past Medical History:   Diagnosis Date   • Anxiety    • Arthritis    • Back pain    • Carpal tunnel syndrome     bilateral   • COPD (chronic obstructive pulmonary disease) (CMS/HCC)    • Frequency of urination    • GERD (gastroesophageal reflux disease)    • Heartburn    • Herniated cervical disc    • Lung cancer (CMS/HCC)    • Seizures (CMS/HCC) 2014    treated with Tegretol as a child/teenager since 9 months old, currently well controlled   • Squamous cell lung cancer (CMS/HCC) 6/10/2021     Past Surgical History:   Procedure Laterality Date   • ABDOMINAL SURGERY     • BIOPSY OF BACK/FLANK N/A 5/18/2021    Procedure: BIOPSY SOFT TISSUE BACK / FLANK;  Surgeon: Hnas Malone MD;  Location:  [facility] OR;  Service: ENT;  Laterality: N/A;   • BRONCHOSCOPY N/A 5/26/2021    Procedure: BRONCHOSCOPY WITH ENDOBRONCHIAL ULTRASOUND;  Surgeon: Saurabh Johnson MD;  Location:  [facility] OR;  Service: Pulmonary;  Laterality: N/A;   • CARDIAC CATHETERIZATION     • EPIDIDYMECTOMY Right 6/27/2018    Procedure: EPIDIDYMECTOMY;  Surgeon: Chino Aviles MD;  Location:  [facility] OR;  Service: Urology   • HERNIA REPAIR     • LARYNGOSCOPY N/A 5/18/2021 " "   Procedure: MICRODIRECT LARYNGOSCOPY;  Surgeon: Hans Malone MD;  Location: [facility] OR;  Service: ENT;  Laterality: N/A;   • MOUTH SURGERY      teeth    • ORCHIECTOMY Right 10/19/2020    Procedure: ORCHIECTOMY;  Surgeon: Chino Aviles MD;  Location: [facility] OR;  Service: Urology;  Laterality: Right;   • TESTICLE SURGERY         Pertinent Review of Systems:  Respiratory: Positive for cough  Cardiovascular: no chest pain  Other pertinent:      Objective   /62 (BP Location: Left arm)   Pulse 97   Ht 182.9 cm (72\")   Wt 74.5 kg (164 lb 3.2 oz)   BMI 22.27 kg/m²   Physical Exam  General:  This is a WD WN thin male in no acute distress  Lungs:  Respiratory effort normal. Auscultation: Left wheezes, right clear  Heart:  Regular rate and rhythm, without murmur, gallop, rub.  No pedal edema  Remedies without edema  Procedures     Results/Data:  Imaging:   Notes: Records from radiation oncology were reviewed.  Lab:   Other: Pathology report was reviewed.    Assessment/Plan   Newly diagnosed metastatic squamous cell carcinoma of the lung    Proceed with port placement, left, possible right         Discussion/Summary    Time spent:     Patient's Body mass index is 22.27 kg/m². indicating that he is within normal range (BMI 18.5-24.9). No BMI management plan needed..       Future Appointments   Date Time Provider Department Center   7/12/2021  9:00 AM Cory De Los Santos MD [facility] None   7/13/2021  2:00 PM [facility] SIMULATOR [facility]   7/15/2021  1:30 PM Yu Foster MD [facility]   8/2/2021 11:45 AM Chip Santoyo LCSW [facility] None         Please note that portions of this note were completed with a voice recognition program.  "

## 2021-07-09 NOTE — TELEPHONE ENCOUNTER
Radiation Oncology Appointment reminder-Spoke with patient,, she verbalized understanding of appointment details.

## 2021-07-09 NOTE — PROGRESS NOTES
Subjective   Vipin Oakley is a 50 y.o. male here today for possible port placement.    History of Present Illness  Mr. Oakley was seen in the office today for port placement for treatment of his newly diagnosed metastatic squamous cell lung cancer of the left lower lobe.  He has been seen by radiation oncology and medical oncology and port has been recommended.  Allergies   Allergen Reactions   • Bee Venom Anaphylaxis   • Tramadol Other (See Comments)     Seizures  Ultram       Current Outpatient Medications   Medication Sig Dispense Refill   • Adult Aspirin Regimen 81 MG EC tablet Take 81 mg by mouth Daily.     • carBAMazepine (TEGretol) 200 MG tablet Take 200 mg by mouth 2 (Two) Times a Day. Takes 1.5 tabs in am and 2 tabs at hs     • diazePAM (VALIUM) 5 MG tablet Take 1 tablet by mouth Every 12 (Twelve) Hours As Needed for Anxiety. 30 tablet 3   • diazePAM (VALIUM) 5 MG tablet Take 1 tablet by mouth At Night As Needed for Anxiety. 30 tablet 0   • EPINEPHrine (ADRENALIN) 1 MG/ML injection Inject 1 mcg/mL under the skin into the appropriate area as directed. FOR BEE STINGS     • famotidine (PEPCID) 20 MG tablet Take 1 tablet by mouth 2 (two) times a day.     • fentaNYL (DURAGESIC) 50 MCG/HR patch Place 1 patch on the skin as directed by provider Every 72 (Seventy-Two) Hours. 10 each 0   • gabapentin (NEURONTIN) 600 MG tablet 800 mg 3 (Three) Times a Day.     • HYDROcodone-acetaminophen (NORCO)  MG per tablet Take 1 tablet by mouth Every 6 (Six) Hours As Needed for Moderate Pain . 16 tablet 0   • ibuprofen (ADVIL,MOTRIN) 800 MG tablet ibuprofen 800 mg tablet   TAKE ONE TABLET BY MOUTH THREE TIMES A DAY     • Lidocaine Viscous HCl (XYLOCAINE) 2 % solution SWISH, SPIT, OR SWALLOW 5 TO 10 ML BY MOUTH EVERY 3 HOURS AS NEEDED. 100 mL 5   • methocarbamol (ROBAXIN) 500 MG tablet Take 500 mg by mouth 2 (Two) Times a Day.     • ondansetron (Zofran) 8 MG tablet Take 1 tablet by mouth Every 8 (Eight) Hours As Needed for  "Nausea or Vomiting. 60 tablet 6   • oxyCODONE (ROXICODONE) 10 MG tablet Take 1 tablet by mouth Every 4 (Four) Hours As Needed for Moderate Pain . 180 tablet 0   • oxyCODONE-acetaminophen (Percocet)  MG per tablet Take 1 tablet by mouth Every 6 (Six) Hours As Needed for Moderate Pain . 60 tablet 0   • sennosides-docusate (Senna-S) 8.6-50 MG per tablet Take 2 tablets by mouth 2 (Two) Times a Day. 120 tablet 5   • Syringe 25G X 5/8\" 3 ML misc Use as directed 2 x weekly 24 each 3   • tamsulosin (Flomax) 0.4 MG capsule 24 hr capsule Take 1 capsule by mouth Every Night. 30 capsule 5   • Testosterone Cypionate (Depo-Testosterone) 200 MG/ML injection Inject 1/2 cc subcutaneously every Monday and Thursday 10 mL 2     No current facility-administered medications for this visit.     Past Medical History:   Diagnosis Date   • Anxiety    • Arthritis    • Back pain    • Carpal tunnel syndrome     bilateral   • COPD (chronic obstructive pulmonary disease) (CMS/HCC)    • Frequency of urination    • GERD (gastroesophageal reflux disease)    • Heartburn    • Herniated cervical disc    • Lung cancer (CMS/HCC)    • Seizures (CMS/HCC) 2014    treated with Tegretol as a child/teenager since 9 months old, currently well controlled   • Squamous cell lung cancer (CMS/HCC) 6/10/2021     Past Surgical History:   Procedure Laterality Date   • ABDOMINAL SURGERY     • BIOPSY OF BACK/FLANK N/A 5/18/2021    Procedure: BIOPSY SOFT TISSUE BACK / FLANK;  Surgeon: Hans Malone MD;  Location:  [facility];  Service: ENT;  Laterality: N/A;   • BRONCHOSCOPY N/A 5/26/2021    Procedure: BRONCHOSCOPY WITH ENDOBRONCHIAL ULTRASOUND;  Surgeon: Saurabh Johnson MD;  Location:  [facility] OR;  Service: Pulmonary;  Laterality: N/A;   • CARDIAC CATHETERIZATION     • EPIDIDYMECTOMY Right 6/27/2018    Procedure: EPIDIDYMECTOMY;  Surgeon: Chino Aviles MD;  Location:  [facility];  Service: Urology   • HERNIA REPAIR     • LARYNGOSCOPY N/A 5/18/2021 " "   Procedure: MICRODIRECT LARYNGOSCOPY;  Surgeon: Hans Malone MD;  Location: [facility] OR;  Service: ENT;  Laterality: N/A;   • MOUTH SURGERY      teeth    • ORCHIECTOMY Right 10/19/2020    Procedure: ORCHIECTOMY;  Surgeon: Chino Aviles MD;  Location: [facility] OR;  Service: Urology;  Laterality: Right;   • TESTICLE SURGERY         Pertinent Review of Systems:  Respiratory: Positive for cough  Cardiovascular: no chest pain  Other pertinent:      Objective   /62 (BP Location: Left arm)   Pulse 97   Ht 182.9 cm (72\")   Wt 74.5 kg (164 lb 3.2 oz)   BMI 22.27 kg/m²   Physical Exam  General:  This is a WD WN thin male in no acute distress  Lungs:  Respiratory effort normal. Auscultation: Left wheezes, right clear  Heart:  Regular rate and rhythm, without murmur, gallop, rub.  No pedal edema  Remedies without edema  Procedures     Results/Data:  Imaging:   Notes: Records from radiation oncology were reviewed.  Lab:   Other: Pathology report was reviewed.    Assessment/Plan   Newly diagnosed metastatic squamous cell carcinoma of the lung    Proceed with port placement, left, possible right         Discussion/Summary    Time spent:     Patient's Body mass index is 22.27 kg/m². indicating that he is within normal range (BMI 18.5-24.9). No BMI management plan needed..       Future Appointments   Date Time Provider Department Center   7/12/2021  9:00 AM Cory De Los Santos MD [facility] None   7/13/2021  2:00 PM [facility] SIMULATOR [facility]   7/15/2021  1:30 PM Yu Foster MD [facility]   8/2/2021 11:45 AM Chip Santoyo LCSW [facility] None         Please note that portions of this note were completed with a voice recognition program.  "

## 2021-07-09 NOTE — H&P
Subjective   Vipin Oakley is a 50 y.o. male here today for possible port placement.    History of Present Illness  Mr. Oakley was seen in the office today for port placement for treatment of his newly diagnosed metastatic squamous cell lung cancer of the left lower lobe.  He has been seen by radiation oncology and medical oncology and port has been recommended.  Allergies   Allergen Reactions   • Bee Venom Anaphylaxis   • Tramadol Other (See Comments)     Seizures  Ultram       Current Outpatient Medications   Medication Sig Dispense Refill   • Adult Aspirin Regimen 81 MG EC tablet Take 81 mg by mouth Daily.     • carBAMazepine (TEGretol) 200 MG tablet Take 200 mg by mouth 2 (Two) Times a Day. Takes 1.5 tabs in am and 2 tabs at hs     • diazePAM (VALIUM) 5 MG tablet Take 1 tablet by mouth Every 12 (Twelve) Hours As Needed for Anxiety. 30 tablet 3   • diazePAM (VALIUM) 5 MG tablet Take 1 tablet by mouth At Night As Needed for Anxiety. 30 tablet 0   • EPINEPHrine (ADRENALIN) 1 MG/ML injection Inject 1 mcg/mL under the skin into the appropriate area as directed. FOR BEE STINGS     • famotidine (PEPCID) 20 MG tablet Take 1 tablet by mouth 2 (two) times a day.     • fentaNYL (DURAGESIC) 50 MCG/HR patch Place 1 patch on the skin as directed by provider Every 72 (Seventy-Two) Hours. 10 each 0   • gabapentin (NEURONTIN) 600 MG tablet 800 mg 3 (Three) Times a Day.     • HYDROcodone-acetaminophen (NORCO)  MG per tablet Take 1 tablet by mouth Every 6 (Six) Hours As Needed for Moderate Pain . 16 tablet 0   • ibuprofen (ADVIL,MOTRIN) 800 MG tablet ibuprofen 800 mg tablet   TAKE ONE TABLET BY MOUTH THREE TIMES A DAY     • Lidocaine Viscous HCl (XYLOCAINE) 2 % solution SWISH, SPIT, OR SWALLOW 5 TO 10 ML BY MOUTH EVERY 3 HOURS AS NEEDED. 100 mL 5   • methocarbamol (ROBAXIN) 500 MG tablet Take 500 mg by mouth 2 (Two) Times a Day.     • ondansetron (Zofran) 8 MG tablet Take 1 tablet by mouth Every 8 (Eight) Hours As Needed for  "Nausea or Vomiting. 60 tablet 6   • oxyCODONE (ROXICODONE) 10 MG tablet Take 1 tablet by mouth Every 4 (Four) Hours As Needed for Moderate Pain . 180 tablet 0   • oxyCODONE-acetaminophen (Percocet)  MG per tablet Take 1 tablet by mouth Every 6 (Six) Hours As Needed for Moderate Pain . 60 tablet 0   • sennosides-docusate (Senna-S) 8.6-50 MG per tablet Take 2 tablets by mouth 2 (Two) Times a Day. 120 tablet 5   • Syringe 25G X 5/8\" 3 ML misc Use as directed 2 x weekly 24 each 3   • tamsulosin (Flomax) 0.4 MG capsule 24 hr capsule Take 1 capsule by mouth Every Night. 30 capsule 5   • Testosterone Cypionate (Depo-Testosterone) 200 MG/ML injection Inject 1/2 cc subcutaneously every Monday and Thursday 10 mL 2     No current facility-administered medications for this visit.     Past Medical History:   Diagnosis Date   • Anxiety    • Arthritis    • Back pain    • Carpal tunnel syndrome     bilateral   • COPD (chronic obstructive pulmonary disease) (CMS/HCC)    • Frequency of urination    • GERD (gastroesophageal reflux disease)    • Heartburn    • Herniated cervical disc    • Lung cancer (CMS/HCC)    • Seizures (CMS/HCC) 2014    treated with Tegretol as a child/teenager since 9 months old, currently well controlled   • Squamous cell lung cancer (CMS/HCC) 6/10/2021     Past Surgical History:   Procedure Laterality Date   • ABDOMINAL SURGERY     • BIOPSY OF BACK/FLANK N/A 5/18/2021    Procedure: BIOPSY SOFT TISSUE BACK / FLANK;  Surgeon: Hans Malone MD;  Location:  [facility];  Service: ENT;  Laterality: N/A;   • BRONCHOSCOPY N/A 5/26/2021    Procedure: BRONCHOSCOPY WITH ENDOBRONCHIAL ULTRASOUND;  Surgeon: Saurabh Johnson MD;  [facility];  Service: Pulmonary;  Laterality: N/A;   • CARDIAC CATHETERIZATION     • EPIDIDYMECTOMY Right 6/27/2018    Procedure: EPIDIDYMECTOMY;  Surgeon: Chino Aviles MD;  Location:  [facility];  Service: Urology   • HERNIA REPAIR     • LARYNGOSCOPY N/A 5/18/2021 " "   Procedure: MICRODIRECT LARYNGOSCOPY;  Surgeon: Hans Malone MD;  Location: [facility] OR;  Service: ENT;  Laterality: N/A;   • MOUTH SURGERY      teeth    • ORCHIECTOMY Right 10/19/2020    Procedure: ORCHIECTOMY;  Surgeon: Chino Aviles MD;  Location: [facility] OR;  Service: Urology;  Laterality: Right;   • TESTICLE SURGERY         Pertinent Review of Systems:  Respiratory: Positive for cough  Cardiovascular: no chest pain  Other pertinent:      Objective   /62 (BP Location: Left arm)   Pulse 97   Ht 182.9 cm (72\")   Wt 74.5 kg (164 lb 3.2 oz)   BMI 22.27 kg/m²   Physical Exam  General:  This is a WD WN thin male in no acute distress  Lungs:  Respiratory effort normal. Auscultation: Left wheezes, right clear  Heart:  Regular rate and rhythm, without murmur, gallop, rub.  No pedal edema  Remedies without edema  Procedures     Results/Data:  Imaging:   Notes: Records from radiation oncology were reviewed.  Lab:   Other: Pathology report was reviewed.    Assessment/Plan   Newly diagnosed metastatic squamous cell carcinoma of the lung    Proceed with port placement, left, possible right         Discussion/Summary    Time spent:     Patient's Body mass index is 22.27 kg/m². indicating that he is within normal range (BMI 18.5-24.9). No BMI management plan needed..       Future Appointments   Date Time Provider Department Center   7/12/2021  9:00 AM Cory De Los Santos MD [facility] None   7/13/2021  2:00 PM [facility] SIMULATOR [facility]   7/15/2021  1:30 PM Yu Foster MD [facility]   8/2/2021 11:45 AM Chip Santoyo LCSW [facility] None         Please note that portions of this note were completed with a voice recognition program.    This document has been electronically signed by Eugenia ABDULLAHI MD on July 9, 2021 09:40 EDT  "

## 2021-07-12 NOTE — PROGRESS NOTES
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 male  started to feel sick in 2021.  He was diagnosed with the flu.  He became worse and worse and only went to the ER where he was diagnosed with flu again.  He did have CT scan of the chest without contrast on 2021 that showed a potential lung mass and hilar mediastinal adenopathy concerning for malignancy..  Patient referred to Dr. Johnson who is in the patient to Dr. Malone who removed a mucous retention cyst.  The patient then had his bronchoscopy with Dr. Johnson and was found to have friable mucosa in the distal left bronchus.  The patient had positive involvement of lymph nodes in station 4L and 10 L.  Pathology was consistent with squamous of carcinoma.  Patient seen by Dr. Kam who sent the patient's pathology for [facility] the patient was found to have a low mutational burden he did have a p53 mutation.  Patient had scans for staging consisting of CT chest abdomen pelvis as well as an MRI of the brain and bone scan.  Patient was found to have disease in the ribs liver and femurs.  Additionally the patient had multiple brain lesions on his MRI scan.  On his limited scan he had 5-6 small lesions and 3-4 additional punctate lesions concerning for additional metastases.  Patient was evaluated  by Dr. DARYL FARRAR who plans on treating the patient's painful somatic sites and the patient was referred to our department today for consideration of SBRT to the brain with the CyberKnife.  The patient also has plans for port placement and to start chemotherapy in the near future with Dr. Foster.      BMI:  Body mass index is 21.74 kg/m².      Social History     Substance and Sexual Activity   Alcohol Use Yes   • Alcohol/week: 2.0 standard drinks   • Types: 2 Shots of liquor per week    Comment: occasionally       Allergies   Allergen Reactions   • Bee Venom Anaphylaxis   • Tramadol Other (See Comments)     Seizures  Ultram         Social History     Tobacco Use   • Smoking status: Current Every Day Smoker     Packs/day: 1.00     Years: 14.00     Pack years: 14.00     Types: Cigarettes   • Smokeless tobacco: Current User     Types: Snuff   Vaping Use   • Vaping Use: Never used   Substance Use Topics   • Alcohol use: Yes     Alcohol/week: 2.0 standard drinks     Types: 2 Shots of liquor per week     Comment: occasionally   • Drug use: No       Current Outpatient Medications:   •  albuterol (PROVENTIL) (2.5 MG/3ML) 0.083% nebulizer solution, Take 2.5 mg by nebulization 2 (two) times a day., Disp: , Rfl:   •  carBAMazepine (TEGretol) 200 MG tablet, Take 200 mg by mouth 2 (Two) Times a Day. Takes 1.5 tabs in am and 2 tabs at hs, Disp: , Rfl:   •  diazePAM (VALIUM) 5 MG tablet, Take 1 tablet by mouth Every 12 (Twelve) Hours As Needed for Anxiety., Disp: 30 tablet, Rfl: 3  •  diazePAM (VALIUM) 5 MG tablet, Take 1 tablet by mouth At Night As Needed for Anxiety., Disp: 30 tablet, Rfl: 0  •  EPINEPHrine (ADRENALIN) 1 MG/ML injection, Inject 1 mcg/mL under the skin into the appropriate area as directed. FOR BEE STINGS, Disp: , Rfl:   •  famotidine (PEPCID) 20 MG tablet, Take 1 tablet by mouth 2 (two) times a day., Disp: , Rfl:   •  fentaNYL (DURAGESIC) 50 MCG/HR patch, Place 1 patch on the skin as directed by provider Every 72  "(Seventy-Two) Hours., Disp: 10 each, Rfl: 0  •  gabapentin (NEURONTIN) 600 MG tablet, 800 mg 3 (Three) Times a Day., Disp: , Rfl:   •  ibuprofen (ADVIL,MOTRIN) 800 MG tablet, ibuprofen 800 mg tablet  TAKE ONE TABLET BY MOUTH THREE TIMES A DAY, Disp: , Rfl:   •  Lidocaine Viscous HCl (XYLOCAINE) 2 % solution, SWISH, SPIT, OR SWALLOW 5 TO 10 ML BY MOUTH EVERY 3 HOURS AS NEEDED., Disp: 100 mL, Rfl: 5  •  methocarbamol (ROBAXIN) 500 MG tablet, Take 500 mg by mouth 2 (Two) Times a Day., Disp: , Rfl:   •  ondansetron (Zofran) 8 MG tablet, Take 1 tablet by mouth Every 8 (Eight) Hours As Needed for Nausea or Vomiting., Disp: 60 tablet, Rfl: 6  •  oxyCODONE (ROXICODONE) 10 MG tablet, Take 1 tablet by mouth Every 4 (Four) Hours As Needed for Moderate Pain ., Disp: 180 tablet, Rfl: 0  •  sennosides-docusate (Senna-S) 8.6-50 MG per tablet, Take 2 tablets by mouth 2 (Two) Times a Day., Disp: 120 tablet, Rfl: 5  •  Syringe 25G X 5/8\" 3 ML misc, Use as directed 2 x weekly, Disp: 24 each, Rfl: 3  •  Testosterone Cypionate (Depo-Testosterone) 200 MG/ML injection, Inject 1/2 cc subcutaneously every Monday and Thursday, Disp: 10 mL, Rfl: 2  •  Adult Aspirin Regimen 81 MG EC tablet, Take 81 mg by mouth Daily., Disp: , Rfl:   •  oxyCODONE-acetaminophen (Percocet)  MG per tablet, Take 1 tablet by mouth Every 6 (Six) Hours As Needed for Moderate Pain ., Disp: 60 tablet, Rfl: 0  •  tamsulosin (Flomax) 0.4 MG capsule 24 hr capsule, Take 1 capsule by mouth Every Night., Disp: 30 capsule, Rfl: 5    Past Medical History:   Diagnosis Date   • Anxiety    • Arthritis    • Back pain    • Bone cancer (CMS/HCC)    • Brain cancer (CMS/HCC)    • Carpal tunnel syndrome     bilateral   • COPD (chronic obstructive pulmonary disease) (CMS/HCC)    • Frequency of urination    • GERD (gastroesophageal reflux disease)    • Heartburn    • Herniated cervical disc    • Lung cancer (CMS/HCC)    • Seizures (CMS/HCC) 2014    treated with Tegretol as a " "child/teenager since 9 months old, currently well controlled   • Squamous cell lung cancer (CMS/HCC) 6/10/2021       family history includes Cancer in his father, maternal grandmother, and maternal uncle; Diabetes in his father; Heart disease in his maternal grandmother, maternal uncle, and paternal aunt; Hypertension in his father and mother.     Past Surgical History:   Procedure Laterality Date   • ABDOMINAL SURGERY     • BIOPSY OF BACK/FLANK N/A 5/18/2021    Procedure: BIOPSY SOFT TISSUE BACK / FLANK;  Surgeon: Hans Malone MD;  Location:  [facility] OR;  Service: ENT;  Laterality: N/A;   • BRONCHOSCOPY N/A 5/26/2021    Procedure: BRONCHOSCOPY WITH ENDOBRONCHIAL ULTRASOUND;  Surgeon: Saurabh Johnson MD;  Location: [facility] OR;  Service: Pulmonary;  Laterality: N/A;   • CARDIAC CATHETERIZATION     • EPIDIDYMECTOMY Right 6/27/2018    Procedure: EPIDIDYMECTOMY;  Surgeon: Chino Aviles MD;  Location: [facility] OR;  Service: Urology   • HERNIA REPAIR     • LARYNGOSCOPY N/A 5/18/2021    Procedure: MICRODIRECT LARYNGOSCOPY;  Surgeon: Hans Malone MD;  Location:  [facility] OR;  Service: ENT;  Laterality: N/A;   • MOUTH SURGERY      teeth    • ORCHIECTOMY Right 10/19/2020    Procedure: ORCHIECTOMY;  Surgeon: Chino Aviles MD;  Location: [facility] OR;  Service: Urology;  Laterality: Right;   • OTHER SURGICAL HISTORY  06/2021    mass removal from throat   • TESTICLE SURGERY          Review of Systems   Respiratory: Positive for shortness of breath.    Musculoskeletal: Positive for back pain.        Hip pain    A full 14 point review of systems was performed and was negative except as noted in the HPI.         Objective   VITAL SIGNS:   Vitals:    07/12/21 0912   BP: 107/68   Pulse: 105   Resp: 15   Temp: 98 °F (36.7 °C)   SpO2: 93%  Comment: RA   Weight: 72.7 kg (160 lb 4.8 oz)   Height: 182.9 cm (72\")   PainSc:   7   PainLoc: Back        KPS       90%    Physical Exam  Constitutional:       " General: He is not in acute distress.     Appearance: He is well-developed.   HENT:      Head: Normocephalic and atraumatic.   Eyes:      Conjunctiva/sclera: Conjunctivae normal.      Pupils: Pupils are equal, round, and reactive to light.   Neck:      Trachea: No tracheal deviation.   Cardiovascular:      Comments: Well-perfused  Noncyanotic  No pedal edema  Pulmonary:      Effort: Pulmonary effort is normal. No respiratory distress.      Breath sounds: No wheezing.   Abdominal:      General: There is no distension.      Palpations: Abdomen is soft.   Musculoskeletal:         General: Normal range of motion.      Cervical back: Normal range of motion.   Skin:     General: Skin is warm and dry.   Neurological:      General: No focal deficit present.      Mental Status: He is alert.      Cranial Nerves: No cranial nerve deficit.      Coordination: Coordination normal.   Psychiatric:         Behavior: Behavior normal.         Judgment: Judgment normal.              The following portions of the patient's history were reviewed and updated as appropriate: allergies, current medications, past family history, past medical history, past social history, past surgical history and problem list.  I have personally requested reviewed and interpreted the patient's images and radiology reports and pathology reports listed below:  INDICATION:   Cough and shortness of air for one week.     TECHNIQUE:   CT of the thorax without IV contrast. Coronal and sagittal reconstructions were obtained.  Radiation dose reduction techniques included automated exposure control or exposure modulation based on body size. Count of known CT and cardiac nuc med studies  performed in previous 12 months: 0.      COMPARISON:   None available.     FINDINGS:  Small pericardial effusion is noted. There is no pleural effusion. There is AP window adenopathy measuring 2.1 cm. There is probably some left hilar adenopathy as well, poorly characterized without IV  contrast. There is also some soft tissue abutting the  distal aortic arch and proximal descending aorta suggesting tumor or adenopathy. Upper abdominal images show a contracted gallbladder.     Lung windows show COPD. There is some mild narrowing of both proximal upper and lower lobe bronchi on the left side possibly due to extrinsic compression. Bronchoscopy may be beneficial. No suspicious pulmonary nodules are identified. There is some  scarring in the left upper lobe. The lungs are otherwise clear. No CT findings present to indicate pneumonia. Note: CT may be negative in the earliest stages of COVID-19. There are no suspicious osseous lesions.     IMPRESSION:     1. COPD with no evidence of acute pneumonia.  2. AP window and probably left hilar and perihilar adenopathy concerning for malignancy. There is some mild extrinsic compression of the proximal left upper and left lower lobe bronchi. Bronchoscopy may be beneficial. While a contrast-enhanced chest CT  will provide some additional diagnostic information, PET CT would be more beneficial.     Signer Name: Jovani Mota MD   Signed: 4/5/2021 4:14 AM   Workstation Name: [email or web address]    [facility]    EXAM: CT ABDOMEN PELVIS W CONTRAST-         TECHNIQUE: Multiple axial CT images were obtained from lung bases  through pubic symphysis WITH administration of IV contrast. Reformatted  images in the coronal and/or sagittal plane(s) were generated from the  axial data set to facilitate diagnostic accuracy and/or surgical  planning.  Oral Contrast:NONE.     Radiation dose reduction techniques were utilized per ALARA protocol.  Automated exposure control was initiated through either or [employer]ose or  [employer] software packages by  protocol.    DOSE:     Clinical information squamous cell lung cancer initial staging;  R59.0-Localized enlarged lymph nodes; R59.0-Localized enlarged lymph  nodes; R53.83-Other fatigue; R63.4-Abnormal  weight loss      Comparison none immediately available at this time     FINDINGS:     Lower thorax: Please see the CT report for the chest.     Abdomen:     Liver: Multiple low-attenuation lesions throughout the liver compatible  with extensive hepatic metastasis.     Gallbladder: No dilation or stone identified.     Pancreas: Unremarkable. No mass or ductal dilatation.     Spleen: Homogeneous. No splenomegaly.     Adrenals: Small bilateral adrenal nodules. Given the history, these are  concerning for metastatic disease.     Kidneys/ureters: No mass. No obstructive uropathy.  No evidence of  urolithiasis.     GI tract: Moderate volume stool.     MESENTERY: No free fluid, walled off fluid collections, mesenteric  stranding, or enlarged lymph nodes         Vasculature: Evidence of atherosclerotic vascular disease.     Abdominal wall: No focal hernia or mass.        Bladder: Mild thickening of the urinary bladder wall.     Reproductive: Unremarkable as visualized     Bones: No acute bony abnormality.     IMPRESSION:     1. Multiple low-attenuation lesions in the liver compatible with  metastatic disease.     2.Small bilateral adrenal nodules, also concerning for metastatic  disease.     3. Mild thickening of the urinary bladder wall.              This report was finalized on 6/18/2021 12:44 PM by Dr. Sky Isaacs MD.    EXAM: CT CHEST W CONTRAST DIAGNOSTIC-      CLINICAL INDICATION:squamous cell initial staging; R59.0-Localized  enlarged lymph nodes; R59.0-Localized enlarged lymph nodes; R53.83-Other  fatigue; R63.4-Abnormal weight loss      COMPARISON: 04/05/2021     TECHNIQUE: Multiple axial CT images were obtained from lung apex through  upper abdomen WITH administration of IV contrast. Reformatted images in  the coronal and/or sagittal plane(s) were generated from the axial data  set to facilitate diagnostic accuracy and/or surgical planning.     Radiation dose reduction techniques were utilized per INEZ  protocol.  Automated exposure control was initiated through either or [employer] or  [employer] software packages by  protocol.    DOSE (DLP mGy-cm):        FINDINGS:     LUNGS: 7 mm parenchymal nodule anteriorly in the left lung on image 35  of the axial series.  Airspace disease in the left upper lobe adjacent to the hilum.  1 cm parenchymal nodule in the right lung on image 48 of the axial  series.     HEART: Trace pericardial effusion.     MEDIASTINUM: Abnormal mediastinal adenopathy particularly in the  aorticopulmonary window, azygoesophageal recess and most notably  surrounding the left main pulmonary artery.     PLEURA: No pleural effusion. No pleural mass or abnormal calcification.  No pneumothorax.     VASCULATURE: No evidence of aneurysm. There is extrinsic mass effect on  the left lower lobe pulmonary artery but I do not see a pulmonary  embolus on the presented exam.     BONES: No acute bony abnormality.     VISUALIZED UPPER ABDOMEN:Please see the CT report for the abdomen and  pelvis.     Other: There is bilateral axillary adenopathy. 1.2 cm left axillary  lymph node and a 1.3 cm right axillary lymph node.     IMPRESSION:     1. Mediastinal, left hilar, and bilateral axillary adenopathy.  2. Right parenchymal nodule and left parenchymal nodule.  3. Also airspace disease in the left upper lobe concerning for  pneumonia.  4. Trace pericardial effusion.     This report was finalized on 6/18/2021 12:44 PM by Dr. Sky Isaacs MD.    EXAMINATION: NM BONE SCAN WHOLE BODY-      CLINICAL INDICATION:     squamous cell lung cancer initial staging;  R59.0-Localized enlarged lymph nodes; R59.0-Localized enlarged lymph  nodes; R53.83-Other fatigue; R63.4-Abnormal weight loss     TECHNIQUE: 21.1 mCi of Tc-99m HDP were injected IV. Several hours later,  whole-body images and spot views were obtained.      COMPARISON: NONE      FINDINGS: Focal area of increased tracer uptake corresponds to known  pathologic  lesion of the left posterior eighth rib. Focal area of  increased uptake in the left inguinal region appears to localize to  tracer activity in the adjacent soft tissues or external to the patient.  There is focal area of increased tracer activity in the right femoral  neck which when correlated to previous CT corresponds to pathologic  lesion. Both kidneys are visualized.      IMPRESSION:  1. Abnormal tracer activity in the right femoral neck region and left  posterior eighth rib compatible with metastatic disease. Please note,  these appear predominantly lytic on CT.  2. Otherwise physiologic distribution of tracer.     This report was finalized on 6/21/2021 3:45 PM by Dr. Fortino Vogel MD.     EXAM:    MR Head Without and With Intravenous Contrast     EXAM DATE:    6/25/2021 11:58 AM     CLINICAL HISTORY:    squamous cell lung cancer intial staging; R59.0-Localized enlarged  lymph nodes; R59.0-Localized enlarged lymph nodes; R53.83-Other fatigue;  R63.4-Abnormal weight loss     TECHNIQUE:    Magnetic resonance images of the head/brain without and with  intravenous contrast in multiple planes.     COMPARISON:    No relevant prior studies available.     FINDINGS:    Brain:  There are numerous ring-enhancing cystic type lesions  throughout the cerebral hemispheres compatible with cystic type  metastases in light of patient history.  For example, a right temporal  lobe ring-enhancing lesion is 1.3 cm.  A left inferior medial frontal  lobe lesion is 8.7 mm.  A left parietal lobe region within the  postcentral gyrus is 0.7 cm.  Smaller subcortical and parenchymal  metastases are noted.  No significant mass effect or vasogenic edema  identified.  No obvious cerebellar lesions identified.  No restricted  diffusion to indicate acute infarct.  No hemorrhage.    Midline shift:  There is no midline shift.    Ventricles:  No hydrocephalus.    Bones/joints:  No destructive calvarial lesions identified on MRI.    Sinuses:   Unremarkable as visualized.  No acute sinusitis.    Mastoid air cells:  Fluid in the right mastoid air cells noted.    Orbits:  Unremarkable as visualized.     IMPRESSION:  1.  Numerous predominantly cystic but rim-enhancing lesions throughout  the brain which are most consistent with numerous metastatic lesions.  2.  No significant vasogenic edema identified. No mass effect or midline  shift.  3.  Right mastoid effusion.  4.  No acute intracranial findings identified.     This report was finalized on 6/25/2021 12:38 PM by Dr. Fortino Vogel MD.  The patient has a total of approximately 8-9 very small volume lesions based on his initial 25 slice MRI scan.  The scan is not sufficient for CyberKnife planning.  The patient also has a metastasis within the right hippocampus.    Assessment      IMPRESSION:     Mr. Oakley is a very pleasant 50-year-old gentleman with a good performance status and unfortunately metastatic squamous cell carcinoma of the lung with distant metastasis including spine, liver, and bone as well as brain metastasis.  The patient has 8-9 small lesions in the brain at this time.  The patient is relatively asymptomatic from his brain however does have painful sites including his ribs and right femur as well as a subcutaneous nodule in his back.  Patient has plans to start radiation to these areas with Dr. Bonner.  I discussed case personally with Dr. Moura today.  The patient does have a GPA of 1.5 in the updated GPA index calculator.  The patient has an expected wheeze from time of greater than 3 months at 9.8 months.  Patient would benefit from CyberKnife SRS/SBRT to these lesions in effort to spare his cognition and save whole brain for salvage therapy.  I discussed the case personally with Dr. Jin, who agrees the patient would benefit from pursuing SRS at this time assuming the patient does not have much larger volume disease when he receives his MRI planning scan for CyberKnife.    I discussed  with the patient and his family the role of CyberKnife versus whole brain radiotherapy.  We discussed the side effect profile for each.  We discussed the relative risks and benefits of each.  The patient is interested in pursuing CyberKnife at this time.    Greater than 1 hour was spent preparing for and coordinating this visit. >50% of the time was spent in direct face to face conversation with the patient teaching, answering question, and providing explanations regarding the patient's case.  The decision to treat the patient with radiation is a complex one and carries the risk of long-term side effects and complications.  The patient's malignancy represents a complicated life threatening condition that requires complex multidisciplinary management for treatment and followup.    RECOMMENDATIONS:    Brain metastases  -8-9 on initial MRI  -Recommend high quality CyberKnife planning MRI scan as an outpatient  -Recommend CT simulation with IV contrast  -Recommend referral to Dr. Jin for evaluation of SRS    Systemic disease  -Has not started chemotherapy/immunotherapy at this point  -We will follow up with Dr. Foster for chemo after port placement  -Has a p53 mutation but no other targetable mutations based on [facility] report  -We will need to coordinate with chemo/immunotherapy treatments to avoid overlap as much as possible    Painful rib femur and subcutaneous nodule  -Will be treated in [place] with Dr. Moura.    Smoking cessation/tobacco abuse  -I discussed with the patient the risks of continued smoking the benefits of smoking abstinence in the setting of his malignancy  -The patient has requested nicotine patches and I will help facilitate this.         Cory De Los Santos MD      Errors in dictation may reflect use of voice recognition software and not all errors in transcription may have been detected prior to signing.

## 2021-07-12 NOTE — TELEPHONE ENCOUNTER
----- Message from Yulia Locke sent at 7/12/2021  9:01 AM EDT -----  Regarding: pain medicine not working  Patient left voicemail Friday night stating that his new medicine is not working. States the patches are not helping at all. He would rather take what he was previously taking and take zofran with it. Please call back at [phone].

## 2021-07-12 NOTE — TELEPHONE ENCOUNTER
Spoke with patient; informed Dr. Foster not in the office today.  He wanted to see if she would change his meds back but a little stronger maybe.  He was in agreement to keep a pin log until seen by Dr. Foster on Wednesday but if pain became worse to call back.  No other needs or complaints voiced at this time.

## 2021-07-12 NOTE — PROGRESS NOTES
Subjective     Chief Complaint: New diagnosis of widely metastatic squamous cell carcinoma    Patient ID: Vipin Oakley is a 50 y.o. male seen for consultation today at the request of  Cory De Los Santos MD    History of Present Illness    This is a 50-year-old man who was recently diagnosed with widely metastatic squamous cell carcinoma.  He underwent a MRI of the brain which demonstrated numerous enhancing lesions.  His radiation oncologist, Jacinto De Los Santos, called me this morning and asked if I could see him in clinic.  He is on a fentanyl patch for presumed bony metastatic disease.  He denies any strokelike symptoms, seizures, or TIAs.    The following portions of the patient's history were reviewed and updated as appropriate: allergies, current medications, past family history, past medical history, past social history, past surgical history and problem list.    Family history:   Family History   Problem Relation Age of Onset   • Cancer Father         liver   • Hypertension Father    • Diabetes Father    • Hypertension Mother    • Cancer Maternal Uncle    • Heart disease Maternal Uncle    • Heart disease Paternal Aunt    • Cancer Maternal Grandmother    • Heart disease Maternal Grandmother        Social history:   Social History     Socioeconomic History   • Marital status:      Spouse name: Not on file   • Number of children: Not on file   • Years of education: Not on file   • Highest education level: Not on file   Tobacco Use   • Smoking status: Current Every Day Smoker     Packs/day: 1.00     Years: 14.00     Pack years: 14.00     Types: Cigarettes   • Smokeless tobacco: Current User     Types: Snuff   Vaping Use   • Vaping Use: Never used   Substance and Sexual Activity   • Alcohol use: Yes     Alcohol/week: 2.0 standard drinks     Types: 2 Shots of liquor per week     Comment: occasionally   • Drug use: No   • Sexual activity: Defer     Birth control/protection: None       Review of Systems   Constitutional:  Negative for activity change, appetite change, chills, diaphoresis, fatigue, fever and unexpected weight change.   HENT: Positive for voice change. Negative for congestion, dental problem, drooling, ear discharge, ear pain, facial swelling, hearing loss, mouth sores, nosebleeds, postnasal drip, rhinorrhea, sinus pressure, sneezing, sore throat, tinnitus and trouble swallowing.    Eyes: Negative for photophobia, pain, discharge, redness, itching and visual disturbance.   Respiratory: Positive for shortness of breath and wheezing. Negative for apnea, cough, choking, chest tightness and stridor.    Cardiovascular: Negative for chest pain, palpitations and leg swelling.   Gastrointestinal: Negative for abdominal distention, abdominal pain, anal bleeding, blood in stool, constipation, diarrhea, nausea, rectal pain and vomiting.   Endocrine: Negative for cold intolerance, heat intolerance, polydipsia, polyphagia and polyuria.   Genitourinary: Negative for decreased urine volume, difficulty urinating, dysuria, enuresis, flank pain, frequency, genital sores, hematuria and urgency.   Musculoskeletal: Negative for arthralgias, back pain, gait problem, joint swelling, myalgias, neck pain and neck stiffness.   Skin: Negative for color change, pallor, rash and wound.   Allergic/Immunologic: Negative for environmental allergies, food allergies and immunocompromised state.   Neurological: Positive for seizures. Negative for dizziness, tremors, syncope, facial asymmetry, speech difficulty, weakness, light-headedness, numbness and headaches.   Hematological: Negative for adenopathy. Does not bruise/bleed easily.   Psychiatric/Behavioral: Negative for agitation, behavioral problems, confusion, decreased concentration, dysphoric mood, hallucinations, self-injury, sleep disturbance and suicidal ideas. The patient is nervous/anxious. The patient is not hyperactive.    All other systems reviewed and are negative.      Objective   Blood  "pressure 110/66, temperature 97.1 °F (36.2 °C), temperature source Infrared, height 182.9 cm (72\"), weight 75.3 kg (166 lb).  Body mass index is 22.51 kg/m².    Physical Exam  Vitals reviewed.   Constitutional:       General: He is not in acute distress.     Appearance: He is well-developed. He is not diaphoretic.   HENT:      Head: Normocephalic and atraumatic.   Eyes:      General: No visual field deficit.  Pulmonary:      Effort: Pulmonary effort is normal.   Skin:     General: Skin is warm and dry.   Neurological:      Mental Status: He is alert and oriented to person, place, and time.      GCS: GCS eye subscore is 4. GCS verbal subscore is 5. GCS motor subscore is 6.      Cranial Nerves: Cranial nerves are intact. No cranial nerve deficit, dysarthria or facial asymmetry.      Comments: Cranial nerves II through XII are grossly intact.  He does exhibit some past pointing with finger-to-nose testing, however there is no bradykinesia or dysmetria.    He does report some lethargy today which he attributes to poor sleep in the last 2 days.   Psychiatric:         Behavior: Behavior normal.         Assessment/Plan     Independent Review of Radiographic Studies:      Available for my review is a MRI of the brain with and without contrast that was performed on June 25, 2021.  There are approximately 9 enhancing lesions within the cerebral hemispheres bilaterally.  There is very little in the way of perilesional edema.    Medical Decision Making:      Given the fact that this is a new diagnosis and the patient has not undergone any sort of chemo or immunotherapy, I think stereotactic radiosurgery for these multiple lesions is an entirely reasonable initial treatment strategy.  After discussing the risks and benefits of whole brain versus stereotactic radiosurgery, the patient would like to proceed with stereotactic radiosurgery.  We will schedule him on a semiurgent basis in the near future and begin treatment as soon as " possible.    Diagnoses and all orders for this visit:    1. Metastasis to brain (CMS/HCC) (Primary)        No follow-ups on file.           This document signed by YNES Jin MD July 12, 2021 12:54 EDT

## 2021-07-13 NOTE — ANESTHESIA PREPROCEDURE EVALUATION
Anesthesia Evaluation     Patient summary reviewed and Nursing notes reviewed   no history of anesthetic complications:  NPO Solid Status: > 8 hours  NPO Liquid Status: > 8 hours           Airway   Mallampati: II  TM distance: >3 FB  Neck ROM: full  Possible difficult intubation  Dental    (+) edentulous    Pulmonary     breath sounds clear to auscultation  (+) lung cancer, COPD,   Cardiovascular     Rhythm: regular  Rate: normal        Neuro/Psych  (+) seizures, numbness, psychiatric history,     GI/Hepatic/Renal/Endo    (+)  GERD,  liver disease,     Musculoskeletal     (+) back pain,   Abdominal     Abdomen: soft.   Substance History      OB/GYN          Other   arthritis,    history of cancer active                      Anesthesia Plan    ASA 3     general     intravenous induction     Anesthetic plan, all risks, benefits, and alternatives have been provided, discussed and informed consent has been obtained with: patient.

## 2021-07-13 NOTE — OP NOTE
Rcklda-s-Tmos insertion    Surgeon:  Eugenia Lundy M.D., JAVIER    Assistant: none    Indications: This patient presents for placement of an Onozcd-g-Ancn for chemotherapy    Pre-operative Diagnosis: lung cancer    Post-operative Diagnosis:  Same    Anesthesia: iv general    Procedure Details   After obtaining informed consent and receiving preoperative antibiotic patient was taken to the operating room and placed in the supine position.  After administration of anesthesia the chest and neck were prepped and draped in sterile fashion.  The left subclavian vein was cannulated with use of the Seldinger technique.  The guidewire was passed and position was confirmed under fluoroscopy.  Following this, a transverse incision was made inferior to the site of guidewire entry and carried down to the pectoralis fascia.  A pocket large enough to admit the port without tension was created.  The guidewire was brought through the incision.    The port and catheter were attached and flushed with heparinized saline, 50 units per mL.  The catheter was cut to appropriate length.  Under fluoroscopic guidance, the dilator sheath was passed over the guidewire.  The guidewire and dilator were removed and the catheter was passed over the peel-away sheath which was then removed.  There was no resistance to irrigation or aspiration of blood.  Fluoroscopy confirmed the catheter to be in good position without kinking.  The port was then sutured in with 3-0 Prolene sutures.  After ensuring hemostasis, the skin was closed in a 2 layer closure of 3-0 Vicryl sutures to Rodri's fascia.  The skin was closed with a 4-0 Vicryl subcuticular stitch.  Dressing was placed.     Patient tolerated the procedure well, was taken to the recovery room in stable condition where chest x-ray pending    Instrument, sponge, and needle counts were correct at closure and at the conclusion of the case.     Findings:  Flouroscopy demonstrated good position of the  port    Estimated Blood Loss: 10 mL or less    Blood administered:  none           Drains: None           Specimens: None        Grafts and Implants: yes         Complications: none           Condition: Stable

## 2021-07-13 NOTE — ANESTHESIA POSTPROCEDURE EVALUATION
Patient: Vipin Oakley    Procedure Summary     Date: 07/13/21 Room / Location:  [facility] /  [facility]    Anesthesia Start: 1246 Anesthesia Stop: 1320    Procedure: INSERTION VENOUS ACCESS DEVICE (Left ) Diagnosis:       Squamous cell carcinoma of left lung (CMS/HCC)      (Squamous cell carcinoma of left lung (CMS/HCC) [C34.92])    Surgeons: Eugenia Lundy MD Provider: Nicholas Stock DO    Anesthesia Type: general ASA Status: 3          Anesthesia Type: general    Vitals  Vitals Value Taken Time   /84 07/13/21 1348   Temp 97.7 °F (36.5 °C) 07/13/21 1321   Pulse 83 07/13/21 1348   Resp 12 07/13/21 1348   SpO2 99 % 07/13/21 1348           Post Anesthesia Care and Evaluation    Patient location during evaluation: PHASE II  Patient participation: complete - patient participated  Level of consciousness: awake and alert  Pain score: 1  Pain management: adequate  Airway patency: patent  Anesthetic complications: No anesthetic complications  PONV Status: controlled  Cardiovascular status: acceptable  Respiratory status: acceptable  Hydration status: acceptable

## 2021-07-15 NOTE — PROGRESS NOTES
NAME: Vipin Oakley    : 1970    DATE:  7/15/2021    DIAGNOSIS:   Metastatic squamous cell carcinoma of the LLL with low volume primary disease in the LLL, metastases to hilar and mediastinal LNs, as well as R lung, liver, bone and brain multiple soft tissue metastasis over his posterior torso.    Tumor sent for CARIS testing and was negative for ALK, BRAF, EGFR, RET, ROS1, MET, HER2, PD-L1.  TMB was low.  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      CHIEF COMPLAINT:  Follow up of metastatic squamous cell lung cancer    HISTORY OF PRESENT ILLNESS:   Vipin Oakley is a very pleasant 50 y.o. male who was referred by Dr. Johnson for evaluation and treatment of squamous cell lung cancer.  He initially started to feel unwell in 2021.  At that time, he presented to an urgent care with cough and was diagnosed with Flu B.  His cough worsened and his voice started to become hoarse.  He ultimately came to the ER where he was diagnosed with FluB again as well as pneumonia and was told he had a lung mass.  From there he saw Dr. Johnson.  Bronchoscopy was delayed because of mucus retention cyst which was removed by Dr. Malone and then he returned to Dr. Johnson and had bronchoscopy.  This revealed heaped up / friable mucosa in thedistal L bronchus.  Brushings and biopsies were done and EBUS biopsies of Station 4L/10L LNs.  This revealed squamous cell carcinoma.  Meanwhile he was found to have a soft tissue lesion on his L upper back and FNA showed metastatic squamous cell carcinoma.      INTERVAL HISTORY:  Mr. Oakley is here today for follow up of metastatic squamous cell lung cancer. He has had portacath placed with Dr. Lundy 2 days ago and asks us to remove his dressing. He has met with Dr. Moura for planning of palliative radiation to the ST metastasis, R hip and l L rib. He also saw Lisset De Los Santos and Davin for planning of Cyberknife brain radiation.  He  has undergone CT sim for radiation planning purposes.   He reports  pain is poorly controlled with current medications. He is using Fentanyl 50 mcg patch and is using oxycodone ii tabs every 4 hours, including waking up at night to take medicine as well. He also reports several new soft tissue nodules on his back. He reports bowels are moving well. He is having a lot of R hip pain and his gait is worsening.  He gets nauseated at times and actually went to the bathroom to vomit once during his appt.  These episodes happen somewhat unpredictably. Antiemetic therapy helps sometimes but not always. His chemotherapy has been approved (with plan to start after palliative radiation), but Neulasta was denied.  He was told he had to get Xgeva from a retail pharmacy which doesn't make sense, so we are working further with his insurance company to try to get this fixed so we can get him this bone therapy.      PAST MEDICAL HISTORY:  Past Medical History:   Diagnosis Date   • Anxiety    • Arthritis    • Back pain    • Bone cancer (CMS/HCC)    • Brain cancer (CMS/HCC)    • Carpal tunnel syndrome     bilateral   • COPD (chronic obstructive pulmonary disease) (CMS/HCC)    • Frequency of urination    • GERD (gastroesophageal reflux disease)    • Heartburn    • Herniated cervical disc    • Lung cancer (CMS/HCC)    • Seizures (CMS/HCC) 2014    treated with Tegretol as a child/teenager since 9 months old, currently well controlled   • Squamous cell lung cancer (CMS/HCC) 6/10/2021         PAST SURGICAL HISTORY:  Past Surgical History:   Procedure Laterality Date   • ABDOMINAL SURGERY     • BIOPSY OF BACK/FLANK N/A 5/18/2021    Procedure: BIOPSY SOFT TISSUE BACK / FLANK;  Surgeon: Hans Malone MD;  Location:  [facility] OR;  Service: ENT;  Laterality: N/A;   • BRONCHOSCOPY N/A 5/26/2021    Procedure: BRONCHOSCOPY WITH ENDOBRONCHIAL ULTRASOUND;  Surgeon: Saurabh Johnson MD;  Location: [facility] OR;  Service: Pulmonary;  Laterality: N/A;   • CARDIAC CATHETERIZATION     • EPIDIDYMECTOMY Right  6/27/2018    Procedure: EPIDIDYMECTOMY;  Surgeon: Chino Aviles MD;  Location: [facility] OR;  Service: Urology   • HERNIA REPAIR     • LARYNGOSCOPY N/A 5/18/2021    Procedure: MICRODIRECT LARYNGOSCOPY;  Surgeon: Hans Malone MD;  Location:  [facility] OR;  Service: ENT;  Laterality: N/A;   • MOUTH SURGERY      teeth    • ORCHIECTOMY Right 10/19/2020    Procedure: ORCHIECTOMY;  Surgeon: Chino Aviles MD;  Location: [facility] OR;  Service: Urology;  Laterality: Right;   • OTHER SURGICAL HISTORY  06/2021    mass removal from throat   • TESTICLE SURGERY         FAMILY HISTORY:  Family History   Problem Relation Age of Onset   • Cancer Father         liver   • Hypertension Father    • Diabetes Father    • Hypertension Mother    • Cancer Maternal Uncle    • Heart disease Maternal Uncle    • Heart disease Paternal Aunt    • Cancer Maternal Grandmother    • Heart disease Maternal Grandmother        SOCIAL HISTORY:  Social History     Socioeconomic History   • Marital status:      Spouse name: Not on file   • Number of children: Not on file   • Years of education: Not on file   • Highest education level: Not on file   Tobacco Use   • Smoking status: Current Every Day Smoker     Packs/day: 1.00     Years: 14.00     Pack years: 14.00     Types: Cigarettes   • Smokeless tobacco: Former User     Types: Snuff   Vaping Use   • Vaping Use: Never used   Substance and Sexual Activity   • Alcohol use: Yes     Alcohol/week: 2.0 standard drinks     Types: 2 Shots of liquor per week     Comment: occasionally   • Drug use: No   • Sexual activity: Defer     Birth control/protection: None     Social History     Social History Narrative    , lives with wife. Worked in furniture building, as a salesman,     Exposed to burning rubber at his current job with [employer]    Current smoker 1 ppd           REVIEW OF SYSTEMS:   A comprehensive 14 point review of systems was performed.  Significant findings as mentioned  above.  All other systems reviewed and are negative.      MEDICATIONS:  The current medication list was reviewed in the EMR    Current Outpatient Medications:   •  albuterol (PROVENTIL) (2.5 MG/3ML) 0.083% nebulizer solution, Take 2.5 mg by nebulization 2 (two) times a day., Disp: , Rfl:   •  carBAMazepine (TEGretol) 200 MG tablet, Take 200 mg by mouth 2 (Two) Times a Day. Takes 1.5 tabs in am and 2 tabs at hs, Disp: , Rfl:   •  diazePAM (VALIUM) 5 MG tablet, Take 1 tablet by mouth At Night As Needed for Anxiety., Disp: 30 tablet, Rfl: 0  •  EPINEPHrine (ADRENALIN) 1 MG/ML injection, Inject 1 mcg/mL under the skin into the appropriate area as directed. FOR BEE STINGS, Disp: , Rfl:   •  famotidine (PEPCID) 20 MG tablet, Take 1 tablet by mouth 2 (two) times a day., Disp: , Rfl:   •  fentaNYL (DURAGESIC) 50 MCG/HR patch, Place 1 patch on the skin as directed by provider Every 72 (Seventy-Two) Hours., Disp: 10 each, Rfl: 0  •  gabapentin (NEURONTIN) 600 MG tablet, 800 mg 3 (Three) Times a Day., Disp: , Rfl:   •  HYDROcodone-acetaminophen (Norco) 7.5-325 MG per tablet, Take 1 tablet by mouth 4 (Four) Times a Day As Needed for Moderate Pain ., Disp: 8 tablet, Rfl: 0  •  ibuprofen (ADVIL,MOTRIN) 800 MG tablet, ibuprofen 800 mg tablet  TAKE ONE TABLET BY MOUTH THREE TIMES A DAY, Disp: , Rfl:   •  Lidocaine Viscous HCl (XYLOCAINE) 2 % solution, SWISH, SPIT, OR SWALLOW 5 TO 10 ML BY MOUTH EVERY 3 HOURS AS NEEDED., Disp: 100 mL, Rfl: 5  •  methocarbamol (ROBAXIN) 500 MG tablet, Take 500 mg by mouth 2 (Two) Times a Day., Disp: , Rfl:   •  nicotine (NICODERM CQ) 21 MG/24HR patch, Place 1 patch on the skin as directed by provider Daily., Disp: 28 each, Rfl: 0  •  ondansetron (Zofran) 8 MG tablet, Take 1 tablet by mouth Every 8 (Eight) Hours As Needed for Nausea or Vomiting., Disp: 60 tablet, Rfl: 6  •  oxyCODONE (ROXICODONE) 10 MG tablet, Take 1 tablet by mouth Every 4 (Four) Hours As Needed for Moderate Pain ., Disp: 180 tablet,  "Rfl: 0  •  sennosides-docusate (Senna-S) 8.6-50 MG per tablet, Take 2 tablets by mouth 2 (Two) Times a Day., Disp: 120 tablet, Rfl: 5  •  Syringe 25G X 5/8\" 3 ML misc, Use as directed 2 x weekly, Disp: 24 each, Rfl: 3  •  Testosterone Cypionate (Depo-Testosterone) 200 MG/ML injection, Inject 1/2 cc subcutaneously every Monday and Thursday, Disp: 10 mL, Rfl: 2    ALLERGIES:    Allergies   Allergen Reactions   • Bee Venom Anaphylaxis   • Tramadol Other (See Comments)     Seizures  Ultram         PHYSICAL EXAM:  Vitals:    07/15/21 1344   BP: 120/79   Pulse: 112   Resp: 18   Temp: 98.6 °F (37 °C)   SpO2: 97%     Pain Score    07/15/21 1344   PainSc:   9   PainLoc: Back     ECOG score: 1     General:  Awake, alert and oriented, in good spirits, joking, but appears very uncomfortable, especially with walking    HEENT:  Pupils are equal, round and reactive to light and accommodation, Extra-ocular movements full, Oropharyx clear, mucous membranes moist  Neck:  No JVD, thyromegaly or lymphadenopathy  CV:  Regular rate and rhythm, no murmurs, rubs or gallops  Resp:  Lungs are clear to auscultation bilaterally, no wheezing  Abd:  Soft, non-tender, non-distended, bowel sounds present, no organomegaly or masses  Soft Tissue:  Over his L  Upper back there are multiple soft tissue lesions.  The largest is kind of in the midline in the archie-scapular area he has a rounded ST mass,approximately 5.5 cm in diameter.  He has several new lesions including a tiny (4-5 mm bony type lesion in the L preauricular area), in the R scapular area there is a 1 cm lesion.  R posterior mid costal margin - 1 cm area, on the L posterior thorax around the T8 level around midway between the spine and flank there is a 1 cm lesion.  Ext:  No clubbing, cyanosis or edema  Lymph:  No cervical, supraclavicular, axillary, inguinal or femoral adenopathy  Neuro:  MS as above, CN II-XII intact, gait abnormal with considerable R hip pain with " walking.      PATHOLOGY:  05-18-21 05-26-21              ENDOSCOPY:  Bronchoscopy 05-26-21  Findings: Irregular, friable mucosa in the distal left main stem bronchus at the left upper lobe takeoff      IMAGING:  CT Chest wo contrast 04-05-21  FINDINGS:  Small pericardial effusion is noted. There is no pleural effusion. There is AP window adenopathy measuring 2.1 cm. There is probably some left hilar adenopathy as well, poorly characterized without IV contrast. There is also some soft tissue abutting the  distal aortic arch and proximal descending aorta suggesting tumor or adenopathy. Upper abdominal images show a contracted gallbladder.     Lung windows show COPD. There is some mild narrowing of both proximal upper and lower lobe bronchi on the left side possibly due to extrinsic compression. Bronchoscopy may be beneficial. No suspicious pulmonary nodules are identified. There is some  scarring in the left upper lobe. The lungs are otherwise clear. No CT findings present to indicate pneumonia. Note: CT may be negative in the earliest stages of COVID-19. There are no suspicious osseous lesions.     IMPRESSION:     1. COPD with no evidence of acute pneumonia.  2. AP window and probably left hilar and perihilar adenopathy concerning for malignancy. There is some mild extrinsic compression of the proximal left upper and left lower lobe bronchi. Bronchoscopy may be beneficial. While a contrast-enhanced chest CT will provide some additional diagnostic information, PET CT would be more beneficial.      CTCAP 06-18-21  FINDINGS:     LUNGS: 7 mm parenchymal nodule anteriorly in the left lung on image 35  of the axial series.  Airspace disease in the left upper lobe adjacent to the hilum.  1 cm parenchymal nodule in the right lung on image 48 of the axial  series.     HEART: Trace pericardial effusion.     MEDIASTINUM: Abnormal mediastinal adenopathy particularly in the  aorticopulmonary window, azygoesophageal  recess and most notably  surrounding the left main pulmonary artery.     PLEURA: No pleural effusion. No pleural mass or abnormal calcification.  No pneumothorax.     VASCULATURE: No evidence of aneurysm. There is extrinsic mass effect on  the left lower lobe pulmonary artery but I do not see a pulmonary  embolus on the presented exam.     BONES: No acute bony abnormality.     VISUALIZED UPPER ABDOMEN:Please see the CT report for the abdomen and  pelvis.     Other: There is bilateral axillary adenopathy. 1.2 cm left axillary  lymph node and a 1.3 cm right axillary lymph node.     IMPRESSION:     1. Mediastinal, left hilar, and bilateral axillary adenopathy.  2. Right parenchymal nodule and left parenchymal nodule.  3. Also airspace disease in the left upper lobe concerning for  pneumonia.  4. Trace pericardial effusion.    FINDINGS:     Lower thorax: Please see the CT report for the chest.     Abdomen:     Liver: Multiple low-attenuation lesions throughout the liver compatible  with extensive hepatic metastasis.     Gallbladder: No dilation or stone identified.     Pancreas: Unremarkable. No mass or ductal dilatation.     Spleen: Homogeneous. No splenomegaly.     Adrenals: Small bilateral adrenal nodules. Given the history, these are  concerning for metastatic disease.     Kidneys/ureters: No mass. No obstructive uropathy.  No evidence of  urolithiasis.     GI tract: Moderate volume stool.     MESENTERY: No free fluid, walled off fluid collections, mesenteric  stranding, or enlarged lymph nodes         Vasculature: Evidence of atherosclerotic vascular disease.     Abdominal wall: No focal hernia or mass.        Bladder: Mild thickening of the urinary bladder wall.     Reproductive: Unremarkable as visualized     Bones: No acute bony abnormality.     IMPRESSION:     1. Multiple low-attenuation lesions in the liver compatible with  metastatic disease.     2.Small bilateral adrenal nodules, also concerning for  metastatic  disease.     3. Mild thickening of the urinary bladder wall.      Bone scan 06-21-21     FINDINGS: Focal area of increased tracer uptake corresponds to known  pathologic lesion of the left posterior eighth rib. Focal area of  increased uptake in the left inguinal region appears to localize to  tracer activity in the adjacent soft tissues or external to the patient.  There is focal area of increased tracer activity in the right femoral  neck which when correlated to previous CT corresponds to pathologic  lesion. Both kidneys are visualized.      IMPRESSION:  1. Abnormal tracer activity in the right femoral neck region and left  posterior eighth rib compatible with metastatic disease. Please note,  these appear predominantly lytic on CT.  2. Otherwise physiologic distribution of tracer.      MRI Brain w/wo contrast 06-25-21  FINDINGS:    Brain:  There are numerous ring-enhancing cystic type lesions  throughout the cerebral hemispheres compatible with cystic type  metastases in light of patient history.  For example, a right temporal  lobe ring-enhancing lesion is 1.3 cm.  A left inferior medial frontal  lobe lesion is 8.7 mm.  A left parietal lobe region within the  postcentral gyrus is 0.7 cm.  Smaller subcortical and parenchymal  metastases are noted.  No significant mass effect or vasogenic edema  identified.  No obvious cerebellar lesions identified.  No restricted  diffusion to indicate acute infarct.  No hemorrhage.    Midline shift:  There is no midline shift.    Ventricles:  No hydrocephalus.    Bones/joints:  No destructive calvarial lesions identified on MRI.    Sinuses:  Unremarkable as visualized.  No acute sinusitis.    Mastoid air cells:  Fluid in the right mastoid air cells noted.    Orbits:  Unremarkable as visualized.     IMPRESSION:  1.  Numerous predominantly cystic but rim-enhancing lesions throughout  the brain which are most consistent with numerous metastatic lesions.  2.  No  significant vasogenic edema identified. No mass effect or midline  shift.  3.  Right mastoid effusion.  4.  No acute intracranial findings identified.         RECENT LABS:  Lab Results   Component Value Date    WBC 10.47 07/09/2021    HGB 12.9 (L) 07/09/2021    HCT 38.2 07/09/2021    MCV 96.5 07/09/2021    RDW 11.9 (L) 07/09/2021     07/09/2021    NEUTRORELPCT 61.7 07/09/2021    LYMPHORELPCT 19.8 07/09/2021    MONORELPCT 11.5 07/09/2021    EOSRELPCT 5.8 07/09/2021    BASORELPCT 0.9 07/09/2021    NEUTROABS 6.47 07/09/2021    LYMPHSABS 2.07 07/09/2021       Lab Results   Component Value Date     (L) 07/09/2021    K 4.0 07/09/2021    CO2 25.5 07/09/2021     07/09/2021    BUN 8 07/09/2021    CREATININE 0.65 (L) 07/09/2021    EGFRIFNONA 131 07/09/2021    GLUCOSE 113 (H) 07/09/2021    CALCIUM 10.6 (H) 07/09/2021    ALKPHOS 190 (H) 07/09/2021    AST 20 07/09/2021    ALT 26 07/09/2021    BILITOT 0.2 07/09/2021    ALBUMIN 3.89 07/09/2021    PROTEINTOT 7.1 07/09/2021    MG 1.9 07/09/2021    PHOS 2.8 07/09/2021     LDH   Date Value Ref Range Status   06/09/2021 278 (H) 135 - 225 U/L Final     Uric Acid   Date Value Ref Range Status   06/09/2021 3.8 3.4 - 7.0 mg/dL Final     Lab Results   Component Value Date    FERRITIN 171.10 06/09/2021    IRON 35 (L) 06/09/2021    TIBC 311 06/09/2021    LABIRON 11 (L) 06/09/2021    OWRYNJAN78 511 06/09/2021    FOLATE 4.79 06/09/2021     Lab Results   Component Value Date    TSH 0.503 06/09/2021         ASSESSMENT & PLAN:  Vipin Oakley is a very pleasant 50 y.o. 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  Tumor sent for CARIS testing and was negative for ALK, BRAF, EGFR, RET, ROS1, MET, HER2, PD-L1.  TMB was low.  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    1.  Metastatic  squamous cell Carcinoma of the lung:  -  He understands that his cancer is treatable but not curable.  -  I am very concerned with the extensive nature of his metastatic disease as well as with rapid progression with multiple new soft tissue lesions.  -  He has seen Filiberto Brown and Davin and plan is for cyberknife radiosurgery to his brain lesions and palliatve radiation to the R hip, L rib and posterior soft tissue lesion.    -  Would like to start systemic therapy as soon as possible after radiation is complete.  -  Will start Xgeva to help with bony metastatic disease as soon as it is approved (he is edentulous so dental clearance not required).  -  PAC in place in L chest c/d/i.    2.  Neoplasm related pain:  -  Morphine caused nausea. Insurance company wouldn't approve Oxycontin.  -  He is currently using Fentanyl patch and taking Oxycodone 10 mg ii tabs q 4h.  His pain is poorly controlled.  His cancer seems to be rapidly progressing so the increased pain fits with his clinical presentation.  -  Will increase Fentanyl to 100 mcg. Continue Oxycodone 10 mg 1-2 tabs q4-6h prn breakthough pain.  -  Continue Senna/ Colace with Miralax as needed.    3.  Nausea/vomiting:  -  Somewhat unpredictable.  I suspect this is related to CNS metastases.  Treatment planning in process.   -  Continue Zofran / Compazine as needed.     4. Depression / Anxiety:  -  Mr. Oakley has understandably been upset given his recent diagnosis and was struggling significantly when I first met him..    -  He had urgent psychiatry evaluation after his last visit and has since followed up for outpatient therapy at [facility].   -  He is feeling much more comfortable than he was at his last visit and has continued to follow up with therapy.      5.  Difficulty with working:  -  I think this is at least part of Mr. Oakley's stress.  Given his current situation, he is not able to work and I have told him it would be  appropriate for him to apply for disability. Our  has been working with him to help him with this.    6.  H/o Seizure d/o from the age of 9 months:  -  Says he hasn't had a seizure in a very long time despite brain lesions.  Takes Tegretol to prevent seizures.  .    7.  Prophylaxis:  Received 2020 influenza vaccine and Prevnar 13 vaccinations.  COVID vaccination was recommended but declined.    8.  ACO / VIRI/Other  Quality measures  -  Vipin Oakley received 2020 flu vaccine.     -  Vipin Oakley reports a pain score of 9.  Given his pain assessment as noted, treatment options were discussed and the following options were decided upon as a follow-up plan to address the patient's pain: prescription for opiod analgesics.  -  Current outpatient and discharge medications have been reconciled for the patient.  Reviewed by: Yu Foster MD     9.  F/u:  -  F/u with Lisset De Los Santos/Davin for CyberKnife radiation to brain metastases  -  F/u with Dr. Moura for palliative radiation to soft tissue metastasis, L rib lesion and R hip  -  Will start Xgeva as soon as insurance company approves it.  -  Plan to start systemic therapy with Carboplatin / Taxol as soon as radiation is completed. Neulasta was not approved so will watch counts.  -  Increase Fentanyl patch to 100 mcg  -  Increase Oxycodone to 10 mg I-ii tabs q4-6h prn and refill today  -  Zofran / compazine as needed.    This note was scribed for Yu Foster MD by Liliya Brandt RN.    I, Yu Foster MD, personally performed the services described in this documentation as scribed by the above named individual in my presence, and it is both accurate and complete.  07/15/2021        I spent 40 minutes with Vipin Oakley today.  In the office today, more than 50% of this time was spent face-to-face with him  in counseling / coordination of care, reviewing his medical history and counseling on the current treatment plan.  All questions were answered to  his satisfaction      Electronically Signed by: Yu Foster MD      CC:     VITOR Mckee MD Frederick Bunge, MD Weisi Yan, MD Barbara Michna, MD Thomas Hunter, MD Charles Benjamin Newman, MD

## 2021-07-16 NOTE — TELEPHONE ENCOUNTER
Provider: DR BARONE  Caller: ALLYSSA  Relationship to Patient:   Pharmacy: [facility]  Phone Number: [phone]  Reason for Call: ALLYSSA FROM [facility] CALLED AND SAID THE FENTANYL AND OXYCODONE PRESCRIBED BOTH REQUIRE PRE AUTHS

## 2021-07-16 NOTE — TELEPHONE ENCOUNTER
Caller: [facility] [address] [phone] [facility] [phone] FX    Relationship: Pharmacy    Best call back number: [phone]    Medication needed:   Requested Prescriptions     Pending Prescriptions Disp Refills   • fentaNYL (DURAGESIC) 100 MCG/HR patch 10 each 0     Sig: Place 1 patch on the skin as directed by provider Every 72 (Seventy-Two) Hours.   • oxyCODONE (ROXICODONE) 10 MG tablet 200 tablet 0     Sig: Take 1-2 tabs every 4-6 hrs as needed for breakthrough pain.       When do you need the refill by: 7/16/21    What additional details did the patient provide when requesting the medication: RX ORIGINALLY SENT TO [facility]. PHARMACY DID NOT HAVE IN STOCK. REQUESTING IT BE SENT TO [facility] INSTEAD.    Does the patient have less than a 3 day supply:  [x] Yes  [] No    What is the patient's preferred pharmacy:   [facility] [address] [phone] [facility] [phone] FX

## 2021-07-16 NOTE — TELEPHONE ENCOUNTER
Caller: [facility]    Best call back number: [phone]    What was the call regarding: THE PHARMACY CALLED TO SEE IF WE COULD ALSO BACK DATE ALL OF THE PATIENT'S OXYCODONE PX'S FOR PRE AUTHORIZATION. THEY WOULD LIKE A CALL BACK TO CONFIRM THAT.    Do you require a callback: YES

## 2021-07-27 NOTE — TELEPHONE ENCOUNTER
Caller: Vipin Oakley    Relationship: Self    Best call back number:[phone]    Who are you requesting to speak with (clinical staff, provider,  specific staff member): CLINICAL STAFF    What was the call regarding: VIPIN CALLED AND SAID THAT HE WOULD LIKE A CALL BACK TO GO OVER SOME QUESTIONS REGARDING HIS APPOINTMENT FOR TOMORROW.    Do you require a callback: YES

## 2021-07-28 NOTE — PROGRESS NOTES
Patient in chemo suite today receiving magnesium.  SS spoke with patient and spouse.  Patient had appointment in [place] today, tomorrow, and Friday for radiation treatments.    Spouse request assistance with travel money.  SS offered gas card.  Spouse declines today stating that they are okay for this week but will need some assistance in the future.    SS and spouse to research assistance programs for [facility].    Patient scheduled on Monday, August 2 nd with Chip Santoyo and scheduled to receive labwork in clinic.    Patient scheduled Wednesday, Thursday, and Friday next week August 4, 5, and 6 for radiation.    SS will follow as needed.

## 2021-07-28 NOTE — PATIENT INSTRUCTIONS
Denosumab injection  What is this medicine?  DENOSUMAB (den oh alexy mab) slows bone breakdown. Prolia is used to treat osteoporosis in women after menopause and in men, and in people who are taking corticosteroids for 6 months or more. Xgeva is used to treat a high calcium level due to cancer and to prevent bone fractures and other bone problems caused by multiple myeloma or cancer bone metastases. Xgeva is also used to treat giant cell tumor of the bone.  This medicine may be used for other purposes; ask your health care provider or pharmacist if you have questions.  COMMON BRAND NAME(S): Prolia, XGEVA  What should I tell my health care provider before I take this medicine?  They need to know if you have any of these conditions:  · dental disease  · having surgery or tooth extraction  · infection  · kidney disease  · low levels of calcium or Vitamin D in the blood  · malnutrition  · on hemodialysis  · skin conditions or sensitivity  · thyroid or parathyroid disease  · an unusual reaction to denosumab, other medicines, foods, dyes, or preservatives  · pregnant or trying to get pregnant  · breast-feeding  How should I use this medicine?  This medicine is for injection under the skin. It is given by a health care professional in a hospital or clinic setting.  A special MedGuide will be given to you before each treatment. Be sure to read this information carefully each time.  For Prolia, talk to your pediatrician regarding the use of this medicine in children. Special care may be needed. For Xgeva, talk to your pediatrician regarding the use of this medicine in children. While this drug may be prescribed for children as young as 13 years for selected conditions, precautions do apply.  Overdosage: If you think you have taken too much of this medicine contact a poison control center or emergency room at once.  NOTE: This medicine is only for you. Do not share this medicine with others.  What if I miss a dose?  It is  important not to miss your dose. Call your doctor or health care professional if you are unable to keep an appointment.  What may interact with this medicine?  Do not take this medicine with any of the following medications:  · other medicines containing denosumab  This medicine may also interact with the following medications:  · medicines that lower your chance of fighting infection  · steroid medicines like prednisone or cortisone  This list may not describe all possible interactions. Give your health care provider a list of all the medicines, herbs, non-prescription drugs, or dietary supplements you use. Also tell them if you smoke, drink alcohol, or use illegal drugs. Some items may interact with your medicine.  What should I watch for while using this medicine?  Visit your doctor or health care professional for regular checks on your progress. Your doctor or health care professional may order blood tests and other tests to see how you are doing.  Call your doctor or health care professional for advice if you get a fever, chills or sore throat, or other symptoms of a cold or flu. Do not treat yourself. This drug may decrease your body's ability to fight infection. Try to avoid being around people who are sick.  You should make sure you get enough calcium and vitamin D while you are taking this medicine, unless your doctor tells you not to. Discuss the foods you eat and the vitamins you take with your health care professional.  See your dentist regularly. Brush and floss your teeth as directed. Before you have any dental work done, tell your dentist you are receiving this medicine.  Do not become pregnant while taking this medicine or for 5 months after stopping it. Talk with your doctor or health care professional about your birth control options while taking this medicine. Women should inform their doctor if they wish to become pregnant or think they might be pregnant. There is a potential for serious side  effects to an unborn child. Talk to your health care professional or pharmacist for more information.  What side effects may I notice from receiving this medicine?  Side effects that you should report to your doctor or health care professional as soon as possible:  · allergic reactions like skin rash, itching or hives, swelling of the face, lips, or tongue  · bone pain  · breathing problems  · dizziness  · jaw pain, especially after dental work  · redness, blistering, peeling of the skin  · signs and symptoms of infection like fever or chills; cough; sore throat; pain or trouble passing urine  · signs of low calcium like fast heartbeat, muscle cramps or muscle pain; pain, tingling, numbness in the hands or feet; seizures  · unusual bleeding or bruising  · unusually weak or tired  Side effects that usually do not require medical attention (report to your doctor or health care professional if they continue or are bothersome):  · constipation  · diarrhea  · headache  · joint pain  · loss of appetite  · muscle pain  · runny nose  · tiredness  · upset stomach  This list may not describe all possible side effects. Call your doctor for medical advice about side effects. You may report side effects to FDA at 4-014-FDA-8094.  Where should I keep my medicine?  This medicine is only given in a clinic, doctor's office, or other health care setting and will not be stored at home.  NOTE: This sheet is a summary. It may not cover all possible information. If you have questions about this medicine, talk to your doctor, pharmacist, or health care provider.  © 2021 [employer]vier/Gold Standard (2019-04-26 16:10:44)

## 2021-07-28 NOTE — CODE DOCUMENTATION
Patient and spouse verbalized understanding to not start taking calcium supplement until calcium level is normal.

## 2021-08-02 NOTE — PROGRESS NOTES
Date of Service: August 2, 2021  Time In: 12:00 PM  Time Out: 12:40 PM      PROGRESS NOTE  Data:  Vipin Oakley is a 51 y.o. male who met 1: 1 with the undersigned for regularly scheduled individual outpatient psychotherapy session at [facility] for follow-up of depression and adjustment disorder secondary to recent diagnosis of cancer.  Patient and the undersigned more mask throughout the session and maintained appropriate distancing.     HPI: Patient presents for session on a cane and states he is having difficulty with his left hip.  Patient reports he did complete radiation which he believes has alleviated some of his pain.  However, he states he is not sure where to go from here states he has upcoming appointments.  Patient reports one of his primary issues at this time is dealing with his daughter and his son-in-law and states he realizes he expecting them to see things and behave the way he does although they are only 25 years old.  The patient reports symptoms and periods of depression including sad mood, anhedonia, anergia, feelings of anger, feelings of hopelessness, and states he has difficulty not focusing on the fact that if he is no longer here his wife and daughter will struggle as they have always been dependent upon him.  P  Patient also reports he struggles with periods of significant anxiety and fear including anxious mood, feeling on edge, feeling overwhelmed, increased heart rate, trembling, and periods of a significant sense of impending doom.  Patient reports he continues to adhere to medication regimen as prescribed and vehemently denies any substance use.  The patient further adamantly convincingly denies active suicidal ideation, intent, or plan.  Patient is able to identify internal and external protective factors.    Clinical Maneuvering/Intervention:  Assisted patient in processing above session content; acknowledged and normalized patient’s thoughts,  feelings, and concerns.  Allow the patient to discuss/vent his feelings and fears concerning his medical diagnosis and ongoing treatment and validated his feelings.  Also encouraged the patient to consider all he can do at this point is followed medical advice and adhere to recommendations.  Further discussed the importance of avoiding high stress situations and encouraged him to consider his daughter is an adult and as much as he would like he cannot figure out the world for them.  Challenged the patient to remind himself he does not have a right to expect others to think the way he does and they must figure out how to live the same way he did.  Continue to provide a safe, confidential environment for the patient to discuss his issues and encouraged him to continue to be as active as possible.  Provided unconditional positive regarding a safe, supportive environment.    Allowed patient to freely discuss issues without interruption or judgment. Provided safe, confidential environment to facilitate the development of positive therapeutic relationship and encourage open, honest communication. Assisted patient in identifying risk factors which would indicate the need for higher level of care including thoughts to harm self or others and/or self-harming behavior and encouraged patient to contact this office, call 911, or present to the nearest emergency room should any of these events occur. Discussed crisis intervention services and means to access.  Patient adamantly and convincingly denies current suicidal or homicidal ideation or perceptual disturbance.        Assessment    Patient appears to maintain relative stability at this time as compared to his baseline.  However, he continues to struggle with significant depression and anxiety secondary to his recent diagnosis of lung cancer and the difficulty of adjusting to this new reality.  However, the patient does have severe illness which will cause significant  issues.  As result, he can be reasonably expected benefit from treatment and would likely be at increased risk for decompensation otherwise.    Diagnoses and all orders for this visit:    1. Current moderate episode of major depressive disorder without prior episode (CMS/[facility]) (Primary)    2. Adjustment disorder with mixed anxiety and depressed mood               REVIEW OF SYSTEMS:  Review of Systems       Mental Status Exam:   Appearance: Well groomed  Build: Thin  Hygiene:   good  Cooperation:  Cooperative  Eye Contact:  Good  Psychomotor Behavior:  Appropriate  Affect:  Appropriate  Mood: anxious  Hopelessness: 1  Speech:  Normal  Thought Process:  Linear  Thought Content:  Normal  Suicidal:  Passive suicidal ideation as a direct reaction to stress  Homicidal:  None  Hallucinations:  None  Delusion:  None  Memory:  Intact  Orientation:  Person, Place and Time  Reliability:  good  Insight:  Fair  Judgement:  Fair  Impulse Control:  Fair  Behavior: No behavior issues        Patient's Support Network Includes:  wife, daughter and extended family    Progress toward goal: Not at goal    Functional Status: Severe impairment    Prognosis: Guarded with Ongoing Treatment        Plan     Plan:   Patient will continue in individual outpatient therapy session at [facility] in 2 to 3 weeks.  Patient will continue with primary care provider and adhere to medication regimen as prescribed and report any side effects. Patient will contact this office, call 911 or present to the nearest emergency room should suicidal or homicidal ideations occur. Provide Cognitive Behavioral Therapy and Integrative Therapy to improve functioning, maintain stability, and avoid decompensation and the need for higher level of care.  Patient and his wife was instructed to contact this office if suicidal thoughts return or to present at the nearest emergency room.  Patient was also instructed to contact this office if  symptomology increases.             Return in about 3 weeks (around 8/23/2021) for Next scheduled follow up.         This document has been electronically signed by Chip Santoyo LCSW, CLAUDIO   August 2, 2021 13:33 EDT

## 2021-08-03 NOTE — TELEPHONE ENCOUNTER
Spoke with patient spouse Radha. Informed her that Vipin's potassium was low yesterday and Dr. Foster called in potassium to [place] [facility].

## 2021-08-04 NOTE — PROGRESS NOTES
Patient to be here tomorrow for chemo teach.  SS to meet with patient to complete application for [employer] program.    SS will follow as needed.

## 2021-08-05 NOTE — PROGRESS NOTES
Oncology Nutrition Screening    Patient Name:  Vipin Oakley  YOB: 1970  MRN: [id number]  Date:  08/05/21  Physician:  Dr. 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    Estimated Nutrition Needs:  1309-0759 kcal (25-30 kcal/ kg)  68-81 gm protein (1-1.2 gm /kg)  9202-0798 ml fluid (1 ml /kcal)    Oral Food Intake:  Regular Diet - No Restrictions    Hydration Status:   Educated on drinking at least eight 8 ounce glasses of water per day.     Nutrition Symptoms:   No Problems with Eating    Activity:   Not my normal self, but able to be up and about with fairly normal activities    Evaluation of Nutritional Risk:   Patient is not identified as a nutritional risk at time of screening; will follow patient through course of treatment for nutritional consultation as warranted.    PES Statement:  Nutrition appropriate per condition at this time r/t lung cancer with metastasis AEB good appetite.     Nutrition Goal / s:  Maintain Intake   Meet estimated nutrition needs    Interventions:  Provided patient with 1 case of Boost plus for home use.    Education:  Patient and spouse were educated on importance of nutrition and cancer treatment.  Provided written information and reviewed on high calorie / high protein hints, meal and snack ideas, and educated on symptom management of nutrition related to chemo.  RD name and phone number provided.         Electronically signed by:  Fidelina Mckenzie RD  14:52 EDT

## 2021-08-05 NOTE — PROGRESS NOTES
SS met with patient and spouse this date.  Spouse completed [employer] application for travel assistance and faxed to [facility] [phone].    SS to follow up with [employer]OC[employer] program tomorrow.    SS will follow.

## 2021-08-05 NOTE — PROGRESS NOTES
CHEMOTHERAPY PREPARATION    Vipin Oakley  [id number]  1970 08/05/2021     Chief Complaint: chemo education     History of present illness:  Vipin Oakley is a 51 y.o.  male who is here today for chemotherapy preparation and needs assessment. The patient has been diagnosed with metastatic squamous cell carcinoma of lung. He has completed [employer] radiation for brain metastases and now receiving palliative radiation to soft tissue metastasis . Will start palliative systemic therapy with Carboplatin / Taxol  q21d as soon as radiation is completed. His pain is controlled with current pain medications. He denies any other specific complaints at this time.     Oncology History:    Oncology/Hematology History   Squamous cell lung cancer (CMS/HCC)   6/10/2021 Initial Diagnosis    Squamous cell lung cancer (CMS/HCC)     7/19/2021 -  Chemotherapy    OP LUNG PACLitaxel / CARBOplatin AUC=6 (Q21D)      7/28/2021 -  Chemotherapy    OP SUPPORTIVE Denosumab (Xgeva) Q28D     Metastasis to brain (CMS/HCC)   7/1/2021 Initial Diagnosis    Metastasis to brain (CMS/HCC)     7/19/2021 -  Chemotherapy    OP LUNG PACLitaxel / CARBOplatin AUC=6 (Q21D)      Metastasis to bone (CMS/HCC)   7/1/2021 Initial Diagnosis    Metastasis to bone (CMS/HCC)     7/19/2021 -  Chemotherapy    OP LUNG PACLitaxel / CARBOplatin AUC=6 (Q21D)      7/28/2021 -  Chemotherapy    OP SUPPORTIVE Denosumab (Xgeva) Q28D     Metastasis to liver (CMS/HCC)   7/1/2021 Initial Diagnosis    Metastasis to liver (CMS/HCC)     7/19/2021 -  Chemotherapy    OP LUNG PACLitaxel / CARBOplatin AUC=6 (Q21D)      Squamous cell carcinoma of left lung (CMS/HCC)   7/9/2021 Initial Diagnosis    Squamous cell carcinoma of left lung (CMS/HCC)     7/28/2021 -  Chemotherapy    OP CENTRAL VENOUS ACCESS DEVICE ACCESS, CARE, AND MAINTENANCE (CVAD)         Past Medical History:   Diagnosis Date   • Anxiety    • Arthritis    • Back pain    • Bone cancer (CMS/HCC)    • Brain cancer (CMS/HCC)     • Carpal tunnel syndrome     bilateral   • COPD (chronic obstructive pulmonary disease) (CMS/HCC)    • Frequency of urination    • GERD (gastroesophageal reflux disease)    • Heartburn    • Herniated cervical disc    • Lung cancer (CMS/HCC)    • Seizures (CMS/HCC) 2014    treated with Tegretol as a child/teenager since 9 months old, currently well controlled   • Squamous cell lung cancer (CMS/HCC) 6/10/2021       Past Surgical History:   Procedure Laterality Date   • ABDOMINAL SURGERY     • BIOPSY OF BACK/FLANK N/A 5/18/2021    Procedure: BIOPSY SOFT TISSUE BACK / FLANK;  Surgeon: Hans Malone MD;  Location:  [facility];  Service: ENT;  Laterality: N/A;   • BRONCHOSCOPY N/A 5/26/2021    Procedure: BRONCHOSCOPY WITH ENDOBRONCHIAL ULTRASOUND;  Surgeon: Saurabh Johnson MD;  Location:  [facility];  Service: Pulmonary;  Laterality: N/A;   • CARDIAC CATHETERIZATION     • EPIDIDYMECTOMY Right 6/27/2018    Procedure: EPIDIDYMECTOMY;  Surgeon: Chino Aviles MD;  Location:  [facility];  Service: Urology   • HERNIA REPAIR     • LARYNGOSCOPY N/A 5/18/2021    Procedure: MICRODIRECT LARYNGOSCOPY;  Surgeon: Hans Malone MD;  Location:  [facility];  Service: ENT;  Laterality: N/A;   • MOUTH SURGERY      teeth    • ORCHIECTOMY Right 10/19/2020    Procedure: ORCHIECTOMY;  Surgeon: Chino Aviles MD;  Location:  [facility];  Service: Urology;  Laterality: Right;   • OTHER SURGICAL HISTORY  06/2021    mass removal from throat   • TESTICLE SURGERY     • VENOUS ACCESS DEVICE (PORT) INSERTION Left 7/13/2021    Procedure: INSERTION VENOUS ACCESS DEVICE;  Surgeon: Eugenia Lundy MD;  Location:  [facility];  Service: General;  Laterality: Left;       Family History   Problem Relation Age of Onset   • Cancer Father         liver   • Hypertension Father    • Diabetes Father    • Hypertension Mother    • Cancer Maternal Uncle    • Heart disease Maternal Uncle    • Heart disease Paternal Aunt    • Cancer  Maternal Grandmother    • Heart disease Maternal Grandmother        Medications: The current medication list was reviewed and reconciled.     Allergies:  is allergic to bee venom and tramadol.    Review of Systems:   A comprehensive 14 point review of systems was performed.  Significant findings as mentioned above.  All other systems reviewed and are negative.      Review of Systems    Physical Exam:    Vitals:    08/05/21 1333   BP: 116/72   Pulse: 101   Resp: 18   Temp: 98.4 °F (36.9 °C)   SpO2: 95%     Vitals:    08/05/21 1333   PainSc: 0-No pain        ECOG: (1) Restricted in Physically Strenuous Activity, Ambulatory & Able to Do Work of Light Nature  General:  Awake, alert and oriented, in good spirits  HEENT:  Pupils are equal, round and reactive to light and accommodation, Extra-ocular movements full, Oropharyx clear, mucous membranes moist  Neck:  No JVD, thyromegaly or lymphadenopathy  CV:  Regular rate and rhythm, no murmurs, rubs or gallops  Resp:  CTAB  Abd:  Soft, non-tender, non-distended, bowel sounds present, no organomegaly or masses  Ext:  No clubbing, cyanosis or edema  Neuro:  MS as above, grossly non focal exam           NEEDS ASSESSMENTS    Genetics  The patient's new diagnosis and family history have been reviewed for genetic counseling needs. A genetic referral is not recommended.       Barriers to care  The patient was given the name and card for our Oncology Social Worker, Sonam Gamino. Based upon barriers assessment today, the patient will require a follow-up call from the  to further discuss needs.     VAD Assessment  The patient and I discussed planned intervenous chemotherapy as well as other IV treatments that are often needed throughout the course of treatment. These may include, but are not limited to blood transfusions, antibiotics, and IV hydration. The vasculature does not appear to be adequate for multiple peripheral IVs throughout their treatment course. Discussed  "risks and benefits of VADs. The patient would like to pursue Port-A-Cath insertion prior to initiation of treatment. This was placed on 7/13/21 by Dr. Lundy.     Advanced Care Planning  The patient and I discussed advanced care planning, \"Conversations that Matter\".   This service was offered, free of charge, for development of advance directives with a certified ACP facilitator.  The patient does not have an up-to-date advanced directive. This document is not on file with our office. The patient is interested in an appointment with one of our facilitators to create or update their advanced directives.  Will have  arrange for this to be done.     Palliative Care  The patient and I discussed palliative care services. Palliative care is not the same as Hospice care. This is specialized medical care for people living with serious illness with the goal of improving quality of life for the patient and their family. [facility] has partnered with [facility]ators to offer our patients outpatient palliative care early along with their treatment to assist in coordination of care, symptom management, pain management, and medical decision making.  Oncology criteria for palliative care referral is not met at this time. The patient is not interested in a palliative care consultation.     Additional Referral needs  none      CHEMOTHERAPY EDUCATION    Booklets Given: Chemotherapy and You [x]  Eating Hints [x]    Sexuality/Fertility Books []      Chemotherapy/Biotherapy Education Sheets: (list all that apply)  nausea management, acid reflux management, diarrhea management, Cancer resourse contacts information, skin and mouth care, vaccination information and Treatment Calendar                                                                                                                                                                 Chemotherapy Regimen:   Treatment Plans     Name Type Plan Dates Plan " Provider         Active    OP SUPPORTIVE Denosumab (Xgeva) Q28D ONCOLOGY SUPPORTIVE CARE 1  7/28/2021 - Present Yu Foster MD     OP LUNG PACLitaxel / CARBOplatin AUC=6 (Q21D)  ONCOLOGY TREATMENT  7/18/2021 - Present Yu Foster MD                    TOPICS EDUCATION PROVIDED COMMENTS   ANEMIA:  role of RBC, cause, s/s, ways to manage, role of transfusion [x]    THROMBOCYTOPENIA:  role of platelet, cause, s/s, ways to prevent bleeding, things to avoid, when to seek help [x]    NEUTROPENIA:  role of WBC, cause, infection precautions, s/s of infection, when to call MD [x]    NUTRITION & APPETITE CHANGES:  importance of maintaining healthy diet & weight, ways to manage to improve intake, dietary consult, exercise regimen [x]    DIARRHEA:  causes, s/s of dehydration, ways to manage, dietary changes, when to call MD [x]    CONSTIPATION:  causes, ways to manage, dietary changes, when to call MD [x]    NAUSEA & VOMITING:  cause, use of antiemetics, dietary changes, when to call MD [x]    MOUTH SORES:  causes, oral care, ways to manage [x]    ALOPECIA:  cause, ways to manage, resources [x]    INFERTILITY & SEXUALITY:  causes, fertility preservation options, sexuality changes, ways to manage, importance of birth control [x]    NERVOUS SYSTEM CHANGES:  causes, s/s, neuropathies, cognitive changes, ways to manage [x]    PAIN:  causes, ways to manage [x]    SKIN & NAIL CHANGES:  cause, s/s, ways to manage [x]    ORGAN TOXICITIES:  cause, s/s, need for diagnostic tests, labs, when to notify MD [x]    SURVIVORSHIP:  distress, distress assessment, secondary malignancies, early/late effects, follow-up, social issues, social support [x]    HOME CARE:  use of spill kits, storing of PO chemo, how to manage bodily fluids [x]    MISCELLANEOUS:  drug interactions, administration, vesicant, et [x]        Assessment and Plan:       1.  Metastatic squamous cell Carcinoma of the lung  2. Brain metastases     The patient and I  have reviewed their new cancer diagnosis and scheduled treatment plan. Needs assessment was completed including genetics, barriers to care, VAD evaluation, advanced care planning, and palliative care services. Referrals have been ordered as appropriate based upon our evaluation and patient desires.     Chemotherapy teaching was also completed today as documented above. Adequate time was given to answer all questions to his satisfaction. Patient and family are aware of their care team members and contact information if they have questions or problems throughout the treatment course. Needs assessments and education has been completed. The patient is adequately prepared to begin treatment.    Reviewed with patient education regarding EMLA cream, dexamethasone, Zofran and Compazine. Patient already has Rx for Zofran. He is also taking dexamethasone for brain metastases. Rx for compazine and EMLA cream sent to the pharmacy.      I advised the patient of taking already prescribed pain medication as needed for aches/pains related to cancer/treatment. He knows to keep pain log so we can adjust as needed. I also advised of using Senakot or Miralax as needed for constipation or Imodium as needed for diarrhea.       I reviewed with the patient the care team members. I also reviewed the option of the acute care visits provided through our oncology office for evaluation and management of symptoms related to treatment. Patient was provided with phone number to call during regular office hours [phone] press #1 and for treatment related questions call [phone] to speak to a nurse. If after hours or on the weekend call [phone] and ask to page the MD on call for [facility] Oncology services.       I spent 60 minutes with Vipin Oakley today.  In the office today, more than 50% of this time was spent face-to-face with him  in counseling / coordination of care, reviewing his interim medical history and counseling on the  current treatment plan.  All questions were answered to his satisfaction.        Electronically Signed by: VITOR De Santiago , August 5, 2021 13:35 EDT

## 2021-08-05 NOTE — PROGRESS NOTES
Nurse Navigator met with patient on this date.  Pt is a 52 y/o male diagnosed with metastatic lung cancer.  Patient here today for a chemotherapy education appointment with VITOR Granado.  NN spoke with patient and patient's spouse, Radha.  Pt in wheelchair.  Pt/ pt's spouse has NN contact information and is aware to goyo with any questions or concerns.

## 2021-08-10 NOTE — PROGRESS NOTES
SS received e-mail from Eimly Cruz with the [employer] program stating: per Venkata:     I have been advised per patient assistance policy we can currently only assist up to $75 annually with gas cards, which I realize won’t go very far in this situation.I have heard this policy may be adjusted in the future, but this is what we are able to do now. So I can get them to you by the end of the week!     SS sent e-mail back stating that patient's appreciate any assistance they receive.    SS will provide gas cards to patient as needed.

## 2021-08-12 NOTE — PROGRESS NOTES
SS spoke with Emily Cruz with [facility] who states being agreeable to providing patient assistance with gas cards.  Gas cards to be available tomorrow for  to provide to patient.  Patient has appointment with radiation at 1130.  SS will follow.

## 2021-08-18 NOTE — TELEPHONE ENCOUNTER
Mrs. Oakley notified of new order for Dexamethasone called in for Mr. Oakley, and taper dosage explained. Verbalized understanding.

## 2021-08-18 NOTE — PROGRESS NOTES
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    Tumor sent for CARIS testing and was negative for ALK, BRAF, EGFR, RET, ROS1, MET, HER2, PD-L1.  TMB was low.  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      TREATMENT HISTORY:   1.  Stereotactic Radiation to the Brain     Treatment Site  Current Dose  Modality  From  To  Elapsed Days  Fx.    6 Brain Mets - Fx 2  1,800 cGy  x06  2021  1    5 Brain Mets - Fx 3  1,800 cGy  x06  2021  1    5 Brain Mets - Fx 1  1,800 cGy  x06  2021  1      2.  Dr. Moura delivered palliative radiation to the R femoral neck and 8th rib.  Further palliative radiation planned but currently on hold.     3. Xgeva bone therapy started 21    CHIEF COMPLAINT:  Follow up of metastatic squamous cell lung cancer    HISTORY OF PRESENT ILLNESS:   Vipin Oakley is a very pleasant 51 y.o. male who was referred by Dr. Johnson for evaluation and treatment of squamous cell lung cancer.  He initially started to feel unwell in 2021.  At that time, he presented to an urgent care with cough and was diagnosed with Flu B.  His cough worsened and his voice started to become hoarse.  He ultimately came to the ER where he was diagnosed with FluB again as well as pneumonia and was told he had a lung mass.  From there he saw Dr. Johnson.  Bronchoscopy was delayed because of mucus retention cyst which was removed by Dr. Malone and then he returned to Dr. Johnson and had bronchoscopy.  This revealed heaped up / friable mucosa in thedistal L bronchus.  Brushings and biopsies were done and EBUS biopsies of Station 4L/10L LNs.  This revealed squamous cell carcinoma.  Meanwhile he was found to have a soft tissue lesion on his  L upper back and FNA showed metastatic squamous cell carcinoma.      INTERVAL HISTORY:i  Mr. Oakley is here today for follow up of metastatic squamous cell lung cancer. He reports several complaints today. He reports continued pain and is using Fentanyl patch 75 mcg and oxycodone 20 mg 6-8 tabs/day for breakthrough pain. He also reports constipation. He is taking senna/colace ii tabs BID but it has not been helpful. He also reports worsening anxiety.  He has a lot of questions about recommended therapy today. He says he is walking better after radiation to the R femoral neck.  He says his headaches are improved though he does still frequently have n/v.       PAST MEDICAL HISTORY:  Past Medical History:   Diagnosis Date   • Anxiety    • Arthritis    • Back pain    • Bone cancer (CMS/HCC)    • Brain cancer (CMS/HCC)    • Carpal tunnel syndrome     bilateral   • COPD (chronic obstructive pulmonary disease) (CMS/HCC)    • Frequency of urination    • GERD (gastroesophageal reflux disease)    • Heartburn    • Herniated cervical disc    • Lung cancer (CMS/HCC)    • Seizures (CMS/HCC) 2014    treated with Tegretol as a child/teenager since 9 months old, currently well controlled   • Squamous cell lung cancer (CMS/HCC) 6/10/2021         PAST SURGICAL HISTORY:  Past Surgical History:   Procedure Laterality Date   • ABDOMINAL SURGERY     • BIOPSY OF BACK/FLANK N/A 5/18/2021    Procedure: BIOPSY SOFT TISSUE BACK / FLANK;  Surgeon: Hans Malone MD;  Location: [facility] OR;  Service: ENT;  Laterality: N/A;   • BRONCHOSCOPY N/A 5/26/2021    Procedure: BRONCHOSCOPY WITH ENDOBRONCHIAL ULTRASOUND;  Surgeon: Saurabh Johnson MD;  Location: [facility] OR;  Service: Pulmonary;  Laterality: N/A;   • CARDIAC CATHETERIZATION     • EPIDIDYMECTOMY Right 6/27/2018    Procedure: EPIDIDYMECTOMY;  Surgeon: Chino Aviles MD;  Location: [facility] OR;  Service: Urology   • HERNIA REPAIR     • LARYNGOSCOPY N/A 5/18/2021    Procedure:  MICRODIRECT LARYNGOSCOPY;  Surgeon: Hans Malone MD;  Location: [facility] OR;  Service: ENT;  Laterality: N/A;   • MOUTH SURGERY      teeth    • ORCHIECTOMY Right 10/19/2020    Procedure: ORCHIECTOMY;  Surgeon: Chino Aviles MD;  Location: [facility] OR;  Service: Urology;  Laterality: Right;   • OTHER SURGICAL HISTORY  06/2021    mass removal from throat   • TESTICLE SURGERY     • VENOUS ACCESS DEVICE (PORT) INSERTION Left 7/13/2021    Procedure: INSERTION VENOUS ACCESS DEVICE;  Surgeon: Eugenia Lundy MD;  Location: [facility] OR;  Service: General;  Laterality: Left;       FAMILY HISTORY:  Family History   Problem Relation Age of Onset   • Cancer Father         liver   • Hypertension Father    • Diabetes Father    • Hypertension Mother    • Cancer Maternal Uncle    • Heart disease Maternal Uncle    • Heart disease Paternal Aunt    • Cancer Maternal Grandmother    • Heart disease Maternal Grandmother        SOCIAL HISTORY:  Social History     Socioeconomic History   • Marital status:      Spouse name: Not on file   • Number of children: Not on file   • Years of education: Not on file   • Highest education level: Not on file   Tobacco Use   • Smoking status: Current Every Day Smoker     Packs/day: 1.00     Years: 14.00     Pack years: 14.00     Types: Cigarettes   • Smokeless tobacco: Former User     Types: Snuff   Vaping Use   • Vaping Use: Never used   Substance and Sexual Activity   • Alcohol use: Yes     Alcohol/week: 2.0 standard drinks     Types: 2 Shots of liquor per week     Comment: occasionally   • Drug use: No   • Sexual activity: Defer     Birth control/protection: None     Social History     Social History Narrative    , lives with wife. Worked in furniture building, as a salesman,     Exposed to burning rubber at his current job with [employer]    Current smoker 1 ppd           REVIEW OF SYSTEMS:   A comprehensive 14 point review of systems was performed.  Significant findings as  mentioned above.  All other systems reviewed and are negative.      MEDICATIONS:  The current medication list was reviewed in the EMR    Current Outpatient Medications:   •  albuterol (PROVENTIL) (2.5 MG/3ML) 0.083% nebulizer solution, Take 2.5 mg by nebulization 2 (two) times a day., Disp: , Rfl:   •  carBAMazepine (TEGretol) 200 MG tablet, Take 200 mg by mouth 2 (Two) Times a Day. Takes 1.5 tabs in am and 2 tabs at hs, Disp: , Rfl:   •  dexamethasone (DECADRON) 4 MG tablet, Take 1 tablet by mouth 2 (Two) Times a Day With Meals., Disp: 60 tablet, Rfl: 3  •  diazePAM (VALIUM) 5 MG tablet, Take 1 tablet by mouth At Night As Needed for Anxiety., Disp: 30 tablet, Rfl: 0  •  EPINEPHrine (ADRENALIN) 1 MG/ML injection, Inject 1 mcg/mL under the skin into the appropriate area as directed. FOR BEE STINGS, Disp: , Rfl:   •  famotidine (PEPCID) 20 MG tablet, Take 1 tablet by mouth 2 (two) times a day., Disp: , Rfl:   •  fentaNYL (DURAGESIC) 75 MCG/HR patch, Place 2 patches on the skin as directed by provider Every 72 (Seventy-Two) Hours., Disp: 20 each, Rfl: 0  •  gabapentin (NEURONTIN) 600 MG tablet, 800 mg 3 (Three) Times a Day., Disp: , Rfl:   •  ibuprofen (ADVIL,MOTRIN) 800 MG tablet, ibuprofen 800 mg tablet  TAKE ONE TABLET BY MOUTH THREE TIMES A DAY, Disp: , Rfl:   •  Lidocaine Viscous HCl (XYLOCAINE) 2 % solution, SWISH, SPIT, OR SWALLOW 5 TO 10 ML BY MOUTH EVERY 3 HOURS AS NEEDED., Disp: 100 mL, Rfl: 5  •  lidocaine-prilocaine (EMLA) 2.5-2.5 % cream, Apply to port-a-cath about 30 minutes to 1 hour prior to chemotherapy, Disp: 30 g, Rfl: 3  •  methocarbamol (ROBAXIN) 500 MG tablet, Take 500 mg by mouth 2 (Two) Times a Day., Disp: , Rfl:   •  nicotine (NICODERM CQ) 21 MG/24HR patch, Place 1 patch on the skin as directed by provider Daily., Disp: 28 each, Rfl: 0  •  ondansetron (Zofran) 8 MG tablet, Take 1 tablet by mouth Every 8 (Eight) Hours As Needed for Nausea or Vomiting., Disp: 60 tablet, Rfl: 6  •  ondansetron ODT  "(Zofran ODT) 8 MG disintegrating tablet, Place 1 tablet on the tongue Every 8 (Eight) Hours As Needed for Nausea or Vomiting., Disp: 60 tablet, Rfl: 5  •  oxyCODONE (ROXICODONE) 20 MG tablet, Take 1 tablet by mouth Every 4 (Four) Hours As Needed for Moderate Pain ., Disp: 180 tablet, Rfl: 0  •  prochlorperazine (COMPAZINE) 10 MG tablet, Take 1 tablet by mouth Every 6 (Six) Hours As Needed for Nausea., Disp: 60 tablet, Rfl: 3  •  sennosides-docusate (Senna-S) 8.6-50 MG per tablet, Take 2 tablets by mouth 2 (Two) Times a Day., Disp: 120 tablet, Rfl: 5  •  Syringe 25G X 5/8\" 3 ML misc, Use as directed 2 x weekly, Disp: 24 each, Rfl: 3  •  Testosterone Cypionate (Depo-Testosterone) 200 MG/ML injection, Inject 1/2 cc subcutaneously every Monday and Thursday, Disp: 10 mL, Rfl: 2    ALLERGIES:    Allergies   Allergen Reactions   • Bee Venom Anaphylaxis   • Tramadol Other (See Comments)     Seizures  Ultram         PHYSICAL EXAM:  Vitals:    08/18/21 0846   BP: 103/70   Pulse: 113   Resp: 18   Temp: 97.6 °F (36.4 °C)   SpO2: 97%     Pain Score    08/18/21 0846   PainSc:   7   PainLoc: Back     ECOG score: 2     General:  Awake, alert and oriented, appears more comfortable  HEENT:  Pupils are equal, round and reactive to light and accommodation, Extra-ocular movements full, Oropharyx clear, mucous membranes moist  Neck:  No JVD, thyromegaly or lymphadenopathy  CV:  Regular rate and rhythm, no murmurs, rubs or gallops  Resp:  Lungs are clear to auscultation bilaterally, no crackles  Abd:  Soft, non-tender, non-distended, bowel sounds present, no organomegaly or masses  Soft Tissue:  Over his L  Upper back there are multiple soft tissue lesions.  The largest is kind of in the midline in the archie-scapular area he has a rounded ST mass,approximately 5.5 cm in diameter.  There is also a R scapular area lesion, aprox 1.5-2 cm.  R posterior mid costal margin - 1-2 cm area, on the L posterior thorax around the T8 level around midway " between the spine and flank there is a 1 cm lesion.  Ext:  No clubbing, cyanosis or edema  Lymph:  No cervical, supraclavicular, axillary, inguinal or femoral adenopathy  Neuro:  MS as above, CN II-XII intact, gait abnormal with considerable R hip pain with walking.      PATHOLOGY:  05-18-21 05-26-21              ENDOSCOPY:  Bronchoscopy 05-26-21  Findings: Irregular, friable mucosa in the distal left main stem bronchus at the left upper lobe takeoff      IMAGING:  CT Chest wo contrast 04-05-21  FINDINGS:  Small pericardial effusion is noted. There is no pleural effusion. There is AP window adenopathy measuring 2.1 cm. There is probably some left hilar adenopathy as well, poorly characterized without IV contrast. There is also some soft tissue abutting the  distal aortic arch and proximal descending aorta suggesting tumor or adenopathy. Upper abdominal images show a contracted gallbladder.     Lung windows show COPD. There is some mild narrowing of both proximal upper and lower lobe bronchi on the left side possibly due to extrinsic compression. Bronchoscopy may be beneficial. No suspicious pulmonary nodules are identified. There is some  scarring in the left upper lobe. The lungs are otherwise clear. No CT findings present to indicate pneumonia. Note: CT may be negative in the earliest stages of COVID-19. There are no suspicious osseous lesions.     IMPRESSION:     1. COPD with no evidence of acute pneumonia.  2. AP window and probably left hilar and perihilar adenopathy concerning for malignancy. There is some mild extrinsic compression of the proximal left upper and left lower lobe bronchi. Bronchoscopy may be beneficial. While a contrast-enhanced chest CT will provide some additional diagnostic information, PET CT would be more beneficial.      CTCAP 06-18-21  FINDINGS:     LUNGS: 7 mm parenchymal nodule anteriorly in the left lung on image 35  of the axial series.  Airspace disease in the left upper  lobe adjacent to the hilum.  1 cm parenchymal nodule in the right lung on image 48 of the axial  series.     HEART: Trace pericardial effusion.     MEDIASTINUM: Abnormal mediastinal adenopathy particularly in the  aorticopulmonary window, azygoesophageal recess and most notably  surrounding the left main pulmonary artery.     PLEURA: No pleural effusion. No pleural mass or abnormal calcification.  No pneumothorax.     VASCULATURE: No evidence of aneurysm. There is extrinsic mass effect on  the left lower lobe pulmonary artery but I do not see a pulmonary  embolus on the presented exam.     BONES: No acute bony abnormality.     VISUALIZED UPPER ABDOMEN:Please see the CT report for the abdomen and  pelvis.     Other: There is bilateral axillary adenopathy. 1.2 cm left axillary  lymph node and a 1.3 cm right axillary lymph node.     IMPRESSION:     1. Mediastinal, left hilar, and bilateral axillary adenopathy.  2. Right parenchymal nodule and left parenchymal nodule.  3. Also airspace disease in the left upper lobe concerning for  pneumonia.  4. Trace pericardial effusion.    FINDINGS:     Lower thorax: Please see the CT report for the chest.     Abdomen:     Liver: Multiple low-attenuation lesions throughout the liver compatible  with extensive hepatic metastasis.     Gallbladder: No dilation or stone identified.     Pancreas: Unremarkable. No mass or ductal dilatation.     Spleen: Homogeneous. No splenomegaly.     Adrenals: Small bilateral adrenal nodules. Given the history, these are  concerning for metastatic disease.     Kidneys/ureters: No mass. No obstructive uropathy.  No evidence of  urolithiasis.     GI tract: Moderate volume stool.     MESENTERY: No free fluid, walled off fluid collections, mesenteric  stranding, or enlarged lymph nodes         Vasculature: Evidence of atherosclerotic vascular disease.     Abdominal wall: No focal hernia or mass.        Bladder: Mild thickening of the urinary bladder wall.      Reproductive: Unremarkable as visualized     Bones: No acute bony abnormality.     IMPRESSION:     1. Multiple low-attenuation lesions in the liver compatible with  metastatic disease.     2.Small bilateral adrenal nodules, also concerning for metastatic  disease.     3. Mild thickening of the urinary bladder wall.      Bone scan 06-21-21     FINDINGS: Focal area of increased tracer uptake corresponds to known  pathologic lesion of the left posterior eighth rib. Focal area of  increased uptake in the left inguinal region appears to localize to  tracer activity in the adjacent soft tissues or external to the patient.  There is focal area of increased tracer activity in the right femoral  neck which when correlated to previous CT corresponds to pathologic  lesion. Both kidneys are visualized.      IMPRESSION:  1. Abnormal tracer activity in the right femoral neck region and left  posterior eighth rib compatible with metastatic disease. Please note,  these appear predominantly lytic on CT.  2. Otherwise physiologic distribution of tracer.      MRI Brain w/wo contrast 06-25-21  FINDINGS:    Brain:  There are numerous ring-enhancing cystic type lesions  throughout the cerebral hemispheres compatible with cystic type  metastases in light of patient history.  For example, a right temporal  lobe ring-enhancing lesion is 1.3 cm.  A left inferior medial frontal  lobe lesion is 8.7 mm.  A left parietal lobe region within the  postcentral gyrus is 0.7 cm.  Smaller subcortical and parenchymal  metastases are noted.  No significant mass effect or vasogenic edema  identified.  No obvious cerebellar lesions identified.  No restricted  diffusion to indicate acute infarct.  No hemorrhage.    Midline shift:  There is no midline shift.    Ventricles:  No hydrocephalus.    Bones/joints:  No destructive calvarial lesions identified on MRI.    Sinuses:  Unremarkable as visualized.  No acute sinusitis.    Mastoid air cells:  Fluid in the  right mastoid air cells noted.    Orbits:  Unremarkable as visualized.     IMPRESSION:  1.  Numerous predominantly cystic but rim-enhancing lesions throughout  the brain which are most consistent with numerous metastatic lesions.  2.  No significant vasogenic edema identified. No mass effect or midline  shift.  3.  Right mastoid effusion.  4.  No acute intracranial findings identified.         RECENT LABS:  Lab Results   Component Value Date    WBC 12.33 (H) 08/18/2021    HGB 13.0 08/18/2021    HCT 40.1 08/18/2021    MCV 94.6 08/18/2021    RDW 12.4 08/18/2021     08/18/2021    NEUTRORELPCT 74.3 08/18/2021    LYMPHORELPCT 12.0 (L) 08/18/2021    MONORELPCT 8.4 08/18/2021    EOSRELPCT 4.0 08/18/2021    BASORELPCT 1.0 08/18/2021    NEUTROABS 9.17 (H) 08/18/2021    LYMPHSABS 1.48 08/18/2021       Lab Results   Component Value Date     (L) 08/02/2021    K 2.9 (L) 08/02/2021    CO2 23.6 08/02/2021    CL 98 08/02/2021    BUN 8 08/02/2021    CREATININE 0.63 (L) 08/02/2021    EGFRIFNONA 135 08/02/2021    GLUCOSE 119 (H) 08/02/2021    CALCIUM 9.5 08/02/2021    ALKPHOS 153 (H) 08/02/2021    AST 10 08/02/2021    ALT 13 08/02/2021    BILITOT 0.2 08/02/2021    ALBUMIN 3.54 08/02/2021    PROTEINTOT 7.0 08/02/2021    MG 1.4 (L) 07/28/2021    PHOS 3.3 07/28/2021     LDH   Date Value Ref Range Status   06/09/2021 278 (H) 135 - 225 U/L Final     Uric Acid   Date Value Ref Range Status   06/09/2021 3.8 3.4 - 7.0 mg/dL Final     Lab Results   Component Value Date    FERRITIN 171.10 06/09/2021    IRON 35 (L) 06/09/2021    TIBC 311 06/09/2021    LABIRON 11 (L) 06/09/2021    TSRCZCPT90 511 06/09/2021    FOLATE 4.79 06/09/2021     Lab Results   Component Value Date    TSH 0.503 06/09/2021         ASSESSMENT & PLAN:  Vipin Oakley is a very pleasant 51 y.o. male with Metastatic squamous cell carcinoma of the LLL with low volume primary disease in the LLL, metastases to hilar and mediastinal LNs, as well as metastatic disease to the  R lung, liver, bone and brain and a soft tissue metastasis to his L posterior back.  Tumor sent for CARIS testing and was negative for ALK, BRAF, EGFR, RET, ROS1, MET, HER2, PD-L1.  TMB was low.  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    1.  Metastatic squamous cell Carcinoma of the lung:  -  He understands that his cancer is treatable but not curable.  -  I have been very concerned with the extensive nature of his metastatic disease as well as with rapid progression with multiple new soft tissue lesions.  -  He has seen Filiberto Brown and Davin.  He completed cyberknife radiosurgery to his brain lesions and palliatve radiation to the R hip, L rib.  He has future palliative radiation planned, but will take break to start his systemic therapy.  - Will start treatment today with Carboplatin and Taxol.  Continue Xgeva bone therapy.  .    2.  Neoplasm related pain:  -  Poorly controlled on Fentanyl 150 mcgt (75x2) and Oxycodone 20 mg ii tabs 3-4x/day.  -  Will increase Fentanyl to 200 mcg (100 x 2) and continue Oxycodone for breakthrough pain.    -  Continue Senna/ Colace ii BID. Add lactulose as needed for refractory constipation.    3.  Nausea/vomiting:  -  Likely related to combination of CNS metastasis and constipation.  Addressing both as above.   -  Continue Zofran / Compazine as needed.     4. Depression / Anxiety:  -  Mr. Oakley has understandably been upset given his recent diagnosis and was struggling significantly when I first met him..    -  He had urgent psychiatry evaluation after his last visit and has since followed up for outpatient therapy at [facility].   -  He is feeling much more comfortable than he was at his last visit and has continued to follow up with therapy.      5.  Difficulty with working:  -  I think this is at least part of Mr. Oakley's stress.  Given his current situation, he is not able to work and I have told him it would be appropriate for him to apply for disability.  Our  has been working with him to help him with this.    6.  H/o Seizure d/o from the age of 9 months:  -  Says he hasn't had a seizure in a very long time despite brain lesions.  Takes Tegretol to prevent seizures. He hasn't had any changes in dose, etc for some time. Sodium is a little low today.  Will give 1L NS since he appears dehydrated and hasn't been eating /drinking well.     7.  Prophylaxis:  Received 2020 influenza vaccine and Prevnar 13 vaccinations.  COVID vaccination was recommended but declined.    8.  ACO / VIRI/Other  Quality measures  -  Vipin Oakley received 2020 flu vaccine.     -  Vipin Oakley reports a pain score of 9.  Given his pain assessment as noted, treatment options were discussed and the following options were decided upon as a follow-up plan to address the patient's pain: prescription for opiod analgesics.  -  Current outpatient and discharge medications have been reconciled for the patient.  Reviewed by: Yu Foster MD     9.  F/u:  -- increase Fentanyl patch to 200 mcg q72h  -  Continue Oxycodone 20 mg 1-2 tabs q6h prn breakthrough  -  Continue Senna/Colace ii BID  -  Add Lactulose as needed  -  Start Chemotherapy today with Carboplatin / Taxol  -  Continue monthly Xgeva  -  Hold radiation during chemotherapy.  - Return to see me in 1 week.    This note was scribed for Yu Foster MD by Liliya Brandt RN.    IYu MD, personally performed the services described in this documentation as scribed by the above named individual in my presence, and it is both accurate and complete.  08/18/2021     This note was scribed for Yu Foster MD by Liliya Brandt RN.    I, Yu Foster MD, personally performed the services described in this documentation as scribed by the above named individual in my presence, and it is both accurate and complete.  08/18/2021       I spent 45 minutes with Vipin Oakley today.  In the office today, more than 50%  of this time was spent face-to-face with him  in counseling / coordination of care, reviewing his medical history and counseling on the current treatment plan.  All questions were answered to his satisfaction      Electronically Signed by: Yu Foster MD      CC:     VITOR Mckee MD Frederick Bunge, MD Weisi Yan, MD Barbara Michna, MD William Silva MD

## 2021-08-18 NOTE — PROGRESS NOTES
Nurse Navigator at chairside.  Pt is a 52 y/o male diagnosed with metastatic lung cancer.  Nurse Navigator spoke with patient and patient's wife, Radha.  Questions answered. Pt seen Dr. Foster today in the office and is here for C1 Carboplatin/ Taxol infusion.  Pt in wheelchair, due to weakness and RLE pain.  Pt stated the pain in his RLE seems to be worse after taking radiation treatments.  Pt/ Pt's wife has NN contact information if needed.  Nurse Navigator will continue to follow patient.

## 2021-08-18 NOTE — PROGRESS NOTES
SS received consult:    Oncology Distress Level 4-7  Received: Today  Naima Young RN Taylor, Pamela F, MSW  Ambulatory Referral to ONC Social Work [[id number]]    Awaiting signature from: Nina Moon APRN Status: Active   Mode: Ordering in Verbal with readback mode Communicated by: Naima Young RN   Ordering user: Naima Young RN 08/18/21 1452 Ordering provider: Nina Moon APRN   Authorized by: Nina Moon APRN   Frequency:  08/18/21 -   Released by: Naima Young RN 08/18/21 1452   Diagnoses   Anxiety [F41.9]     SS spoke with patient's nurse Faye who states that patient is here today starting new chemotherapy. Patient completed NCCN Distress Thermometer his rated his distress a 5 out of 10.    SS spoke with patient and spouse this date.  Patient states that he is scheduled with Chip Santoyo LCSW on August 26 st, and September 13 th.  Patient states that he is having a difficult time dealing with no income, loss of ability to work, and being sick all the time.  Patient being followed by therapist on a regular basis.    SS provided patient and spouse with 2-$15 gas cards provided by [employer] program.    Patient and spouse state that patient received social security disability on May 10 th but will not receive first check until November.    Patient continues to receive Passport Insurance.    SS will follow as needed.

## 2021-08-27 NOTE — TELEPHONE ENCOUNTER
Caller: LUZMA    Relationship: WIFE    Best call back number: [phone]    What was the call regarding: SHE CALLED BACK TO FIND OUT WHY SOMEONE CALLED. LET HER KNOW UNABLE TO REFILL RX UNTIL NEXT WEEK.

## 2021-08-27 NOTE — TELEPHONE ENCOUNTER
Caller: MELVINA    Relationship: self    Best call back number: [phone]    Medication needed:   Requested Prescriptions     Pending Prescriptions Disp Refills   • oxyCODONE (ROXICODONE) 20 MG tablet 84 tablet 0     Sig: Take 1 tablet by mouth Every 4 (Four) Hours As Needed for Moderate Pain .   • fentaNYL (DURAGESIC) 100 MCG/HR patch 20 patch 0     Sig: Place 2 patches on the skin as directed by provider Every 72 (Seventy-Two) Hours.       Does the patient have less than a 3 day supply:  [x] Yes  [] No    What is the patient's preferred pharmacy:    [facility] [address] [phone] [facility] [phone] FX

## 2021-08-30 NOTE — TELEPHONE ENCOUNTER
Caller: MELVINA    Relationship: PATIENT    Best call back number: [phone]     What is the best time to reach you: ANYTIME    Who are you requesting to speak with (clinical staff, provider,  specific staff member): MEGAN    What was the call regarding: HE'S LIKE TO SPEAK WITH MEGAN REGARDING HIS MEDICINE AND RADIATION TODAY    Do you require a callback: YES

## 2021-08-30 NOTE — PROGRESS NOTES
DATE:  8/30/2021    DIAGNOSIS: Lung cancer    CHIEF COMPLAINT:  Mouth sores    Interval History:  Mr. Oakley follows with Dr. Foster for metastatic lung cancer and is being seen today for an acute visit. He received cycle one Taxol/Carboplatin on 08/18/2021 and overall tolerated this very well. He reports that he feels good today. However, he has been struggling with mouth sores since late last week. He has been using magic mouthwash but reports this does not help. Denies difficulty swallowing. Denies fever/chills. He denies any other specific complaints today.    The following portions of the patient's history were reviewed and updated as appropriate: allergies, current medications, past family history, past medical history, past social history, past surgical history and problem list.    PAST MEDICAL HISTORY:  Past Medical History:   Diagnosis Date   • Anxiety    • Arthritis    • Back pain    • Bone cancer (CMS/HCC)    • Brain cancer (CMS/HCC)    • Carpal tunnel syndrome     bilateral   • COPD (chronic obstructive pulmonary disease) (CMS/HCC)    • Frequency of urination    • GERD (gastroesophageal reflux disease)    • Heartburn    • Herniated cervical disc    • Lung cancer (CMS/HCC)    • Seizures (CMS/HCC) 2014    treated with Tegretol as a child/teenager since 9 months old, currently well controlled   • Squamous cell lung cancer (CMS/HCC) 6/10/2021       PAST SURGICAL HISTORY:  Past Surgical History:   Procedure Laterality Date   • ABDOMINAL SURGERY     • BIOPSY OF BACK/FLANK N/A 5/18/2021    Procedure: BIOPSY SOFT TISSUE BACK / FLANK;  Surgeon: Hans Malone MD;  Location: [facility] OR;  Service: ENT;  Laterality: N/A;   • BRONCHOSCOPY N/A 5/26/2021    Procedure: BRONCHOSCOPY WITH ENDOBRONCHIAL ULTRASOUND;  Surgeon: Saurabh Johnson MD;  Location: [facility] OR;  Service: Pulmonary;  Laterality: N/A;   • CARDIAC CATHETERIZATION     • EPIDIDYMECTOMY Right 6/27/2018    Procedure: EPIDIDYMECTOMY;  Surgeon:  Chino Aviles MD;  Location: [facility];  Service: Urology   • HERNIA REPAIR     • LARYNGOSCOPY N/A 5/18/2021    Procedure: MICRODIRECT LARYNGOSCOPY;  Surgeon: Hans Malone MD;  Location: [facility];  Service: ENT;  Laterality: N/A;   • MOUTH SURGERY      teeth    • ORCHIECTOMY Right 10/19/2020    Procedure: ORCHIECTOMY;  Surgeon: Chino Aviles MD;  Location: [facility];  Service: Urology;  Laterality: Right;   • OTHER SURGICAL HISTORY  06/2021    mass removal from throat   • TESTICLE SURGERY     • VENOUS ACCESS DEVICE (PORT) INSERTION Left 7/13/2021    Procedure: INSERTION VENOUS ACCESS DEVICE;  Surgeon: Eugenia Lundy MD;  Location: [facility];  Service: General;  Laterality: Left;       SOCIAL HISTORY:  Social History     Socioeconomic History   • Marital status:      Spouse name: Not on file   • Number of children: Not on file   • Years of education: Not on file   • Highest education level: Not on file   Tobacco Use   • Smoking status: Current Every Day Smoker     Packs/day: 1.00     Years: 14.00     Pack years: 14.00     Types: Cigarettes   • Smokeless tobacco: Former User     Types: Snuff   Vaping Use   • Vaping Use: Never used   Substance and Sexual Activity   • Alcohol use: Yes     Alcohol/week: 2.0 standard drinks     Types: 2 Shots of liquor per week     Comment: occasionally   • Drug use: No   • Sexual activity: Defer     Birth control/protection: None         FAMILY HISTORY:  Family History   Problem Relation Age of Onset   • Cancer Father         liver   • Hypertension Father    • Diabetes Father    • Hypertension Mother    • Cancer Maternal Uncle    • Heart disease Maternal Uncle    • Heart disease Paternal Aunt    • Cancer Maternal Grandmother    • Heart disease Maternal Grandmother          MEDICATIONS:  The current medication list was reviewed in the EMR    Current Outpatient Medications:   •  albuterol (PROVENTIL) (2.5 MG/3ML) 0.083% nebulizer solution, Take 2.5 mg  by nebulization 2 (two) times a day., Disp: , Rfl:   •  carBAMazepine (TEGretol) 200 MG tablet, Take 200 mg by mouth 2 (Two) Times a Day. Takes 1.5 tabs in am and 2 tabs at hs, Disp: , Rfl:   •  dexamethasone (DECADRON) 4 MG tablet, Take 1 tablet by mouth 2 (Two) Times a Day With Meals., Disp: 60 tablet, Rfl: 3  •  diazePAM (VALIUM) 5 MG tablet, Take 1 tablet by mouth At Night As Needed for Anxiety., Disp: 30 tablet, Rfl: 0  •  EPINEPHrine (ADRENALIN) 1 MG/ML injection, Inject 1 mcg/mL under the skin into the appropriate area as directed. FOR BEE STINGS, Disp: , Rfl:   •  famotidine (PEPCID) 20 MG tablet, Take 1 tablet by mouth 2 (two) times a day., Disp: , Rfl:   •  fentaNYL (DURAGESIC) 100 MCG/HR patch, Place 2 patches on the skin as directed by provider Every 72 (Seventy-Two) Hours., Disp: 20 patch, Rfl: 0  •  gabapentin (NEURONTIN) 600 MG tablet, 800 mg 3 (Three) Times a Day., Disp: , Rfl:   •  ibuprofen (ADVIL,MOTRIN) 800 MG tablet, ibuprofen 800 mg tablet  TAKE ONE TABLET BY MOUTH THREE TIMES A DAY, Disp: , Rfl:   •  lactulose (CHRONULAC) 10 GM/15ML solution, Take 30 mL by mouth 2 (Two) Times a Day As Needed (for refractory constipation.)., Disp: 473 mL, Rfl: 1  •  Lidocaine Viscous HCl (XYLOCAINE) 2 % solution, SWISH, SPIT, OR SWALLOW 5 TO 10 ML BY MOUTH EVERY 3 HOURS AS NEEDED., Disp: 100 mL, Rfl: 5  •  lidocaine-prilocaine (EMLA) 2.5-2.5 % cream, Apply to port-a-cath about 30 minutes to 1 hour prior to chemotherapy, Disp: 30 g, Rfl: 3  •  methocarbamol (ROBAXIN) 500 MG tablet, Take 500 mg by mouth 2 (Two) Times a Day., Disp: , Rfl:   •  nicotine (NICODERM CQ) 21 MG/24HR patch, Place 1 patch on the skin as directed by provider Daily., Disp: 28 each, Rfl: 0  •  ondansetron (Zofran) 8 MG tablet, Take 1 tablet by mouth Every 8 (Eight) Hours As Needed for Nausea or Vomiting., Disp: 60 tablet, Rfl: 6  •  ondansetron ODT (Zofran ODT) 8 MG disintegrating tablet, Place 1 tablet on the tongue Every 8 (Eight) Hours As  "Needed for Nausea or Vomiting., Disp: 60 tablet, Rfl: 5  •  oxyCODONE (ROXICODONE) 20 MG tablet, Take 1 tablet by mouth Every 4 (Four) Hours As Needed for Moderate Pain ., Disp: 84 tablet, Rfl: 0  •  prochlorperazine (COMPAZINE) 10 MG tablet, Take 1 tablet by mouth Every 6 (Six) Hours As Needed for Nausea., Disp: 60 tablet, Rfl: 3  •  sennosides-docusate (Senna-S) 8.6-50 MG per tablet, Take 2 tablets by mouth 2 (Two) Times a Day., Disp: 120 tablet, Rfl: 5  •  Syringe 25G X 5/8\" 3 ML misc, Use as directed 2 x weekly, Disp: 24 each, Rfl: 3  •  Testosterone Cypionate (Depo-Testosterone) 200 MG/ML injection, Inject 1/2 cc subcutaneously every Monday and Thursday, Disp: 10 mL, Rfl: 2  •  fluconazole (DIFLUCAN) 100 MG tablet, Take 2 tablets by mouth Daily., Disp: 14 tablet, Rfl: 0  •  nystatin (MYCOSTATIN) 764910 UNIT/ML suspension, Swish and swallow 5 mL 4 (Four) Times a Day., Disp: 473 mL, Rfl: 1    ALLERGIES:    Allergies   Allergen Reactions   • Bee Venom Anaphylaxis   • Tramadol Other (See Comments)     Seizures  Ultram           REVIEW OF SYSTEMS:    A comprehensive 14 point review of systems was performed.  Significant findings as mentioned above.  All other systems reviewed and are negative.        Physical Exam   Vital Signs:   Vitals:    08/30/21 1225   BP: 109/69   Pulse: 85   Resp: 18   Temp: 97.3 °F (36.3 °C)   SpO2: 97%    Patient's Body mass index is 19.39 kg/m².     General: Well developed, well nourished, alert and oriented x 3, in no acute distress.   Head: ATNC   Eyes: PERRL, No evidence of conjunctivitis.   Nose: No nasal discharge.   Mouth: Oral mucosal membranes moist. No oral ulceration or hemorrhages.Oropharynx +candidiasis.  Neck: Neck supple. No thyromegaly. No JVD.   Lungs: Clear in all fields to A&P without rales, rhonchi or wheezing.   Heart: S1, S2. Regular rate and rhythm. No murmurs, rubs, or gallops.   Abdomen: Soft. Bowel sounds are normoactive. Nontender with palpation.    Extremities: " No clubbing, cyanosis or edema.   Integumentary: Warm, dry, intact.  Neurologic: Grossly non-focal exam.    Pain Score:  Pain Score    08/30/21 1225   PainSc: 0-No pain             RECENT LABS:  Lab Results   Component Value Date    WBC 12.33 (H) 08/18/2021    HGB 13.0 08/18/2021    HCT 40.1 08/18/2021    MCV 94.6 08/18/2021    RDW 12.4 08/18/2021     08/18/2021    NEUTRORELPCT 74.3 08/18/2021    LYMPHORELPCT 12.0 (L) 08/18/2021    MONORELPCT 8.4 08/18/2021    EOSRELPCT 4.0 08/18/2021    BASORELPCT 1.0 08/18/2021    NEUTROABS 9.17 (H) 08/18/2021    LYMPHSABS 1.48 08/18/2021       Lab Results   Component Value Date     (L) 08/18/2021    K 4.8 08/18/2021    CO2 19.7 (L) 08/18/2021    CL 95 (L) 08/18/2021    BUN 9 08/18/2021    CREATININE 0.56 (L) 08/18/2021    EGFRIFNONA >150 08/18/2021    GLUCOSE 126 (H) 08/18/2021    CALCIUM 9.2 08/18/2021    ALKPHOS 247 (H) 08/18/2021    AST 14 08/18/2021    ALT 26 08/18/2021    BILITOT 0.4 08/18/2021    ALBUMIN 3.56 08/18/2021    PROTEINTOT 7.6 08/18/2021    MG 1.4 (L) 07/28/2021    PHOS 3.3 07/28/2021       Lab Results   Component Value Date     (H) 06/09/2021    URICACID 3.8 06/09/2021       Lab Results   Component Value Date    FERRITIN 171.10 06/09/2021    IRON 35 (L) 06/09/2021    TIBC 311 06/09/2021    LABIRON 11 (L) 06/09/2021    RBPKOGOA30 511 06/09/2021    FOLATE 4.79 06/09/2021      ASSESSMENT & PLAN:  Vipin Oakley is a very pleasant 51 y.o. male with    1. Metastatic Lung cancer  - Please see Dr. Foster' most recent follow up note from 08/18/2021 for full details regarding oncology history. He is being seen today for an acute visit (see below). He will follow up with Dr. Foster as previously planned.    2. Oral candidiasis  - RX provided for Diflucan 200 mg daily x 7 days and Nystatin s/s QID as needed.  - Advised to continue Magic Mouthwash as needed. Also provided instructions on salt/soda rinses as needed.   - If no improvement in symptoms  instructed patient to notify clinic.         A total of 20 minutes were spent coordinating this patient’s care in clinic today; more than 50% of this time was face-to-face with the patient, reviewing his interim medical history and counseling on the current treatment and followup plan. All questions were answered to his satisfaction.          Electronically Signed by: VITOR Cavazos APRN August 30, 2021 12:39 EDT       CC:   No ref. provider found  Kassie River APRN

## 2021-08-31 NOTE — TELEPHONE ENCOUNTER
Caller: Vipin Oakley    Relationship: Self    Best call back number: [phone]    What is the best time to reach you:ANY    Who are you requesting to speak with: MEGAN    Do you know the name of the person who called: VIPIN    What was the call regarding: INQUIRING IF YOU WOULD RECOMMEND HIM TO GET THE COVID VACCINE.    Do you require a callback: YES, PLEASE

## 2021-09-01 NOTE — PROGRESS NOTES
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    Tumor sent for CARIS testing and was negative for ALK, BRAF, EGFR, RET, ROS1, MET, HER2, PD-L1.  TMB was low.  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      TREATMENT HISTORY:   1.  Stereotactic Radiation to the Brain     Treatment Site  Current Dose  Modality  From  To  Elapsed Days  Fx.    6 Brain Mets - Fx 2  1,800 cGy  x06  2021  1    5 Brain Mets - Fx 3  1,800 cGy  x06  2021  1    5 Brain Mets - Fx 1  1,800 cGy  x06  2021  1      2.  Dr. Moura delivered palliative radiation to the R femoral neck and 8th rib.  Further palliative radiation planned but currently on hold.     3. Xgeva bone therapy started 21    4.       5.  Received palliative radiation to ST metastases    CHIEF COMPLAINT:  Follow up of metastatic squamous cell lung cancer    HISTORY OF PRESENT ILLNESS:   Vipin Oakley is a very pleasant 51 y.o. male who was referred by Dr. Johnson for evaluation and treatment of squamous cell lung cancer.  He initially started to feel unwell in 2021.  At that time, he presented to an urgent care with cough and was diagnosed with Flu B.  His cough worsened and his voice started to become hoarse.  He ultimately came to the ER where he was diagnosed with FluB again as well as pneumonia and was told he had a lung mass.  From there he saw Dr. Johnson.  Bronchoscopy was delayed because of mucus retention cyst which was removed by Dr. Malone and then he returned to Dr. Johnson and had bronchoscopy.  This revealed heaped up / friable mucosa in thedistal L bronchus.  Brushings and biopsies were done and EBUS biopsies of Station 4L/10L LNs.  This revealed squamous cell  carcinoma.  Meanwhile he was found to have a soft tissue lesion on his L upper back and FNA showed metastatic squamous cell carcinoma.      INTERVAL HISTORY:i  Mr. Oakley is here today for follow up of metastatic squamous cell lung cancer.  He is feeling quite a bit better than the last time I saw him.  He looks more comfortable and seems more energetic.  He took C1 chemotherapy without untoward event.  Subsequently Dr. Moura has delivered further palliative radiation to ST metastases.  He continues to use Fentanyl patch 200 mcg (100 mcgx2) with Oxycodone for breakthrough pain. He is due for Xgeva today.  With use of Nystatin S/S and Diflucan thrush has resolved and he is no longer having fevers.  He complains of uncontrolled Anxiety despite Valium 5 mg and asks for a higher dose.      PAST MEDICAL HISTORY:  Past Medical History:   Diagnosis Date   • Anxiety    • Arthritis    • Back pain    • Bone cancer (CMS/HCC)    • Brain cancer (CMS/HCC)    • Carpal tunnel syndrome     bilateral   • COPD (chronic obstructive pulmonary disease) (CMS/HCC)    • Frequency of urination    • GERD (gastroesophageal reflux disease)    • Heartburn    • Herniated cervical disc    • Lung cancer (CMS/HCC)    • Seizures (CMS/HCC) 2014    treated with Tegretol as a child/teenager since 9 months old, currently well controlled   • Squamous cell lung cancer (CMS/HCC) 6/10/2021         PAST SURGICAL HISTORY:  Past Surgical History:   Procedure Laterality Date   • ABDOMINAL SURGERY     • BIOPSY OF BACK/FLANK N/A 5/18/2021    Procedure: BIOPSY SOFT TISSUE BACK / FLANK;  Surgeon: Hans Malone MD;  Location:  [facility];  Service: ENT;  Laterality: N/A;   • BRONCHOSCOPY N/A 5/26/2021    Procedure: BRONCHOSCOPY WITH ENDOBRONCHIAL ULTRASOUND;  Surgeon: Saurabh Johnson MD;  Location:  [facility];  Service: Pulmonary;  Laterality: N/A;   • CARDIAC CATHETERIZATION     • EPIDIDYMECTOMY Right 6/27/2018    Procedure: EPIDIDYMECTOMY;  Surgeon:  Chino Aviles MD;  Location: [facility];  Service: Urology   • HERNIA REPAIR     • LARYNGOSCOPY N/A 5/18/2021    Procedure: MICRODIRECT LARYNGOSCOPY;  Surgeon: Hans Malone MD;  Location: [facility];  Service: ENT;  Laterality: N/A;   • MOUTH SURGERY      teeth    • ORCHIECTOMY Right 10/19/2020    Procedure: ORCHIECTOMY;  Surgeon: Chino Aviles MD;  Location: [facility] OR;  Service: Urology;  Laterality: Right;   • OTHER SURGICAL HISTORY  06/2021    mass removal from throat   • TESTICLE SURGERY     • VENOUS ACCESS DEVICE (PORT) INSERTION Left 7/13/2021    Procedure: INSERTION VENOUS ACCESS DEVICE;  Surgeon: Eugenia Lundy MD;  Location: [facility];  Service: General;  Laterality: Left;       FAMILY HISTORY:  Family History   Problem Relation Age of Onset   • Cancer Father         liver   • Hypertension Father    • Diabetes Father    • Hypertension Mother    • Cancer Maternal Uncle    • Heart disease Maternal Uncle    • Heart disease Paternal Aunt    • Cancer Maternal Grandmother    • Heart disease Maternal Grandmother        SOCIAL HISTORY:  Social History     Socioeconomic History   • Marital status:      Spouse name: Not on file   • Number of children: Not on file   • Years of education: Not on file   • Highest education level: Not on file   Tobacco Use   • Smoking status: Current Every Day Smoker     Packs/day: 1.00     Years: 14.00     Pack years: 14.00     Types: Cigarettes   • Smokeless tobacco: Former User     Types: Snuff   Vaping Use   • Vaping Use: Never used   Substance and Sexual Activity   • Alcohol use: Yes     Alcohol/week: 2.0 standard drinks     Types: 2 Shots of liquor per week     Comment: occasionally   • Drug use: No   • Sexual activity: Defer     Birth control/protection: None     Social History     Social History Narrative    , lives with wife. Worked in furniture building, as a salesman,     Exposed to burning rubber at his current job with [employer]     Current smoker 1 ppd           REVIEW OF SYSTEMS:   A comprehensive 14 point review of systems was performed.  Significant findings as mentioned above.  All other systems reviewed and are negative.      MEDICATIONS:  The current medication list was reviewed in the EMR    Current Outpatient Medications:   •  albuterol (PROVENTIL) (2.5 MG/3ML) 0.083% nebulizer solution, Take 2.5 mg by nebulization 2 (two) times a day., Disp: , Rfl:   •  carBAMazepine (TEGretol) 200 MG tablet, Take 200 mg by mouth 2 (Two) Times a Day. Takes 1.5 tabs in am and 2 tabs at hs, Disp: , Rfl:   •  dexamethasone (DECADRON) 4 MG tablet, Take 1 tablet by mouth 2 (Two) Times a Day With Meals., Disp: 60 tablet, Rfl: 3  •  diazePAM (VALIUM) 5 MG tablet, Take 1 tablet by mouth At Night As Needed for Anxiety., Disp: 30 tablet, Rfl: 0  •  EPINEPHrine (ADRENALIN) 1 MG/ML injection, Inject 1 mcg/mL under the skin into the appropriate area as directed. FOR BEE STINGS, Disp: , Rfl:   •  famotidine (PEPCID) 20 MG tablet, Take 1 tablet by mouth 2 (two) times a day., Disp: , Rfl:   •  fentaNYL (DURAGESIC) 100 MCG/HR patch, Place 2 patches on the skin as directed by provider Every 72 (Seventy-Two) Hours., Disp: 20 patch, Rfl: 0  •  fluconazole (DIFLUCAN) 100 MG tablet, Take 2 tablets by mouth Daily., Disp: 14 tablet, Rfl: 0  •  gabapentin (NEURONTIN) 600 MG tablet, 800 mg 3 (Three) Times a Day., Disp: , Rfl:   •  ibuprofen (ADVIL,MOTRIN) 800 MG tablet, ibuprofen 800 mg tablet  TAKE ONE TABLET BY MOUTH THREE TIMES A DAY, Disp: , Rfl:   •  lactulose (CHRONULAC) 10 GM/15ML solution, Take 30 mL by mouth 2 (Two) Times a Day As Needed (for refractory constipation.)., Disp: 473 mL, Rfl: 1  •  Lidocaine Viscous HCl (XYLOCAINE) 2 % solution, SWISH, SPIT, OR SWALLOW 5 TO 10 ML BY MOUTH EVERY 3 HOURS AS NEEDED., Disp: 100 mL, Rfl: 5  •  lidocaine-prilocaine (EMLA) 2.5-2.5 % cream, Apply to port-a-cath about 30 minutes to 1 hour prior to chemotherapy, Disp: 30 g, Rfl:  "3  •  methocarbamol (ROBAXIN) 500 MG tablet, Take 500 mg by mouth 2 (Two) Times a Day., Disp: , Rfl:   •  nicotine (NICODERM CQ) 21 MG/24HR patch, Place 1 patch on the skin as directed by provider Daily., Disp: 28 each, Rfl: 0  •  nystatin (MYCOSTATIN) 090818 UNIT/ML suspension, Swish and swallow 5 mL 4 (Four) Times a Day., Disp: 473 mL, Rfl: 1  •  ondansetron (Zofran) 8 MG tablet, Take 1 tablet by mouth Every 8 (Eight) Hours As Needed for Nausea or Vomiting., Disp: 60 tablet, Rfl: 6  •  ondansetron ODT (Zofran ODT) 8 MG disintegrating tablet, Place 1 tablet on the tongue Every 8 (Eight) Hours As Needed for Nausea or Vomiting., Disp: 60 tablet, Rfl: 5  •  oxyCODONE (ROXICODONE) 20 MG tablet, Take 1 tablet by mouth Every 4 (Four) Hours As Needed for Moderate Pain ., Disp: 84 tablet, Rfl: 0  •  prochlorperazine (COMPAZINE) 10 MG tablet, Take 1 tablet by mouth Every 6 (Six) Hours As Needed for Nausea., Disp: 60 tablet, Rfl: 3  •  sennosides-docusate (Senna-S) 8.6-50 MG per tablet, Take 2 tablets by mouth 2 (Two) Times a Day., Disp: 120 tablet, Rfl: 5  •  Syringe 25G X 5/8\" 3 ML misc, Use as directed 2 x weekly, Disp: 24 each, Rfl: 3  •  Testosterone Cypionate (Depo-Testosterone) 200 MG/ML injection, Inject 1/2 cc subcutaneously every Monday and Thursday, Disp: 10 mL, Rfl: 2    ALLERGIES:    Allergies   Allergen Reactions   • Bee Venom Anaphylaxis   • Tramadol Other (See Comments)     Seizures  Ultram         PHYSICAL EXAM:  Vitals:    09/01/21 1327   BP: 117/75   Pulse: 95   Resp: 16   Temp: 97.3 °F (36.3 °C)   SpO2: 99%     Pain Score    09/01/21 1327   PainSc: 0-No pain     ECOG score: 2     General:  Awake, alert and oriented, appears more comfortable, smiling, joking  HEENT:  Pupils are equal, round and reactive to light and accommodation, Extra-ocular movements full, Oropharyx clear, mucous membranes moist  Neck:  No JVD, thyromegaly or lymphadenopathy  CV:  Regular rate and rhythm, no murmurs, rubs or " gallops  Resp:  Lungs are clear to auscultation bilaterally, no wheezes  Abd:  Soft, non-tender, non-distended, bowel sounds present, no organomegaly or masses  Soft Tissue:  Over his L  Upper back there are multiple soft tissue lesions.  The largest is kind of in the midline in the archie-scapular area he has a rounded ST mass,approximately 3 cm (down from 5.5cm) in diameter.  There is also a R scapular area lesion, aprox 1 cm (down from 2 cm).  R posterior mid costal margin - 1 cm area, on the L posterior thorax around the T8 level around midway between the spine and flank there is a 1 cm lesion.  Ext:  No clubbing, cyanosis or edema  Lymph:  No cervical, supraclavicular, axillary, inguinal or femoral adenopathy  Neuro:  MS as above, CN II-XII intact, gait abnormal with considerable R hip pain with walking.      PATHOLOGY:  05-18-21 05-26-21              ENDOSCOPY:  Bronchoscopy 05-26-21  Findings: Irregular, friable mucosa in the distal left main stem bronchus at the left upper lobe takeoff      IMAGING:  CT Chest wo contrast 04-05-21  FINDINGS:  Small pericardial effusion is noted. There is no pleural effusion. There is AP window adenopathy measuring 2.1 cm. There is probably some left hilar adenopathy as well, poorly characterized without IV contrast. There is also some soft tissue abutting the  distal aortic arch and proximal descending aorta suggesting tumor or adenopathy. Upper abdominal images show a contracted gallbladder.     Lung windows show COPD. There is some mild narrowing of both proximal upper and lower lobe bronchi on the left side possibly due to extrinsic compression. Bronchoscopy may be beneficial. No suspicious pulmonary nodules are identified. There is some  scarring in the left upper lobe. The lungs are otherwise clear. No CT findings present to indicate pneumonia. Note: CT may be negative in the earliest stages of COVID-19. There are no suspicious osseous  lesions.     IMPRESSION:     1. COPD with no evidence of acute pneumonia.  2. AP window and probably left hilar and perihilar adenopathy concerning for malignancy. There is some mild extrinsic compression of the proximal left upper and left lower lobe bronchi. Bronchoscopy may be beneficial. While a contrast-enhanced chest CT will provide some additional diagnostic information, PET CT would be more beneficial.      CTCAP 06-18-21  FINDINGS:     LUNGS: 7 mm parenchymal nodule anteriorly in the left lung on image 35  of the axial series.  Airspace disease in the left upper lobe adjacent to the hilum.  1 cm parenchymal nodule in the right lung on image 48 of the axial  series.     HEART: Trace pericardial effusion.     MEDIASTINUM: Abnormal mediastinal adenopathy particularly in the  aorticopulmonary window, azygoesophageal recess and most notably  surrounding the left main pulmonary artery.     PLEURA: No pleural effusion. No pleural mass or abnormal calcification.  No pneumothorax.     VASCULATURE: No evidence of aneurysm. There is extrinsic mass effect on  the left lower lobe pulmonary artery but I do not see a pulmonary  embolus on the presented exam.     BONES: No acute bony abnormality.     VISUALIZED UPPER ABDOMEN:Please see the CT report for the abdomen and  pelvis.     Other: There is bilateral axillary adenopathy. 1.2 cm left axillary  lymph node and a 1.3 cm right axillary lymph node.     IMPRESSION:     1. Mediastinal, left hilar, and bilateral axillary adenopathy.  2. Right parenchymal nodule and left parenchymal nodule.  3. Also airspace disease in the left upper lobe concerning for  pneumonia.  4. Trace pericardial effusion.    FINDINGS:     Lower thorax: Please see the CT report for the chest.     Abdomen:     Liver: Multiple low-attenuation lesions throughout the liver compatible  with extensive hepatic metastasis.     Gallbladder: No dilation or stone identified.     Pancreas: Unremarkable. No mass or  ductal dilatation.     Spleen: Homogeneous. No splenomegaly.     Adrenals: Small bilateral adrenal nodules. Given the history, these are  concerning for metastatic disease.     Kidneys/ureters: No mass. No obstructive uropathy.  No evidence of  urolithiasis.     GI tract: Moderate volume stool.     MESENTERY: No free fluid, walled off fluid collections, mesenteric  stranding, or enlarged lymph nodes         Vasculature: Evidence of atherosclerotic vascular disease.     Abdominal wall: No focal hernia or mass.        Bladder: Mild thickening of the urinary bladder wall.     Reproductive: Unremarkable as visualized     Bones: No acute bony abnormality.     IMPRESSION:     1. Multiple low-attenuation lesions in the liver compatible with  metastatic disease.     2.Small bilateral adrenal nodules, also concerning for metastatic  disease.     3. Mild thickening of the urinary bladder wall.      Bone scan 06-21-21     FINDINGS: Focal area of increased tracer uptake corresponds to known  pathologic lesion of the left posterior eighth rib. Focal area of  increased uptake in the left inguinal region appears to localize to  tracer activity in the adjacent soft tissues or external to the patient.  There is focal area of increased tracer activity in the right femoral  neck which when correlated to previous CT corresponds to pathologic  lesion. Both kidneys are visualized.      IMPRESSION:  1. Abnormal tracer activity in the right femoral neck region and left  posterior eighth rib compatible with metastatic disease. Please note,  these appear predominantly lytic on CT.  2. Otherwise physiologic distribution of tracer.      MRI Brain w/wo contrast 06-25-21  FINDINGS:    Brain:  There are numerous ring-enhancing cystic type lesions  throughout the cerebral hemispheres compatible with cystic type  metastases in light of patient history.  For example, a right temporal  lobe ring-enhancing lesion is 1.3 cm.  A left inferior medial  frontal  lobe lesion is 8.7 mm.  A left parietal lobe region within the  postcentral gyrus is 0.7 cm.  Smaller subcortical and parenchymal  metastases are noted.  No significant mass effect or vasogenic edema  identified.  No obvious cerebellar lesions identified.  No restricted  diffusion to indicate acute infarct.  No hemorrhage.    Midline shift:  There is no midline shift.    Ventricles:  No hydrocephalus.    Bones/joints:  No destructive calvarial lesions identified on MRI.    Sinuses:  Unremarkable as visualized.  No acute sinusitis.    Mastoid air cells:  Fluid in the right mastoid air cells noted.    Orbits:  Unremarkable as visualized.     IMPRESSION:  1.  Numerous predominantly cystic but rim-enhancing lesions throughout  the brain which are most consistent with numerous metastatic lesions.  2.  No significant vasogenic edema identified. No mass effect or midline  shift.  3.  Right mastoid effusion.  4.  No acute intracranial findings identified.         RECENT LABS:  Lab Results   Component Value Date    WBC 7.13 09/01/2021    HGB 12.0 (L) 09/01/2021    HCT 36.8 (L) 09/01/2021    MCV 94.8 09/01/2021    RDW 12.7 09/01/2021     (H) 09/01/2021    NEUTRORELPCT 68.2 09/01/2021    LYMPHORELPCT 19.6 09/01/2021    MONORELPCT 9.7 09/01/2021    EOSRELPCT 1.3 09/01/2021    BASORELPCT 0.6 09/01/2021    NEUTROABS 4.87 09/01/2021    LYMPHSABS 1.40 09/01/2021       Lab Results   Component Value Date     (L) 09/01/2021    K 4.5 09/01/2021    CO2 24.2 09/01/2021    CL 93 (L) 09/01/2021    BUN 15 09/01/2021    CREATININE 0.71 (L) 09/01/2021    EGFRIFNONA 118 09/01/2021    GLUCOSE 128 (H) 09/01/2021    CALCIUM 9.4 09/01/2021    ALKPHOS 343 (H) 09/01/2021    AST 8 09/01/2021    ALT 26 09/01/2021    BILITOT 0.2 09/01/2021    ALBUMIN 3.57 09/01/2021    PROTEINTOT 7.1 09/01/2021    MG 2.0 09/01/2021    PHOS 2.1 (L) 09/01/2021     LDH   Date Value Ref Range Status   06/09/2021 278 (H) 135 - 225 U/L Final     Uric  Acid   Date Value Ref Range Status   06/09/2021 3.8 3.4 - 7.0 mg/dL Final     Lab Results   Component Value Date    FERRITIN 171.10 06/09/2021    IRON 35 (L) 06/09/2021    TIBC 311 06/09/2021    LABIRON 11 (L) 06/09/2021    NOPDIYPT49 511 06/09/2021    FOLATE 4.79 06/09/2021     Lab Results   Component Value Date    TSH 0.503 06/09/2021         ASSESSMENT & PLAN:  Vipin Oakley is a very pleasant 51 y.o. 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  Tumor sent for CARIS testing and was negative for ALK, BRAF, EGFR, RET, ROS1, MET, HER2, PD-L1.  TMB was low.  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    1.  Metastatic squamous cell Carcinoma of the lung:  -  He understands that his cancer is treatable but not curable.  -  I have been very concerned with the extensive nature of his metastatic disease as well as with rapid progression with multiple new soft tissue lesions.  -  He has seen DrsEva Moura, Filiberto and Davin.  He completed cyberknife radiosurgery to his brain lesions and palliatve radiation to the R hip, L rib and subsequently to ST metastases  - Started treatment today with Carboplatin and Taxol as well as Xgeva bone therapy.    -  He appears to be responding well to treatment. Will continue as planned.     2.  Neoplasm related pain:  -  Well controlled on Fentanyl 200 mcg (100x2) and Oxycodone 20 mg ii tabs 3-4x/day.  -  Continue Senna/ Colace ii BID and lactulose as needed for refractory constipation.    3.  Nausea/vomiting:  -  Likely related to combination of CNS metastasis and constipation.  Addressing both as above.   -  Continue Zofran / Compazine as needed.     4. Depression / Anxiety:  -  Mr. Oakley has understandably been upset given his recent diagnosis and was struggling significantly when I first met  him..    -  He had urgent psychiatry evaluation after his first visit and has since followed up for outpatient therapy at [facility].   -  He continues to complain of anxiety.  He is taking Valium 5 mg daily but says this isn't controlling his symptoms.  Will start Lexapro 10 mg daily and increase Valium to 5 mg 1-2 tabs BID prn #90. If this doesn't control his symptoms, will refer him back to psychiatric nurse practitioner.     5.  Difficulty with working:  -  I think this is at least part of Mr. Oakley's stress.  Given his current situation, he is not able to work and I have told him it would be appropriate for him to apply for disability. Our  has been working with him to help him with this.    6.  H/o Seizure d/o from the age of 9 months:  -  Says he hasn't had a seizure in a very long time despite brain lesions.  Takes Tegretol to prevent seizures. He hasn't had any changes in dose, etc for some time. Sodium is a little low today.  Will monitor carefully.    7.  Prophylaxis:  Received 2020 influenza vaccine and Prevnar 13 vaccinations.  COVID vaccination was recommended but declined.    8.  ACO / VIRI/Other  Quality measures  -  Vipin Oakley received 2020 flu vaccine.     -  Vipin Oakley reports a pain score of 0.  Given his pain assessment as noted, treatment options were discussed and the following options were decided upon as a follow-up plan to address the patient's pain: prescription for opiod analgesics.  -  Current outpatient and discharge medications have been reconciled for the patient.  Reviewed by: Yu Foster MD     9.  F/u:  - continue Fentanyl patch to 200 mcg q72h  - Continue Oxycodone 20 mg 1-2 tabs q6h prn breakthrough  - Continue Senna/Colace ii BID with Lactulose as needed  - Continue Chemotherapy as planned with Carboplatin / Taxol and monthly Xgeva  - Start Lexapro 10 mg daily  -  Increase Valium to 5 mg 1-2 tabs BID PRN #90  -  RTC in 2 wks.    This  note was scribed for Yu Foster MD by Liliya Brandt RN.    I, Yu Foster MD, personally performed the services described in this documentation as scribed by the above named individual in my presence, and it is both accurate and complete.  09/01/2021       I spent 30 minutes with Vipin Oakley today.  In the office today, more than 50% of this time was spent face-to-face with him  in counseling / coordination of care, reviewing his medical history and counseling on the current treatment plan.  All questions were answered to his satisfaction      Electronically Signed by: Yu Foster MD      CC:     VITOR Mckee MD Frederick Bunge, MD Weisi Yan, MD Barbara Michna, MD Thomas Hunter, MD Charles Benjamin Newman, MD

## 2021-09-02 NOTE — TELEPHONE ENCOUNTER
Caller: Vipin Oakley    Relationship: Self    Best call back number: [phone]    What was the call regarding: VIPIN CALLED WITH QUESTIONS ABOUT THE COVID VACCINE.    Do you require a callback: YES

## 2021-09-02 NOTE — TELEPHONE ENCOUNTER
----- Message from Liliya Brandt RN sent at 9/2/2021  9:16 AM EDT -----  Regarding: FW: vaccine  Please let him know that he should absolutely get the vaccine. Ideally, he should time it a few days before he has a treatment. His body will make a better immune response.   ----- Message -----  From: Yulia Locke  Sent: 9/2/2021   8:58 AM EDT  To: Liliya Brandt RN  Subject: vaccine                                          Vipin left a vm asking if he can get the vaccine?

## 2021-09-02 NOTE — TELEPHONE ENCOUNTER
Spoke with pt wife aRdha. Gave her information about pt getting covid vaccine. Expressed to her that Dr. Foster states to get it a few days before next treatment if possible due to immune response being better that way. Pt wife Zayra verbalized understanding and knows that she can either call to get that scheduled or can visit  Online as well,

## 2021-09-03 NOTE — TELEPHONE ENCOUNTER
Caller: MELVINA    Relationship to patient: SELF    Best call back number: [phone]    Patient is needing: TO REQUEST A PHONE CALL FROM [facility].

## 2021-09-07 NOTE — PROGRESS NOTES
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    Tumor sent for CARIS testing and was negative for ALK, BRAF, EGFR, RET, ROS1, MET, HER2, PD-L1.  TMB was low.  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      TREATMENT HISTORY:   1.  Stereotactic Radiation to the Brain     Treatment Site  Current Dose  Modality  From  To  Elapsed Days  Fx.    6 Brain Mets - Fx 2  1,800 cGy  x06  2021  1    5 Brain Mets - Fx 3  1,800 cGy  x06  2021  1    5 Brain Mets - Fx 1  1,800 cGy  x06  2021  1      2.  Dr. Moura delivered palliative radiation to the R femoral neck and 8th rib.  Further palliative radiation planned but currently on hold.     3. Xgeva bone therapy started 21    4.       5.  Received palliative radiation with proton therapy to ST metastases    CHIEF COMPLAINT:  Follow up of metastatic squamous cell lung cancer    HISTORY OF PRESENT ILLNESS:   Vipin Oakley is a very pleasant 51 y.o. male who was referred by Dr. Johnson for evaluation and treatment of squamous cell lung cancer.  He initially started to feel unwell in 2021.  At that time, he presented to an urgent care with cough and was diagnosed with Flu B.  His cough worsened and his voice started to become hoarse.  He ultimately came to the ER where he was diagnosed with FluB again as well as pneumonia and was told he had a lung mass.  From there he saw Dr. Johnson.  Bronchoscopy was delayed because of mucus retention cyst which was removed by Dr. Malone and then he returned to Dr. Johnson and had bronchoscopy.  This revealed heaped up / friable mucosa in thedistal L bronchus.  Brushings and biopsies were done and EBUS biopsies of Station 4L/10L LNs.  This revealed  squamous cell carcinoma.  Meanwhile he was found to have a soft tissue lesion on his L upper back and FNA showed metastatic squamous cell carcinoma.      INTERVAL HISTORY:i  Mr. Oakley is here today for follow up of metastatic squamous cell lung cancer. He has received 1 dose Carbo/Taxol Q21d and tolerated it well. He has completed all planned XRT and tolerated his treatments well. He reports nausea and vomiting x 2 days, but this is overall improved. He also reports continued hip pain, but this is also improved. He reports his appetite has improved, breathing is good and he is overall improving in his daily functioning.  He has had trouble with Fentanyl patches falling off and has started to tape them to his leg so they don't fall off.       PAST MEDICAL HISTORY:  Past Medical History:   Diagnosis Date   • Anxiety    • Arthritis    • Back pain    • Bone cancer (CMS/HCC)    • Brain cancer (CMS/HCC)    • Carpal tunnel syndrome     bilateral   • COPD (chronic obstructive pulmonary disease) (CMS/HCC)    • Frequency of urination    • GERD (gastroesophageal reflux disease)    • Heartburn    • Herniated cervical disc    • Lung cancer (CMS/HCC)    • Seizures (CMS/HCC) 2014    treated with Tegretol as a child/teenager since 9 months old, currently well controlled   • Squamous cell lung cancer (CMS/HCC) 6/10/2021         PAST SURGICAL HISTORY:  Past Surgical History:   Procedure Laterality Date   • ABDOMINAL SURGERY     • BIOPSY OF BACK/FLANK N/A 5/18/2021    Procedure: BIOPSY SOFT TISSUE BACK / FLANK;  Surgeon: Hans Malone MD;  Location: [facility];  Service: ENT;  Laterality: N/A;   • BRONCHOSCOPY N/A 5/26/2021    Procedure: BRONCHOSCOPY WITH ENDOBRONCHIAL ULTRASOUND;  Surgeon: Saurabh Johnson MD;  Location: [facility] OR;  Service: Pulmonary;  Laterality: N/A;   • CARDIAC CATHETERIZATION     • EPIDIDYMECTOMY Right 6/27/2018    Procedure: EPIDIDYMECTOMY;  Surgeon: Chino Aviles MD;  Location: [facility] OR;   Service: Urology   • HERNIA REPAIR     • LARYNGOSCOPY N/A 5/18/2021    Procedure: MICRODIRECT LARYNGOSCOPY;  Surgeon: Hans Malone MD;  Location: [facility] OR;  Service: ENT;  Laterality: N/A;   • MOUTH SURGERY      teeth    • ORCHIECTOMY Right 10/19/2020    Procedure: ORCHIECTOMY;  Surgeon: Chino Aviles MD;  Location: [facility] OR;  Service: Urology;  Laterality: Right;   • OTHER SURGICAL HISTORY  06/2021    mass removal from throat   • TESTICLE SURGERY     • VENOUS ACCESS DEVICE (PORT) INSERTION Left 7/13/2021    Procedure: INSERTION VENOUS ACCESS DEVICE;  Surgeon: Eugenia Lundy MD;  Location: [facility] OR;  Service: General;  Laterality: Left;       FAMILY HISTORY:  Family History   Problem Relation Age of Onset   • Cancer Father         liver   • Hypertension Father    • Diabetes Father    • Hypertension Mother    • Cancer Maternal Uncle    • Heart disease Maternal Uncle    • Heart disease Paternal Aunt    • Cancer Maternal Grandmother    • Heart disease Maternal Grandmother        SOCIAL HISTORY:  Social History     Socioeconomic History   • Marital status:      Spouse name: Not on file   • Number of children: Not on file   • Years of education: Not on file   • Highest education level: Not on file   Tobacco Use   • Smoking status: Current Every Day Smoker     Packs/day: 1.00     Years: 14.00     Pack years: 14.00     Types: Cigarettes   • Smokeless tobacco: Former User     Types: Snuff   Vaping Use   • Vaping Use: Never used   Substance and Sexual Activity   • Alcohol use: Yes     Alcohol/week: 2.0 standard drinks     Types: 2 Shots of liquor per week     Comment: occasionally   • Drug use: No   • Sexual activity: Defer     Birth control/protection: None     Social History     Social History Narrative    , lives with wife. Worked in furniture building, as a salesman,     Exposed to burning rubber at his current job with [employer]    Current smoker 1 ppd           REVIEW OF SYSTEMS:   A  comprehensive 14 point review of systems was performed.  Significant findings as mentioned above.  All other systems reviewed and are negative.      MEDICATIONS:  The current medication list was reviewed in the EMR    Current Outpatient Medications:   •  albuterol (PROVENTIL) (2.5 MG/3ML) 0.083% nebulizer solution, Take 2.5 mg by nebulization 2 (two) times a day., Disp: , Rfl:   •  carBAMazepine (TEGretol) 200 MG tablet, Take 200 mg by mouth 2 (Two) Times a Day. Takes 1.5 tabs in am and 2 tabs at hs, Disp: , Rfl:   •  dexamethasone (DECADRON) 4 MG tablet, Take 1 tablet by mouth 2 (Two) Times a Day With Meals., Disp: 60 tablet, Rfl: 3  •  diazePAM (VALIUM) 5 MG tablet, Take 1 tablet by mouth Every 8 (Eight) Hours As Needed for Anxiety., Disp: 90 tablet, Rfl: 0  •  EPINEPHrine (ADRENALIN) 1 MG/ML injection, Inject 1 mcg/mL under the skin into the appropriate area as directed. FOR BEE STINGS, Disp: , Rfl:   •  escitalopram (Lexapro) 10 MG tablet, Take 1 tablet by mouth Daily., Disp: 30 tablet, Rfl: 11  •  famotidine (PEPCID) 20 MG tablet, Take 1 tablet by mouth 2 (two) times a day., Disp: , Rfl:   •  fentaNYL (DURAGESIC) 100 MCG/HR patch, Place 2 patches on the skin as directed by provider Every 72 (Seventy-Two) Hours., Disp: 20 patch, Rfl: 0  •  fluconazole (DIFLUCAN) 100 MG tablet, Take 2 tablets by mouth Daily., Disp: 14 tablet, Rfl: 0  •  gabapentin (NEURONTIN) 600 MG tablet, 800 mg 3 (Three) Times a Day., Disp: , Rfl:   •  ibuprofen (ADVIL,MOTRIN) 800 MG tablet, ibuprofen 800 mg tablet  TAKE ONE TABLET BY MOUTH THREE TIMES A DAY, Disp: , Rfl:   •  lactulose (CHRONULAC) 10 GM/15ML solution, Take 30 mL by mouth 2 (Two) Times a Day As Needed (for refractory constipation.)., Disp: 473 mL, Rfl: 1  •  Lidocaine Viscous HCl (XYLOCAINE) 2 % solution, SWISH, SPIT, OR SWALLOW 5 TO 10 ML BY MOUTH EVERY 3 HOURS AS NEEDED., Disp: 100 mL, Rfl: 5  •  lidocaine-prilocaine (EMLA) 2.5-2.5 % cream, Apply to port-a-cath about 30  "minutes to 1 hour prior to chemotherapy, Disp: 30 g, Rfl: 3  •  methocarbamol (ROBAXIN) 500 MG tablet, Take 500 mg by mouth 2 (Two) Times a Day., Disp: , Rfl:   •  nicotine (NICODERM CQ) 21 MG/24HR patch, Place 1 patch on the skin as directed by provider Daily., Disp: 28 each, Rfl: 0  •  nystatin (MYCOSTATIN) 446452 UNIT/ML suspension, Swish and swallow 5 mL 4 (Four) Times a Day., Disp: 473 mL, Rfl: 1  •  ondansetron (Zofran) 8 MG tablet, Take 1 tablet by mouth Every 8 (Eight) Hours As Needed for Nausea or Vomiting., Disp: 60 tablet, Rfl: 6  •  ondansetron ODT (Zofran ODT) 8 MG disintegrating tablet, Place 1 tablet on the tongue Every 8 (Eight) Hours As Needed for Nausea or Vomiting., Disp: 60 tablet, Rfl: 5  •  oxyCODONE (ROXICODONE) 20 MG tablet, Take 1 tablet by mouth Every 4 (Four) Hours As Needed for Moderate Pain ., Disp: 84 tablet, Rfl: 0  •  phosphorus (K PHOS NEUTRAL) 155-852-130 MG tablet, Take 1 tablet by mouth 3 (Three) Times a Day for 7 days., Disp: 21 tablet, Rfl: 0  •  prochlorperazine (COMPAZINE) 10 MG tablet, Take 1 tablet by mouth Every 6 (Six) Hours As Needed for Nausea., Disp: 60 tablet, Rfl: 3  •  sennosides-docusate (Senna-S) 8.6-50 MG per tablet, Take 2 tablets by mouth 2 (Two) Times a Day., Disp: 120 tablet, Rfl: 5  •  Syringe 25G X 5/8\" 3 ML misc, Use as directed 2 x weekly, Disp: 24 each, Rfl: 3  •  Testosterone Cypionate (Depo-Testosterone) 200 MG/ML injection, Inject 1/2 cc subcutaneously every Monday and Thursday, Disp: 10 mL, Rfl: 2    ALLERGIES:    Allergies   Allergen Reactions   • Bee Venom Anaphylaxis   • Tramadol Other (See Comments)     Seizures  Ultram         PHYSICAL EXAM:  Vitals:    09/08/21 0821   BP: 114/79   Pulse: 109   Resp: 18   Temp: 98.4 °F (36.9 °C)   TempSrc: Temporal   SpO2: 98%   Weight: 64.8 kg (142 lb 12.8 oz)   Height: 182.9 cm (72.01\")   PainSc:   7   PainLoc: Hip   ECOG score: 2     General:  Awake, alert and oriented, in a wheelchair, but brought his cane " with him  HEENT:  Pupils are equal, round and reactive to light and accommodation, Extra-ocular movements full, Oropharyx clear, mucous membranes moist  Neck:  No JVD, thyromegaly or lymphadenopathy  CV:  Regular rate and rhythm, no murmurs, rubs or gallops  Resp:  Lungs are clear to auscultation bilaterally, no crackles  Abd:  Soft, non-tender, non-distended, bowel sounds present, no organomegaly or masses  Soft Tissue:  Over his L  Upper back there are multiple soft tissue lesions.  The largest is kind of in the midline in the archie-scapular area he has a rounded ST mass,approximately 3 cm  in diameter.  Many have flattened out.  There are a couple of small lesions about 1 cm each.  Ext:  No clubbing, cyanosis or edema  Lymph:  No cervical, supraclavicular, axillary, inguinal or femoral adenopathy  Neuro:  MS as above, CN II-XII intact    PATHOLOGY:  05-18-21 05-26-21              ENDOSCOPY:  Bronchoscopy 05-26-21  Findings: Irregular, friable mucosa in the distal left main stem bronchus at the left upper lobe takeoff      IMAGING:  CT Chest wo contrast 04-05-21  FINDINGS:  Small pericardial effusion is noted. There is no pleural effusion. There is AP window adenopathy measuring 2.1 cm. There is probably some left hilar adenopathy as well, poorly characterized without IV contrast. There is also some soft tissue abutting the  distal aortic arch and proximal descending aorta suggesting tumor or adenopathy. Upper abdominal images show a contracted gallbladder.     Lung windows show COPD. There is some mild narrowing of both proximal upper and lower lobe bronchi on the left side possibly due to extrinsic compression. Bronchoscopy may be beneficial. No suspicious pulmonary nodules are identified. There is some  scarring in the left upper lobe. The lungs are otherwise clear. No CT findings present to indicate pneumonia. Note: CT may be negative in the earliest stages of COVID-19. There are no suspicious osseous  lesions.     IMPRESSION:     1. COPD with no evidence of acute pneumonia.  2. AP window and probably left hilar and perihilar adenopathy concerning for malignancy. There is some mild extrinsic compression of the proximal left upper and left lower lobe bronchi. Bronchoscopy may be beneficial. While a contrast-enhanced chest CT will provide some additional diagnostic information, PET CT would be more beneficial.      CTCAP 06-18-21  FINDINGS:     LUNGS: 7 mm parenchymal nodule anteriorly in the left lung on image 35  of the axial series.  Airspace disease in the left upper lobe adjacent to the hilum.  1 cm parenchymal nodule in the right lung on image 48 of the axial  series.     HEART: Trace pericardial effusion.     MEDIASTINUM: Abnormal mediastinal adenopathy particularly in the  aorticopulmonary window, azygoesophageal recess and most notably  surrounding the left main pulmonary artery.     PLEURA: No pleural effusion. No pleural mass or abnormal calcification.  No pneumothorax.     VASCULATURE: No evidence of aneurysm. There is extrinsic mass effect on  the left lower lobe pulmonary artery but I do not see a pulmonary  embolus on the presented exam.     BONES: No acute bony abnormality.     VISUALIZED UPPER ABDOMEN:Please see the CT report for the abdomen and  pelvis.     Other: There is bilateral axillary adenopathy. 1.2 cm left axillary  lymph node and a 1.3 cm right axillary lymph node.     IMPRESSION:     1. Mediastinal, left hilar, and bilateral axillary adenopathy.  2. Right parenchymal nodule and left parenchymal nodule.  3. Also airspace disease in the left upper lobe concerning for  pneumonia.  4. Trace pericardial effusion.    FINDINGS:     Lower thorax: Please see the CT report for the chest.     Abdomen:     Liver: Multiple low-attenuation lesions throughout the liver compatible  with extensive hepatic metastasis.     Gallbladder: No dilation or stone identified.     Pancreas: Unremarkable. No mass or  ductal dilatation.     Spleen: Homogeneous. No splenomegaly.     Adrenals: Small bilateral adrenal nodules. Given the history, these are  concerning for metastatic disease.     Kidneys/ureters: No mass. No obstructive uropathy.  No evidence of  urolithiasis.     GI tract: Moderate volume stool.     MESENTERY: No free fluid, walled off fluid collections, mesenteric  stranding, or enlarged lymph nodes         Vasculature: Evidence of atherosclerotic vascular disease.     Abdominal wall: No focal hernia or mass.        Bladder: Mild thickening of the urinary bladder wall.     Reproductive: Unremarkable as visualized     Bones: No acute bony abnormality.     IMPRESSION:     1. Multiple low-attenuation lesions in the liver compatible with  metastatic disease.     2.Small bilateral adrenal nodules, also concerning for metastatic  disease.     3. Mild thickening of the urinary bladder wall.      Bone scan 06-21-21     FINDINGS: Focal area of increased tracer uptake corresponds to known  pathologic lesion of the left posterior eighth rib. Focal area of  increased uptake in the left inguinal region appears to localize to  tracer activity in the adjacent soft tissues or external to the patient.  There is focal area of increased tracer activity in the right femoral  neck which when correlated to previous CT corresponds to pathologic  lesion. Both kidneys are visualized.      IMPRESSION:  1. Abnormal tracer activity in the right femoral neck region and left  posterior eighth rib compatible with metastatic disease. Please note,  these appear predominantly lytic on CT.  2. Otherwise physiologic distribution of tracer.      MRI Brain w/wo contrast 06-25-21  FINDINGS:    Brain:  There are numerous ring-enhancing cystic type lesions  throughout the cerebral hemispheres compatible with cystic type  metastases in light of patient history.  For example, a right temporal  lobe ring-enhancing lesion is 1.3 cm.  A left inferior medial  frontal  lobe lesion is 8.7 mm.  A left parietal lobe region within the  postcentral gyrus is 0.7 cm.  Smaller subcortical and parenchymal  metastases are noted.  No significant mass effect or vasogenic edema  identified.  No obvious cerebellar lesions identified.  No restricted  diffusion to indicate acute infarct.  No hemorrhage.    Midline shift:  There is no midline shift.    Ventricles:  No hydrocephalus.    Bones/joints:  No destructive calvarial lesions identified on MRI.    Sinuses:  Unremarkable as visualized.  No acute sinusitis.    Mastoid air cells:  Fluid in the right mastoid air cells noted.    Orbits:  Unremarkable as visualized.     IMPRESSION:  1.  Numerous predominantly cystic but rim-enhancing lesions throughout  the brain which are most consistent with numerous metastatic lesions.  2.  No significant vasogenic edema identified. No mass effect or midline  shift.  3.  Right mastoid effusion.  4.  No acute intracranial findings identified.         RECENT LABS:  Lab Results   Component Value Date    WBC 8.15 09/08/2021    HGB 11.6 (L) 09/08/2021    HCT 36.9 (L) 09/08/2021    MCV 95.8 09/08/2021    RDW 13.0 09/08/2021     (H) 09/08/2021    NEUTRORELPCT 66.0 09/08/2021    LYMPHORELPCT 19.3 (L) 09/08/2021    MONORELPCT 13.3 (H) 09/08/2021    EOSRELPCT 0.4 09/08/2021    BASORELPCT 0.5 09/08/2021    NEUTROABS 5.39 09/08/2021    LYMPHSABS 1.57 09/08/2021       Lab Results   Component Value Date     (L) 09/08/2021    K 4.7 09/08/2021    CO2 26.0 09/08/2021    CL 95 (L) 09/08/2021    BUN 11 09/08/2021    CREATININE 0.66 (L) 09/08/2021    EGFRIFNONA 128 09/08/2021    GLUCOSE 104 (H) 09/08/2021    CALCIUM 9.6 09/08/2021    ALKPHOS 256 (H) 09/08/2021    AST 7 09/08/2021    ALT 15 09/08/2021    BILITOT <0.2 09/08/2021    ALBUMIN 3.78 09/08/2021    PROTEINTOT 7.0 09/08/2021    MG 2.0 09/01/2021    PHOS 2.1 (L) 09/01/2021     LDH   Date Value Ref Range Status   06/09/2021 278 (H) 135 - 225 U/L Final      Uric Acid   Date Value Ref Range Status   06/09/2021 3.8 3.4 - 7.0 mg/dL Final     Lab Results   Component Value Date    FERRITIN 171.10 06/09/2021    IRON 35 (L) 06/09/2021    TIBC 311 06/09/2021    LABIRON 11 (L) 06/09/2021    XAWBGLNI04 511 06/09/2021    FOLATE 4.79 06/09/2021     Lab Results   Component Value Date    TSH 0.503 06/09/2021         ASSESSMENT & PLAN:  Vipin Oakley is a very pleasant 51 y.o. 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  Tumor sent for CARIS testing and was negative for ALK, BRAF, EGFR, RET, ROS1, MET, HER2, PD-L1.  TMB was low.  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    1.  Metastatic squamous cell Carcinoma of the lung:  -  He understands that his cancer is treatable but not curable.  -  I have been very concerned with the extensive nature of his metastatic disease as well as with rapid progression with multiple new soft tissue lesions.  -  He has seen DrsEva Moura, Filiberto and Davin.  He completed cyberknife radiosurgery to his brain lesions and palliatve radiation to the R hip, L rib and subsequently proton radiation to ST metastases. I spoke with radiation oncology this AM and they have completed all planned radiation at this point.  - Started treatment with Carboplatin and Taxol as well as Xgeva bone therapy.    -  He appears to be responding well to treatment. Will continue with C2 today as planned. Will check MRI Brain, CT CAP and bone scan prior to C3.    2.  Neoplasm related pain:  -  Well controlled on Fentanyl 200 mcg (100x2) and Oxycodone 20 mg ii tabs 4x/day.  He had patches fall off, so will give 2 additional doses (4 patches) to make up for this.  Will increase # of Oxycodone 20 mg tabs to 8/day (112 q 14d).  Told him not to take more than this.  -  Continue Senna/  Colace ii BID and lactulose as needed for refractory constipation.    3.  Nausea/vomiting:  -  Likely related to combination of CNS metastasis and constipation.  Addressing both as above.   -  Continue Zofran / Compazine as needed.     4. Depression / Anxiety:  -  Mr. Oakley has understandably been upset given his recent diagnosis and was struggling significantly when I first met him..    -  He had urgent psychiatry evaluation after his first visit and has since followed up for outpatient therapy at [facility].   -  He continues to complain of anxiety.  He was taking Valium 5 mg daily but says this isn't controlling his symptoms.  Started Lexapro 10 mg daily and increased Valium to 5 mg 1-2 tabs BID prn #90. If this doesn't control his symptoms, will refer him back to psychiatric nurse practitioner.     5.  Difficulty with working:  -  I think this is at least part of Mr. Oakley's stress.  Given his current situation, he is not able to work and I have told him it would be appropriate for him to apply for disability. Our  has been working with him to help him with this.    6.  H/o Seizure d/o from the age of 9 months:  -  Says he hasn't had a seizure in a very long time despite brain lesions.  Takes Tegretol to prevent seizures. He hasn't had any changes in dose, etc for some time. Sodium is a little low today.  Will monitor carefully.    7.  Prophylaxis:  Received 2020 influenza vaccine and Prevnar 13 vaccinations.  COVID vaccination was recommended but declined.    8.  ACO / VIRI/Other  Quality measures  -  Vipin Oakley received 2020 flu vaccine.     -  Vipin Oakley reports a pain score of 0.  Given his pain assessment as noted, treatment options were discussed and the following options were decided upon as a follow-up plan to address the patient's pain: prescription for opiod analgesics.  -  Current outpatient and discharge medications have been reconciled for the  patient.  Reviewed by: Yu Foster MD     9.  F/u:  - continue Fentanyl patch to 200 mcg q72h  - Continue Oxycodone 20 mg 1-2 tabs q6h prn breakthrough (#112 q 14d)- Continue Senna/Colace ii BID with Lactulose as needed  - Continue Chemotherapy as planned with Carboplatin / Taxol and monthly Xgeva  - Continue Lexapro 10 mg daily  - Continue Valium to 5 mg 1-2 tabs BID PRN #90  - Continue Carbo/Taxol and Xgeva  - Continue Fentanyl patch and Oxycodone as prescribed.   - Repeat CTCAP, bone scan and MRI Brain prior to C3 on 9/29  - Return to see me with cycle 3 after above testing.     This note was scribed for Yu Foster MD by Liliya Brandt RN.    I, Yu Foster MD, personally performed the services described in this documentation as scribed by the above named individual in my presence, and it is both accurate and complete.  09/08/2021       I spent 35 minutes with Vipin Oakley today.  In the office today, more than 50% of this time was spent face-to-face with him  in counseling / coordination of care, reviewing his medical history and counseling on the current treatment plan.  All questions were answered to his satisfaction      Electronically Signed by: Yu Foster MD      CC:     VITOR Mckee MD Frederick Bunge, MD Weisi Yan, MD Barbara Michna, MD Thomas Hunter, MD Charles Benjamin Newman, MD

## 2021-09-09 NOTE — PROGRESS NOTES
SS spoke with patient this date.   Patient receiving chemo treatment.  Patient provided with gas cards provided through [employer] program.     Patient provided with chocolate boost this date.    SS will follow.

## 2021-09-14 NOTE — PROGRESS NOTES
"Date of Service: September 13, 2021  Time In: 12:30 PM  Time Out: 1:15 PM      PROGRESS NOTE  Data:  Vipin Oakley is a 51 y.o. male who met 1: 1 with the undersigned for regularly scheduled individual outpatient psychotherapy session at [facility] for follow-up of depression and adjustment disorder secondary to recent diagnosis of cancer.  Patient and the undersigned more mask throughout the session and maintained appropriate distancing.     HPI: Patient presents for session with the assistance of a walking cane and states he believes he recently pulled a muscle in his leg.  Patient appears to be somewhat unsteady and has difficulty ambulating.  Patient does report things are going well and states he believes his cancer treatment is effective.  However, he states he has upcoming scans to assess for status.  Patient states he was approved for some security disability benefits and states this has removed some level of stress.  He reports he worries about his wife as she has struggled with his illness and states he believes she should \"talk to someone\".  The patient reports symptoms and periods of depression including sad mood, anhedonia, anergia, feelings of anger, feelings of hopelessness, and states he has difficulty not focusing on the fact that if he is no longer here his wife and daughter will struggle as they have always been dependent upon him.  Patient also reports he struggles with periods of significant anxiety and fear including anxious mood, feeling on edge, feeling overwhelmed, increased heart rate, trembling, and periods of a significant sense of impending doom.  Patient also states his primary care provider told him that psychiatric provider could increase his benzodiazepine and the patient states he believes he would do well with a low dose of alprazolam.  Patient reports he continues to adhere to medication regimen as prescribed and vehemently denies any substance " use.  The patient further adamantly convincingly denies active suicidal ideation, intent, or plan.  Patient is able to identify internal and external protective factors.    Clinical Maneuvering/Intervention:  Assisted patient in processing above session content; acknowledged and normalized patient’s thoughts, feelings, and concerns.  Allow the patient to discuss/process his feelings concerning his recent diagnosis of lung cancer and the difficulty is experienced and validated his feelings.  Also validated the patient's belief that alprazolam would decrease his symptoms but also provided information and education concerning the inherent danger in being prescribed benzodiazepines and opioids together.  In fact, pointed out the fact the patient is on 200 mcg of fentanyl and reiterated the fact this is a high dose of powerful pain medication and encouraged him to use strictly as prescribed.  Continue to utilize cognitive behavioral therapy to assist the patient in developing appropriate coping mechanisms decrease in severity and frequency of symptoms and continue to encourage the patient to find ways to keep himself busy and occupied and to be as active as possible.  Further encouraged the patient to follow through with getting a second dose of the COVID-19 vaccine and keep all scheduled appointments with his providers and follow recommendations.  Provided unconditional positive regarding a safe, supportive environment.    Allowed patient to freely discuss issues without interruption or judgment. Provided safe, confidential environment to facilitate the development of positive therapeutic relationship and encourage open, honest communication. Assisted patient in identifying risk factors which would indicate the need for higher level of care including thoughts to harm self or others and/or self-harming behavior and encouraged patient to contact this office, call 911, or present to the nearest emergency room should any of  these events occur. Discussed crisis intervention services and means to access.  Patient adamantly and convincingly denies current suicidal or homicidal ideation or perceptual disturbance.        Assessment    Patient appears to maintain relative stability at this time as compared to his baseline.  However, he continues to struggle with significant depression and anxiety secondary to his recent diagnosis of lung cancer and the difficulty of adjusting to this new reality.  However, the patient does have severe illness which will cause significant issues.  As result, he can be reasonably expected benefit from treatment and would likely be at increased risk for decompensation otherwise.    Diagnoses and all orders for this visit:    1. Current moderate episode of major depressive disorder without prior episode (CMS/[facility]) (Primary)    2. Adjustment disorder with mixed anxiety and depressed mood               REVIEW OF SYSTEMS:  Review of Systems       Mental Status Exam:   Appearance: Well groomed  Build: Thin  Hygiene:   good  Cooperation:  Cooperative  Eye Contact:  Good  Psychomotor Behavior:  Appropriate  Affect:  Appropriate  Mood: anxious  Hopelessness: 1  Speech:  Normal  Thought Process:  Linear  Thought Content:  Normal  Suicidal:  Passive suicidal ideation as a direct reaction to stress  Homicidal:  None  Hallucinations:  None  Delusion:  None  Memory:  Intact  Orientation:  Person, Place and Time  Reliability:  good  Insight:  Fair  Judgement:  Fair  Impulse Control:  Fair  Behavior: No behavior issues        Patient's Support Network Includes:  wife, daughter and extended family    Progress toward goal: Not at goal    Functional Status: Severe impairment    Prognosis: Guarded with Ongoing Treatment        Plan     Plan:   Patient will continue in individual outpatient therapy session at [facility] in 2 to 3 weeks.  Patient will continue with primary care provider and adhere to  medication regimen as prescribed and report any side effects. Patient will contact this office, call 911 or present to the nearest emergency room should suicidal or homicidal ideations occur. Provide Cognitive Behavioral Therapy and Integrative Therapy to improve functioning, maintain stability, and avoid decompensation and the need for higher level of care.  Patient and his wife was instructed to contact this office if suicidal thoughts return or to present at the nearest emergency room.  Patient was also instructed to contact this office if symptomology increases.             Return in about 3 weeks (around 10/4/2021) for Next scheduled follow up.         This document has been electronically signed by Chip Santoyo LCSW, CLAUDIO   September 14, 2021 08:32 EDT

## 2021-09-16 NOTE — TELEPHONE ENCOUNTER
Caller: Vipin Oakley    Relationship: Self    Best call back number:[phone]  Medication needed:     fentaNYL (DURAGESIC) 100 MCG/HR patch  2 patch, Every 72 Hours       When do you need the refill by: TODAY   What additional details did the patient provide when requesting the medication: REFILL ON THUR  Does the patient have less than a 3 day supply:  [x] Yes  [] No    What is the patient's preferred pharmacy: [facility] [address] [phone] [facility] [phone] FX

## 2021-09-28 NOTE — PROGRESS NOTES
"Date of Service: September 27, 2021  Time In: 12:25 PM  Time Out: 1:00 PM      PROGRESS NOTE  Data:  Vipin Oakley is a 51 y.o. male who met 1: 1 with the undersigned for regularly scheduled individual outpatient psychotherapy session at [facility] for follow-up of depression and adjustment disorder secondary to recent diagnosis of cancer.  Patient and the undersigned more mask throughout the session and maintained appropriate distancing.     HPI: Patient presents for session with the assistance of a walking cane and states he believes he recently pulled a muscle in his leg.  Patient appears to be somewhat unsteady and has difficulty ambulating.  He also states he believes he has a \"tumor\" somewhere in his leg and states he is hopeful his medical providers will be able to address this.  Patient does report things are going well and states he believes his cancer treatment has been effective.   The patient reports symptoms and periods of depression including sad mood, anhedonia, anergia, feelings of anger, feelings of hopelessness, and states he has difficulty not focusing on the fact that if he is no longer here his wife and daughter will struggle as they have always been dependent upon him.  Patient also reports he struggles with periods of significant anxiety and fear including anxious mood, feeling on edge, feeling overwhelmed, increased heart rate, trembling, and periods of a significant sense of impending doom.  Patient also states he realizes he needs to stop smoking cigarettes and requests having nicotine lozenges prescribed.  Patient reports he continues to adhere to medication regimen as prescribed and vehemently denies any substance use.  The patient further adamantly convincingly denies active suicidal ideation, intent, or plan.  Patient is able to identify internal and external protective factors.    Clinical Maneuvering/Intervention:  Assisted patient in processing " above session content; acknowledged and normalized patient’s thoughts, feelings, and concerns.  Allow the patient to discuss/process his feelings and ongoing frustrations with his medical issues and the difficulty he has had adjusting to his new reality.  Also praised the patient for his willingness and ability to adjust and encouraged him to continue to make an effort to find ways to enjoy his life on a daily basis.  Also praised the patient for his willingness to begin the process of attempting to stop smoking and informed him his primary care provider should be able to prescribe appropriate medication to assist.  Continue to utilize cognitive behavioral therapy to assist the patient in developing appropriate coping mechanisms to decrease the severity and frequency of symptoms.  Provided unconditional positive regarding a safe, supportive environment.    Allowed patient to freely discuss issues without interruption or judgment. Provided safe, confidential environment to facilitate the development of positive therapeutic relationship and encourage open, honest communication. Assisted patient in identifying risk factors which would indicate the need for higher level of care including thoughts to harm self or others and/or self-harming behavior and encouraged patient to contact this office, call 911, or present to the nearest emergency room should any of these events occur. Discussed crisis intervention services and means to access.  Patient adamantly and convincingly denies current suicidal or homicidal ideation or perceptual disturbance.        Assessment    Patient appears to maintain relative stability at this time as compared to his baseline.  However, he continues to struggle with significant depression and anxiety secondary to his recent diagnosis of lung cancer and the difficulty of adjusting to this new reality.  However, the patient does have severe illness which will cause significant issues.  As result,  he can be reasonably expected benefit from treatment and would likely be at increased risk for decompensation otherwise.    Diagnoses and all orders for this visit:    1. Current moderate episode of major depressive disorder without prior episode (HCC) (Primary)    2. Adjustment disorder with mixed anxiety and depressed mood               REVIEW OF SYSTEMS:  Review of Systems       Mental Status Exam:   Appearance: Well groomed  Build: Thin  Hygiene:   good  Cooperation:  Cooperative  Eye Contact:  Good  Psychomotor Behavior:  Appropriate  Affect:  Appropriate  Mood: anxious  Hopelessness: 1  Speech:  Normal  Thought Process:  Linear  Thought Content:  Normal  Suicidal:  Passive suicidal ideation as a direct reaction to stress  Homicidal:  None  Hallucinations:  None  Delusion:  None  Memory:  Intact  Orientation:  Person, Place and Time  Reliability:  good  Insight:  Fair  Judgement:  Fair  Impulse Control:  Fair  Behavior: No behavior issues        Patient's Support Network Includes:  wife, daughter and extended family    Progress toward goal: Not at goal    Functional Status: Severe impairment    Prognosis: Guarded with Ongoing Treatment        Plan     Plan:   Patient will continue in individual outpatient therapy session at [facility] in 2 to 3 weeks.  Patient will continue with primary care provider and adhere to medication regimen as prescribed and report any side effects. Patient will contact this office, call 911 or present to the nearest emergency room should suicidal or homicidal ideations occur. Provide Cognitive Behavioral Therapy and Integrative Therapy to improve functioning, maintain stability, and avoid decompensation and the need for higher level of care.  Patient and his wife was instructed to contact this office if suicidal thoughts return or to present at the nearest emergency room.  Patient was also instructed to contact this office if symptomology increases.              Return in about 3 weeks (around 10/18/2021) for Next scheduled follow up.         This document has been electronically signed by Chip Santoyo LCSW, CLAUDIO   September 28, 2021 07:55 EDT

## 2021-09-28 NOTE — TELEPHONE ENCOUNTER
Caller: Vipin Oakley    Relationship: Self      Medication requested (name and dosage): FENTANYL, 100 MCG/HR PATCH  OXYCODONE 20-MG. TABLET     Pharmacy where request should be sent: [facility]    Additional details provided by patient:   USING LAST PATCH NOW    Best call back number: [phone]    Does the patient have less than a 3 day supply:  [x] Yes  [] No    NEEDS REFILLED TODAY    THANKS

## 2021-09-29 NOTE — TELEPHONE ENCOUNTER
Caller: Vipin Oakley    Relationship: Self     Medication requested (name and dosage): VALIUM 5MG    Pharmacy where request should be sent: [facility]    Best call back number: [phone]    Does the patient have less than a 3 day supply:  [x] Yes  [] No    Vijaya Mcdonald Rep   09/29/21 09:09 EDT

## 2021-09-30 NOTE — PROGRESS NOTES
Patient in clinic today receiving chemotherapy.  SS spoke with patient.  Patient request chocolate boost this date.  Patient provided with chocolate boost per dietary.      SS will follow as needed.

## 2021-09-30 NOTE — PROGRESS NOTES
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    Tumor sent for CARIS testing and was negative for ALK, BRAF, EGFR, RET, ROS1, MET, HER2, PD-L1.  TMB was low.  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      TREATMENT HISTORY:   1.  Stereotactic Radiation to the Brain     Treatment Site  Current Dose  Modality  From  To  Elapsed Days  Fx.    6 Brain Mets - Fx 2  1,800 cGy  x06  2021  1    5 Brain Mets - Fx 3  1,800 cGy  x06  2021  1    5 Brain Mets - Fx 1  1,800 cGy  x06  2021  1      2.  Dr. Moura delivered palliative radiation to the R femoral neck and 8th rib.  Further palliative radiation planned but currently on hold.     3. Xgeva bone therapy started 21    4.       5.  Received palliative radiation with proton therapy to ST metastases    CHIEF COMPLAINT:  Follow up of metastatic squamous cell lung cancer/Toxicity check    HISTORY OF PRESENT ILLNESS:   Vipin Oakley is a very pleasant 51 y.o. male who was referred by Dr. Johnson for evaluation and treatment of squamous cell lung cancer.  He initially started to feel unwell in 2021.  At that time, he presented to an urgent care with cough and was diagnosed with Flu B.  His cough worsened and his voice started to become hoarse.  He ultimately came to the ER where he was diagnosed with FluB again as well as pneumonia and was told he had a lung mass.  From there he saw Dr. Johnson.  Bronchoscopy was delayed because of mucus retention cyst which was removed by Dr. Malone and then he returned to Dr. Johnson and had bronchoscopy.  This revealed heaped up / friable mucosa in thedistal L bronchus.  Brushings and biopsies were done and EBUS biopsies of Station 4L/10L LNs.   This revealed squamous cell carcinoma.  Meanwhile he was found to have a soft tissue lesion on his L upper back and FNA showed metastatic squamous cell carcinoma.      INTERVAL HISTORY:  Mr. Oakley presents today for follow up of metastatic squamous cell lung cancer and toxicity check. He has completed two cycles of [id number] carboplatin/taxol to date. Overall, he reports that he is tolerating the treatments very well. He has mild nausea/vomiting which is well controlled with zofran and compazine as needed. He reports a good appetite, hydrating well. Denies constipation or diarrhea. Pain is well controlled with Fentanyl patch 200 mcg q72h and oxycodone 20 mg as needed. He denies any specific complaints today.           PAST MEDICAL HISTORY:  Past Medical History:   Diagnosis Date   • Anxiety    • Arthritis    • Back pain    • Bone cancer (CMS/HCC)    • Brain cancer (CMS/HCC)    • Carpal tunnel syndrome     bilateral   • COPD (chronic obstructive pulmonary disease) (CMS/HCC)    • Frequency of urination    • GERD (gastroesophageal reflux disease)    • Heartburn    • Herniated cervical disc    • Lung cancer (CMS/HCC)    • Seizures (CMS/HCC) 2014    treated with Tegretol as a child/teenager since 9 months old, currently well controlled   • Squamous cell lung cancer (CMS/HCC) 6/10/2021         PAST SURGICAL HISTORY:  Past Surgical History:   Procedure Laterality Date   • ABDOMINAL SURGERY     • BIOPSY OF BACK/FLANK N/A 5/18/2021    Procedure: BIOPSY SOFT TISSUE BACK / FLANK;  Surgeon: Hans Malone MD;  Location: [facility] OR;  Service: ENT;  Laterality: N/A;   • BRONCHOSCOPY N/A 5/26/2021    Procedure: BRONCHOSCOPY WITH ENDOBRONCHIAL ULTRASOUND;  Surgeon: Saurabh Johnson MD;  Location: [facility] OR;  Service: Pulmonary;  Laterality: N/A;   • CARDIAC CATHETERIZATION     • EPIDIDYMECTOMY Right 6/27/2018    Procedure: EPIDIDYMECTOMY;  Surgeon: Chino Aviles MD;  Location: [facility] OR;  Service: Urology   •  HERNIA REPAIR     • LARYNGOSCOPY N/A 5/18/2021    Procedure: MICRODIRECT LARYNGOSCOPY;  Surgeon: Hans Malone MD;  Location: [facility] OR;  Service: ENT;  Laterality: N/A;   • MOUTH SURGERY      teeth    • ORCHIECTOMY Right 10/19/2020    Procedure: ORCHIECTOMY;  Surgeon: Chino Aviles MD;  Location: [facility] OR;  Service: Urology;  Laterality: Right;   • OTHER SURGICAL HISTORY  06/2021    mass removal from throat   • TESTICLE SURGERY     • VENOUS ACCESS DEVICE (PORT) INSERTION Left 7/13/2021    Procedure: INSERTION VENOUS ACCESS DEVICE;  Surgeon: Eugenia Lundy MD;  Location: [facility] OR;  Service: General;  Laterality: Left;       FAMILY HISTORY:  Family History   Problem Relation Age of Onset   • Cancer Father         liver   • Hypertension Father    • Diabetes Father    • Hypertension Mother    • Cancer Maternal Uncle    • Heart disease Maternal Uncle    • Heart disease Paternal Aunt    • Cancer Maternal Grandmother    • Heart disease Maternal Grandmother        SOCIAL HISTORY:  Social History     Socioeconomic History   • Marital status:      Spouse name: Not on file   • Number of children: Not on file   • Years of education: Not on file   • Highest education level: Not on file   Tobacco Use   • Smoking status: Current Every Day Smoker     Packs/day: 1.00     Years: 14.00     Pack years: 14.00     Types: Cigarettes   • Smokeless tobacco: Former User     Types: Snuff   Vaping Use   • Vaping Use: Never used   Substance and Sexual Activity   • Alcohol use: Yes     Alcohol/week: 2.0 standard drinks     Types: 2 Shots of liquor per week     Comment: occasionally   • Drug use: No   • Sexual activity: Defer     Birth control/protection: None     Social History     Social History Narrative    , lives with wife. Worked in furniture building, as a salesman,     Exposed to burning rubber at his current job with [employer]    Current smoker 1 ppd           REVIEW OF SYSTEMS:   A comprehensive 14  point review of systems was performed.  Significant findings as mentioned above.  All other systems reviewed and are negative.      MEDICATIONS:  The current medication list was reviewed in the EMR    Current Outpatient Medications:   •  albuterol (PROVENTIL) (2.5 MG/3ML) 0.083% nebulizer solution, Take 2.5 mg by nebulization 2 (two) times a day., Disp: , Rfl:   •  carBAMazepine (TEGretol) 200 MG tablet, Take 200 mg by mouth 2 (Two) Times a Day. Takes 1.5 tabs in am and 2 tabs at hs, Disp: , Rfl:   •  dexamethasone (DECADRON) 4 MG tablet, Take 1 tablet by mouth 2 (Two) Times a Day With Meals., Disp: 60 tablet, Rfl: 3  •  diazePAM (VALIUM) 5 MG tablet, Take 1 tablet by mouth Every 8 (Eight) Hours As Needed for Anxiety., Disp: 90 tablet, Rfl: 0  •  EPINEPHrine (ADRENALIN) 1 MG/ML injection, Inject 1 mcg/mL under the skin into the appropriate area as directed. FOR BEE STINGS, Disp: , Rfl:   •  escitalopram (Lexapro) 10 MG tablet, Take 1 tablet by mouth Daily., Disp: 30 tablet, Rfl: 11  •  famotidine (PEPCID) 20 MG tablet, Take 1 tablet by mouth 2 (two) times a day., Disp: , Rfl:   •  gabapentin (NEURONTIN) 600 MG tablet, 800 mg 3 (Three) Times a Day., Disp: , Rfl:   •  ibuprofen (ADVIL,MOTRIN) 800 MG tablet, ibuprofen 800 mg tablet  TAKE ONE TABLET BY MOUTH THREE TIMES A DAY, Disp: , Rfl:   •  lactulose (CHRONULAC) 10 GM/15ML solution, Take 30 mL by mouth 2 (Two) Times a Day As Needed (for refractory constipation.)., Disp: 473 mL, Rfl: 1  •  Lidocaine Viscous HCl (XYLOCAINE) 2 % solution, SWISH, SPIT, OR SWALLOW 5 TO 10 ML BY MOUTH EVERY 3 HOURS AS NEEDED., Disp: 100 mL, Rfl: 5  •  lidocaine-prilocaine (EMLA) 2.5-2.5 % cream, Apply to port-a-cath about 30 minutes to 1 hour prior to chemotherapy, Disp: 30 g, Rfl: 3  •  methocarbamol (ROBAXIN) 500 MG tablet, Take 500 mg by mouth 2 (Two) Times a Day., Disp: , Rfl:   •  nicotine (NICODERM CQ) 21 MG/24HR patch, Place 1 patch on the skin as directed by provider Daily., Disp:  "28 each, Rfl: 0  •  nystatin (MYCOSTATIN) 806583 UNIT/ML suspension, Swish and swallow 5 mL 4 (Four) Times a Day., Disp: 473 mL, Rfl: 1  •  ondansetron (Zofran) 8 MG tablet, Take 1 tablet by mouth Every 8 (Eight) Hours As Needed for Nausea or Vomiting., Disp: 60 tablet, Rfl: 6  •  ondansetron ODT (Zofran ODT) 8 MG disintegrating tablet, Place 1 tablet on the tongue Every 8 (Eight) Hours As Needed for Nausea or Vomiting., Disp: 60 tablet, Rfl: 5  •  oxyCODONE (ROXICODONE) 20 MG tablet, Take 1 tablet by mouth Every 4 (Four) Hours As Needed for Moderate Pain ., Disp: 112 tablet, Rfl: 0  •  prochlorperazine (COMPAZINE) 10 MG tablet, TAKE ONE TABLET BY MOUTH EVERY 6 HOURS AS NEEDED FOR NAUSEA, Disp: 60 tablet, Rfl: 3  •  sennosides-docusate (Senna-S) 8.6-50 MG per tablet, Take 2 tablets by mouth 2 (Two) Times a Day., Disp: 120 tablet, Rfl: 5  •  Syringe 25G X 5/8\" 3 ML misc, Use as directed 2 x weekly, Disp: 24 each, Rfl: 3  •  Testosterone Cypionate (Depo-Testosterone) 200 MG/ML injection, Inject 1/2 cc subcutaneously every Monday and Thursday, Disp: 10 mL, Rfl: 2  •  fentaNYL (DURAGESIC) 100 MCG/HR patch, Place 2 patches on the skin as directed by provider Every 72 (Seventy-Two) Hours., Disp: 10 patch, Rfl: 0  •  fluconazole (DIFLUCAN) 100 MG tablet, Take 2 tablets by mouth Daily., Disp: 14 tablet, Rfl: 0  No current facility-administered medications for this visit.    Facility-Administered Medications Ordered in Other Visits:   •  CARBOplatin (PARAPLATIN) 820 mg in sodium chloride 0.9 % 332 mL chemo IVPB, 820 mg, Intravenous, Once, Yu Foster MD  •  heparin injection 500 Units, 500 Units, Intravenous, PRN, Nina Moon APRN  •  PACLitaxel (TAXOL) 370 mg in sodium chloride 0.9 % 561.7 mL chemo IVPB, 370 mg, Intravenous, Once, Yu Foster MD, 370 mg at 09/30/21 1033  •  sodium chloride 0.9 % flush 10 mL, 10 mL, Intravenous, PRN, Nina Moon, VITOR    ALLERGIES:    Allergies "   Allergen Reactions   • Bee Venom Anaphylaxis   • Tramadol Other (See Comments)     Seizures  Ultram         PHYSICAL EXAM:  Vitals:    09/30/21 0828   BP: 117/79   Pulse: 114   Resp: 18   Temp: 98.9 °F (37.2 °C)   TempSrc: Temporal   SpO2: 98%   Weight: 63.5 kg (140 lb 1.6 oz)   PainSc: 10-Worst pain ever   PainLoc: Back   ECOG score: 2     General:  Awake, alert and oriented, in a wheelchair, also ambulating with assistance of cane. In no acute distress.  HEENT:  Pupils are equal, round and reactive to light and accommodation, Extra-ocular movements full, Oropharyx clear, mucous membranes moist  Neck:  No JVD, thyromegaly or lymphadenopathy  CV:  Regular rate and rhythm, no murmurs, rubs or gallops  Resp:  Lungs are clear to auscultation bilaterally, no crackles  Abd:  Soft, non-tender, non-distended, bowel sounds present, no organomegaly or masses  Soft Tissue:  Over his L  Upper back there are multiple soft tissue lesions.  The largest is kind of in the midline in the archie-scapular area he has a rounded ST mass,approximately 3 cm  in diameter.  Many have flattened out.  There are a couple of small lesions about 1 cm each.  Ext:  No clubbing, cyanosis or edema  Lymph:  No cervical, supraclavicular, axillary adenopathy  Neuro:  MS as above, CN II-XII intact    PATHOLOGY:  05-18-21 05-26-21              ENDOSCOPY:  Bronchoscopy 05-26-21  Findings: Irregular, friable mucosa in the distal left main stem bronchus at the left upper lobe takeoff      IMAGING:  CT Chest wo contrast 04-05-21  FINDINGS:  Small pericardial effusion is noted. There is no pleural effusion. There is AP window adenopathy measuring 2.1 cm. There is probably some left hilar adenopathy as well, poorly characterized without IV contrast. There is also some soft tissue abutting the  distal aortic arch and proximal descending aorta suggesting tumor or adenopathy. Upper abdominal images show a contracted gallbladder.     Lung windows show  COPD. There is some mild narrowing of both proximal upper and lower lobe bronchi on the left side possibly due to extrinsic compression. Bronchoscopy may be beneficial. No suspicious pulmonary nodules are identified. There is some  scarring in the left upper lobe. The lungs are otherwise clear. No CT findings present to indicate pneumonia. Note: CT may be negative in the earliest stages of COVID-19. There are no suspicious osseous lesions.     IMPRESSION:     1. COPD with no evidence of acute pneumonia.  2. AP window and probably left hilar and perihilar adenopathy concerning for malignancy. There is some mild extrinsic compression of the proximal left upper and left lower lobe bronchi. Bronchoscopy may be beneficial. While a contrast-enhanced chest CT will provide some additional diagnostic information, PET CT would be more beneficial.      CTCAP 06-18-21  FINDINGS:     LUNGS: 7 mm parenchymal nodule anteriorly in the left lung on image 35  of the axial series.  Airspace disease in the left upper lobe adjacent to the hilum.  1 cm parenchymal nodule in the right lung on image 48 of the axial  series.     HEART: Trace pericardial effusion.     MEDIASTINUM: Abnormal mediastinal adenopathy particularly in the  aorticopulmonary window, azygoesophageal recess and most notably  surrounding the left main pulmonary artery.     PLEURA: No pleural effusion. No pleural mass or abnormal calcification.  No pneumothorax.     VASCULATURE: No evidence of aneurysm. There is extrinsic mass effect on  the left lower lobe pulmonary artery but I do not see a pulmonary  embolus on the presented exam.     BONES: No acute bony abnormality.     VISUALIZED UPPER ABDOMEN:Please see the CT report for the abdomen and  pelvis.     Other: There is bilateral axillary adenopathy. 1.2 cm left axillary  lymph node and a 1.3 cm right axillary lymph node.     IMPRESSION:     1. Mediastinal, left hilar, and bilateral axillary adenopathy.  2. Right  parenchymal nodule and left parenchymal nodule.  3. Also airspace disease in the left upper lobe concerning for  pneumonia.  4. Trace pericardial effusion.    FINDINGS:     Lower thorax: Please see the CT report for the chest.     Abdomen:     Liver: Multiple low-attenuation lesions throughout the liver compatible  with extensive hepatic metastasis.     Gallbladder: No dilation or stone identified.     Pancreas: Unremarkable. No mass or ductal dilatation.     Spleen: Homogeneous. No splenomegaly.     Adrenals: Small bilateral adrenal nodules. Given the history, these are  concerning for metastatic disease.     Kidneys/ureters: No mass. No obstructive uropathy.  No evidence of  urolithiasis.     GI tract: Moderate volume stool.     MESENTERY: No free fluid, walled off fluid collections, mesenteric  stranding, or enlarged lymph nodes         Vasculature: Evidence of atherosclerotic vascular disease.     Abdominal wall: No focal hernia or mass.        Bladder: Mild thickening of the urinary bladder wall.     Reproductive: Unremarkable as visualized     Bones: No acute bony abnormality.     IMPRESSION:     1. Multiple low-attenuation lesions in the liver compatible with  metastatic disease.     2.Small bilateral adrenal nodules, also concerning for metastatic  disease.     3. Mild thickening of the urinary bladder wall.      Bone scan 06-21-21     FINDINGS: Focal area of increased tracer uptake corresponds to known  pathologic lesion of the left posterior eighth rib. Focal area of  increased uptake in the left inguinal region appears to localize to  tracer activity in the adjacent soft tissues or external to the patient.  There is focal area of increased tracer activity in the right femoral  neck which when correlated to previous CT corresponds to pathologic  lesion. Both kidneys are visualized.      IMPRESSION:  1. Abnormal tracer activity in the right femoral neck region and left  posterior eighth rib compatible with  metastatic disease. Please note,  these appear predominantly lytic on CT.  2. Otherwise physiologic distribution of tracer.      MRI Brain w/wo contrast 06-25-21  FINDINGS:    Brain:  There are numerous ring-enhancing cystic type lesions  throughout the cerebral hemispheres compatible with cystic type  metastases in light of patient history.  For example, a right temporal  lobe ring-enhancing lesion is 1.3 cm.  A left inferior medial frontal  lobe lesion is 8.7 mm.  A left parietal lobe region within the  postcentral gyrus is 0.7 cm.  Smaller subcortical and parenchymal  metastases are noted.  No significant mass effect or vasogenic edema  identified.  No obvious cerebellar lesions identified.  No restricted  diffusion to indicate acute infarct.  No hemorrhage.    Midline shift:  There is no midline shift.    Ventricles:  No hydrocephalus.    Bones/joints:  No destructive calvarial lesions identified on MRI.    Sinuses:  Unremarkable as visualized.  No acute sinusitis.    Mastoid air cells:  Fluid in the right mastoid air cells noted.    Orbits:  Unremarkable as visualized.     IMPRESSION:  1.  Numerous predominantly cystic but rim-enhancing lesions throughout  the brain which are most consistent with numerous metastatic lesions.  2.  No significant vasogenic edema identified. No mass effect or midline  shift.  3.  Right mastoid effusion.  4.  No acute intracranial findings identified.     NM Bone Scan 09/18/2021  FINDINGS: Abnormal intense increased tracer uptake involving the sternum  and manubrium.  Abnormal intense increased tracer uptake involving 2 sites of the  calvaria.  Abnormal intense increased tracer uptake involving thoracic and lumbar  vertebral bodies.  Left posterior rib uptake is noted as well as lower levels of increased  tracer uptake involving mid left posterior ribs.  Abnormal uptake right femoral neck region.  Abnormal uptake involving the left ischium.  Mid left femur diaphysis lesion.      IMPRESSION:  Interval progression of disease. Abnormal exam demonstrating  diffuse osteoblastic metastatic disease of the axial and appendicular  skeleton as detailed above. Of note, right femoral neck and left femoral  diaphysis involvement is noted, new since prior exam.     CT Chest with contrast 09/22/2021  CT FINDINGS: On the lung windows there are emphysematous changes in both  lungs. There is some increased density in the left upper lung.  A  portion of this may represent post radiation change. The left lower lobe  and right lung are clear. There is no evidence of supraclavicular  lymphadenopathy. In the mediastinum there are enlarged lymph nodes. The  overall volume of adenopathy however is decreasing. The heart was not  enlarged.  There is fluid in the pericardial sac surrounding the heart.     IMPRESSION:  There is some volume loss in the left upper lobe with what  appears to be some post radiation changes in the lungs. There continues  to be adenopathy in the mediastinum this is however improved in  comparing with the June exam. The lungs continue to show changes of  emphysema throughout both lungs. There is a moderate-sized pericardial  effusion.     CT AP with contrast 09/22/2021  CT FINDINGS: The liver continues to be abnormal. There are low density  areas in all segments of the liver consistent with metastatic disease.  These are stable in size to perhaps slightly smaller than on the earlier  CT. The spleen was unremarkable. There is no evidence of mass in the  pancreas. The aorta is normal in caliber. No adrenal lesions are  identified. The kidneys enhance appropriately and show no evidence of  obstruction. There is no evidence of ascites. In the pelvis there were  no masses or fluid collections. On the bone windows there are lesions in  the lumbar spine and sacrum consistent with metastatic disease.     IMPRESSION:  Continued evidence of metastatic disease in the liver. The  size of the lesions  are decreasing. No new lesions have developed. There  are multiple bony metastatic deposits in the lumbar spine and sacrum.       RECENT LABS:  Lab Results   Component Value Date    WBC 7.53 09/29/2021    HGB 12.2 (L) 09/29/2021    HCT 36.5 (L) 09/29/2021    MCV 94.6 09/29/2021    RDW 17.1 (H) 09/29/2021     (L) 09/29/2021    NEUTRORELPCT 61.7 09/29/2021    LYMPHORELPCT 30.1 09/29/2021    MONORELPCT 7.2 09/29/2021    EOSRELPCT 0.3 09/29/2021    BASORELPCT 0.4 09/29/2021    NEUTROABS 4.65 09/29/2021    LYMPHSABS 2.27 09/29/2021       Lab Results   Component Value Date     (L) 09/29/2021    K 3.5 09/29/2021    CO2 22.3 09/29/2021     09/29/2021    BUN 16 09/29/2021    CREATININE 0.72 (L) 09/29/2021    EGFRIFNONA 116 09/29/2021    GLUCOSE 104 (H) 09/29/2021    CALCIUM 8.7 09/29/2021    ALKPHOS 144 (H) 09/29/2021    AST 11 09/29/2021    ALT 12 09/29/2021    BILITOT 0.2 09/29/2021    ALBUMIN 4.38 09/29/2021    PROTEINTOT 7.2 09/29/2021    MG 1.9 09/29/2021    PHOS 3.0 09/29/2021     LDH   Date Value Ref Range Status   06/09/2021 278 (H) 135 - 225 U/L Final     Uric Acid   Date Value Ref Range Status   06/09/2021 3.8 3.4 - 7.0 mg/dL Final     Lab Results   Component Value Date    FERRITIN 171.10 06/09/2021    IRON 35 (L) 06/09/2021    TIBC 311 06/09/2021    LABIRON 11 (L) 06/09/2021    [id number] 511 06/09/2021    FOLATE 4.79 06/09/2021     Lab Results   Component Value Date    TSH 0.503 06/09/2021         ASSESSMENT & PLAN:  Vipin Adin is a very pleasant 51 y.o. 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  Tumor sent for CARIS testing and was negative for ALK, BRAF, EGFR, RET, ROS1, MET, HER2, PD-L1.  TMB was low.  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    1.  Metastatic squamous cell Carcinoma of  the lung:  - He understands that his cancer is treatable but not curable.  - Very concerned with the extensive nature of his metastatic disease as well as with rapid progression with multiple new soft tissue lesions.  -  He has seen Filiberto Brown and Davin.  He completed cyberknife radiosurgery to his brain lesions and palliatve radiation to the R hip, L rib and subsequently proton radiation to ST metastases. Radiation oncology has completed all planned radiation at this point.  - He began treatment with Carboplatin and Taxol as well as Xgeva bone therapy and is overall tolerating the treatments well.   - Recent CT imaging is showing improvement in adenopathy and decrease in size of liver lesions. No new lesions have developed. Bone scan shows interval progression of disease. MRI is scheduled for 10/04/2021.  Will continue with C3 today as planned.     2.  Neoplasm related pain:  -  Well controlled on Fentanyl 200 mcg (100x2) and Oxycodone 20 mg tabs to 8/day (112 q 14d). Advised patient to let us know if he is requiring more than 8 per day.  -  Continue Senna/ Colace ii BID and lactulose as needed for refractory constipation.    3.  Nausea/vomiting:  -  Likely related to combination of CNS metastasis and constipation. Overall, improved advised to continue Zofran / Compazine as needed.     4. Depression / Anxiety:  -  Mr. Oakley has understandably been upset given his recent diagnosis and was struggling significantly during initial consultation.  -  He had urgent psychiatry evaluation after his first visit and has since followed up for outpatient therapy at [facility].   -  He continues to complain of anxiety. Started Lexapro 10 mg daily and increased Valium to 5 mg 1-2 tabs BID prn #90. He reports symptoms have improved today. Will continue to monitor.    5.  Difficulty with working:  - Likely, largely contributing to Mr. Oakley's stress.  Given his current situation, he is not able to  work and I have told him it would be appropriate for him to apply for disability. He is working closely with our .    6.  H/o Seizure d/o from the age of 9 months:  -  Says he hasn't had a seizure in a very long time despite brain lesions.  Takes Tegretol to prevent seizures. He hasn't had any changes in dose, etc for some time. Will monitor carefully.    7.  Prophylaxis:  Received 2020 influenza vaccine and Prevnar 13 vaccinations.  COVID vaccination was recommended but declined.    8.  F/u:  - Continue Fentanyl patch to 200 mcg q72h.  - Continue Oxycodone 20 mg 1-2 tabs q6h prn breakthrough (#112 q 14d).  - Continue Senna/Colace ii BID with Lactulose as needed.  - Continue Lexapro 10 mg daily.  - Continue Valium to 5 mg 1-2 tabs BID PRN #90.  - Continue Carbo/Taxol and Xgeva, cycle 3 today as planned.  - MRI Brain scheduled for 10/04/2021.  - With MD as scheduled.    ACO / VIRI/Other  Quality measures  -  Vipin Oakley did not receive 2021 flu vaccine. This was recommended.  -  Vipin Oakley reports a pain score of 10.  Given his pain assessment as noted, treatment options were discussed and the following options were decided upon as a follow-up plan to address the patient's pain: continuation of current treatment plan for pain.  -  Current outpatient and discharge medications have been reconciled for the patient.  Reviewed by: VITOR Ocampo      I spent 30 minutes with Vipin Oakley today.  In the office today, more than 50% of this time was spent face-to-face with him  in counseling / coordination of care, reviewing his medical history and counseling on the current treatment plan.  All questions were answered to his satisfaction      Electronically Signed by: VITOR Ocampo      CC:     VITOR Mckee MD Frederick Bunge, MD Weisi Yan, MD Barbara Michna, MD Thomas Hunter, MD Charles Benjamin Newman, MD

## 2021-10-14 NOTE — TELEPHONE ENCOUNTER
Caller: Vipin Oakley    Relationship: Self    Medication requested (name and dosage):   OXYCODONE, 20-MG.   FENTANYL PATCH 100-MCG    Pharmacy where request should be sent:   [facility] @ [phone]    Best call back number: [phone]    Does the patient have less than a 3 day supply:  [x] Yes  [] No    ALSO, PATIENT HAS BEEN TESTED FOR COVID AND IS WAITING FOR TEST RESULTS. HE WILL CALL WHEN HE KNOWS THE RESULTS     Vijaya RUFFIN Rep   10/14/21 13:04 EDT

## 2021-10-20 NOTE — PROGRESS NOTES
NAME: Vipin Oakley    : 1970    DATE:  10/21/2021    DIAGNOSIS:   Metastatic squamous cell carcinoma of the LLL with low volume primary disease in the LLL, metastases to hilar and mediastinal LNs, as well as R lung, liver, bone and brain multiple soft tissue metastasis over his posterior torso.    Tumor sent for CARIS testing and was negative for ALK, BRAF, EGFR, RET, ROS1, MET, HER2, PD-L1.  TMB was low.  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      TREATMENT HISTORY:   1.  Stereotactic Radiation to the Brain     Treatment Site  Current Dose  Modality  From  To  Elapsed Days  Fx.    6 Brain Mets - Fx 2  1,800 cGy  x06  2021  1    5 Brain Mets - Fx 3  1,800 cGy  x06  2021  1    5 Brain Mets - Fx 1  1,800 cGy  x06  2021  1      2.  Dr. Moura delivered palliative radiation to the R femoral neck and 8th rib.  Further palliative radiation planned but currently on hold.     3. Xgeva bone therapy started 21    4.       5.  Received palliative radiation with proton therapy to ST metastases    CHIEF COMPLAINT:  Follow up of metastatic squamous cell lung cancer    HISTORY OF PRESENT ILLNESS:   Vipin Oakley is a very pleasant 51 y.o. male who was referred by Dr. Johnson for evaluation and treatment of squamous cell lung cancer.  He initially started to feel unwell in 2021.  At that time, he presented to an urgent care with cough and was diagnosed with Flu B.  His cough worsened and his voice started to become hoarse.  He ultimately came to the ER where he was diagnosed with FluB again as well as pneumonia and was told he had a lung mass.  From there he saw Dr. Johnson.  Bronchoscopy was delayed because of mucus retention cyst which was removed by Dr. Malone and then he returned to Dr. Johnson and had bronchoscopy.  This revealed heaped up / friable mucosa in thedistal L bronchus.  Brushings and biopsies were done and EBUS biopsies of Station 4L/10L LNs.  This revealed  squamous cell carcinoma.  Meanwhile he was found to have a soft tissue lesion on his L upper back and FNA showed metastatic squamous cell carcinoma.      INTERVAL HISTORY:i  Mr. Oakley is here today for follow up of metastatic squamous cell lung cancer.  He is doing much better since his last visit. He says he is able to be more active. He was able to weed eat yesterday.  He says overall he is feeling well.  He is eating well and gaining weight.  Breathing is good. He continues to complain of a lot of pain.  He said he finally figured out why he was running low on fentanyl patches. He was changing patches every other day rather than q72h.  He asks if his oxycodone could be increased.  He is struggling with a decubitus ulcer and asks what he should do to heal it.      PAST MEDICAL HISTORY:  Past Medical History:   Diagnosis Date   • Anxiety    • Arthritis    • Back pain    • Bone cancer (CMS/HCC)    • Brain cancer (CMS/HCC)    • Carpal tunnel syndrome     bilateral   • COPD (chronic obstructive pulmonary disease) (CMS/HCC)    • Frequency of urination    • GERD (gastroesophageal reflux disease)    • Heartburn    • Herniated cervical disc    • Lung cancer (CMS/HCC)    • Seizures (CMS/HCC) 2014    treated with Tegretol as a child/teenager since 9 months old, currently well controlled   • Squamous cell lung cancer (CMS/HCC) 6/10/2021         PAST SURGICAL HISTORY:  Past Surgical History:   Procedure Laterality Date   • ABDOMINAL SURGERY     • BIOPSY OF BACK/FLANK N/A 5/18/2021    Procedure: BIOPSY SOFT TISSUE BACK / FLANK;  Surgeon: Hans Malone MD;  Location:  [facility] OR;  Service: ENT;  Laterality: N/A;   • BRONCHOSCOPY N/A 5/26/2021    Procedure: BRONCHOSCOPY WITH ENDOBRONCHIAL ULTRASOUND;  Surgeon: Saurabh Johnson MD;  Location:  [facility] OR;  Service: Pulmonary;  Laterality: N/A;   • CARDIAC CATHETERIZATION     • EPIDIDYMECTOMY Right 6/27/2018    Procedure: EPIDIDYMECTOMY;  Surgeon: Chino Aviles,  MD;  Location: [facility] OR;  Service: Urology   • HERNIA REPAIR     • LARYNGOSCOPY N/A 5/18/2021    Procedure: MICRODIRECT LARYNGOSCOPY;  Surgeon: Hans Malone MD;  Location: [facility] OR;  Service: ENT;  Laterality: N/A;   • MOUTH SURGERY      teeth    • ORCHIECTOMY Right 10/19/2020    Procedure: ORCHIECTOMY;  Surgeon: Chino Aviles MD;  Location: [facility] OR;  Service: Urology;  Laterality: Right;   • OTHER SURGICAL HISTORY  06/2021    mass removal from throat   • TESTICLE SURGERY     • VENOUS ACCESS DEVICE (PORT) INSERTION Left 7/13/2021    Procedure: INSERTION VENOUS ACCESS DEVICE;  Surgeon: Eugenia Lundy MD;  Location: [facility] OR;  Service: General;  Laterality: Left;       FAMILY HISTORY:  Family History   Problem Relation Age of Onset   • Cancer Father         liver   • Hypertension Father    • Diabetes Father    • Hypertension Mother    • Cancer Maternal Uncle    • Heart disease Maternal Uncle    • Heart disease Paternal Aunt    • Cancer Maternal Grandmother    • Heart disease Maternal Grandmother        SOCIAL HISTORY:  Social History     Socioeconomic History   • Marital status:    Tobacco Use   • Smoking status: Current Every Day Smoker     Packs/day: 1.00     Years: 14.00     Pack years: 14.00     Types: Cigarettes   • Smokeless tobacco: Former User     Types: Snuff   Vaping Use   • Vaping Use: Never used   Substance and Sexual Activity   • Alcohol use: Yes     Alcohol/week: 2.0 standard drinks     Types: 2 Shots of liquor per week     Comment: occasionally   • Drug use: No   • Sexual activity: Defer     Birth control/protection: None     Social History     Social History Narrative    , lives with wife. Worked in furniture building, as a salesman,     Exposed to burning rubber at his current job with [employer]    Current smoker 1 ppd           REVIEW OF SYSTEMS:   A comprehensive 14 point review of systems was performed.  Significant findings as mentioned above.  All other  systems reviewed and are negative.      MEDICATIONS:  The current medication list was reviewed in the EMR    Current Outpatient Medications:   •  albuterol (PROVENTIL) (2.5 MG/3ML) 0.083% nebulizer solution, Take 2.5 mg by nebulization 2 (two) times a day., Disp: , Rfl:   •  carBAMazepine (TEGretol) 200 MG tablet, Take 200 mg by mouth 2 (Two) Times a Day. Takes 1.5 tabs in am and 2 tabs at hs, Disp: , Rfl:   •  dexamethasone (DECADRON) 4 MG tablet, Take 1 tablet by mouth 2 (Two) Times a Day With Meals., Disp: 60 tablet, Rfl: 3  •  diazePAM (VALIUM) 5 MG tablet, Take 1 tablet by mouth Every 8 (Eight) Hours As Needed for Anxiety., Disp: 90 tablet, Rfl: 0  •  EPINEPHrine (ADRENALIN) 1 MG/ML injection, Inject 1 mcg/mL under the skin into the appropriate area as directed. FOR BEE STINGS, Disp: , Rfl:   •  escitalopram (Lexapro) 10 MG tablet, Take 1 tablet by mouth Daily., Disp: 30 tablet, Rfl: 11  •  famotidine (PEPCID) 20 MG tablet, Take 1 tablet by mouth 2 (two) times a day., Disp: , Rfl:   •  fentaNYL (DURAGESIC) 100 MCG/HR patch, Place 2 patches on the skin as directed by provider Every 72 (Seventy-Two) Hours., Disp: 10 patch, Rfl: 0  •  fluconazole (DIFLUCAN) 100 MG tablet, Take 2 tablets by mouth Daily., Disp: 14 tablet, Rfl: 0  •  gabapentin (NEURONTIN) 600 MG tablet, 800 mg 3 (Three) Times a Day., Disp: , Rfl:   •  ibuprofen (ADVIL,MOTRIN) 800 MG tablet, ibuprofen 800 mg tablet  TAKE ONE TABLET BY MOUTH THREE TIMES A DAY, Disp: , Rfl:   •  lactulose (CHRONULAC) 10 GM/15ML solution, Take 30 mL by mouth 2 (Two) Times a Day As Needed (for refractory constipation.)., Disp: 473 mL, Rfl: 1  •  Lidocaine Viscous HCl (XYLOCAINE) 2 % solution, SWISH, SPIT, OR SWALLOW 5 TO 10 ML BY MOUTH EVERY 3 HOURS AS NEEDED., Disp: 100 mL, Rfl: 5  •  lidocaine-prilocaine (EMLA) 2.5-2.5 % cream, Apply to port-a-cath about 30 minutes to 1 hour prior to chemotherapy, Disp: 30 g, Rfl: 3  •  methocarbamol (ROBAXIN) 500 MG tablet, Take 500  "mg by mouth 2 (Two) Times a Day., Disp: , Rfl:   •  nicotine (NICODERM CQ) 21 MG/24HR patch, Place 1 patch on the skin as directed by provider Daily., Disp: 28 each, Rfl: 0  •  nystatin (MYCOSTATIN) 347329 UNIT/ML suspension, Swish and swallow 5 mL 4 (Four) Times a Day., Disp: 473 mL, Rfl: 1  •  ondansetron (Zofran) 8 MG tablet, Take 1 tablet by mouth Every 8 (Eight) Hours As Needed for Nausea or Vomiting., Disp: 60 tablet, Rfl: 6  •  ondansetron ODT (Zofran ODT) 8 MG disintegrating tablet, Place 1 tablet on the tongue Every 8 (Eight) Hours As Needed for Nausea or Vomiting., Disp: 60 tablet, Rfl: 5  •  oxyCODONE (ROXICODONE) 20 MG tablet, Take 1 tablet by mouth Every 4 (Four) Hours As Needed for Moderate Pain ., Disp: 112 tablet, Rfl: 0  •  prochlorperazine (COMPAZINE) 10 MG tablet, TAKE ONE TABLET BY MOUTH EVERY 6 HOURS AS NEEDED FOR NAUSEA, Disp: 60 tablet, Rfl: 3  •  sennosides-docusate (Senna-S) 8.6-50 MG per tablet, Take 2 tablets by mouth 2 (Two) Times a Day., Disp: 120 tablet, Rfl: 5  •  Syringe 25G X 5/8\" 3 ML misc, Use as directed 2 x weekly, Disp: 24 each, Rfl: 3  •  Testosterone Cypionate (Depo-Testosterone) 200 MG/ML injection, Inject 1/2 cc subcutaneously every Monday and Thursday, Disp: 10 mL, Rfl: 2    ALLERGIES:    Allergies   Allergen Reactions   • Bee Venom Anaphylaxis   • Tramadol Other (See Comments)     Seizures  Ultram         PHYSICAL EXAM:  Vitals:    10/21/21 0858   BP: 111/84   Pulse: 103   Resp: 18   Temp: 97.5 °F (36.4 °C)   TempSrc: Temporal   SpO2: 98%   Weight: 67.8 kg (149 lb 6.4 oz)   Height: 182.9 cm (72\")   PainSc:   9   PainLoc: Buttocks   ECOG score: 1     General:  Awake, alert and oriented, in good spirits.  Smells of tobacco.   HEENT:  Pupils are equal, round and reactive to light and accommodation, Extra-ocular movements full, Oropharyx clear, mucous membranes moist  Neck:  No JVD, thyromegaly or lymphadenopathy  CV:  Regular tachycardia, no murmurs, rubs or gallops  Resp:  " Lungs are clear to auscultation bilaterally, no wheezing  Abd:  Soft, non-tender, non-distended, bowel sounds present, no organomegaly or masses  Soft Tissue:  Over his L  Upper back there are multiple soft tissue lesions.  These have improved.    He has a sacral decubitus wound at the top of the gluteal cleft.  About 5 cm stage I, just under 1 cm stage 2.  Ext:  No clubbing, cyanosis or edema  Lymph:  No cervical, supraclavicular, axillary, inguinal or femoral adenopathy  Neuro:  MS as above, CN II-XII intact    PATHOLOGY:  05-18-21 05-26-21              ENDOSCOPY:  Bronchoscopy 05-26-21  Findings: Irregular, friable mucosa in the distal left main stem bronchus at the left upper lobe takeoff      IMAGING:  CT Chest wo contrast 04-05-21  FINDINGS:  Small pericardial effusion is noted. There is no pleural effusion. There is AP window adenopathy measuring 2.1 cm. There is probably some left hilar adenopathy as well, poorly characterized without IV contrast. There is also some soft tissue abutting the  distal aortic arch and proximal descending aorta suggesting tumor or adenopathy. Upper abdominal images show a contracted gallbladder.     Lung windows show COPD. There is some mild narrowing of both proximal upper and lower lobe bronchi on the left side possibly due to extrinsic compression. Bronchoscopy may be beneficial. No suspicious pulmonary nodules are identified. There is some  scarring in the left upper lobe. The lungs are otherwise clear. No CT findings present to indicate pneumonia. Note: CT may be negative in the earliest stages of COVID-19. There are no suspicious osseous lesions.     IMPRESSION:     1. COPD with no evidence of acute pneumonia.  2. AP window and probably left hilar and perihilar adenopathy concerning for malignancy. There is some mild extrinsic compression of the proximal left upper and left lower lobe bronchi. Bronchoscopy may be beneficial. While a contrast-enhanced chest CT will  provide some additional diagnostic information, PET CT would be more beneficial.      CTCAP 06-18-21  FINDINGS:     LUNGS: 7 mm parenchymal nodule anteriorly in the left lung on image 35  of the axial series.  Airspace disease in the left upper lobe adjacent to the hilum.  1 cm parenchymal nodule in the right lung on image 48 of the axial  series.     HEART: Trace pericardial effusion.     MEDIASTINUM: Abnormal mediastinal adenopathy particularly in the  aorticopulmonary window, azygoesophageal recess and most notably  surrounding the left main pulmonary artery.     PLEURA: No pleural effusion. No pleural mass or abnormal calcification.  No pneumothorax.     VASCULATURE: No evidence of aneurysm. There is extrinsic mass effect on  the left lower lobe pulmonary artery but I do not see a pulmonary  embolus on the presented exam.     BONES: No acute bony abnormality.     VISUALIZED UPPER ABDOMEN:Please see the CT report for the abdomen and  pelvis.     Other: There is bilateral axillary adenopathy. 1.2 cm left axillary  lymph node and a 1.3 cm right axillary lymph node.     IMPRESSION:     1. Mediastinal, left hilar, and bilateral axillary adenopathy.  2. Right parenchymal nodule and left parenchymal nodule.  3. Also airspace disease in the left upper lobe concerning for  pneumonia.  4. Trace pericardial effusion.    FINDINGS:     Lower thorax: Please see the CT report for the chest.     Abdomen:     Liver: Multiple low-attenuation lesions throughout the liver compatible  with extensive hepatic metastasis.     Gallbladder: No dilation or stone identified.     Pancreas: Unremarkable. No mass or ductal dilatation.     Spleen: Homogeneous. No splenomegaly.     Adrenals: Small bilateral adrenal nodules. Given the history, these are  concerning for metastatic disease.     Kidneys/ureters: No mass. No obstructive uropathy.  No evidence of  urolithiasis.     GI tract: Moderate volume stool.     MESENTERY: No free fluid, walled  off fluid collections, mesenteric  stranding, or enlarged lymph nodes         Vasculature: Evidence of atherosclerotic vascular disease.     Abdominal wall: No focal hernia or mass.        Bladder: Mild thickening of the urinary bladder wall.     Reproductive: Unremarkable as visualized     Bones: No acute bony abnormality.     IMPRESSION:     1. Multiple low-attenuation lesions in the liver compatible with  metastatic disease.     2.Small bilateral adrenal nodules, also concerning for metastatic  disease.     3. Mild thickening of the urinary bladder wall.      Bone scan 06-21-21     FINDINGS: Focal area of increased tracer uptake corresponds to known  pathologic lesion of the left posterior eighth rib. Focal area of  increased uptake in the left inguinal region appears to localize to  tracer activity in the adjacent soft tissues or external to the patient.  There is focal area of increased tracer activity in the right femoral  neck which when correlated to previous CT corresponds to pathologic  lesion. Both kidneys are visualized.      IMPRESSION:  1. Abnormal tracer activity in the right femoral neck region and left  posterior eighth rib compatible with metastatic disease. Please note,  these appear predominantly lytic on CT.  2. Otherwise physiologic distribution of tracer.      MRI Brain w/wo contrast 06-25-21  FINDINGS:    Brain:  There are numerous ring-enhancing cystic type lesions  throughout the cerebral hemispheres compatible with cystic type  metastases in light of patient history.  For example, a right temporal  lobe ring-enhancing lesion is 1.3 cm.  A left inferior medial frontal  lobe lesion is 8.7 mm.  A left parietal lobe region within the  postcentral gyrus is 0.7 cm.  Smaller subcortical and parenchymal  metastases are noted.  No significant mass effect or vasogenic edema  identified.  No obvious cerebellar lesions identified.  No restricted  diffusion to indicate acute infarct.  No hemorrhage.     Midline shift:  There is no midline shift.    Ventricles:  No hydrocephalus.    Bones/joints:  No destructive calvarial lesions identified on MRI.    Sinuses:  Unremarkable as visualized.  No acute sinusitis.    Mastoid air cells:  Fluid in the right mastoid air cells noted.    Orbits:  Unremarkable as visualized.     IMPRESSION:  1.  Numerous predominantly cystic but rim-enhancing lesions throughout  the brain which are most consistent with numerous metastatic lesions.  2.  No significant vasogenic edema identified. No mass effect or midline  shift.  3.  Right mastoid effusion.  4.  No acute intracranial findings identified.       Bone scan 09-18-21  FINDINGS: Abnormal intense increased tracer uptake involving the sternum  and manubrium.  Abnormal intense increased tracer uptake involving 2 sites of the  calvaria.  Abnormal intense increased tracer uptake involving thoracic and lumbar  vertebral bodies.  Left posterior rib uptake is noted as well as lower levels of increased  tracer uptake involving mid left posterior ribs.  Abnormal uptake right femoral neck region.  Abnormal uptake involving the left ischium.  Mid left femur diaphysis lesion.     IMPRESSION:  Interval progression of disease. Abnormal exam demonstrating  diffuse osteoblastic metastatic disease of the axial and appendicular  skeleton as detailed above. Of note, right femoral neck and left femoral  diaphysis involvement is noted, new since prior exam.      CTCAP 09-22-21  CT FINDINGS: On the lung windows there are emphysematous changes in both  lungs. There is some increased density in the left upper lung.  A  portion of this may represent post radiation change. The left lower lobe  and right lung are clear. There is no evidence of supraclavicular  lymphadenopathy. In the mediastinum there are enlarged lymph nodes. The  overall volume of adenopathy however is decreasing. The heart was not  enlarged.  There is fluid in the pericardial sac surrounding  the heart.     IMPRESSION:  There is some volume loss in the left upper lobe with what  appears to be some post radiation changes in the lungs. There continues  to be adenopathy in the mediastinum this is however improved in  comparing with the June exam. The lungs continue to show changes of  emphysema throughout both lungs. There is a moderate-sized pericardial  Effusion.    CT FINDINGS: The liver continues to be abnormal. There are low density  areas in all segments of the liver consistent with metastatic disease.  These are stable in size to perhaps slightly smaller than on the earlier  CT. The spleen was unremarkable. There is no evidence of mass in the  pancreas. The aorta is normal in caliber. No adrenal lesions are  identified. The kidneys enhance appropriately and show no evidence of  obstruction. There is no evidence of ascites. In the pelvis there were  no masses or fluid collections. On the bone windows there are lesions in  the lumbar spine and sacrum consistent with metastatic disease.     IMPRESSION:  Continued evidence of metastatic disease in the liver. The  size of the lesions are decreasing. No new lesions have developed. There  are multiple bony metastatic deposits in the lumbar spine and sacrum.        Bone scan 09-18-21  FINDINGS: Abnormal intense increased tracer uptake involving the sternum  and manubrium.  Abnormal intense increased tracer uptake involving 2 sites of the  calvaria.  Abnormal intense increased tracer uptake involving thoracic and lumbar  vertebral bodies.  Left posterior rib uptake is noted as well as lower levels of increased  tracer uptake involving mid left posterior ribs.  Abnormal uptake right femoral neck region.  Abnormal uptake involving the left ischium.  Mid left femur diaphysis lesion.     IMPRESSION:  Interval progression of disease. Abnormal exam demonstrating  diffuse osteoblastic metastatic disease of the axial and appendicular  skeleton as detailed above. Of  note, right femoral neck and left femoral  diaphysis involvement is noted, new since prior exam.        CTCAP 09-22-21  CT FINDINGS: On the lung windows there are emphysematous changes in both  lungs. There is some increased density in the left upper lung.  A  portion of this may represent post radiation change. The left lower lobe  and right lung are clear. There is no evidence of supraclavicular  lymphadenopathy. In the mediastinum there are enlarged lymph nodes. The  overall volume of adenopathy however is decreasing. The heart was not  enlarged.  There is fluid in the pericardial sac surrounding the heart.     IMPRESSION:  There is some volume loss in the left upper lobe with what  appears to be some post radiation changes in the lungs. There continues  to be adenopathy in the mediastinum this is however improved in  comparing with the June exam. The lungs continue to show changes of  emphysema throughout both lungs. There is a moderate-sized pericardial  Effusion.    CT FINDINGS: The liver continues to be abnormal. There are low density  areas in all segments of the liver consistent with metastatic disease.  These are stable in size to perhaps slightly smaller than on the earlier  CT. The spleen was unremarkable. There is no evidence of mass in the  pancreas. The aorta is normal in caliber. No adrenal lesions are  identified. The kidneys enhance appropriately and show no evidence of  obstruction. There is no evidence of ascites. In the pelvis there were  no masses or fluid collections. On the bone windows there are lesions in  the lumbar spine and sacrum consistent with metastatic disease.     IMPRESSION:  Continued evidence of metastatic disease in the liver. The  size of the lesions are decreasing. No new lesions have developed. There  are multiple bony metastatic deposits in the lumbar spine and sacrum      MRI Brain w/wo contrast 10-04-21  FINDINGS:  Rim-enhancing metastatic lesions throughout the cerebral  hemispheres  have improved since previous exam. A right temporal lobe lesion is now  10.3 mm and was previously 12.9 mm. A right frontal lobe lesion is now  5.6 mm and was previously 7.6 mm. A left frontal lobe lesion is now 3.9  mm and was previously 8.7 mm. All other subcortical lesions have  decreased in size.     No significant vasogenic edema identified with the exception of the  right temporal lobe lesion.     No midline shift.     Right mastoid effusion is noted.     No new rim-enhancing or solid enhancing lesions identified.     IMPRESSION:  Interval improvement compared to the previous exam with  decrease size and prominence of multiple enhancing brain metastases.      RECENT LABS:  Lab Results   Component Value Date    WBC 7.26 10/21/2021    HGB 10.5 (L) 10/21/2021    HCT 31.2 (L) 10/21/2021    MCV 97.5 (H) 10/21/2021    RDW 21.4 (H) 10/21/2021     10/21/2021    NEUTRORELPCT 61.7 09/29/2021    LYMPHORELPCT 30.1 09/29/2021    MONORELPCT 7.2 09/29/2021    EOSRELPCT 0.3 09/29/2021    BASORELPCT 0.4 09/29/2021    NEUTROABS 4.57 10/21/2021    LYMPHSABS 2.27 09/29/2021       Lab Results   Component Value Date     10/21/2021    K 4.2 10/21/2021    CO2 22.9 10/21/2021     10/21/2021    BUN 13 10/21/2021    CREATININE 0.56 (L) 10/21/2021    EGFRIFNONA >150 10/21/2021    GLUCOSE 141 (H) 10/21/2021    CALCIUM 9.2 10/21/2021    ALKPHOS 122 (H) 10/21/2021    AST 11 10/21/2021    ALT 11 10/21/2021    BILITOT <0.2 10/21/2021    ALBUMIN 4.07 10/21/2021    PROTEINTOT 7.1 10/21/2021    MG 1.9 09/29/2021    PHOS 3.0 09/29/2021     LDH   Date Value Ref Range Status   06/09/2021 278 (H) 135 - 225 U/L Final     Uric Acid   Date Value Ref Range Status   06/09/2021 3.8 3.4 - 7.0 mg/dL Final     Lab Results   Component Value Date    FERRITIN 171.10 06/09/2021    IRON 35 (L) 06/09/2021    TIBC 311 06/09/2021    LABIRON 11 (L) 06/09/2021    [id number] 511 06/09/2021    FOLATE 4.79 06/09/2021     Lab Results    Component Value Date    TSH 0.503 06/09/2021         ASSESSMENT & PLAN:  Vipin Oakley is a very pleasant 51 y.o. 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  Tumor sent for CARIS testing and was negative for ALK, BRAF, EGFR, RET, ROS1, MET, HER2, PD-L1.  TMB was low.  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    1.  Metastatic squamous cell Carcinoma of the lung:  -  He understands that his cancer is treatable but not curable.  -  I have been very concerned with the extensive nature of his metastatic disease as well as with rapid progression with multiple new soft tissue lesions.  -  He has seen Filiberto Brown and Davin.  He completed cyberknife radiosurgery to his brain lesions and palliatve radiation to the R hip, L rib and subsequently proton radiation to ST metastases.   - Started treatment with Carboplatin and Taxol as well as Xgeva bone therapy.  After 4 cycles of treatment, his restaging imaging overall shows improvement.  Lesions in the brain, CAP improved.  He does have evidence of progression in the bone.  I believe it is likely that this progression occurred during radiation before he started systemic therapy.  Will continue current therapy but monitor closely.  -[place] obtain xrays L femur, R femur and pelvis.  -  Plan to restage after 6 cycles treatment.    2.  Neoplasm related pain:  -  Currently taking Fentanyl 200 mcg (100x2) and Oxycodone 20 mg ii tabs 4x/day.    -  This continues to be a problem. He asked for increased dose Oxycodone to 30 mg tabs.  I told him that at this point with increased pain I feel he needs to see a pain specialist.  He refused to go for pain clinic consult and said he would continue current regimen.  -  Will check blood Fentanyl and Oxycodone levels to make sure he is  taking them given that there have been questions.  - Xrays of pelvis / matteo femur  -  Continue Senna/ Colace ii BID and lactulose as needed for refractory constipation.    3.  Nausea/vomiting:  -  Likely related to combination of CNS metastasis and constipation.  Addressing both as above.   -  Continue Zofran / Compazine as needed.     4. Depression / Anxiety:  -  Mr. Oakley has understandably been upset given his recent diagnosis and was struggling significantly when I first met him..    -  He had urgent psychiatry evaluation after his first visit and has since followed up for outpatient therapy at [facility].   -  He continues to complain of anxiety.  He was taking Valium 5 mg daily but says this isn't controlling his symptoms.  Started Lexapro 10 mg daily and increased Valium to 5 mg 1-2 tabs BID prn #90. If this doesn't control his symptoms, will refer him back to psychiatric nurse practitioner.     5.  Difficulty with working:  -  I think this is at least part of Mr. Oakley's stress.  Given his current situation, he is not able to work and I have told him it would be appropriate for him to apply for disability. Our  has been working with him to help him with this.    6.  H/o Seizure d/o from the age of 9 months:  -  Says he hasn't had a seizure in a very long time despite brain lesions.  Takes Tegretol to prevent seizures. He hasn't had any changes in dose, etc for some time. Sodium is a little low today.  Will monitor carefully.    7.  Prophylaxis:  Received 2020 influenza vaccine and Prevnar 13 vaccinations.  COVID vaccination  X 2 completed. He refused 2021 flu vaccine.    8.  ACO / VIRI/Other  Quality measures  -  Vipin Oakley did not receive 2021 flu vaccine.  This was recommended but declined.      -  Vipin Oakley reports a pain score of 0.  Given his pain assessment as noted, treatment options were discussed and the following options were decided upon as a follow-up plan  to address the patient's pain: prescription for opiod analgesics.  -  Current outpatient and discharge medications have been reconciled for the patient.  Reviewed by: Yu Foster MD     9.  F/u:  - continue Fentanyl patch to 200 mcg q72h  - Continue Oxycodone 20 mg 1-2 tabs q6h prn breakthrough (#112 q 14d)  - Continue Senna/Colace ii BID with Lactulose as needed  -  Refused referral to pain management  -  Check blood fentanyl, oxycodone to be sure he is taking it  - Continue Chemotherapy as planned with Carboplatin / Taxol and monthly Xgeva  -  Plan to restage after 6 cycles  -  Check xrays matteo femur, pelvis today.  - Continue Lexapro 10 mg daily  - Continue Valium to 5 mg 1-2 tabs BID PRN #90  - Continue Carbo/Taxol and Xgeva    This note was scribed for Yu Foster MD by Liliya Brandt RN.    I, Yu Foster MD, personally performed the services described in this documentation as scribed by the above named individual in my presence, and it is both accurate and complete.  10/21/2021       I spent 35 minutes with Vipin Oakley today.  In the office today, more than 50% of this time was spent face-to-face with him  in counseling / coordination of care, reviewing his medical history and counseling on the current treatment plan.  All questions were answered to his satisfaction      Electronically Signed by: Yu Foster MD      CC:     VITOR Mckee MD Frederick Bunge, MD Weisi Yan, MD Barbara Michna, MD Thomas Hunter, MD Charles Benjamin Newman, MD

## 2021-10-20 NOTE — TELEPHONE ENCOUNTER
Caller: Vipin Oakley    Relationship: Self    Best call back number: [phone]    What is the best time to reach you: ASAP    Who are you requesting to speak with (clinical staff, provider,  specific staff member): CLINICAL STAFF    Do you know the name of the person who called: VIPIN    What was the call regarding: VIPIN WILL BE IN TOMORROW BUT IS REQUESTING A CREAM FOR HIS BACKSIDE AS HE HAS A WOUND THAT IS CAUSING HIM A LOT OF PAIN, IF SOMETHING CAN BE CALLED IN, PLEASE SEND TO [employer].    Do you require a callback: PLEASE CALL BACK TO ADVISE.

## 2021-10-25 NOTE — TELEPHONE ENCOUNTER
Followed up with patient today regarding as to why he did not have his xrays preformed last week. Patient stated he left after waiting too long to have them done & would like to have them done again Thursday. I told him we will arrange this, he also stated that he could  paperwork needed for his wife at this appointment as well.

## 2021-10-25 NOTE — TELEPHONE ENCOUNTER
Caller: Vipin Oakley    Relationship: Self    Best call back number: [phone]    What form or medical record are you requesting: LETTER FOR HIS WIFE, STATING HE NEEDS FOR HER TO STAY HOME TO HELP WITH  HIS CARE, STATED THIS WAS DISGUST AT HIS APPT. LAST WEEK.    Who is requesting this form or medical record from you: VIPIN    How would you like to receive the form or medical records (pick-up, mail, fax): If pick-up, provide patient with address and location details WILL  THIS Thursday WHEN HE GETS INJECTION, IF IT IS READY.    Timeframe paperwork needed: ASAP    Additional notes: PLEASE CALL PATIENT WITH ANY QUESTIONS, I DID NOT SEE ANYTHING RE: THIS IN HIS CHART.

## 2021-10-27 NOTE — TELEPHONE ENCOUNTER
From: Vipin Oakley  To: Office of Yu Foster MD  Sent: 10/27/2021 9:54 AM EDT  Subject: Medication Renewal Request    Refills have been requested for the following medications:     fentaNYL (DURAGESIC) 100 MCG/HR patch [Yu Foster MD]     oxyCODONE (ROXICODONE) 20 MG tablet [Yu Foster MD]    Preferred pharmacy: [address] [phone] [place] [phone] FX  Delivery method: Pickup      Medication renewals requested in this message routed separately:     diazePAM (VALIUM) 5 MG tablet [Noa Peraza MD]

## 2021-11-01 NOTE — PROGRESS NOTES
Date of Service: November 1, 2021  Time In: 8:50 AM  Time Out: 9:30 AM      PROGRESS NOTE  Data:  Vipin Oakley is a 51 y.o. male who met 1: 1 with the undersigned for regularly scheduled individual outpatient psychotherapy session at [facility] for follow-up of depression and adjustment disorder secondary to recent diagnosis of cancer.  Patient and the undersigned more mask throughout the session and maintained appropriate distancing.     HPI: Patient presents for session with the assistance of a walking cane but states he is getting around better and is using the cane less.  He states his oncologist continues to assess the situation and determine why he is having difficulty ambulating.  Patient also reports he is potentially exposed to COVID over the past few days but states he did have a negative test.  The patient reports symptoms and periods of depression including sad mood, anhedonia, anergia, feelings of anger, feelings of hopelessness, and states he has difficulty not focusing on the fact that if he is no longer here his wife and daughter will struggle as they have always been dependent upon him.  Patient also reports he struggles with periods of significant anxiety and fear including anxious mood, feeling on edge, feeling overwhelmed, increased heart rate, trembling, and periods of a significant sense of impending doom.  Patient also states he realizes he needs to stop smoking cigarettes and requests having nicotine lozenges prescribed.  Patient reports he continues to adhere to medication regimen as prescribed and vehemently denies any substance use.  The patient further adamantly convincingly denies active suicidal ideation, intent, or plan.  Patient is able to identify internal and external protective factors.    Patient reports he continues to struggle with smoking and states he believes he needs an aide for cessation.  He states his primary care provider stated that  he needed something from the undersigned in order to prescribe him nicotine replacement.  Patient agreed to sign release of information for his primary care provider Eulalia Luong to allow the undersigned to communicate.    Clinical Maneuvering/Intervention:  Assisted patient in processing above session content; acknowledged and normalized patient’s thoughts, feelings, and concerns.  Allow the patient to discuss/process ongoing difficulty he is experiencing with his recovery and his limited mobility and validated his feelings.  Also utilized motivational interviewing techniques including complex reflections to discussed the concept of things we can control daily cannot control strongly urged the patient he must be patient with himself and allow his recovery to take the required amount of time.  Further continue to utilize cognitive behavioral therapy to assist the patient in addressing his long history of smoking and agreed to contact his primary care provider with regard to being prescribed nicotine replacement.  Provided unconditional positive regarding a safe, supportive environment.    Undersigned contacted the patient's primary care provider, Eulalia Luong, at [facility] and left message as there was no one available to answer the phone.  A detailed message was left and requested a call back.    Allowed patient to freely discuss issues without interruption or judgment. Provided safe, confidential environment to facilitate the development of positive therapeutic relationship and encourage open, honest communication. Assisted patient in identifying risk factors which would indicate the need for higher level of care including thoughts to harm self or others and/or self-harming behavior and encouraged patient to contact this office, call 911, or present to the nearest emergency room should any of these events occur. Discussed crisis intervention services and means to access.  Patient adamantly and  convincingly denies current suicidal or homicidal ideation or perceptual disturbance.        Assessment    Patient appears to maintain relative stability at this time as compared to his baseline.  However, he continues to struggle with significant depression and anxiety secondary to his recent diagnosis of lung cancer and the difficulty of adjusting to this new reality.  However, the patient does have severe illness which will cause significant issues.  As result, he can be reasonably expected benefit from treatment and would likely be at increased risk for decompensation otherwise.    Diagnoses and all orders for this visit:    1. Current moderate episode of major depressive disorder without prior episode (HCC) (Primary)    2. Adjustment disorder with mixed anxiety and depressed mood    3. Cigarette nicotine dependence, uncomplicated               REVIEW OF SYSTEMS:  Review of Systems       Mental Status Exam:   Appearance: Well groomed  Build: Thin  Hygiene:   good  Cooperation:  Cooperative  Eye Contact:  Good  Psychomotor Behavior:  Appropriate  Affect:  Appropriate  Mood: anxious  Hopelessness: 1  Speech:  Normal  Thought Process:  Linear  Thought Content:  Normal  Suicidal:  Passive suicidal ideation as a direct reaction to stress  Homicidal:  None  Hallucinations:  None  Delusion:  None  Memory:  Intact  Orientation:  Person, Place and Time  Reliability:  good  Insight:  Fair  Judgement:  Fair  Impulse Control:  Fair  Behavior: No behavior issues        Patient's Support Network Includes:  wife, daughter and extended family    Progress toward goal: Not at goal    Functional Status: Severe impairment    Prognosis: Guarded with Ongoing Treatment        Plan     Plan:   Patient will continue in individual outpatient therapy session at [facility] in 2 to 3 weeks.  Patient will continue with primary care provider and adhere to medication regimen as prescribed and report any side effects.  Patient will contact this office, call 911 or present to the nearest emergency room should suicidal or homicidal ideations occur. Provide Cognitive Behavioral Therapy and Integrative Therapy to improve functioning, maintain stability, and avoid decompensation and the need for higher level of care.  Patient and his wife was instructed to contact this office if suicidal thoughts return or to present at the nearest emergency room.  Patient was also instructed to contact this office if symptomology increases.             Return in about 3 weeks (around 11/22/2021) for Next scheduled follow up.         This document has been electronically signed by Chip Santoyo LCSW, CLAUDIO   November 1, 2021 09:37 EDT

## 2021-11-03 NOTE — PROGRESS NOTES
Subjective     Chief Complaint: Metastatic cancer with brain metastases    Patient ID: Vipin Oakley is a 51 y.o. male is here today for follow-up.    History of Present Illness    This is a 51-year-old man in whom I performed stereotactic radiosurgery in August for multiple intracranial metastatic lesions.  He presents today to discuss the results of his most recent MRI.    The following portions of the patient's history were reviewed and updated as appropriate: allergies, current medications, past family history, past medical history, past social history, past surgical history and problem list.    Family history:   Family History   Problem Relation Age of Onset   • Cancer Father         liver   • Hypertension Father    • Diabetes Father    • Hypertension Mother    • Cancer Maternal Uncle    • Heart disease Maternal Uncle    • Heart disease Paternal Aunt    • Cancer Maternal Grandmother    • Heart disease Maternal Grandmother        Social history:   Social History     Socioeconomic History   • Marital status:    Tobacco Use   • Smoking status: Current Every Day Smoker     Packs/day: 1.00     Years: 14.00     Pack years: 14.00     Types: Cigarettes   • Smokeless tobacco: Former User     Types: Snuff   Vaping Use   • Vaping Use: Never used   Substance and Sexual Activity   • Alcohol use: Yes     Alcohol/week: 2.0 standard drinks     Types: 2 Shots of liquor per week     Comment: occasionally   • Drug use: No   • Sexual activity: Defer     Birth control/protection: None       Review of Systems   Constitutional: Negative for activity change, appetite change, chills, diaphoresis, fatigue, fever and unexpected weight change.   HENT: Positive for voice change. Negative for congestion, dental problem, drooling, ear discharge, ear pain, facial swelling, hearing loss, mouth sores, nosebleeds, postnasal drip, rhinorrhea, sinus pressure, sneezing, sore throat, tinnitus and trouble swallowing.    Eyes: Negative for  "photophobia, pain, discharge, redness, itching and visual disturbance.   Respiratory: Positive for shortness of breath and wheezing. Negative for apnea, cough, choking, chest tightness and stridor.    Cardiovascular: Negative for chest pain, palpitations and leg swelling.   Gastrointestinal: Negative for abdominal distention, abdominal pain, anal bleeding, blood in stool, constipation, diarrhea, nausea, rectal pain and vomiting.   Endocrine: Negative for cold intolerance, heat intolerance, polydipsia, polyphagia and polyuria.   Genitourinary: Negative for decreased urine volume, difficulty urinating, dysuria, enuresis, flank pain, frequency, genital sores, hematuria and urgency.   Musculoskeletal: Negative for arthralgias, back pain, gait problem, joint swelling, myalgias, neck pain and neck stiffness.   Skin: Negative for color change, pallor, rash and wound.   Allergic/Immunologic: Negative for environmental allergies, food allergies and immunocompromised state.   Neurological: Positive for seizures. Negative for dizziness, tremors, syncope, facial asymmetry, speech difficulty, weakness, light-headedness, numbness and headaches.   Hematological: Negative for adenopathy. Does not bruise/bleed easily.   Psychiatric/Behavioral: Negative for agitation, behavioral problems, confusion, decreased concentration, dysphoric mood, hallucinations, self-injury, sleep disturbance and suicidal ideas. The patient is nervous/anxious. The patient is not hyperactive.    All other systems reviewed and are negative.      Objective   Blood pressure 128/80, pulse 96, resp. rate 18, height 157.5 cm (62\"), weight 65.8 kg (145 lb), SpO2 99 %.  Body mass index is 26.52 kg/m².    Physical Exam    Assessment/Plan     Independent Review of Radiographic Studies:      Available for my review is the MRI of the brain which was performed earlier today.  A comparison study from October 2021 is also available for my review.  I do not appreciate any " recurrence or progression of disease based on his most recent MRIs.  There is still a homogeneously/ring-enhancing lesion situated in the right temporal lobe.    Medical Decision Making:      His most recent surveillance MRI is demonstrating a stable, excellent response to stereotactic radiosurgery.  I will touch base with Dr. De Los Santos, but I think a surveillance MRI in the 2-3-month timeframe is reasonable.  I will follow-up with him once the study has been completed.      He is trying desperately to quit smoking and has asked me to provide him with Some Nicorette lozenges.    Diagnoses and all orders for this visit:    1. Metastasis to brain (HCC) (Primary)  -     MRI Brain With & Without Contrast; Future    2. Tobacco abuse    Other orders  -     nicotine polacrilex (Nicorette Mini) 4 MG lozenge; Dissolve 1 lozenge in the mouth As Needed for Smoking Cessation.  Dispense: 72 lozenge; Refill: 5        No follow-ups on file.           This document signed by YNES Jin MD November 3, 2021 13:12 EDT

## 2021-11-11 NOTE — PROGRESS NOTES
SS spoke with patient this date in chemo suite.  Patient receiving chemotherapy.      Patient request chocolate boost.  No chocolate boost in supply room today, and patient didn't want to wait.      Patient will be back on Tuesday and will get Boost that date.    SS will follow as needed.

## 2021-11-11 NOTE — PROGRESS NOTES
Office Follow Up Note      Patient Name: Vipin Oakley  : 1970   MRN: [id number]     Requesting Physician: Kassie River AP*    Chief Complaint: Bone Metastasis    Staging:  IV (T4,N3,M1)    History of Present Illness: Vipin Oakley is a pleasant 51 y.o. male who is here today for follow up after completing radiation therapy.     He initially started to feel unwell in 2021.  At that time, he presented to an urgent care with cough and was diagnosed with Flu B.  His cough worsened and his voice started to become hoarse.  He ultimately came to the ER where he was diagnosed with Flu B again as well as pneumonia and was told he had a lung mass.  From there he saw pulmonology, Dr. Johnson.  His bronchoscopy was delayed because of mucus retention cyst, which was removed by Dr. Malone, and then he returned to Dr. Johnson and had the bronchoscopy.  This revealed heaped up/friable mucosa in the distal left bronchus.  Brushings and biopsies were done and EBUS biopsies of Station 4L/10L LNs.  This revealed squamous cell carcinoma.  Meanwhile he was found to have a soft tissue lesion on his L upper back and FNA showed metastatic squamous cell carcinoma.      Since this time he was found to have metastases to hilar and mediastinal LNs, as well as metastatic disease to the R lung, liver, bone and brain and a soft tissue metastasis to his L posterior back.      He is now s/p XRT. He received 2000 cGy in 5 fractions to each painful bony mets. This included his right femur, right back, and left 8th rib. He completed his treatments on 9/3/2021. He also completed cyberknife therapy at [facility] to his brain lesions.     He continues chemotherapy with medical oncology and has received Carboplatin and Taxol as well as Xgeva bone therapy.  After 4 cycles of treatment, his restaging imaging overall showed improvement.  Lesions in the brain, CAP improved.  He does have evidence of progression in the bone. He continues  on with chemotherapy Carboplatin and Taxol and will complete C6 on 12/22/2021.       Subjective      Review of Systems:   Review of Systems   Constitutional: Positive for fatigue.   HENT:  Negative.    Eyes: Negative.    Respiratory: Negative.    Cardiovascular: Negative.    Gastrointestinal: Negative.    Endocrine: Negative.    Genitourinary: Negative.     Musculoskeletal: Positive for arthralgias and gait problem.   Skin: Negative.  Negative for itching, rash and wound.   Neurological: Positive for extremity weakness and gait problem.   Hematological: Negative.    Psychiatric/Behavioral: Negative.        I have reviewed and confirmed the accuracy of the ROS as documented by the MA/LPN/RN VITOR Rodriguez     The following portions of the patient's history were reviewed and updated as appropriate: allergies, current medications, past family history, past medical history, past social history, past surgical history and problem list.    Past Oncology History:   Oncology/Hematology History   Squamous cell lung cancer (HCC)   6/10/2021 Initial Diagnosis    Squamous cell lung cancer (CMS/HCC)     7/28/2021 -  Chemotherapy    OP SUPPORTIVE Denosumab (Xgeva) Q28D     8/18/2021 -  Chemotherapy    OP LUNG PACLitaxel / CARBOplatin AUC=6 (Q21D)      Metastasis to brain (HCC)   7/1/2021 Initial Diagnosis    Metastasis to brain (CMS/HCC)     7/28/2021 - 7/2028 Radiation    Radiation OncologyTreatment Course:  Vipin Oakley received 1800 cGy in 1 fraction to five brain metastases via Stereotactic Radiation Therapy - SRT.     7/29/2021 - 7/29/2021 Radiation    Radiation OncologyTreatment Course:  Vipin Oakley received 1800 cGy in 1 fraction to six brain metastases via Stereotactic Radiation Therapy - SRT.     7/30/2021 - 7/30/2021 Radiation    Radiation OncologyTreatment Course:  Vipin Oakley received 1800 cGy in 1 fraction to five brain metastases via Stereotactic Radiation Therapy - SRT.     8/18/2021 -  Chemotherapy    OP  LUNG PACLitaxel / CARBOplatin AUC=6 (Q21D)      Metastasis to bone (HCC)   7/1/2021 Initial Diagnosis    Metastasis to bone (CMS/HCC)     7/28/2021 -  Chemotherapy    OP SUPPORTIVE Denosumab (Xgeva) Q28D     8/18/2021 -  Chemotherapy    OP LUNG PACLitaxel / CARBOplatin AUC=6 (Q21D)      Metastasis to liver (HCC)   7/1/2021 Initial Diagnosis    Metastasis to liver (CMS/HCC)     8/18/2021 -  Chemotherapy    OP LUNG PACLitaxel / CARBOplatin AUC=6 (Q21D)      Squamous cell carcinoma of left lung (HCC)   7/9/2021 Initial Diagnosis    Squamous cell carcinoma of left lung (CMS/HCC)     7/28/2021 -  Chemotherapy    OP CENTRAL VENOUS ACCESS DEVICE ACCESS, CARE, AND MAINTENANCE (CVAD)          KPS: 80%     Results Review:   The following data was reviewed by: VITOR Rodriguez on 11/11/2021:  Common labs    Common Labsle 10/21/21 10/21/21 10/28/21 10/28/21 11/11/21 11/11/21    0855 0855 1244 1244 0916 0916   Glucose  141 (A) 124 (A)   127 (A)   BUN  13 18   19   Creatinine  0.56 (A) 0.80   0.60 (A)   eGFR Non African Am  >150 103   143   Sodium  136 136   135 (A)   Potassium  4.2 3.7   4.4   Chloride  104 101   102   Calcium  9.2 8.6   9.3   Albumin  4.07 4.15   4.40   Total Bilirubin  <0.2 <0.2   0.2   Alkaline Phosphatase  122 (A) 130 (A)   108   AST (SGOT)  11 13   12   ALT (SGPT)  11 15   17   WBC 7.26   7.60 8.06    Hemoglobin 10.5 (A)   10.2 (A) 10.2 (A)    Hematocrit 31.2 (A)   29.6 (A) 31.1 (A)    Platelets 324   295 171    (A) Abnormal value                IMAGING:    CT Chest wo contrast 04-05-21  FINDINGS:  Small pericardial effusion is noted. There is no pleural effusion. There is AP window adenopathy measuring 2.1 cm. There is probably some left hilar adenopathy as well, poorly characterized without IV contrast. There is also some soft tissue abutting the  distal aortic arch and proximal descending aorta suggesting tumor or adenopathy. Upper abdominal images show a contracted gallbladder.     Lung  windows show COPD. There is some mild narrowing of both proximal upper and lower lobe bronchi on the left side possibly due to extrinsic compression. Bronchoscopy may be beneficial. No suspicious pulmonary nodules are identified. There is some  scarring in the left upper lobe. The lungs are otherwise clear. No CT findings present to indicate pneumonia. Note: CT may be negative in the earliest stages of COVID-19. There are no suspicious osseous lesions.     IMPRESSION:     1. COPD with no evidence of acute pneumonia.  2. AP window and probably left hilar and perihilar adenopathy concerning for malignancy. There is some mild extrinsic compression of the proximal left upper and left lower lobe bronchi. Bronchoscopy may be beneficial. While a contrast-enhanced chest CT will provide some additional diagnostic information, PET CT would be more beneficial.        CTCAP 06-18-21  FINDINGS:     LUNGS: 7 mm parenchymal nodule anteriorly in the left lung on image 35  of the axial series.  Airspace disease in the left upper lobe adjacent to the hilum.  1 cm parenchymal nodule in the right lung on image 48 of the axial  series.     HEART: Trace pericardial effusion.     MEDIASTINUM: Abnormal mediastinal adenopathy particularly in the  aorticopulmonary window, azygoesophageal recess and most notably  surrounding the left main pulmonary artery.     PLEURA: No pleural effusion. No pleural mass or abnormal calcification.  No pneumothorax.     VASCULATURE: No evidence of aneurysm. There is extrinsic mass effect on  the left lower lobe pulmonary artery but I do not see a pulmonary  embolus on the presented exam.     BONES: No acute bony abnormality.     VISUALIZED UPPER ABDOMEN:Please see the CT report for the abdomen and  pelvis.     Other: There is bilateral axillary adenopathy. 1.2 cm left axillary  lymph node and a 1.3 cm right axillary lymph node.     IMPRESSION:     1. Mediastinal, left hilar, and bilateral axillary  adenopathy.  2. Right parenchymal nodule and left parenchymal nodule.  3. Also airspace disease in the left upper lobe concerning for  pneumonia.  4. Trace pericardial effusion.     FINDINGS:     Lower thorax: Please see the CT report for the chest.     Abdomen:     Liver: Multiple low-attenuation lesions throughout the liver compatible  with extensive hepatic metastasis.     Gallbladder: No dilation or stone identified.     Pancreas: Unremarkable. No mass or ductal dilatation.     Spleen: Homogeneous. No splenomegaly.     Adrenals: Small bilateral adrenal nodules. Given the history, these are  concerning for metastatic disease.     Kidneys/ureters: No mass. No obstructive uropathy.  No evidence of  urolithiasis.     GI tract: Moderate volume stool.     MESENTERY: No free fluid, walled off fluid collections, mesenteric  stranding, or enlarged lymph nodes         Vasculature: Evidence of atherosclerotic vascular disease.     Abdominal wall: No focal hernia or mass.        Bladder: Mild thickening of the urinary bladder wall.     Reproductive: Unremarkable as visualized     Bones: No acute bony abnormality.     IMPRESSION:     1. Multiple low-attenuation lesions in the liver compatible with  metastatic disease.     2.Small bilateral adrenal nodules, also concerning for metastatic  disease.     3. Mild thickening of the urinary bladder wall.        Bone scan 06-21-21     FINDINGS: Focal area of increased tracer uptake corresponds to known  pathologic lesion of the left posterior eighth rib. Focal area of  increased uptake in the left inguinal region appears to localize to  tracer activity in the adjacent soft tissues or external to the patient.  There is focal area of increased tracer activity in the right femoral  neck which when correlated to previous CT corresponds to pathologic  lesion. Both kidneys are visualized.      IMPRESSION:  1. Abnormal tracer activity in the right femoral neck region and left  posterior  eighth rib compatible with metastatic disease. Please note,  these appear predominantly lytic on CT.  2. Otherwise physiologic distribution of tracer.        MRI Brain w/wo contrast 06-25-21  FINDINGS:    Brain:  There are numerous ring-enhancing cystic type lesions  throughout the cerebral hemispheres compatible with cystic type  metastases in light of patient history.  For example, a right temporal  lobe ring-enhancing lesion is 1.3 cm.  A left inferior medial frontal  lobe lesion is 8.7 mm.  A left parietal lobe region within the  postcentral gyrus is 0.7 cm.  Smaller subcortical and parenchymal  metastases are noted.  No significant mass effect or vasogenic edema  identified.  No obvious cerebellar lesions identified.  No restricted  diffusion to indicate acute infarct.  No hemorrhage.    Midline shift:  There is no midline shift.    Ventricles:  No hydrocephalus.    Bones/joints:  No destructive calvarial lesions identified on MRI.    Sinuses:  Unremarkable as visualized.  No acute sinusitis.    Mastoid air cells:  Fluid in the right mastoid air cells noted.    Orbits:  Unremarkable as visualized.     IMPRESSION:  1.  Numerous predominantly cystic but rim-enhancing lesions throughout  the brain which are most consistent with numerous metastatic lesions.  2.  No significant vasogenic edema identified. No mass effect or midline  shift.  3.  Right mastoid effusion.  4.  No acute intracranial findings identified.    Chest X-Ray- 7/13/2021  IMPRESSION:  1.  Left Port-A-Cath placement with tip in the distal SVC region. No  pneumothorax.  2.  Increase attenuation of the left lung field is most probably related  to patient's underlying malignancy as no obvious effusion is identified.    MRI Cyberknife w/ and w/o contrast- 7/18/2021   IMPRESSION:  At least 12 peripherally enhancing centrally cystic or  necrotic brachial lesions are present compatible with diffuse metastatic  involvement as described above.    MRI Brain  w/ and w/o contrast- 11/3/2021  IMPRESSION:  1. Significantly improved intracranial metastatic disease, with the  previously noted ring-enhancing lesions reduced to small or punctate size.  2. No clearly new intracranial metastatic disease.  3. A single punctate lesion in the left occipital white matter may be  slightly increased but this is a minimal finding compared to the prior  study. No other new intracranial pathology is identified.    NM Bone Scan- 9/18/2021  FINDINGS: Abnormal intense increased tracer uptake involving the sternum and manubrium.  Abnormal intense increased tracer uptake involving 2 sites of the calvaria.  Abnormal intense increased tracer uptake involving thoracic and lumbar vertebral bodies.  Left posterior rib uptake is noted as well as lower levels of increased tracer uptake involving mid left posterior ribs.  Abnormal uptake right femoral neck region.  Abnormal uptake involving the left ischium.  Mid left femur diaphysis lesion.  IMPRESSION:  Interval progression of disease. Abnormal exam demonstrating diffuse osteoblastic metastatic disease of the axial and appendicular skeleton as detailed above. Of note, right femoral neck and left femoral diaphysis involvement is noted, new since prior exam.    CT Abdomen/Pelvis w/ contrast- 9/22/2021    IMPRESSION:  Continued evidence of metastatic disease in the liver. The  size of the lesions are decreasing. No new lesions have developed. There  are multiple bony metastatic deposits in the lumbar spine and sacrum.    CT Chest w/ contrast- 9/22/2021    IMPRESSION:  There is some volume loss in the left upper lobe with what  appears to be some post radiation changes in the lungs. There continues  to be adenopathy in the mediastinum this is however improved in  comparing with the June exam. The lungs continue to show changes of  emphysema throughout both lungs. There is a moderate-sized pericardial  effusion.    MRI Brain w/ and w/o contrast-  10/4/2021    IMPRESSION:  Interval improvement compared to the previous exam with  decrease size and prominence of multiple enhancing brain metastases.    XR Pelvis and Femur- 10/28/2021    IMPRESSION:  Lytic type lesion of the proximal right femur. No radiographic evidence of pathologic fracture.         PATHOLOGY:  05-18-21 05-26-21                Objective     Physical Exam:  Physical Exam  Vitals reviewed.   Constitutional:       General: He is not in acute distress.     Appearance: Normal appearance. He is normal weight. He is not ill-appearing.   HENT:      Head: Normocephalic.      Nose: Nose normal.      Mouth/Throat:      Mouth: Mucous membranes are moist.      Pharynx: Oropharynx is clear.   Eyes:      Extraocular Movements: Extraocular movements intact.      Conjunctiva/sclera: Conjunctivae normal.      Pupils: Pupils are equal, round, and reactive to light.   Cardiovascular:      Rate and Rhythm: Normal rate and regular rhythm.      Pulses: Normal pulses.      Heart sounds: Normal heart sounds.   Pulmonary:      Effort: Pulmonary effort is normal. No respiratory distress.      Breath sounds: Normal breath sounds.   Abdominal:      General: Abdomen is flat. Bowel sounds are normal. There is no distension.      Palpations: Abdomen is soft. There is no mass.   Musculoskeletal:         General: No swelling. Normal range of motion.      Cervical back: Normal range of motion.      Right upper leg: Bony tenderness present.   Skin:     General: Skin is warm and dry.      Capillary Refill: Capillary refill takes less than 2 seconds.   Neurological:      General: No focal deficit present.      Mental Status: He is alert and oriented to person, place, and time. Mental status is at baseline.   Psychiatric:         Mood and Affect: Mood normal.         Behavior: Behavior normal.         Thought Content: Thought content normal.         Judgment: Judgment normal.         Vital Signs:   Vitals:    11/11/21  1300   BP: 120/79   BP Location: Left arm   Patient Position: Sitting   Pulse: 101   Resp: 18   Temp: 98.7 °F (37.1 °C)   TempSrc: Temporal   SpO2: 96%     There is no height or weight on file to calculate BMI.       Assessment / Plan      Assessment/Plan:   Vipin Oakley is a very pleasant 50 y.o. 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  Tumor sent for CARIS testing and was negative for ALK, BRAF, EGFR, RET, ROS1, MET, HER2, PD-L1.  TMB was low.  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     First bone scan showed abnormal tracer activity in the right femoral neck region and left posterior eighth rib compatible with metastatic disease. Please note, these appear predominantly lytic on CT.    We offered palliative radiation. He is now s/p XRT. He received 2000 cGy in 5 fractions to each painful bony mets. This included his right femur, right back, and left 8th rib. He completed his treatments on 9/3/2021. He also completed cyberknife therapy at [facility] to his brain lesions. He tolerated all treatments well. He states that this helped with the pain. However, now he states that he is having worsening pain in the right femur, below where he was treated previously.  He is currently on Fentanyl 200 mcg (100x2) and Oxycodone 20 mg 1-2 tabs every ~4hours for bony mets pain. X-Rays were obtained due to the worsening pain and it showed his lytic lesion of the proximal right femur with no pathologic fracture. A bone scan on 9/18/2021 showed interval progression of disease. Abnormal exam demonstrating diffuse osteoblastic metastatic disease of the axial and appendicular skeleton as detailed above. Of note, right femoral neck and left femoral diaphysis involvement is noted, new since prior exam. He is wondering today about  further radiation for femur pain. It is in a different spot of the femur than what was previously radiated.     We will plan to re-sim patient and start another round of palliative radiation to the new spot on the femur. Plan to give 2000 cGy in 5 fractions.     He has a F/U appointment with medical oncology APRN on 11/11/2021. As well as an appointment with Dr. Foster on 12/2/2021 after completing C6 of his chemotherapy.       Follow Up:   CT SIM scheduled for 11/16/2021      VITOR Rodriguez  11/11/21 13:54 EST

## 2021-11-11 NOTE — PROGRESS NOTES
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    Tumor sent for CARIS testing and was negative for ALK, BRAF, EGFR, RET, ROS1, MET, HER2, PD-L1.  TMB was low.  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      TREATMENT HISTORY:   1.  Stereotactic Radiation to the Brain     Treatment Site  Current Dose  Modality  From  To  Elapsed Days  Fx.    6 Brain Mets - Fx 2  1,800 cGy  x06  2021  1    5 Brain Mets - Fx 3  1,800 cGy  x06  2021  1    5 Brain Mets - Fx 1  1,800 cGy  x06  2021  1      2.  Dr. Moura delivered palliative radiation to the R femoral neck and 8th rib.  Further palliative radiation planned but currently on hold.     3. Xgeva bone therapy started 21    4.       5.  Received palliative radiation with proton therapy to ST metastases    CHIEF COMPLAINT:  Follow up of metastatic squamous cell lung cancer/toxicity check    HISTORY OF PRESENT ILLNESS:   Vipin Oakley is a very pleasant 51 y.o. male who was referred by Dr. Johnson for evaluation and treatment of squamous cell lung cancer.  He initially started to feel unwell in 2021.  At that time, he presented to an urgent care with cough and was diagnosed with Flu B.  His cough worsened and his voice started to become hoarse.  He ultimately came to the ER where he was diagnosed with FluB again as well as pneumonia and was told he had a lung mass.  From there he saw Dr. Johnson.  Bronchoscopy was delayed because of mucus retention cyst which was removed by Dr. Malone and then he returned to Dr. Johnson and had bronchoscopy.  This revealed heaped up / friable mucosa in thedistal L bronchus.  Brushings and biopsies were done and EBUS biopsies of Station 4L/10L LNs.   This revealed squamous cell carcinoma.  Meanwhile he was found to have a soft tissue lesion on his L upper back and FNA showed metastatic squamous cell carcinoma.      INTERVAL HISTORY:  Mr. Oakley is here today for follow up of metastatic squamous cell lung cancer/toxicity check. Since his last visit, overall, he has been doing well. He continues to receive Carbo/Taxol and has completed four cycles to date which he is tolerating well. He continues with pain in his right femur. He is using Fentanyl patch 200 mcg [id number] along with oxycodone 20 mg 1-2 tabs every ~4hours. He will follow up with radiation oncology today and is hoping to have additional radiation to his right femur as this was helpful for his back. He says he is eating and hydrating well. He has no difficulty with nausea and constipation is well controlled with prn stool softeners. He denies any other complaints today.     PAST MEDICAL HISTORY:  Past Medical History:   Diagnosis Date   • Anxiety    • Arthritis    • Back pain    • Bone cancer (HCC)    • Brain cancer (HCC)    • Carpal tunnel syndrome     bilateral   • COPD (chronic obstructive pulmonary disease) (HCC)    • Frequency of urination    • GERD (gastroesophageal reflux disease)    • Heartburn    • Herniated cervical disc    • Lung cancer (HCC)    • Seizures (HCC) 2014    treated with Tegretol as a child/teenager since 9 months old, currently well controlled   • Squamous cell lung cancer (HCC) 6/10/2021         PAST SURGICAL HISTORY:  Past Surgical History:   Procedure Laterality Date   • ABDOMINAL SURGERY     • BIOPSY OF BACK/FLANK N/A 5/18/2021    Procedure: BIOPSY SOFT TISSUE BACK / FLANK;  Surgeon: Hans Malone MD;  Location: [facility] OR;  Service: ENT;  Laterality: N/A;   • BRONCHOSCOPY N/A 5/26/2021    Procedure: BRONCHOSCOPY WITH ENDOBRONCHIAL ULTRASOUND;  Surgeon: Saurabh Johnson MD;  Location: [facility] OR;  Service: Pulmonary;  Laterality: N/A;   • CARDIAC CATHETERIZATION      • EPIDIDYMECTOMY Right 6/27/2018    Procedure: EPIDIDYMECTOMY;  Surgeon: Chino Aviles MD;  Location: [facility] OR;  Service: Urology   • HERNIA REPAIR     • LARYNGOSCOPY N/A 5/18/2021    Procedure: MICRODIRECT LARYNGOSCOPY;  Surgeon: Hans Malone MD;  Location: [facility] OR;  Service: ENT;  Laterality: N/A;   • MOUTH SURGERY      teeth    • ORCHIECTOMY Right 10/19/2020    Procedure: ORCHIECTOMY;  Surgeon: Chino Aviles MD;  Location: [facility] OR;  Service: Urology;  Laterality: Right;   • OTHER SURGICAL HISTORY  06/2021    mass removal from throat   • TESTICLE SURGERY     • VENOUS ACCESS DEVICE (PORT) INSERTION Left 7/13/2021    Procedure: INSERTION VENOUS ACCESS DEVICE;  Surgeon: Eugenia Lundy MD;  Location: [facility] OR;  Service: General;  Laterality: Left;       FAMILY HISTORY:  Family History   Problem Relation Age of Onset   • Cancer Father         liver   • Hypertension Father    • Diabetes Father    • Hypertension Mother    • Cancer Maternal Uncle    • Heart disease Maternal Uncle    • Heart disease Paternal Aunt    • Cancer Maternal Grandmother    • Heart disease Maternal Grandmother        SOCIAL HISTORY:  Social History     Socioeconomic History   • Marital status:    Tobacco Use   • Smoking status: Current Every Day Smoker     Packs/day: 1.00     Years: 14.00     Pack years: 14.00     Types: Cigarettes   • Smokeless tobacco: Former User     Types: Snuff   Vaping Use   • Vaping Use: Never used   Substance and Sexual Activity   • Alcohol use: Yes     Alcohol/week: 2.0 standard drinks     Types: 2 Shots of liquor per week     Comment: occasionally   • Drug use: No   • Sexual activity: Defer     Birth control/protection: None     Social History     Social History Narrative    , lives with wife. Worked in furniture building, as a salesman,     Exposed to burning rubber at his current job with [employer]    Current smoker 1 ppd     REVIEW OF SYSTEMS:   A comprehensive 14 point  review of systems was performed.  Significant findings as mentioned above.  All other systems reviewed and are negative.      MEDICATIONS:  The current medication list was reviewed in the EMR    Current Outpatient Medications:   •  albuterol (PROVENTIL) (2.5 MG/3ML) 0.083% nebulizer solution, Take 2.5 mg by nebulization 2 (two) times a day., Disp: , Rfl:   •  carBAMazepine (TEGretol) 200 MG tablet, Take 200 mg by mouth 2 (Two) Times a Day. Takes 1.5 tabs in am and 2 tabs at hs, Disp: , Rfl:   •  dexamethasone (DECADRON) 4 MG tablet, Take 1 tablet by mouth 2 (Two) Times a Day With Meals., Disp: 60 tablet, Rfl: 3  •  diazePAM (VALIUM) 5 MG tablet, Take 1 tablet by mouth Every 8 (Eight) Hours As Needed for Anxiety., Disp: 90 tablet, Rfl: 0  •  EPINEPHrine (ADRENALIN) 1 MG/ML injection, Inject 1 mcg/mL under the skin into the appropriate area as directed. FOR BEE STINGS, Disp: , Rfl:   •  escitalopram (Lexapro) 10 MG tablet, Take 1 tablet by mouth Daily., Disp: 30 tablet, Rfl: 11  •  famotidine (PEPCID) 20 MG tablet, Take 1 tablet by mouth 2 (two) times a day., Disp: , Rfl:   •  fentaNYL (DURAGESIC) 100 MCG/HR patch, Place 2 patches on the skin as directed by provider Every 72 (Seventy-Two) Hours., Disp: 10 patch, Rfl: 0  •  fluconazole (DIFLUCAN) 100 MG tablet, Take 2 tablets by mouth Daily., Disp: 14 tablet, Rfl: 0  •  gabapentin (NEURONTIN) 600 MG tablet, 800 mg 3 (Three) Times a Day., Disp: , Rfl:   •  ibuprofen (ADVIL,MOTRIN) 800 MG tablet, ibuprofen 800 mg tablet  TAKE ONE TABLET BY MOUTH THREE TIMES A DAY, Disp: , Rfl:   •  K Phos Knott-Sod Phos Di & Mono (K-Phos-Neutral) 155-852-130 MG tablet, K-Phos-Neutral 250 mg tablet, Disp: , Rfl:   •  lactulose (CHRONULAC) 10 GM/15ML solution, Take 30 mL by mouth 2 (Two) Times a Day As Needed (for refractory constipation.)., Disp: 473 mL, Rfl: 1  •  Lidocaine Viscous HCl (XYLOCAINE) 2 % solution, SWISH, SPIT, OR SWALLOW 5 TO 10 ML BY MOUTH EVERY 3 HOURS AS NEEDED., Disp:  "100 mL, Rfl: 5  •  lidocaine-prilocaine (EMLA) 2.5-2.5 % cream, Apply to port-a-cath about 30 minutes to 1 hour prior to chemotherapy, Disp: 30 g, Rfl: 3  •  methocarbamol (ROBAXIN) 500 MG tablet, Take 500 mg by mouth 2 (Two) Times a Day., Disp: , Rfl:   •  nicotine (NICODERM CQ) 21 MG/24HR patch, Place 1 patch on the skin as directed by provider Daily., Disp: 28 each, Rfl: 0  •  nicotine polacrilex (Nicorette Mini) 4 MG lozenge, Dissolve 1 lozenge in the mouth As Needed for Smoking Cessation., Disp: 72 lozenge, Rfl: 5  •  nystatin (MYCOSTATIN) 370062 UNIT/ML suspension, Swish and swallow 5 mL 4 (Four) Times a Day., Disp: 473 mL, Rfl: 1  •  ondansetron (Zofran) 8 MG tablet, Take 1 tablet by mouth Every 8 (Eight) Hours As Needed for Nausea or Vomiting., Disp: 60 tablet, Rfl: 6  •  ondansetron ODT (Zofran ODT) 8 MG disintegrating tablet, Place 1 tablet on the tongue Every 8 (Eight) Hours As Needed for Nausea or Vomiting., Disp: 60 tablet, Rfl: 5  •  oxyCODONE (ROXICODONE) 20 MG tablet, Take 1 tablet by mouth Every 4 (Four) Hours As Needed for Moderate Pain ., Disp: 112 tablet, Rfl: 0  •  prochlorperazine (COMPAZINE) 10 MG tablet, Take 1 tablet by mouth Every 6 (Six) Hours As Needed for Nausea., Disp: 60 tablet, Rfl: 3  •  sennosides-docusate (Senna-S) 8.6-50 MG per tablet, Take 2 tablets by mouth 2 (Two) Times a Day., Disp: 120 tablet, Rfl: 5  •  Syringe 25G X 5/8\" 3 ML misc, Use as directed 2 x weekly, Disp: 24 each, Rfl: 3  •  Testosterone Cypionate (Depo-Testosterone) 200 MG/ML injection, Inject 1/2 cc subcutaneously every Monday and Thursday, Disp: 10 mL, Rfl: 2  No current facility-administered medications for this visit.    Facility-Administered Medications Ordered in Other Visits:   •  CARBOplatin (PARAPLATIN) 900 mg in sodium chloride 0.9 % 340 mL chemo IVPB, 900 mg, Intravenous, Once, Yu Foster MD  •  dexamethasone (DECADRON) IVPB 20 mg, 20 mg, Intravenous, Once, Yu Foster MD, Last Rate: 200 " mL/hr at 11/11/21 1015, 20 mg at 11/11/21 1015  •  fosaprepitant (EMEND) 150 mg/100mL NS, 150 mg, Intravenous, Once, Yu Foster MD  •  heparin injection 500 Units, 500 Units, Intravenous, PRN, Nina Moon APRN  •  PACLitaxel (TAXOL) 345 mg in sodium chloride 0.9 % 557.5 mL chemo IVPB, 345 mg, Intravenous, Once, Yu Foster MD  •  sodium chloride 0.9 % flush 10 mL, 10 mL, Intravenous, PRN, Nina Moon APRN    ALLERGIES:    Allergies   Allergen Reactions   • Bee Venom Anaphylaxis   • Tramadol Other (See Comments)     Seizures  Ultram         PHYSICAL EXAM:  Vitals:    11/11/21 0907   BP: 123/77   Pulse: 108   Resp: 18   Temp: 98.4 °F (36.9 °C)   TempSrc: Temporal   SpO2: 97%   Weight: 70.3 kg (155 lb)   PainSc:   7   PainLoc: Hip   ECOG score: 0     General:  Awake, alert and oriented, in no acute distress. Appears well.   HEENT:  Pupils are equal, round and reactive to light and accommodation, Extra-ocular movements full, Oropharyx clear, mucous membranes moist  Neck:  No JVD, thyromegaly or lymphadenopathy  CV:  Regular tachycardia, no murmurs, rubs or gallops  Resp:  Lungs are clear to auscultation bilaterally  Abd:  Soft, non-tender, non-distended, bowel sounds present, no organomegaly or masses  Ext:  No clubbing, cyanosis or edema  Neuro:  MS as above, grossly non focal exam    PATHOLOGY:  05-18-21 05-26-21              ENDOSCOPY:  Bronchoscopy 05-26-21  Findings: Irregular, friable mucosa in the distal left main stem bronchus at the left upper lobe takeoff      IMAGING:  CT Chest wo contrast 04-05-21  FINDINGS:  Small pericardial effusion is noted. There is no pleural effusion. There is AP window adenopathy measuring 2.1 cm. There is probably some left hilar adenopathy as well, poorly characterized without IV contrast. There is also some soft tissue abutting the  distal aortic arch and proximal descending aorta suggesting tumor or adenopathy. Upper abdominal  images show a contracted gallbladder.     Lung windows show COPD. There is some mild narrowing of both proximal upper and lower lobe bronchi on the left side possibly due to extrinsic compression. Bronchoscopy may be beneficial. No suspicious pulmonary nodules are identified. There is some  scarring in the left upper lobe. The lungs are otherwise clear. No CT findings present to indicate pneumonia. Note: CT may be negative in the earliest stages of COVID-19. There are no suspicious osseous lesions.     IMPRESSION:     1. COPD with no evidence of acute pneumonia.  2. AP window and probably left hilar and perihilar adenopathy concerning for malignancy. There is some mild extrinsic compression of the proximal left upper and left lower lobe bronchi. Bronchoscopy may be beneficial. While a contrast-enhanced chest CT will provide some additional diagnostic information, PET CT would be more beneficial.      CTCAP 06-18-21  FINDINGS:     LUNGS: 7 mm parenchymal nodule anteriorly in the left lung on image 35  of the axial series.  Airspace disease in the left upper lobe adjacent to the hilum.  1 cm parenchymal nodule in the right lung on image 48 of the axial  series.     HEART: Trace pericardial effusion.     MEDIASTINUM: Abnormal mediastinal adenopathy particularly in the  aorticopulmonary window, azygoesophageal recess and most notably  surrounding the left main pulmonary artery.     PLEURA: No pleural effusion. No pleural mass or abnormal calcification.  No pneumothorax.     VASCULATURE: No evidence of aneurysm. There is extrinsic mass effect on  the left lower lobe pulmonary artery but I do not see a pulmonary  embolus on the presented exam.     BONES: No acute bony abnormality.     VISUALIZED UPPER ABDOMEN:Please see the CT report for the abdomen and  pelvis.     Other: There is bilateral axillary adenopathy. 1.2 cm left axillary  lymph node and a 1.3 cm right axillary lymph node.     IMPRESSION:     1. Mediastinal,  left hilar, and bilateral axillary adenopathy.  2. Right parenchymal nodule and left parenchymal nodule.  3. Also airspace disease in the left upper lobe concerning for  pneumonia.  4. Trace pericardial effusion.    FINDINGS:     Lower thorax: Please see the CT report for the chest.     Abdomen:     Liver: Multiple low-attenuation lesions throughout the liver compatible  with extensive hepatic metastasis.     Gallbladder: No dilation or stone identified.     Pancreas: Unremarkable. No mass or ductal dilatation.     Spleen: Homogeneous. No splenomegaly.     Adrenals: Small bilateral adrenal nodules. Given the history, these are  concerning for metastatic disease.     Kidneys/ureters: No mass. No obstructive uropathy.  No evidence of  urolithiasis.     GI tract: Moderate volume stool.     MESENTERY: No free fluid, walled off fluid collections, mesenteric  stranding, or enlarged lymph nodes         Vasculature: Evidence of atherosclerotic vascular disease.     Abdominal wall: No focal hernia or mass.        Bladder: Mild thickening of the urinary bladder wall.     Reproductive: Unremarkable as visualized     Bones: No acute bony abnormality.     IMPRESSION:     1. Multiple low-attenuation lesions in the liver compatible with  metastatic disease.     2.Small bilateral adrenal nodules, also concerning for metastatic  disease.     3. Mild thickening of the urinary bladder wall.      Bone scan 06-21-21     FINDINGS: Focal area of increased tracer uptake corresponds to known  pathologic lesion of the left posterior eighth rib. Focal area of  increased uptake in the left inguinal region appears to localize to  tracer activity in the adjacent soft tissues or external to the patient.  There is focal area of increased tracer activity in the right femoral  neck which when correlated to previous CT corresponds to pathologic  lesion. Both kidneys are visualized.      IMPRESSION:  1. Abnormal tracer activity in the right femoral  neck region and left  posterior eighth rib compatible with metastatic disease. Please note,  these appear predominantly lytic on CT.  2. Otherwise physiologic distribution of tracer.      MRI Brain w/wo contrast 06-25-21  FINDINGS:    Brain:  There are numerous ring-enhancing cystic type lesions  throughout the cerebral hemispheres compatible with cystic type  metastases in light of patient history.  For example, a right temporal  lobe ring-enhancing lesion is 1.3 cm.  A left inferior medial frontal  lobe lesion is 8.7 mm.  A left parietal lobe region within the  postcentral gyrus is 0.7 cm.  Smaller subcortical and parenchymal  metastases are noted.  No significant mass effect or vasogenic edema  identified.  No obvious cerebellar lesions identified.  No restricted  diffusion to indicate acute infarct.  No hemorrhage.    Midline shift:  There is no midline shift.    Ventricles:  No hydrocephalus.    Bones/joints:  No destructive calvarial lesions identified on MRI.    Sinuses:  Unremarkable as visualized.  No acute sinusitis.    Mastoid air cells:  Fluid in the right mastoid air cells noted.    Orbits:  Unremarkable as visualized.     IMPRESSION:  1.  Numerous predominantly cystic but rim-enhancing lesions throughout  the brain which are most consistent with numerous metastatic lesions.  2.  No significant vasogenic edema identified. No mass effect or midline  shift.  3.  Right mastoid effusion.  4.  No acute intracranial findings identified.       Bone scan 09-18-21  FINDINGS: Abnormal intense increased tracer uptake involving the sternum  and manubrium.  Abnormal intense increased tracer uptake involving 2 sites of the  calvaria.  Abnormal intense increased tracer uptake involving thoracic and lumbar  vertebral bodies.  Left posterior rib uptake is noted as well as lower levels of increased  tracer uptake involving mid left posterior ribs.  Abnormal uptake right femoral neck region.  Abnormal uptake involving the  left ischium.  Mid left femur diaphysis lesion.     IMPRESSION:  Interval progression of disease. Abnormal exam demonstrating  diffuse osteoblastic metastatic disease of the axial and appendicular  skeleton as detailed above. Of note, right femoral neck and left femoral  diaphysis involvement is noted, new since prior exam.      CTCAP 09-22-21  CT FINDINGS: On the lung windows there are emphysematous changes in both  lungs. There is some increased density in the left upper lung.  A  portion of this may represent post radiation change. The left lower lobe  and right lung are clear. There is no evidence of supraclavicular  lymphadenopathy. In the mediastinum there are enlarged lymph nodes. The  overall volume of adenopathy however is decreasing. The heart was not  enlarged.  There is fluid in the pericardial sac surrounding the heart.     IMPRESSION:  There is some volume loss in the left upper lobe with what  appears to be some post radiation changes in the lungs. There continues  to be adenopathy in the mediastinum this is however improved in  comparing with the June exam. The lungs continue to show changes of  emphysema throughout both lungs. There is a moderate-sized pericardial  Effusion.    CT FINDINGS: The liver continues to be abnormal. There are low density  areas in all segments of the liver consistent with metastatic disease.  These are stable in size to perhaps slightly smaller than on the earlier  CT. The spleen was unremarkable. There is no evidence of mass in the  pancreas. The aorta is normal in caliber. No adrenal lesions are  identified. The kidneys enhance appropriately and show no evidence of  obstruction. There is no evidence of ascites. In the pelvis there were  no masses or fluid collections. On the bone windows there are lesions in  the lumbar spine and sacrum consistent with metastatic disease.     IMPRESSION:  Continued evidence of metastatic disease in the liver. The  size of the lesions are  decreasing. No new lesions have developed. There  are multiple bony metastatic deposits in the lumbar spine and sacrum.        Bone scan 09-18-21  FINDINGS: Abnormal intense increased tracer uptake involving the sternum  and manubrium.  Abnormal intense increased tracer uptake involving 2 sites of the  calvaria.  Abnormal intense increased tracer uptake involving thoracic and lumbar  vertebral bodies.  Left posterior rib uptake is noted as well as lower levels of increased  tracer uptake involving mid left posterior ribs.  Abnormal uptake right femoral neck region.  Abnormal uptake involving the left ischium.  Mid left femur diaphysis lesion.     IMPRESSION:  Interval progression of disease. Abnormal exam demonstrating  diffuse osteoblastic metastatic disease of the axial and appendicular  skeleton as detailed above. Of note, right femoral neck and left femoral  diaphysis involvement is noted, new since prior exam.        CTCAP 09-22-21  CT FINDINGS: On the lung windows there are emphysematous changes in both  lungs. There is some increased density in the left upper lung.  A  portion of this may represent post radiation change. The left lower lobe  and right lung are clear. There is no evidence of supraclavicular  lymphadenopathy. In the mediastinum there are enlarged lymph nodes. The  overall volume of adenopathy however is decreasing. The heart was not  enlarged.  There is fluid in the pericardial sac surrounding the heart.     IMPRESSION:  There is some volume loss in the left upper lobe with what  appears to be some post radiation changes in the lungs. There continues  to be adenopathy in the mediastinum this is however improved in  comparing with the June exam. The lungs continue to show changes of  emphysema throughout both lungs. There is a moderate-sized pericardial  Effusion.    CT FINDINGS: The liver continues to be abnormal. There are low density  areas in all segments of the liver consistent with  metastatic disease.  These are stable in size to perhaps slightly smaller than on the earlier  CT. The spleen was unremarkable. There is no evidence of mass in the  pancreas. The aorta is normal in caliber. No adrenal lesions are  identified. The kidneys enhance appropriately and show no evidence of  obstruction. There is no evidence of ascites. In the pelvis there were  no masses or fluid collections. On the bone windows there are lesions in  the lumbar spine and sacrum consistent with metastatic disease.     IMPRESSION:  Continued evidence of metastatic disease in the liver. The  size of the lesions are decreasing. No new lesions have developed. There  are multiple bony metastatic deposits in the lumbar spine and sacrum      MRI Brain w/wo contrast 10-04-21  FINDINGS:  Rim-enhancing metastatic lesions throughout the cerebral hemispheres  have improved since previous exam. A right temporal lobe lesion is now  10.3 mm and was previously 12.9 mm. A right frontal lobe lesion is now  5.6 mm and was previously 7.6 mm. A left frontal lobe lesion is now 3.9  mm and was previously 8.7 mm. All other subcortical lesions have  decreased in size.     No significant vasogenic edema identified with the exception of the  right temporal lobe lesion.     No midline shift.     Right mastoid effusion is noted.     No new rim-enhancing or solid enhancing lesions identified.     IMPRESSION:  Interval improvement compared to the previous exam with  decrease size and prominence of multiple enhancing brain metastases.      RECENT LABS:  Lab Results   Component Value Date    WBC 8.06 11/11/2021    HGB 10.2 (L) 11/11/2021    HCT 31.1 (L) 11/11/2021    .7 (H) 11/11/2021    RDW 22.8 (H) 11/11/2021     11/11/2021    NEUTRORELPCT 73.3 11/11/2021    LYMPHORELPCT 17.4 (L) 11/11/2021    MONORELPCT 8.6 11/11/2021    EOSRELPCT 0.0 (L) 11/11/2021    BASORELPCT 0.2 11/11/2021    NEUTROABS 5.91 11/11/2021    LYMPHSABS 1.40 11/11/2021        Lab Results   Component Value Date     (L) 11/11/2021    K 4.4 11/11/2021    CO2 23.4 11/11/2021     11/11/2021    BUN 19 11/11/2021    CREATININE 0.60 (L) 11/11/2021    EGFRIFNONA 143 11/11/2021    GLUCOSE 127 (H) 11/11/2021    CALCIUM 9.3 11/11/2021    ALKPHOS 108 11/11/2021    AST 12 11/11/2021    ALT 17 11/11/2021    BILITOT 0.2 11/11/2021    ALBUMIN 4.40 11/11/2021    PROTEINTOT 7.4 11/11/2021    MG 1.9 10/28/2021    PHOS 2.8 10/28/2021     LDH   Date Value Ref Range Status   06/09/2021 278 (H) 135 - 225 U/L Final     Uric Acid   Date Value Ref Range Status   06/09/2021 3.8 3.4 - 7.0 mg/dL Final     Lab Results   Component Value Date    FERRITIN 171.10 06/09/2021    IRON 35 (L) 06/09/2021    TIBC 311 06/09/2021    LABIRON 11 (L) 06/09/2021    [id number] 511 06/09/2021    FOLATE 4.79 06/09/2021     Lab Results   Component Value Date    TSH 0.503 06/09/2021         ASSESSMENT & PLAN:  Vipin Oakley is a very pleasant 51 y.o. 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  Tumor sent for CARIS testing and was negative for ALK, BRAF, EGFR, RET, ROS1, MET, HER2, PD-L1.  TMB was low.  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    1.  Metastatic squamous cell Carcinoma of the lung:  -  He understands that his cancer is treatable but not curable.  -  Concerned with the extensive nature of his metastatic disease as well as with rapid progression with multiple new soft tissue lesions.  -  He has seen Lisset Moura, Filiberto and Davin.  He completed cyberknife radiosurgery to his brain lesions and palliatve radiation to the R hip, L rib and subsequently proton radiation to ST metastases.   - Started treatment with Carboplatin and Taxol as well as Xgeva bone therapy.  After 4 cycles of treatment, his restaging imaging  overall showed improvement.  Lesions in the brain, CAP improved.  He does have evidence of progression in the bone. It is likely that this progression occurred during radiation before he started systemic therapy.  Will continue current therapy but monitor closely.  -Obtained xrays and has lytic lesion of the proximal right femur with no pathologic fracture.   - Will proceed with C5 today. He will follow up with MD with C6.     2.  Neoplasm related pain:  -  Currently taking Fentanyl 200 mcg (100x2) and Oxycodone 20 mg 1-2 tabs every ~4hours.     -  Previously recommended pain specialist but he refused and wants to continue with current regimen.   -Obtained xrays and has lytic lesion of the proximal right femur with no pathologic fracture. He has follow up with radiation today. D/w Rad/Onc NP and will discuss possibility of additional palliative radiation to his right femur.   -  Continue Senna/ Colace ii BID and lactulose as needed for refractory constipation currently doing well in this regard.     3.  Nausea  -Currently denies. Continue zofran and compazine prn.     4. Depression / Anxiety:  -Currently doing well in this regard. Continue Lexapro 10 mg daily and Valum 5 mg 1-2 tabs BID (he is taking 2 tabs BID).     5.  Difficulty with working:  - Given his current situation, he is not able to work and  would be appropriate for him to apply for disability. Our  has been working with him to help him with this and will get his disability in December.     6.  H/o Seizure d/o from the age of 9 months:  -  Says he hasn't had a seizure in a very long time despite brain lesions.  Takes Tegretol to prevent seizures. He hasn't had any changes in dose, etc for some time. Sodium is sl low today.  Will monitor carefully.    7.  Prophylaxis:  Received 2020 influenza vaccine and Prevnar 13 vaccinations.  COVID vaccination  X 2 completed. He refused 2021 flu vaccine.    ACO / VIRI/Other  Quality measures  -  Vipin  Adin did not receive 2021 flu vaccine.  -  Vipin Oakley reports a pain score of 7.  Given his pain assessment as noted, treatment options were discussed and the following options were decided upon as a follow-up plan to address the patient's pain: continuation of current treatment plan for pain. Follow up with radiation today as scheduled for consideratio of additional palliative radiation.   -  Current outpatient and discharge medications have been reconciled for the patient.  Reviewed by: VITOR De Santiago      8.  F/u:  - Continue Chemotherapy as planned with Carboplatin / Taxol and monthly Xgeva. Proceed with C5 today.   -  Plan to restage after 6 cycles  -MD follow up as scheduled with C6      I spent 30 minutes with Vipin Oakley today.  In the office today, more than 50% of this time was spent face-to-face with him  in counseling / coordination of care, reviewing his medical history and counseling on the current treatment plan.  All questions were answered to his satisfaction      Electronically Signed by: VITOR Granado      CC:     VITOR Mckee MD Frederick Bunge, MD Weisi Yan, MD Barbara Michna, MD Thomas Hunter, MD Charles Benjamin Newman, MD

## 2021-11-22 NOTE — TELEPHONE ENCOUNTER
From: Vipin Oakley  To: Office of Yu Foster MD  Sent: 11/21/2021 9:35 PM EST  Subject: Medication Renewal Request    Refills have been requested for the following medications:     fentaNYL (DURAGESIC) 100 MCG/HR patch [Yu Foster MD]     oxyCODONE (ROXICODONE) 20 MG tablet [Yu Foster MD]    Preferred pharmacy: [facility] [address] [phone] [facility] [phone]   Delivery method: Pickup

## 2021-11-24 NOTE — TELEPHONE ENCOUNTER
From: Vipin Oakley  To: Office of Yu Foster MD  Sent: 11/23/2021 9:13 PM EST  Subject: Medication Renewal Request    Refills have been requested for the following medications:     diazePAM (VALIUM) 5 MG tablet [Yu Foster MD]    Preferred pharmacy: [address] [phone] [facility] [phone] [facility]  Delivery method: Pickup

## 2021-11-30 NOTE — PROGRESS NOTES
OTV Note      Patient Name: Vipin Oakley  : 1970   MRN: [id number]     Diagnosis:    Chief Complaint   Patient presents with   • Lung Cancer      Staging: IV (T4,N3,M1)    This patient was seen today for an on treatment visit. The patient is receiving radiation treatment to the right femur. The patient has received XRT Dose (cGy): 800 cGy in 2 fractions out of a planned dose of 2000 cGy in 5 fractions.       Subjective      Review of Systems:   Review of Systems   Constitutional: Negative.    HENT: Negative.    Eyes: Negative.    Respiratory: Negative.    Cardiovascular: Negative.    Gastrointestinal: Negative.    Endocrine: Negative.    Genitourinary: Negative.    Musculoskeletal: Negative.    Skin: Negative.    Neurological: Negative.    Hematological: Negative.    Psychiatric/Behavioral: Negative.      Review of Systems   Constitutional: Negative.    HENT:  Negative.    Eyes: Negative.    Respiratory: Negative.    Cardiovascular: Negative.    Gastrointestinal: Negative.    Endocrine: Negative.    Genitourinary: Negative.     Musculoskeletal: Negative.    Skin: Negative.    Neurological: Negative.    Hematological: Negative.    Psychiatric/Behavioral: Negative.        Medications:     Current Outpatient Medications:   •  albuterol (PROVENTIL) (2.5 MG/3ML) 0.083% nebulizer solution, Take 2.5 mg by nebulization 2 (two) times a day., Disp: , Rfl:   •  carBAMazepine (TEGretol) 200 MG tablet, Take 200 mg by mouth 2 (Two) Times a Day. Takes 1.5 tabs in am and 2 tabs at hs, Disp: , Rfl:   •  dexamethasone (DECADRON) 4 MG tablet, Take 1 tablet by mouth 2 (Two) Times a Day With Meals., Disp: 60 tablet, Rfl: 3  •  diazePAM (VALIUM) 5 MG tablet, Take 1 tablet by mouth Every 8 (Eight) Hours As Needed for Anxiety., Disp: 90 tablet, Rfl: 0  •  EPINEPHrine (ADRENALIN) 1 MG/ML injection, Inject 1 mcg/mL under the skin into the appropriate area as directed. FOR BEE STINGS, Disp: , Rfl:   •  escitalopram (Lexapro) 10 MG  tablet, Take 1 tablet by mouth Daily., Disp: 30 tablet, Rfl: 11  •  famotidine (PEPCID) 20 MG tablet, Take 1 tablet by mouth 2 (two) times a day., Disp: , Rfl:   •  fentaNYL (DURAGESIC) 100 MCG/HR patch, Place 2 patches on the skin as directed by provider Every 72 (Seventy-Two) Hours., Disp: 10 patch, Rfl: 0  •  fluconazole (DIFLUCAN) 100 MG tablet, Take 2 tablets by mouth Daily., Disp: 14 tablet, Rfl: 0  •  gabapentin (NEURONTIN) 600 MG tablet, 800 mg 3 (Three) Times a Day., Disp: , Rfl:   •  ibuprofen (ADVIL,MOTRIN) 800 MG tablet, ibuprofen 800 mg tablet  TAKE ONE TABLET BY MOUTH THREE TIMES A DAY, Disp: , Rfl:   •  K Phos Phillips-Sod Phos Di & Mono (K-Phos-Neutral) 155-852-130 MG tablet, K-Phos-Neutral 250 mg tablet, Disp: , Rfl:   •  lactulose (CHRONULAC) 10 GM/15ML solution, Take 30 mL by mouth 2 (Two) Times a Day As Needed (for refractory constipation.)., Disp: 473 mL, Rfl: 1  •  Lidocaine Viscous HCl (XYLOCAINE) 2 % solution, SWISH, SPIT, OR SWALLOW 5 TO 10 ML BY MOUTH EVERY 3 HOURS AS NEEDED., Disp: 100 mL, Rfl: 5  •  lidocaine-prilocaine (EMLA) 2.5-2.5 % cream, Apply to port-a-cath about 30 minutes to 1 hour prior to chemotherapy, Disp: 30 g, Rfl: 3  •  methocarbamol (ROBAXIN) 500 MG tablet, Take 500 mg by mouth 2 (Two) Times a Day., Disp: , Rfl:   •  nicotine (NICODERM CQ) 21 MG/24HR patch, Place 1 patch on the skin as directed by provider Daily., Disp: 28 each, Rfl: 0  •  nicotine polacrilex (Nicorette Mini) 4 MG lozenge, Dissolve 1 lozenge in the mouth As Needed for Smoking Cessation., Disp: 72 lozenge, Rfl: 5  •  nystatin (MYCOSTATIN) 298249 UNIT/ML suspension, Swish and swallow 5 mL 4 (Four) Times a Day., Disp: 473 mL, Rfl: 1  •  ondansetron (Zofran) 8 MG tablet, Take 1 tablet by mouth Every 8 (Eight) Hours As Needed for Nausea or Vomiting., Disp: 60 tablet, Rfl: 6  •  ondansetron ODT (Zofran ODT) 8 MG disintegrating tablet, Place 1 tablet on the tongue Every 8 (Eight) Hours As Needed for Nausea or  "Vomiting., Disp: 60 tablet, Rfl: 5  •  oxyCODONE (ROXICODONE) 20 MG tablet, Take 1 tablet by mouth Every 4 (Four) Hours As Needed for Moderate Pain ., Disp: 112 tablet, Rfl: 0  •  prochlorperazine (COMPAZINE) 10 MG tablet, Take 1 tablet by mouth Every 6 (Six) Hours As Needed for Nausea., Disp: 60 tablet, Rfl: 3  •  sennosides-docusate (Senna-S) 8.6-50 MG per tablet, Take 2 tablets by mouth 2 (Two) Times a Day., Disp: 120 tablet, Rfl: 5  •  Syringe 25G X 5/8\" 3 ML misc, Use as directed 2 x weekly, Disp: 24 each, Rfl: 3  •  Testosterone Cypionate (Depo-Testosterone) 200 MG/ML injection, Inject 1/2 cc subcutaneously every Monday and Thursday, Disp: 10 mL, Rfl: 2    Allergies:   Allergies   Allergen Reactions   • Bee Venom Anaphylaxis   • Tramadol Other (See Comments)     Seizures  Ultram           Objective       Vital Signs:   Vitals:    11/30/21 1329   BP: 119/83   Pulse: 104   Resp: 18   Temp: 98 °F (36.7 °C)   TempSrc: Temporal   SpO2: 97%   Weight: 70.3 kg (155 lb)   PainSc: 0-No pain     Body mass index is 28.35 kg/m².     Current Total XRT Dose (cGY): XRT Dose (cGy): 800    Plan      Plan:   Patient was seen today for an on treatment visit. Patient is receiving radiation therapy to the right femur. Patient is stable and tolerating radiation therapy well with minimal side effects.  No new complaints today.  He states that the pain is already relieving.  Continue with radiation therapy.     I have reviewed treatment setup notes, checked and approved the daily guidance images. I reviewed dose delivery, treatment parameters and deemed them appropriate. We plan to continue radiation therapy as prescribed.     I, Luda Moura MD, personally performed the services described in this documentation as scribed by the above named individual in my presence, and it is both accurate and complete.  11/30/2021  15:01 EST     Electronically signed by Luda Moura MD, 11/30/21, 3:01 PM EST.    Scribed for Dr. Luda Moura MD by Aleisha " VITOR Gaines 11/30/2021  13:39 EST

## 2021-12-01 NOTE — TELEPHONE ENCOUNTER
Provider:  Davin  Phone #:  [phone]  Surgery:  NA  Surgery Date: NA   Last visit: Office Visit with William Jin MD (11/03/2021)    Next visit: 01/10/2022    Please see MyChart message below from patient's wife regarding his antibiotic. We did not prescribe this Rx         ----- Message from Vipin Oakley sent at 12/1/2021  3:15 PM EST -----  Regarding: Dexamethasone 4mg  My  Vipin Oakley is a patient of Dr. Jin. He had been prescribed antibiotics the first time he came and was evaluated for cyberknife. He has now ran out of refills and needs a new prescription.  The last prescription was written by a Dr. Gallo. I hope this is something you can assist us with.    Thank you,  Radha Okaley  [phone]

## 2021-12-01 NOTE — PROGRESS NOTES
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    Tumor sent for CARIS testing and was negative for ALK, BRAF, EGFR, RET, ROS1, MET, HER2, PD-L1.  TMB was low.  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      TREATMENT HISTORY:   1.  Stereotactic Radiation to the Brain     Treatment Site  Current Dose  Modality  From  To  Elapsed Days  Fx.    6 Brain Mets - Fx 2  1,800 cGy  x06  2021  1    5 Brain Mets - Fx 3  1,800 cGy  x06  2021  1    5 Brain Mets - Fx 1  1,800 cGy  x06  2021  1      2.  Dr. Moura delivered palliative radiation to the R femoral neck and 8th rib.  Further palliative radiation planned but currently on hold.     3. Xgeva bone therapy started 21    4.         5.  Received palliative radiation with proton therapy to ST metastases    CHIEF COMPLAINT:  Follow up of metastatic squamous cell lung cancer/toxicity check    HISTORY OF PRESENT ILLNESS:   Vipin Oakley is a very pleasant 51 y.o. male who was referred by Dr. Johnson for evaluation and treatment of squamous cell lung cancer.  He initially started to feel unwell in 2021.  At that time, he presented to an urgent care with cough and was diagnosed with Flu B.  His cough worsened and his voice started to become hoarse.  He ultimately came to the ER where he was diagnosed with FluB again as well as pneumonia and was told he had a lung mass.  From there he saw Dr. Johnson.  Bronchoscopy was delayed because of mucus retention cyst which was removed by Dr. Malone and then he returned to Dr. Johnson and had bronchoscopy.  This revealed heaped up / friable mucosa in thedistal L bronchus.  Brushings and biopsies were done and EBUS biopsies of Station 4L/10L LNs.   This revealed squamous cell carcinoma.  Meanwhile he was found to have a soft tissue lesion on his L upper back and FNA showed metastatic squamous cell carcinoma.      INTERVAL HISTORY:  Mr. Oakley is here today for follow up of metastatic squamous cell carcinoma. He says he has good days and bad days. On bad days, he reports fatigue and R rib pain that started 3-4 days ago.  He denies any injury, cough, fever, hemoptysis. He is using Fentanyl 200 mcg patch and oxycodone 20 mg every 3.5-4hr. Bowels are moving well, he denies n/v, seizures. He is otherwise doing well.       PAST MEDICAL HISTORY:  Past Medical History:   Diagnosis Date   • Anxiety    • Arthritis    • Back pain    • Bone cancer (HCC)    • Brain cancer (HCC)    • Carpal tunnel syndrome     bilateral   • COPD (chronic obstructive pulmonary disease) (HCC)    • Frequency of urination    • GERD (gastroesophageal reflux disease)    • Heartburn    • Herniated cervical disc    • Lung cancer (HCC)    • Seizures (HCC) 2014    treated with Tegretol as a child/teenager since 9 months old, currently well controlled   • Squamous cell lung cancer (HCC) 6/10/2021         PAST SURGICAL HISTORY:  Past Surgical History:   Procedure Laterality Date   • ABDOMINAL SURGERY     • BIOPSY OF BACK/FLANK N/A 5/18/2021    Procedure: BIOPSY SOFT TISSUE BACK / FLANK;  Surgeon: Hans Malone MD;  Location: [facility] OR;  Service: ENT;  Laterality: N/A;   • BRONCHOSCOPY N/A 5/26/2021    Procedure: BRONCHOSCOPY WITH ENDOBRONCHIAL ULTRASOUND;  Surgeon: Saurabh Johnson MD;  Location: [facility] OR;  Service: Pulmonary;  Laterality: N/A;   • CARDIAC CATHETERIZATION     • EPIDIDYMECTOMY Right 6/27/2018    Procedure: EPIDIDYMECTOMY;  Surgeon: Chino Aviles MD;  Location: [facility] OR;  Service: Urology   • HERNIA REPAIR     • LARYNGOSCOPY N/A 5/18/2021    Procedure: MICRODIRECT LARYNGOSCOPY;  Surgeon: Hans Malone MD;  Location: [facility] OR;  Service: ENT;  Laterality:  N/A;   • MOUTH SURGERY      teeth    • ORCHIECTOMY Right 10/19/2020    Procedure: ORCHIECTOMY;  Surgeon: Chino Aviles MD;  Location: [facility];  Service: Urology;  Laterality: Right;   • OTHER SURGICAL HISTORY  06/2021    mass removal from throat   • TESTICLE SURGERY     • VENOUS ACCESS DEVICE (PORT) INSERTION Left 7/13/2021    Procedure: INSERTION VENOUS ACCESS DEVICE;  Surgeon: Eugenia Lundy MD;  Location: [facility];  Service: General;  Laterality: Left;       FAMILY HISTORY:  Family History   Problem Relation Age of Onset   • Cancer Father         liver   • Hypertension Father    • Diabetes Father    • Hypertension Mother    • Cancer Maternal Uncle    • Heart disease Maternal Uncle    • Heart disease Paternal Aunt    • Cancer Maternal Grandmother    • Heart disease Maternal Grandmother        SOCIAL HISTORY:  Social History     Socioeconomic History   • Marital status:    Tobacco Use   • Smoking status: Current Every Day Smoker     Packs/day: 1.00     Years: 14.00     Pack years: 14.00     Types: Cigarettes   • Smokeless tobacco: Former User     Types: Snuff   Vaping Use   • Vaping Use: Never used   Substance and Sexual Activity   • Alcohol use: Yes     Alcohol/week: 2.0 standard drinks     Types: 2 Shots of liquor per week     Comment: occasionally   • Drug use: No   • Sexual activity: Defer     Birth control/protection: None     Social History     Social History Narrative    , lives with wife. Worked in furniture building, as a salesman,     Exposed to burning rubber at his current job with [employer]    Current smoker 1 ppd     REVIEW OF SYSTEMS:   A comprehensive 14 point review of systems was performed.  Significant findings as mentioned above.  All other systems reviewed and are negative.      MEDICATIONS:  The current medication list was reviewed in the EMR    Current Outpatient Medications:   •  albuterol (PROVENTIL) (2.5 MG/3ML) 0.083% nebulizer solution, Take 2.5 mg by  nebulization 2 (two) times a day., Disp: , Rfl:   •  carBAMazepine (TEGretol) 200 MG tablet, Take 200 mg by mouth 2 (Two) Times a Day. Takes 1.5 tabs in am and 2 tabs at hs, Disp: , Rfl:   •  dexamethasone (DECADRON) 4 MG tablet, Take 1 tablet by mouth 2 (Two) Times a Day With Meals., Disp: 60 tablet, Rfl: 3  •  diazePAM (VALIUM) 5 MG tablet, Take 1 tablet by mouth Every 8 (Eight) Hours As Needed for Anxiety., Disp: 90 tablet, Rfl: 0  •  EPINEPHrine (ADRENALIN) 1 MG/ML injection, Inject 1 mcg/mL under the skin into the appropriate area as directed. FOR BEE STINGS, Disp: , Rfl:   •  escitalopram (Lexapro) 10 MG tablet, Take 1 tablet by mouth Daily., Disp: 30 tablet, Rfl: 11  •  famotidine (PEPCID) 20 MG tablet, Take 1 tablet by mouth 2 (two) times a day., Disp: , Rfl:   •  fentaNYL (DURAGESIC) 100 MCG/HR patch, Place 2 patches on the skin as directed by provider Every 72 (Seventy-Two) Hours., Disp: 10 patch, Rfl: 0  •  fluconazole (DIFLUCAN) 100 MG tablet, Take 2 tablets by mouth Daily., Disp: 14 tablet, Rfl: 0  •  gabapentin (NEURONTIN) 600 MG tablet, 800 mg 3 (Three) Times a Day., Disp: , Rfl:   •  ibuprofen (ADVIL,MOTRIN) 800 MG tablet, ibuprofen 800 mg tablet  TAKE ONE TABLET BY MOUTH THREE TIMES A DAY, Disp: , Rfl:   •  K Phos Duplin-Sod Phos Di & Mono (K-Phos-Neutral) 155-852-130 MG tablet, K-Phos-Neutral 250 mg tablet, Disp: , Rfl:   •  lactulose (CHRONULAC) 10 GM/15ML solution, Take 30 mL by mouth 2 (Two) Times a Day As Needed (for refractory constipation.)., Disp: 473 mL, Rfl: 1  •  Lidocaine Viscous HCl (XYLOCAINE) 2 % solution, SWISH, SPIT, OR SWALLOW 5 TO 10 ML BY MOUTH EVERY 3 HOURS AS NEEDED., Disp: 100 mL, Rfl: 5  •  lidocaine-prilocaine (EMLA) 2.5-2.5 % cream, Apply to port-a-cath about 30 minutes to 1 hour prior to chemotherapy, Disp: 30 g, Rfl: 3  •  methocarbamol (ROBAXIN) 500 MG tablet, Take 500 mg by mouth 2 (Two) Times a Day., Disp: , Rfl:   •  nicotine (NICODERM CQ) 21 MG/24HR patch, Place 1  "patch on the skin as directed by provider Daily., Disp: 28 each, Rfl: 0  •  nicotine polacrilex (Nicorette Mini) 4 MG lozenge, Dissolve 1 lozenge in the mouth As Needed for Smoking Cessation., Disp: 72 lozenge, Rfl: 5  •  nystatin (MYCOSTATIN) 861171 UNIT/ML suspension, Swish and swallow 5 mL 4 (Four) Times a Day., Disp: 473 mL, Rfl: 1  •  ondansetron (Zofran) 8 MG tablet, Take 1 tablet by mouth Every 8 (Eight) Hours As Needed for Nausea or Vomiting., Disp: 60 tablet, Rfl: 6  •  ondansetron ODT (Zofran ODT) 8 MG disintegrating tablet, Place 1 tablet on the tongue Every 8 (Eight) Hours As Needed for Nausea or Vomiting., Disp: 60 tablet, Rfl: 5  •  oxyCODONE (ROXICODONE) 20 MG tablet, Take 1 tablet by mouth Every 4 (Four) Hours As Needed for Moderate Pain ., Disp: 112 tablet, Rfl: 0  •  prochlorperazine (COMPAZINE) 10 MG tablet, Take 1 tablet by mouth Every 6 (Six) Hours As Needed for Nausea., Disp: 60 tablet, Rfl: 3  •  sennosides-docusate (Senna-S) 8.6-50 MG per tablet, Take 2 tablets by mouth 2 (Two) Times a Day., Disp: 120 tablet, Rfl: 5  •  Syringe 25G X 5/8\" 3 ML misc, Use as directed 2 x weekly, Disp: 24 each, Rfl: 3  •  Testosterone Cypionate (Depo-Testosterone) 200 MG/ML injection, Inject 1/2 cc subcutaneously every Monday and Thursday, Disp: 10 mL, Rfl: 2    ALLERGIES:    Allergies   Allergen Reactions   • Bee Venom Anaphylaxis   • Tramadol Other (See Comments)     Seizures  Ultram         PHYSICAL EXAM:  Vitals:    12/02/21 0910   BP: 123/76   Pulse: 112   Resp: 18   Temp: 97.5 °F (36.4 °C)   TempSrc: Temporal   SpO2: 99%   Weight: 71.6 kg (157 lb 12.8 oz)   Height: 182.9 cm (72\")   PainSc:   7   PainLoc: Comment: Side   ECOG score: 0     General:  Awake, alert and oriented, in no acute distress. Appears well.   HEENT:  Pupils are equal, round and reactive to light and accommodation, Extra-ocular movements full, Oropharyx clear, mucous membranes moist  Neck:  No JVD, thyromegaly or lymphadenopathy  CV:  " Regular tachycardia, no murmurs, rubs or gallops  Resp:  Lungs are clear to auscultation bilaterally, no wheezing  Abd:  Soft, non-tender, non-distended, bowel sounds present, no organomegaly or masses  Ext:  No clubbing, cyanosis or edema  Skin:  No palpable ST nodules over his back or ribcage anywhere.  Neuro:  MS as above, grossly non focal exam    PATHOLOGY:  05-18-21 05-26-21              ENDOSCOPY:  Bronchoscopy 05-26-21  Findings: Irregular, friable mucosa in the distal left main stem bronchus at the left upper lobe takeoff      IMAGING:  CT Chest wo contrast 04-05-21  FINDINGS:  Small pericardial effusion is noted. There is no pleural effusion. There is AP window adenopathy measuring 2.1 cm. There is probably some left hilar adenopathy as well, poorly characterized without IV contrast. There is also some soft tissue abutting the  distal aortic arch and proximal descending aorta suggesting tumor or adenopathy. Upper abdominal images show a contracted gallbladder.     Lung windows show COPD. There is some mild narrowing of both proximal upper and lower lobe bronchi on the left side possibly due to extrinsic compression. Bronchoscopy may be beneficial. No suspicious pulmonary nodules are identified. There is some  scarring in the left upper lobe. The lungs are otherwise clear. No CT findings present to indicate pneumonia. Note: CT may be negative in the earliest stages of COVID-19. There are no suspicious osseous lesions.     IMPRESSION:     1. COPD with no evidence of acute pneumonia.  2. AP window and probably left hilar and perihilar adenopathy concerning for malignancy. There is some mild extrinsic compression of the proximal left upper and left lower lobe bronchi. Bronchoscopy may be beneficial. While a contrast-enhanced chest CT will provide some additional diagnostic information, PET CT would be more beneficial.      CTCAP 06-18-21  FINDINGS:     LUNGS: 7 mm parenchymal nodule anteriorly in the  left lung on image 35  of the axial series.  Airspace disease in the left upper lobe adjacent to the hilum.  1 cm parenchymal nodule in the right lung on image 48 of the axial  series.     HEART: Trace pericardial effusion.     MEDIASTINUM: Abnormal mediastinal adenopathy particularly in the  aorticopulmonary window, azygoesophageal recess and most notably  surrounding the left main pulmonary artery.     PLEURA: No pleural effusion. No pleural mass or abnormal calcification.  No pneumothorax.     VASCULATURE: No evidence of aneurysm. There is extrinsic mass effect on  the left lower lobe pulmonary artery but I do not see a pulmonary  embolus on the presented exam.     BONES: No acute bony abnormality.     VISUALIZED UPPER ABDOMEN:Please see the CT report for the abdomen and  pelvis.     Other: There is bilateral axillary adenopathy. 1.2 cm left axillary  lymph node and a 1.3 cm right axillary lymph node.     IMPRESSION:     1. Mediastinal, left hilar, and bilateral axillary adenopathy.  2. Right parenchymal nodule and left parenchymal nodule.  3. Also airspace disease in the left upper lobe concerning for  pneumonia.  4. Trace pericardial effusion.    FINDINGS:     Lower thorax: Please see the CT report for the chest.     Abdomen:     Liver: Multiple low-attenuation lesions throughout the liver compatible  with extensive hepatic metastasis.     Gallbladder: No dilation or stone identified.     Pancreas: Unremarkable. No mass or ductal dilatation.     Spleen: Homogeneous. No splenomegaly.     Adrenals: Small bilateral adrenal nodules. Given the history, these are  concerning for metastatic disease.     Kidneys/ureters: No mass. No obstructive uropathy.  No evidence of  urolithiasis.     GI tract: Moderate volume stool.     MESENTERY: No free fluid, walled off fluid collections, mesenteric  stranding, or enlarged lymph nodes         Vasculature: Evidence of atherosclerotic vascular disease.     Abdominal wall: No  focal hernia or mass.        Bladder: Mild thickening of the urinary bladder wall.     Reproductive: Unremarkable as visualized     Bones: No acute bony abnormality.     IMPRESSION:     1. Multiple low-attenuation lesions in the liver compatible with  metastatic disease.     2.Small bilateral adrenal nodules, also concerning for metastatic  disease.     3. Mild thickening of the urinary bladder wall.      Bone scan 06-21-21     FINDINGS: Focal area of increased tracer uptake corresponds to known  pathologic lesion of the left posterior eighth rib. Focal area of  increased uptake in the left inguinal region appears to localize to  tracer activity in the adjacent soft tissues or external to the patient.  There is focal area of increased tracer activity in the right femoral  neck which when correlated to previous CT corresponds to pathologic  lesion. Both kidneys are visualized.      IMPRESSION:  1. Abnormal tracer activity in the right femoral neck region and left  posterior eighth rib compatible with metastatic disease. Please note,  these appear predominantly lytic on CT.  2. Otherwise physiologic distribution of tracer.      MRI Brain w/wo contrast 06-25-21  FINDINGS:    Brain:  There are numerous ring-enhancing cystic type lesions  throughout the cerebral hemispheres compatible with cystic type  metastases in light of patient history.  For example, a right temporal  lobe ring-enhancing lesion is 1.3 cm.  A left inferior medial frontal  lobe lesion is 8.7 mm.  A left parietal lobe region within the  postcentral gyrus is 0.7 cm.  Smaller subcortical and parenchymal  metastases are noted.  No significant mass effect or vasogenic edema  identified.  No obvious cerebellar lesions identified.  No restricted  diffusion to indicate acute infarct.  No hemorrhage.    Midline shift:  There is no midline shift.    Ventricles:  No hydrocephalus.    Bones/joints:  No destructive calvarial lesions identified on MRI.    Sinuses:   Unremarkable as visualized.  No acute sinusitis.    Mastoid air cells:  Fluid in the right mastoid air cells noted.    Orbits:  Unremarkable as visualized.     IMPRESSION:  1.  Numerous predominantly cystic but rim-enhancing lesions throughout  the brain which are most consistent with numerous metastatic lesions.  2.  No significant vasogenic edema identified. No mass effect or midline  shift.  3.  Right mastoid effusion.  4.  No acute intracranial findings identified.       Bone scan 09-18-21  FINDINGS: Abnormal intense increased tracer uptake involving the sternum  and manubrium.  Abnormal intense increased tracer uptake involving 2 sites of the  calvaria.  Abnormal intense increased tracer uptake involving thoracic and lumbar  vertebral bodies.  Left posterior rib uptake is noted as well as lower levels of increased  tracer uptake involving mid left posterior ribs.  Abnormal uptake right femoral neck region.  Abnormal uptake involving the left ischium.  Mid left femur diaphysis lesion.     IMPRESSION:  Interval progression of disease. Abnormal exam demonstrating  diffuse osteoblastic metastatic disease of the axial and appendicular  skeleton as detailed above. Of note, right femoral neck and left femoral  diaphysis involvement is noted, new since prior exam.      CTCAP 09-22-21  CT FINDINGS: On the lung windows there are emphysematous changes in both  lungs. There is some increased density in the left upper lung.  A  portion of this may represent post radiation change. The left lower lobe  and right lung are clear. There is no evidence of supraclavicular  lymphadenopathy. In the mediastinum there are enlarged lymph nodes. The  overall volume of adenopathy however is decreasing. The heart was not  enlarged.  There is fluid in the pericardial sac surrounding the heart.     IMPRESSION:  There is some volume loss in the left upper lobe with what  appears to be some post radiation changes in the lungs. There  continues  to be adenopathy in the mediastinum this is however improved in  comparing with the June exam. The lungs continue to show changes of  emphysema throughout both lungs. There is a moderate-sized pericardial  Effusion.    CT FINDINGS: The liver continues to be abnormal. There are low density  areas in all segments of the liver consistent with metastatic disease.  These are stable in size to perhaps slightly smaller than on the earlier  CT. The spleen was unremarkable. There is no evidence of mass in the  pancreas. The aorta is normal in caliber. No adrenal lesions are  identified. The kidneys enhance appropriately and show no evidence of  obstruction. There is no evidence of ascites. In the pelvis there were  no masses or fluid collections. On the bone windows there are lesions in  the lumbar spine and sacrum consistent with metastatic disease.     IMPRESSION:  Continued evidence of metastatic disease in the liver. The  size of the lesions are decreasing. No new lesions have developed. There  are multiple bony metastatic deposits in the lumbar spine and sacrum.        Bone scan 09-18-21  FINDINGS: Abnormal intense increased tracer uptake involving the sternum  and manubrium.  Abnormal intense increased tracer uptake involving 2 sites of the  calvaria.  Abnormal intense increased tracer uptake involving thoracic and lumbar  vertebral bodies.  Left posterior rib uptake is noted as well as lower levels of increased  tracer uptake involving mid left posterior ribs.  Abnormal uptake right femoral neck region.  Abnormal uptake involving the left ischium.  Mid left femur diaphysis lesion.     IMPRESSION:  Interval progression of disease. Abnormal exam demonstrating  diffuse osteoblastic metastatic disease of the axial and appendicular  skeleton as detailed above. Of note, right femoral neck and left femoral  diaphysis involvement is noted, new since prior exam.        CTCAP 09-22-21  CT FINDINGS: On the lung  windows there are emphysematous changes in both  lungs. There is some increased density in the left upper lung.  A  portion of this may represent post radiation change. The left lower lobe  and right lung are clear. There is no evidence of supraclavicular  lymphadenopathy. In the mediastinum there are enlarged lymph nodes. The  overall volume of adenopathy however is decreasing. The heart was not  enlarged.  There is fluid in the pericardial sac surrounding the heart.     IMPRESSION:  There is some volume loss in the left upper lobe with what  appears to be some post radiation changes in the lungs. There continues  to be adenopathy in the mediastinum this is however improved in  comparing with the June exam. The lungs continue to show changes of  emphysema throughout both lungs. There is a moderate-sized pericardial  Effusion.    CT FINDINGS: The liver continues to be abnormal. There are low density  areas in all segments of the liver consistent with metastatic disease.  These are stable in size to perhaps slightly smaller than on the earlier  CT. The spleen was unremarkable. There is no evidence of mass in the  pancreas. The aorta is normal in caliber. No adrenal lesions are  identified. The kidneys enhance appropriately and show no evidence of  obstruction. There is no evidence of ascites. In the pelvis there were  no masses or fluid collections. On the bone windows there are lesions in  the lumbar spine and sacrum consistent with metastatic disease.     IMPRESSION:  Continued evidence of metastatic disease in the liver. The  size of the lesions are decreasing. No new lesions have developed. There  are multiple bony metastatic deposits in the lumbar spine and sacrum      MRI Brain w/wo contrast 10-04-21  FINDINGS:  Rim-enhancing metastatic lesions throughout the cerebral hemispheres  have improved since previous exam. A right temporal lobe lesion is now  10.3 mm and was previously 12.9 mm. A right frontal lobe  lesion is now  5.6 mm and was previously 7.6 mm. A left frontal lobe lesion is now 3.9  mm and was previously 8.7 mm. All other subcortical lesions have  decreased in size.     No significant vasogenic edema identified with the exception of the  right temporal lobe lesion.     No midline shift.     Right mastoid effusion is noted.     No new rim-enhancing or solid enhancing lesions identified.     IMPRESSION:  Interval improvement compared to the previous exam with  decrease size and prominence of multiple enhancing brain metastases.      XR Pelvis 1 or 2 View 10-28-21  FINDINGS:    Bones/joints:  Lytic lesion of the proximal right femur is noted  without evidence of associated periosteal reaction. No evidence of  pathologic fracture.  No dislocation.    Soft tissues:  Unremarkable.     IMPRESSION:    Lytic type lesion of the proximal right femur. No radiographic  evidence of pathologic fracture.      XR Femur 2 View Bilateral 10-28-21  FINDINGS:    Bones/joints:  No pathologic fractures are identified. Lytic lesion of  the proximal right femur is noted. No periosteal reaction.  No  dislocation.    Soft tissues:  Unremarkable.     IMPRESSION:  Lytic lesion of the proximal right femur. No radiographic evidence of  pathologic fracture.        MRI Brain With & Without Contrast 11-03-21  FINDINGS: Previous exam by report showed numerous peripherally enhancing  centrally cystic or necrotic brain parenchymal lesions which in general  are markedly diminished.     As an example, regarding the larger lesions, medial right temporal lobe  lesion, 17 mm in diameter on the prior study is diminished to 10 mm. The  9 mm lesion of the inferior right frontal lobe is reduced to 5 mm. The 9  mm left postcentral gyrus lesion has been reduced to 5.5 mm, practically  all of the remaining ring-enhancing lesions have been reduced to  punctate size.     There are very few faint punctate lesions scattered throughout the brain  elsewhere,  some barely distinguishable from vessels in cross section,  but which can all be identified in retrospect on the prior study and are  within 1 or 2 mm of the previous size. Central left occipital lesion,  axial image 68 of [employer] series, measures 2.5 mm and may be slightly  larger.     No new areas of edema are identified. There is no evidence of restricted  diffusion to suggest acute infarction. The sagittal images show the  midline structures to appear grossly normal. There are no signal changes  suggestive of hemorrhage, no evidence of hydrocephalus, or abnormal  extra-axial collection. There is expected flow signal in the major  intracranial arteries and in the median sagittal sinus. No gross  abnormalities are noted of the orbits. There is mild left sphenoid sinus  disease.      IMPRESSION:  1. Significantly improved intracranial metastatic disease, with the  previously noted ring-enhancing lesions reduced to small or punctate  size.     2. No clearly new intracranial metastatic disease.     3. A single punctate lesion in the left occipital white matter may be  slightly increased but this is a minimal finding compared to the prior  study. No other new intracranial pathology is identified.        RECENT LABS:  Lab Results   Component Value Date    WBC 5.14 12/01/2021    HGB 11.5 (L) 12/01/2021    HCT 34.8 (L) 12/01/2021    .2 (H) 12/01/2021    RDW 21.4 (H) 12/01/2021     (L) 12/01/2021    NEUTRORELPCT 73.3 11/11/2021    LYMPHORELPCT 17.4 (L) 11/11/2021    MONORELPCT 8.6 11/11/2021    EOSRELPCT 0.0 (L) 11/11/2021    BASORELPCT 0.2 11/11/2021    NEUTROABS 2.36 12/01/2021    LYMPHSABS 1.40 11/11/2021       Lab Results   Component Value Date     12/01/2021    K 4.1 12/01/2021    CO2 26.6 12/01/2021    CL 98 12/01/2021    BUN 10 12/01/2021    CREATININE 0.71 (L) 12/01/2021    EGFRIFNONA 118 12/01/2021    GLUCOSE 97 12/01/2021    CALCIUM 8.8 12/01/2021    ALKPHOS 125 (H) 12/01/2021    AST 17  12/01/2021    ALT 17 12/01/2021    BILITOT 0.2 12/01/2021    ALBUMIN 4.33 12/01/2021    PROTEINTOT 7.7 12/01/2021    MG 2.2 12/01/2021    PHOS 3.8 12/01/2021     LDH   Date Value Ref Range Status   06/09/2021 278 (H) 135 - 225 U/L Final     Uric Acid   Date Value Ref Range Status   06/09/2021 3.8 3.4 - 7.0 mg/dL Final     Lab Results   Component Value Date    FERRITIN 171.10 06/09/2021    IRON 35 (L) 06/09/2021    TIBC 311 06/09/2021    LABIRON 11 (L) 06/09/2021    MVGHYPMV53 511 06/09/2021    FOLATE 4.79 06/09/2021     Lab Results   Component Value Date    TSH 0.503 06/09/2021         ASSESSMENT & PLAN:  Vipin Oakley is a very pleasant 51 y.o. 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  Tumor sent for CARIS testing and was negative for ALK, BRAF, EGFR, RET, ROS1, MET, HER2, PD-L1.  TMB was low.  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    1.  Metastatic squamous cell Carcinoma of the lung:  -  He understands that his cancer is treatable but not curable.  -  Concerned with the extensive nature of his metastatic disease as well as with rapid progression with multiple new soft tissue lesions.  -  He has seen Filiberto Brown and Davin.  He completed cyberknife radiosurgery to his brain lesions and palliatve radiation to the R hip, L rib and subsequently proton radiation to ST metastases.   - Started treatment with Carboplatin and Taxol as well as Xgeva bone therapy.  After 4 cycles of treatment, his restaging imaging overall showed improvement.  Lesions in the brain, CAP improved.  He did have evidence of progression in the bone. It is likely that this progression occurred during radiation before he started systemic therapy.  Will continue current therapy but monitor closely.  -Obtained xrays and has lytic lesion of  the proximal right femur with no pathologic fracture.   - Will proceed with C6 today.   -  After today will continue with Xgeva but hold chemotherapy.  Will restage with CT CAP, MRI Brain and bone scan in early Jan and have him return after that.    2.  Neoplasm related pain:  -  Currently taking Fentanyl 200 mcg (100x2) and Oxycodone 20 mg 1-2 tabs every ~4hours.     -  Previously recommended pain specialist but he refused and wants to continue with current regimen.   -  Pain well controlled.   -  Continue Senna/ Colace ii BID and lactulose as needed for refractory constipation currently doing well in this regard.     3.  Nausea  -Currently denies. Continue zofran and compazine prn.     4. Depression / Anxiety:  -Currently doing well in this regard. Continue Lexapro 10 mg daily and Valum 5 mg 1-2 tabs BID (he is taking 2 tabs BID).     5.  Steroid use:  -  Lisset De Los Santos/Sabino prescribed Dexamethasone 4 mg BID in July with Cyberknife therapy.  I understood that he had stopped this, but he says he has been refilling it and taking it. He just ran out and asks for a refill.  -  I suspect that he no longer needs high dose steroid therapy and believed he had weaned off.  I will call Dr. De Los Santos to confirm that he is comfortable with Mr. Oakley being weaned off and will plan to reduce Dex from 4 mg BID to 4 QAM/2QPM x 3d then 2mg BID x 3 d, 1 mg BID x 3d, 1 mg QAM then stop.    6.  Difficulty with working:  - Given his current situation, he is not able to work and  would be appropriate for him to apply for disability. Our  has been working with him to help him with this and will get his disability in December.     7.  H/o Seizure d/o from the age of 9 months:  -  Says he hasn't had a seizure in a very long time despite brain lesions.  Takes Tegretol to prevent seizures. He hasn't had any changes in dose, etc for some time. Sodium is sl low today.  Will monitor carefully.    8.  Prophylaxis:  Received 2020  influenza vaccine and Prevnar 13 vaccinations.  COVID vaccination  X 2 completed. He refused 2021 flu vaccine.    9. ACO / VIRI/Other  Quality measures  -  Vipin Oakley did not receive 2021 flu vaccine.  -  Vipin Oakley reports a pain score of 7.  Given his pain assessment as noted, treatment options were discussed and the following options were decided upon as a follow-up plan to address the patient's pain: continuation of current treatment plan for pain. Follow up with radiation today as scheduled for consideratio of additional palliative radiation.   -  Current outpatient and discharge medications have been reconciled for the patient.  Reviewed by: Yu Foster MD       8.  F/u:  - plan to proceed with C6 today and give him a treatment break through the holidays.   - Continue monthly Xgeva.  - will reimage the first week of January with CTCAP, Bone Scan, MRI Brain w/wo contrast and I will see him back after the imaging.   -  Wean Dexamethasone off  -  Continue current pain medication regimen.    This note was scribed for Yu Foster MD by Liliya Brandt RN.    I, Yu Foster MD, personally performed the services described in this documentation as scribed by the above named individual in my presence, and it is both accurate and complete.  12/02/2021       I spent 35 minutes with Vipin Oakley today.  In the office today, more than 50% of this time was spent face-to-face with him  in counseling / coordination of care, reviewing his medical history and counseling on the current treatment plan.  All questions were answered to his satisfaction      Electronically Signed by: Yu Foster MD       CC:     VITOR Mckee MD Frederick Bunge, MD Weisi Yan, MD Barbara Michna, MD Thomas Hunter, MD Charles Benjamin Newman, MD

## 2021-12-02 NOTE — TELEPHONE ENCOUNTER
Im not sure which antibiotic/medicine he is referring to, please find out. But, he should contact the prescribing physician, Dr. Gallo.

## 2021-12-06 NOTE — TELEPHONE ENCOUNTER
From: Vipin Oakley  To: Office of Yu Foster MD  Sent: 12/5/2021 10:43 AM EST  Subject: Medication Renewal Request    Refills have been requested for the following medications:     fentaNYL (DURAGESIC) 100 MCG/HR patch [Yu Foster MD]     oxyCODONE (ROXICODONE) 20 MG tablet [Yu Foster MD]    Preferred pharmacy: [facility] [address] [phone] [facility] [phone]   Delivery method: Pickup

## 2021-12-20 NOTE — TELEPHONE ENCOUNTER
Caller: Radha Oakley    Relationship: Emergency Contact    Best call back number: [phone]    What is the best time to reach you: ANYTIME    Who are you requesting to speak with (clinical staff, provider,  specific staff member):CLINICAL    What was the call regarding: PATIENT SPOUSE STATES THAT MPEDICATION REQUIRES A PRIOR AUTHORIZATION AT [place] [email or web address]     [facility] [place], [place] - [address] [place] [address] - [phone]  - [phone] [address] [address]   Phone:  [phone]  Fax:  [phone]         Do you require a callback: YES

## 2021-12-20 NOTE — TELEPHONE ENCOUNTER
From: Vipin Oakley  To: Office of Yu Foster MD  Sent: 12/19/2021 5:01 AM EST  Subject: Medication Renewal Request    Refills have been requested for the following medications:     fentaNYL (DURAGESIC) 100 MCG/HR patch [Yu Foster MD]     oxyCODONE (ROXICODONE) 20 MG tablet [Yu Foster MD]    Preferred pharmacy: [facility] [address] [phone] [facility] [phone]   Delivery method: Pickup

## 2021-12-29 NOTE — TELEPHONE ENCOUNTER
Caller: Radha Oakley    Relationship: Emergency Contact    Best call back number: [phone]    Requested Prescriptions:   Requested Prescriptions     Pending Prescriptions Disp Refills   • ondansetron (Zofran) 8 MG tablet 60 tablet 6     Sig: Take 1 tablet by mouth Every 8 (Eight) Hours As Needed for Nausea or Vomiting.        Pharmacy where request should be sent:  [facility] AS LISTED    Additional details provided by patient: RADHA STATES THAT THESE NEED TO BE THE ONES THAT DISINTEGRATE.     Does the patient have less than a 3 day supply:  [x] Yes  [] No    Vijaya MCCLURE Rep   12/29/21 13:19 EST

## 2022-01-01 ENCOUNTER — LAB (OUTPATIENT)
Dept: ONCOLOGY | Facility: CLINIC | Age: 52
End: 2022-01-01

## 2022-01-01 ENCOUNTER — OFFICE VISIT (OUTPATIENT)
Dept: RADIATION ONCOLOGY | Facility: HOSPITAL | Age: 52
End: 2022-01-01

## 2022-01-01 ENCOUNTER — TELEPHONE (OUTPATIENT)
Dept: ONCOLOGY | Facility: CLINIC | Age: 52
End: 2022-01-01

## 2022-01-01 ENCOUNTER — APPOINTMENT (OUTPATIENT)
Dept: MRI IMAGING | Facility: HOSPITAL | Age: 52
End: 2022-01-01

## 2022-01-01 ENCOUNTER — INFUSION (OUTPATIENT)
Dept: ONCOLOGY | Facility: HOSPITAL | Age: 52
End: 2022-01-01

## 2022-01-01 ENCOUNTER — APPOINTMENT (OUTPATIENT)
Dept: RADIATION ONCOLOGY | Facility: HOSPITAL | Age: 52
End: 2022-01-01

## 2022-01-01 ENCOUNTER — TELEPHONE (OUTPATIENT)
Dept: ONCOLOGY | Facility: HOSPITAL | Age: 52
End: 2022-01-01

## 2022-01-01 ENCOUNTER — OFFICE VISIT (OUTPATIENT)
Dept: ONCOLOGY | Facility: CLINIC | Age: 52
End: 2022-01-01

## 2022-01-01 ENCOUNTER — OFFICE VISIT (OUTPATIENT)
Dept: NEUROSURGERY | Facility: CLINIC | Age: 52
End: 2022-01-01

## 2022-01-01 ENCOUNTER — HOSPITAL ENCOUNTER (OUTPATIENT)
Dept: RADIATION ONCOLOGY | Facility: HOSPITAL | Age: 52
Setting detail: RADIATION/ONCOLOGY SERIES
Discharge: HOME | End: 2022-01-10

## 2022-01-01 ENCOUNTER — HOSPITAL ENCOUNTER (OUTPATIENT)
Dept: NUCLEAR MEDICINE | Facility: HOSPITAL | Age: 52
Discharge: HOME | End: 2022-01-05

## 2022-01-01 ENCOUNTER — APPOINTMENT (OUTPATIENT)
Dept: ONCOLOGY | Facility: HOSPITAL | Age: 52
End: 2022-01-01

## 2022-01-01 ENCOUNTER — HOSPITAL ENCOUNTER (OUTPATIENT)
Dept: MRI IMAGING | Facility: HOSPITAL | Age: 52
Discharge: HOME | End: 2022-01-10
Admitting: NEUROLOGICAL SURGERY

## 2022-01-01 ENCOUNTER — DOCUMENTATION (OUTPATIENT)
Dept: ONCOLOGY | Facility: CLINIC | Age: 52
End: 2022-01-01

## 2022-01-01 ENCOUNTER — APPOINTMENT (OUTPATIENT)
Dept: NUCLEAR MEDICINE | Facility: HOSPITAL | Age: 52
End: 2022-01-01

## 2022-01-01 VITALS
HEART RATE: 72 BPM | SYSTOLIC BLOOD PRESSURE: 105 MMHG | BODY MASS INDEX: 20.1 KG/M2 | WEIGHT: 148.4 LBS | RESPIRATION RATE: 18 BRPM | DIASTOLIC BLOOD PRESSURE: 76 MMHG | HEIGHT: 72 IN

## 2022-01-01 VITALS
TEMPERATURE: 97.3 F | RESPIRATION RATE: 16 BRPM | SYSTOLIC BLOOD PRESSURE: 111 MMHG | OXYGEN SATURATION: 98 % | WEIGHT: 145 LBS | BODY MASS INDEX: 19.67 KG/M2 | HEART RATE: 118 BPM | DIASTOLIC BLOOD PRESSURE: 77 MMHG

## 2022-01-01 VITALS
TEMPERATURE: 97.8 F | HEIGHT: 72 IN | OXYGEN SATURATION: 97 % | HEART RATE: 86 BPM | WEIGHT: 145.8 LBS | RESPIRATION RATE: 18 BRPM | BODY MASS INDEX: 19.75 KG/M2 | SYSTOLIC BLOOD PRESSURE: 127 MMHG | DIASTOLIC BLOOD PRESSURE: 88 MMHG

## 2022-01-01 VITALS
HEART RATE: 131 BPM | DIASTOLIC BLOOD PRESSURE: 80 MMHG | SYSTOLIC BLOOD PRESSURE: 123 MMHG | TEMPERATURE: 97.8 F | RESPIRATION RATE: 18 BRPM | OXYGEN SATURATION: 96 %

## 2022-01-01 VITALS
DIASTOLIC BLOOD PRESSURE: 88 MMHG | RESPIRATION RATE: 18 BRPM | HEART RATE: 86 BPM | BODY MASS INDEX: 19.76 KG/M2 | OXYGEN SATURATION: 97 % | TEMPERATURE: 97.8 F | SYSTOLIC BLOOD PRESSURE: 127 MMHG | WEIGHT: 145.72 LBS

## 2022-01-01 VITALS
OXYGEN SATURATION: 98 % | BODY MASS INDEX: 17.88 KG/M2 | HEIGHT: 72 IN | HEART RATE: 162 BPM | RESPIRATION RATE: 18 BRPM | SYSTOLIC BLOOD PRESSURE: 102 MMHG | DIASTOLIC BLOOD PRESSURE: 70 MMHG | WEIGHT: 132 LBS | TEMPERATURE: 97.2 F

## 2022-01-01 DIAGNOSIS — C79.31 METASTASIS TO BRAIN: ICD-10-CM

## 2022-01-01 DIAGNOSIS — C79.51 METASTASIS TO BONE: ICD-10-CM

## 2022-01-01 DIAGNOSIS — C78.7 METASTASIS TO LIVER: ICD-10-CM

## 2022-01-01 DIAGNOSIS — C34.92 SQUAMOUS CELL CARCINOMA OF LEFT LUNG: ICD-10-CM

## 2022-01-01 DIAGNOSIS — F41.9 ANXIETY: ICD-10-CM

## 2022-01-01 DIAGNOSIS — G89.3 NEOPLASM RELATED PAIN: ICD-10-CM

## 2022-01-01 DIAGNOSIS — C79.51 METASTASIS TO BONE: Primary | ICD-10-CM

## 2022-01-01 DIAGNOSIS — C79.31 METASTASIS TO BRAIN: Primary | ICD-10-CM

## 2022-01-01 DIAGNOSIS — C34.92 SQUAMOUS CELL CARCINOMA OF LEFT LUNG: Primary | ICD-10-CM

## 2022-01-01 DIAGNOSIS — R56.9 SEIZURE: Primary | ICD-10-CM

## 2022-01-01 DIAGNOSIS — F32.A DEPRESSION, UNSPECIFIED DEPRESSION TYPE: ICD-10-CM

## 2022-01-01 DIAGNOSIS — R63.4 WEIGHT LOSS: ICD-10-CM

## 2022-01-01 DIAGNOSIS — R11.0 NAUSEA: ICD-10-CM

## 2022-01-01 LAB
ALBUMIN SERPL-MCNC: 3.4 G/DL (ref 3.5–5.2)
ALBUMIN/GLOB SERPL: 0.9 G/DL
ALP SERPL-CCNC: 440 U/L (ref 39–117)
ALT SERPL W P-5'-P-CCNC: 46 U/L (ref 1–41)
ANION GAP SERPL CALCULATED.3IONS-SCNC: 11.4 MMOL/L (ref 5–15)
AST SERPL-CCNC: 21 U/L (ref 1–40)
BASOPHILS # BLD AUTO: 0.06 10*3/MM3 (ref 0–0.2)
BASOPHILS NFR BLD AUTO: 0.6 % (ref 0–1.5)
BILIRUB SERPL-MCNC: 0.2 MG/DL (ref 0–1.2)
BUN SERPL-MCNC: 6 MG/DL (ref 6–20)
BUN/CREAT SERPL: 9.8 (ref 7–25)
CALCIUM SPEC-SCNC: 10.4 MG/DL (ref 8.6–10.5)
CHLORIDE SERPL-SCNC: 95 MMOL/L (ref 98–107)
CO2 SERPL-SCNC: 25.6 MMOL/L (ref 22–29)
CREAT BLDA-MCNC: 0.8 MG/DL (ref 0.6–1.3)
CREAT SERPL-MCNC: 0.61 MG/DL (ref 0.76–1.27)
DEPRECATED RDW RBC AUTO: 53.3 FL (ref 37–54)
EOSINOPHIL # BLD AUTO: 0.36 10*3/MM3 (ref 0–0.4)
EOSINOPHIL NFR BLD AUTO: 3.7 % (ref 0.3–6.2)
ERYTHROCYTE [DISTWIDTH] IN BLOOD BY AUTOMATED COUNT: 13.4 % (ref 12.3–15.4)
GFR SERPL CREATININE-BSD FRML MDRD: 140 ML/MIN/1.73
GLOBULIN UR ELPH-MCNC: 4 GM/DL
GLUCOSE SERPL-MCNC: 142 MG/DL (ref 65–99)
HCT VFR BLD AUTO: 30.7 % (ref 37.5–51)
HGB BLD-MCNC: 10.2 G/DL (ref 13–17.7)
IMM GRANULOCYTES # BLD AUTO: 0.05 10*3/MM3 (ref 0–0.05)
IMM GRANULOCYTES NFR BLD AUTO: 0.5 % (ref 0–0.5)
LYMPHOCYTES # BLD AUTO: 1.62 10*3/MM3 (ref 0.7–3.1)
LYMPHOCYTES NFR BLD AUTO: 16.5 % (ref 19.6–45.3)
MAGNESIUM SERPL-MCNC: 1.4 MG/DL (ref 1.6–2.6)
MCH RBC QN AUTO: 36.6 PG (ref 26.6–33)
MCHC RBC AUTO-ENTMCNC: 33.2 G/DL (ref 31.5–35.7)
MCV RBC AUTO: 110 FL (ref 79–97)
MONOCYTES # BLD AUTO: 1.28 10*3/MM3 (ref 0.1–0.9)
MONOCYTES NFR BLD AUTO: 13.1 % (ref 5–12)
NEUTROPHILS NFR BLD AUTO: 6.42 10*3/MM3 (ref 1.7–7)
NEUTROPHILS NFR BLD AUTO: 65.6 % (ref 42.7–76)
NRBC BLD AUTO-RTO: 0 /100 WBC (ref 0–0.2)
PHOSPHATE SERPL-MCNC: 2 MG/DL (ref 2.5–4.5)
PLATELET # BLD AUTO: 165 10*3/MM3 (ref 140–450)
PMV BLD AUTO: 9.8 FL (ref 6–12)
POTASSIUM SERPL-SCNC: 4.1 MMOL/L (ref 3.5–5.2)
PROT SERPL-MCNC: 7.4 G/DL (ref 6–8.5)
RBC # BLD AUTO: 2.79 10*6/MM3 (ref 4.14–5.8)
SODIUM SERPL-SCNC: 132 MMOL/L (ref 136–145)
WBC NRBC COR # BLD: 9.79 10*3/MM3 (ref 3.4–10.8)

## 2022-01-01 PROCEDURE — 83735 ASSAY OF MAGNESIUM: CPT | Performed by: INTERNAL MEDICINE

## 2022-01-01 PROCEDURE — 77334 RADIATION TREATMENT AID(S): CPT | Performed by: RADIOLOGY

## 2022-01-01 PROCEDURE — 84100 ASSAY OF PHOSPHORUS: CPT | Performed by: INTERNAL MEDICINE

## 2022-01-01 PROCEDURE — 77290 THER RAD SIMULAJ FIELD CPLX: CPT | Performed by: RADIOLOGY

## 2022-01-01 PROCEDURE — 77300 RADIATION THERAPY DOSE PLAN: CPT | Performed by: RADIOLOGY

## 2022-01-01 PROCEDURE — 25010000002 DENOSUMAB 120 MG/1.7ML SOLUTION: Performed by: INTERNAL MEDICINE

## 2022-01-01 PROCEDURE — 99214 OFFICE O/P EST MOD 30 MIN: CPT | Performed by: INTERNAL MEDICINE

## 2022-01-01 PROCEDURE — 99212-NC PR NO CHARGE CBC OFFICE OUTPATIENT VISIT 10 MINUTES

## 2022-01-01 PROCEDURE — 77263 THER RADIOLOGY TX PLNG CPLX: CPT | Performed by: RADIOLOGY

## 2022-01-01 PROCEDURE — G0463 HOSPITAL OUTPT CLINIC VISIT: HCPCS

## 2022-01-01 PROCEDURE — 99212-NC PR NO CHARGE CBC OFFICE OUTPATIENT VISIT 10 MINUTES: Performed by: RADIOLOGY

## 2022-01-01 PROCEDURE — 77295 3-D RADIOTHERAPY PLAN: CPT | Performed by: RADIOLOGY

## 2022-01-01 PROCEDURE — 80053 COMPREHEN METABOLIC PANEL: CPT | Performed by: INTERNAL MEDICINE

## 2022-01-01 PROCEDURE — 0 GADOBENATE DIMEGLUMINE 529 MG/ML SOLUTION: Performed by: NEUROLOGICAL SURGERY

## 2022-01-01 PROCEDURE — 85025 COMPLETE CBC W/AUTO DIFF WBC: CPT | Performed by: INTERNAL MEDICINE

## 2022-01-01 PROCEDURE — 96372 THER/PROPH/DIAG INJ SC/IM: CPT

## 2022-01-01 PROCEDURE — 78306 BONE IMAGING WHOLE BODY: CPT

## 2022-01-01 PROCEDURE — 96523 IRRIG DRUG DELIVERY DEVICE: CPT

## 2022-01-01 PROCEDURE — 70553 MRI BRAIN STEM W/O & W/DYE: CPT

## 2022-01-01 PROCEDURE — 99215 OFFICE O/P EST HI 40 MIN: CPT | Performed by: INTERNAL MEDICINE

## 2022-01-01 PROCEDURE — 82565 ASSAY OF CREATININE: CPT

## 2022-01-01 PROCEDURE — A9577 INJ MULTIHANCE: HCPCS | Performed by: NEUROLOGICAL SURGERY

## 2022-01-01 PROCEDURE — A9561 TC99M OXIDRONATE: HCPCS | Performed by: INTERNAL MEDICINE

## 2022-01-01 PROCEDURE — 0 TECHNETIUM OXIDRONATE KIT: Performed by: INTERNAL MEDICINE

## 2022-01-01 PROCEDURE — 25010000002 HEPARIN LOCK FLUSH PER 10 UNITS: Performed by: NURSE PRACTITIONER

## 2022-01-01 PROCEDURE — 99214 OFFICE O/P EST MOD 30 MIN: CPT | Performed by: NEUROLOGICAL SURGERY

## 2022-01-01 PROCEDURE — 78306 BONE IMAGING WHOLE BODY: CPT | Performed by: RADIOLOGY

## 2022-01-01 RX ORDER — HYDROMORPHONE HYDROCHLORIDE 2 MG/1
2 TABLET ORAL EVERY 8 HOURS PRN
Qty: 45 TABLET | Refills: 0 | Status: SHIPPED | OUTPATIENT
Start: 2022-01-01 | End: 2022-01-01 | Stop reason: SDUPTHER

## 2022-01-01 RX ORDER — SODIUM CHLORIDE 9 MG/ML
250 INJECTION, SOLUTION INTRAVENOUS ONCE
Status: CANCELLED | OUTPATIENT
Start: 2022-01-01

## 2022-01-01 RX ORDER — OXYCODONE HYDROCHLORIDE 20 MG/1
TABLET ORAL
Qty: 112 TABLET | Refills: 0 | Status: SHIPPED | OUTPATIENT
Start: 2022-01-01 | End: 2022-01-01 | Stop reason: SDUPTHER

## 2022-01-01 RX ORDER — OXYCODONE HYDROCHLORIDE 20 MG/1
20 TABLET ORAL EVERY 4 HOURS PRN
Qty: 112 TABLET | Refills: 0 | Status: CANCELLED | OUTPATIENT
Start: 2022-01-01

## 2022-01-01 RX ORDER — DEXAMETHASONE 1 MG
TABLET ORAL
Qty: 60 TABLET | Refills: 0 | Status: SHIPPED | OUTPATIENT
Start: 2022-01-01

## 2022-01-01 RX ORDER — FENTANYL 100 UG/H
2 PATCH TRANSDERMAL
Qty: 10 PATCH | Refills: 0 | Status: SHIPPED | OUTPATIENT
Start: 2022-01-01 | End: 2022-01-01 | Stop reason: SDUPTHER

## 2022-01-01 RX ORDER — GABAPENTIN 800 MG/1
800 TABLET ORAL 3 TIMES DAILY
Qty: 90 TABLET | Refills: 0 | Status: SHIPPED | OUTPATIENT
Start: 2022-01-01 | End: 2022-01-01 | Stop reason: SDUPTHER

## 2022-01-01 RX ORDER — DIAZEPAM 5 MG/1
5 TABLET ORAL EVERY 8 HOURS PRN
Qty: 90 TABLET | Refills: 0 | Status: SHIPPED | OUTPATIENT
Start: 2022-01-01 | End: 2022-01-01 | Stop reason: SDUPTHER

## 2022-01-01 RX ORDER — OXYCODONE HYDROCHLORIDE 20 MG/1
TABLET ORAL
Qty: 112 TABLET | Refills: 0 | Status: SHIPPED | OUTPATIENT
Start: 2022-01-01

## 2022-01-01 RX ORDER — FENTANYL 100 UG/H
2 PATCH TRANSDERMAL
Qty: 10 PATCH | Refills: 0 | Status: CANCELLED | OUTPATIENT
Start: 2022-01-01

## 2022-01-01 RX ORDER — DIAZEPAM 5 MG/1
5 TABLET ORAL EVERY 8 HOURS PRN
Qty: 90 TABLET | Refills: 0 | Status: SHIPPED | OUTPATIENT
Start: 2022-01-01

## 2022-01-01 RX ORDER — SODIUM CHLORIDE 0.9 % (FLUSH) 0.9 %
10 SYRINGE (ML) INJECTION AS NEEDED
Status: CANCELLED | OUTPATIENT
Start: 2022-01-01

## 2022-01-01 RX ORDER — SODIUM CHLORIDE 0.9 % (FLUSH) 0.9 %
10 SYRINGE (ML) INJECTION AS NEEDED
Status: DISCONTINUED | OUTPATIENT
Start: 2022-01-01 | End: 2022-01-01 | Stop reason: HOSPADM

## 2022-01-01 RX ORDER — FAMOTIDINE 10 MG/ML
20 INJECTION, SOLUTION INTRAVENOUS AS NEEDED
Status: CANCELLED | OUTPATIENT
Start: 2022-01-01

## 2022-01-01 RX ORDER — DIPHENHYDRAMINE HYDROCHLORIDE 50 MG/ML
50 INJECTION INTRAMUSCULAR; INTRAVENOUS AS NEEDED
Status: CANCELLED | OUTPATIENT
Start: 2022-01-01

## 2022-01-01 RX ORDER — HEPARIN SODIUM (PORCINE) LOCK FLUSH IV SOLN 100 UNIT/ML 100 UNIT/ML
500 SOLUTION INTRAVENOUS AS NEEDED
Status: CANCELLED | OUTPATIENT
Start: 2022-01-01

## 2022-01-01 RX ORDER — OLANZAPINE 5 MG/1
5 TABLET ORAL ONCE
Status: CANCELLED | OUTPATIENT
Start: 2022-01-01 | End: 2022-01-01

## 2022-01-01 RX ORDER — HEPARIN SODIUM (PORCINE) LOCK FLUSH IV SOLN 100 UNIT/ML 100 UNIT/ML
500 SOLUTION INTRAVENOUS AS NEEDED
Status: DISCONTINUED | OUTPATIENT
Start: 2022-01-01 | End: 2022-01-01 | Stop reason: HOSPADM

## 2022-01-01 RX ORDER — OXYCODONE HYDROCHLORIDE 20 MG/1
20 TABLET ORAL EVERY 4 HOURS PRN
Qty: 112 TABLET | Refills: 0 | Status: SHIPPED | OUTPATIENT
Start: 2022-01-01 | End: 2022-01-01 | Stop reason: SDUPTHER

## 2022-01-01 RX ORDER — FENTANYL 100 UG/H
2 PATCH TRANSDERMAL
Qty: 10 PATCH | Refills: 0 | Status: SHIPPED | OUTPATIENT
Start: 2022-01-01

## 2022-01-01 RX ORDER — PALONOSETRON 0.05 MG/ML
0.25 INJECTION, SOLUTION INTRAVENOUS ONCE
Status: CANCELLED | OUTPATIENT
Start: 2022-01-01

## 2022-01-01 RX ORDER — SODIUM CHLORIDE 9 MG/ML
250 INJECTION, SOLUTION INTRAVENOUS ONCE
Status: CANCELLED | OUTPATIENT
Start: 2022-02-15

## 2022-01-01 RX ORDER — GABAPENTIN 800 MG/1
800 TABLET ORAL 3 TIMES DAILY
Qty: 90 TABLET | Refills: 0 | Status: SHIPPED | OUTPATIENT
Start: 2022-01-01

## 2022-01-01 RX ORDER — HYDROMORPHONE HYDROCHLORIDE 2 MG/1
2 TABLET ORAL EVERY 8 HOURS PRN
Qty: 45 TABLET | Refills: 0 | Status: SHIPPED | OUTPATIENT
Start: 2022-01-01

## 2022-01-01 RX ADMIN — GADOBENATE DIMEGLUMINE 15 ML: 529 INJECTION, SOLUTION INTRAVENOUS at 14:08

## 2022-01-01 RX ADMIN — DENOSUMAB 120 MG: 120 INJECTION SUBCUTANEOUS at 15:38

## 2022-01-01 RX ADMIN — TECHNETIUM TC 99M OXIDRONATE 1 DOSE: 3.15 INJECTION, POWDER, LYOPHILIZED, FOR SOLUTION INTRAVENOUS at 11:30

## 2022-01-01 RX ADMIN — Medication 500 UNITS: at 15:56

## 2022-01-01 RX ADMIN — SODIUM CHLORIDE, PRESERVATIVE FREE 10 ML: 5 INJECTION INTRAVENOUS at 15:55

## 2022-01-03 NOTE — PROGRESS NOTES
Office Follow Up Note      Patient Name: Vipin Oakley  : 1970   MRN: [id number]     Requesting Physician: Yu Foster MD    Chief Complaint: Bone Metastasis   Staging: IV (T4,N3,M1)    History of Present Illness: Vipin Oakley is a pleasant 51 y.o. male who is here today for follow up after completing his second round of radiation therapy.      He initially started to feel unwell in 2021.  At that time, he presented to an urgent care with cough and was diagnosed with Flu B.  His cough worsened and his voice started to become hoarse.  He ultimately came to the ER where he was diagnosed with Flu B again as well as pneumonia and was told he had a lung mass.  From there he saw pulmonology, Dr. Johnson.  His bronchoscopy was delayed because of mucus retention cyst, which was removed by Dr. Malone, and then he returned to Dr. Johnson and had the bronchoscopy.  This revealed heaped up/friable mucosa in the distal left bronchus.  Brushings and biopsies were done and EBUS biopsies of Station 4L/10L LNs.  This revealed squamous cell carcinoma.  Meanwhile he was found to have a soft tissue lesion on his L upper back and FNA showed metastatic squamous cell carcinoma.       Since this time he was found to have metastases to hilar and mediastinal LNs, as well as metastatic disease to the R lung, liver, bone and brain and a soft tissue metastasis to his L posterior back.       He continues chemotherapy with medical oncology and has received Carboplatin and Taxol as well as Xgeva bone therapy.  After 4 cycles of treatment, his restaging imaging overall showed improvement.  Lesions in the brain, CAP improved.  He does have evidence of progression in the bone. He continues on with chemotherapy Carboplatin and Taxol and will complete C6 on 2021.      Interval History:  He is now s/p XRT x2. He received 2000 cGy in 5 fractions to each painful bony mets. This included his right femur, right back, and  left 8th rib. He completed his treatments on 9/3/2021. He also completed cyberknife therapy at [facility] to his brain lesions.  At his last follow-up with us his pain was significantly better in the thigh, rib, and back.  However, he was complaining of new right femur pain.  He was re-simed and treated with 2000 cGy in 5 fractions to the right femur.  He started this on 11/29/2021 and completed those 5 treatments on 12/3/2021.  He tolerated this well.  Today is 1 month out from his last treatment.     Subjective      Review of Systems:   Review of Systems   Constitutional: Positive for fatigue.   HENT:  Negative.    Eyes: Negative.    Respiratory: Negative.    Cardiovascular: Negative.    Gastrointestinal: Positive for nausea.   Endocrine: Negative.    Genitourinary: Negative.     Musculoskeletal: Positive for arthralgias, back pain and gait problem.   Skin: Negative.  Negative for color change.   Neurological: Positive for gait problem.   Hematological: Negative.    Psychiatric/Behavioral: Negative.      Review of Systems   Constitutional: Positive for fatigue.   HENT: Negative.    Eyes: Negative.    Respiratory: Negative.    Cardiovascular: Negative.    Gastrointestinal: Positive for nausea.   Endocrine: Negative.    Genitourinary: Negative.    Musculoskeletal: Positive for arthralgias, back pain and gait problem.   Skin: Negative.  Negative for color change and dry skin.   Hematological: Negative.    Psychiatric/Behavioral: Negative.        I have reviewed and confirmed the accuracy of the ROS as documented by the MA/LPN/RN VITOR Rodriguez     The following portions of the patient's history were reviewed and updated as appropriate: allergies, current medications, past family history, past medical history, past social history, past surgical history and problem list.    Past Oncology History:   Oncology/Hematology History   Squamous cell lung cancer (HCC)   6/10/2021 Initial Diagnosis    Squamous cell lung  cancer (CMS/HCC)     7/28/2021 -  Chemotherapy    OP SUPPORTIVE Denosumab (Xgeva) Q28D     8/18/2021 -  Chemotherapy    OP LUNG PACLitaxel / CARBOplatin AUC=6 (Q21D)      Metastasis to brain (HCC)   7/1/2021 Initial Diagnosis    Metastasis to brain (CMS/HCC)     7/28/2021 - 7/2028 Radiation    Radiation OncologyTreatment Course:  Vipin Oakley received 1800 cGy in 1 fraction to five brain metastases via Stereotactic Radiation Therapy - SRT.     7/29/2021 - 7/29/2021 Radiation    Radiation OncologyTreatment Course:  Vipin Oakley received 1800 cGy in 1 fraction to six brain metastases via Stereotactic Radiation Therapy - SRT.     7/30/2021 - 7/30/2021 Radiation    Radiation OncologyTreatment Course:  Vipin Oakley received 1800 cGy in 1 fraction to five brain metastases via Stereotactic Radiation Therapy - SRT.     8/18/2021 -  Chemotherapy    OP LUNG PACLitaxel / CARBOplatin AUC=6 (Q21D)      Metastasis to bone (HCC)   7/1/2021 Initial Diagnosis    Metastasis to bone (CMS/HCC)     7/28/2021 -  Chemotherapy    OP SUPPORTIVE Denosumab (Xgeva) Q28D     8/18/2021 -  Chemotherapy    OP LUNG PACLitaxel / CARBOplatin AUC=6 (Q21D)      Metastasis to liver (HCC)   7/1/2021 Initial Diagnosis    Metastasis to liver (CMS/HCC)     8/18/2021 -  Chemotherapy    OP LUNG PACLitaxel / CARBOplatin AUC=6 (Q21D)      Squamous cell carcinoma of left lung (HCC)   7/9/2021 Initial Diagnosis    Squamous cell carcinoma of left lung (CMS/HCC)     7/28/2021 -  Chemotherapy    OP CENTRAL VENOUS ACCESS DEVICE ACCESS, CARE, AND MAINTENANCE (CVAD)          KPS: 70%     Results Review:   The following data was reviewed by: VITOR Rodriguez on 01/03/2022:  Common labs    Common Labsle 10/28/21 10/28/21 11/11/21 11/11/21 12/1/21 12/1/21    1244 1244 0916 0916 1301 1301   Glucose 124 (A)   127 (A)  97   BUN 18   19  10   Creatinine 0.80   0.60 (A)  0.71 (A)   eGFR Non African Am 103   143  118   Sodium 136   135 (A)  136   Potassium 3.7   4.4  4.1    Chloride 101   102  98   Calcium 8.6   9.3  8.8   Albumin 4.15   4.40  4.33   Total Bilirubin <0.2   0.2  0.2   Alkaline Phosphatase 130 (A)   108  125 (A)   AST (SGOT) 13   12  17   ALT (SGPT) 15   17  17   WBC  7.60 8.06  5.14    Hemoglobin  10.2 (A) 10.2 (A)  11.5 (A)    Hematocrit  29.6 (A) 31.1 (A)  34.8 (A)    Platelets  295 171  136 (A)    (A) Abnormal value            Covid Tests    Common Labsle 8/27/21   COVID19 Not Detected               Imaging:     CT Chest wo contrast 04-05-21  FINDINGS:  Small pericardial effusion is noted. There is no pleural effusion. There is AP window adenopathy measuring 2.1 cm. There is probably some left hilar adenopathy as well, poorly characterized without IV contrast. There is also some soft tissue abutting the  distal aortic arch and proximal descending aorta suggesting tumor or adenopathy. Upper abdominal images show a contracted gallbladder.     Lung windows show COPD. There is some mild narrowing of both proximal upper and lower lobe bronchi on the left side possibly due to extrinsic compression. Bronchoscopy may be beneficial. No suspicious pulmonary nodules are identified. There is some  scarring in the left upper lobe. The lungs are otherwise clear. No CT findings present to indicate pneumonia. Note: CT may be negative in the earliest stages of COVID-19. There are no suspicious osseous lesions.     IMPRESSION:     1. COPD with no evidence of acute pneumonia.  2. AP window and probably left hilar and perihilar adenopathy concerning for malignancy. There is some mild extrinsic compression of the proximal left upper and left lower lobe bronchi. Bronchoscopy may be beneficial. While a contrast-enhanced chest CT will provide some additional diagnostic information, PET CT would be more beneficial.        CTCAP 06-18-21  FINDINGS:     LUNGS: 7 mm parenchymal nodule anteriorly in the left lung on image 35  of the axial series.  Airspace disease in the left upper lobe  adjacent to the hilum.  1 cm parenchymal nodule in the right lung on image 48 of the axial  series.     HEART: Trace pericardial effusion.     MEDIASTINUM: Abnormal mediastinal adenopathy particularly in the  aorticopulmonary window, azygoesophageal recess and most notably  surrounding the left main pulmonary artery.     PLEURA: No pleural effusion. No pleural mass or abnormal calcification.  No pneumothorax.     VASCULATURE: No evidence of aneurysm. There is extrinsic mass effect on  the left lower lobe pulmonary artery but I do not see a pulmonary  embolus on the presented exam.     BONES: No acute bony abnormality.     VISUALIZED UPPER ABDOMEN:Please see the CT report for the abdomen and  pelvis.     Other: There is bilateral axillary adenopathy. 1.2 cm left axillary  lymph node and a 1.3 cm right axillary lymph node.     IMPRESSION:     1. Mediastinal, left hilar, and bilateral axillary adenopathy.  2. Right parenchymal nodule and left parenchymal nodule.  3. Also airspace disease in the left upper lobe concerning for  pneumonia.  4. Trace pericardial effusion.     FINDINGS:     Lower thorax: Please see the CT report for the chest.     Abdomen:     Liver: Multiple low-attenuation lesions throughout the liver compatible  with extensive hepatic metastasis.     Gallbladder: No dilation or stone identified.     Pancreas: Unremarkable. No mass or ductal dilatation.     Spleen: Homogeneous. No splenomegaly.     Adrenals: Small bilateral adrenal nodules. Given the history, these are  concerning for metastatic disease.     Kidneys/ureters: No mass. No obstructive uropathy.  No evidence of  urolithiasis.     GI tract: Moderate volume stool.     MESENTERY: No free fluid, walled off fluid collections, mesenteric  stranding, or enlarged lymph nodes         Vasculature: Evidence of atherosclerotic vascular disease.     Abdominal wall: No focal hernia or mass.        Bladder: Mild thickening of the urinary bladder  wall.     Reproductive: Unremarkable as visualized     Bones: No acute bony abnormality.     IMPRESSION:     1. Multiple low-attenuation lesions in the liver compatible with  metastatic disease.     2.Small bilateral adrenal nodules, also concerning for metastatic  disease.     3. Mild thickening of the urinary bladder wall.        Bone scan 06-21-21     FINDINGS: Focal area of increased tracer uptake corresponds to known  pathologic lesion of the left posterior eighth rib. Focal area of  increased uptake in the left inguinal region appears to localize to  tracer activity in the adjacent soft tissues or external to the patient.  There is focal area of increased tracer activity in the right femoral  neck which when correlated to previous CT corresponds to pathologic  lesion. Both kidneys are visualized.      IMPRESSION:  1. Abnormal tracer activity in the right femoral neck region and left  posterior eighth rib compatible with metastatic disease. Please note,  these appear predominantly lytic on CT.  2. Otherwise physiologic distribution of tracer.        MRI Brain w/wo contrast 06-25-21  FINDINGS:    Brain:  There are numerous ring-enhancing cystic type lesions  throughout the cerebral hemispheres compatible with cystic type  metastases in light of patient history.  For example, a right temporal  lobe ring-enhancing lesion is 1.3 cm.  A left inferior medial frontal  lobe lesion is 8.7 mm.  A left parietal lobe region within the  postcentral gyrus is 0.7 cm.  Smaller subcortical and parenchymal  metastases are noted.  No significant mass effect or vasogenic edema  identified.  No obvious cerebellar lesions identified.  No restricted  diffusion to indicate acute infarct.  No hemorrhage.    Midline shift:  There is no midline shift.    Ventricles:  No hydrocephalus.    Bones/joints:  No destructive calvarial lesions identified on MRI.    Sinuses:  Unremarkable as visualized.  No acute sinusitis.    Mastoid air cells:   Fluid in the right mastoid air cells noted.    Orbits:  Unremarkable as visualized.     IMPRESSION:  1.  Numerous predominantly cystic but rim-enhancing lesions throughout  the brain which are most consistent with numerous metastatic lesions.  2.  No significant vasogenic edema identified. No mass effect or midline  shift.  3.  Right mastoid effusion.  4.  No acute intracranial findings identified.     Chest X-Ray- 7/13/2021  IMPRESSION:  1.  Left Port-A-Cath placement with tip in the distal SVC region. No  pneumothorax.  2.  Increase attenuation of the left lung field is most probably related  to patient's underlying malignancy as no obvious effusion is identified.     MRI Cyberknife w/ and w/o contrast- 7/18/2021   IMPRESSION:  At least 12 peripherally enhancing centrally cystic or  necrotic brachial lesions are present compatible with diffuse metastatic  involvement as described above.     MRI Brain w/ and w/o contrast- 11/3/2021  IMPRESSION:  1. Significantly improved intracranial metastatic disease, with the  previously noted ring-enhancing lesions reduced to small or punctate size.  2. No clearly new intracranial metastatic disease.  3. A single punctate lesion in the left occipital white matter may be  slightly increased but this is a minimal finding compared to the prior  study. No other new intracranial pathology is identified.     NM Bone Scan- 9/18/2021  FINDINGS: Abnormal intense increased tracer uptake involving the sternum and manubrium.  Abnormal intense increased tracer uptake involving 2 sites of the calvaria.  Abnormal intense increased tracer uptake involving thoracic and lumbar vertebral bodies.  Left posterior rib uptake is noted as well as lower levels of increased tracer uptake involving mid left posterior ribs.  Abnormal uptake right femoral neck region.  Abnormal uptake involving the left ischium.  Mid left femur diaphysis lesion.  IMPRESSION:  Interval progression of disease. Abnormal exam  demonstrating diffuse osteoblastic metastatic disease of the axial and appendicular skeleton as detailed above. Of note, right femoral neck and left femoral diaphysis involvement is noted, new since prior exam.     CT Abdomen/Pelvis w/ contrast- 9/22/2021     IMPRESSION:  Continued evidence of metastatic disease in the liver. The  size of the lesions are decreasing. No new lesions have developed. There  are multiple bony metastatic deposits in the lumbar spine and sacrum.     CT Chest w/ contrast- 9/22/2021     IMPRESSION:  There is some volume loss in the left upper lobe with what  appears to be some post radiation changes in the lungs. There continues  to be adenopathy in the mediastinum this is however improved in  comparing with the June exam. The lungs continue to show changes of  emphysema throughout both lungs. There is a moderate-sized pericardial  effusion.     MRI Brain w/ and w/o contrast- 10/4/2021     IMPRESSION:  Interval improvement compared to the previous exam with  decrease size and prominence of multiple enhancing brain metastases.     XR Pelvis and Femur- 10/28/2021     IMPRESSION:  Lytic type lesion of the proximal right femur. No radiographic evidence of pathologic fracture.       CT of the chest with contrast-12/30/2021  FINDINGS:     LUNGS: Consolidation in the left upper lobe. There is consolidation in  the left lower lobe, likely pneumonia.  COPD.     HEART: Small pericardial effusion.     MEDIASTINUM: No masses. No enlarged lymph nodes.  No fluid collections.     PLEURA: No pleural effusion. No pleural mass or abnormal calcification.  No pneumothorax.     VASCULATURE: No evidence of aneurysm.     BONES: Sclerotic lesions are seen in the spine compatible with  metastatic disease. Sclerotic change in the sternum and likely  pathologic fracture of uncertain age.     VISUALIZED UPPER ABDOMEN: Please see the CT report for the abdomen and  pelvis.     Other: None.     IMPRESSION:  1. Metastatic  disease to the skeleton.  2. Left lower lobe pneumonia.  3. Dense consolidation filling the left upper lobe with volume loss and  mediastinal shift to the left.  4. Small pericardial effusion.  5. Other findings as above.    CT of the abdomen pelvis with contrast-12/30/2021  FINDINGS:     Lower thorax: Please see the CT report for the chest.     Abdomen:     Liver: Increased number of metastatic lesions to the liver.     Gallbladder: No dilation or stone identified.     Pancreas: Unremarkable. No mass or ductal dilatation.     Spleen: Homogeneous. No splenomegaly.     Adrenals: No mass.     Kidneys/ureters: No mass. No obstructive uropathy.  No evidence of  urolithiasis.     GI tract: Moderate to large stool burden. There is no evidence of  appendicitis.     MESENTERY: No free fluid, walled off fluid collections, mesenteric  stranding, or enlarged lymph nodes.         Vasculature: Evidence of atherosclerotic vascular disease.     Abdominal wall: No focal hernia or mass.        Bladder: No focal mass or significant wall thickening.     Reproductive: Unremarkable as visualized.     Bones: Sclerotic metastasis.     IMPRESSION:  1. Increasing number of metastatic lesions to the liver.  2. Continued evidence of bony metastasis.  3. Other findings as above.         Pathology:    05-18-21 05-26-21                Objective     Physical Exam:  Physical Exam  Vitals reviewed.   Constitutional:       General: He is not in acute distress.     Appearance: He is underweight. He is ill-appearing.   HENT:      Head: Normocephalic.      Nose: Nose normal.      Mouth/Throat:      Mouth: Mucous membranes are moist.      Pharynx: Oropharynx is clear.   Eyes:      Extraocular Movements: Extraocular movements intact.      Conjunctiva/sclera: Conjunctivae normal.      Pupils: Pupils are equal, round, and reactive to light.   Cardiovascular:      Rate and Rhythm: Normal rate and regular rhythm.      Pulses: Normal pulses.       Heart sounds: Normal heart sounds.   Pulmonary:      Effort: Pulmonary effort is normal. No respiratory distress.      Breath sounds: Normal breath sounds.   Abdominal:      General: Abdomen is flat. Bowel sounds are normal. There is no distension.      Palpations: Abdomen is soft. There is no mass.   Musculoskeletal:         General: No swelling or tenderness. Normal range of motion.      Cervical back: Normal range of motion.      Thoracic back: Bony tenderness present.      Right upper leg: Bony tenderness present.   Skin:     General: Skin is warm and dry.      Capillary Refill: Capillary refill takes less than 2 seconds.   Neurological:      General: No focal deficit present.      Mental Status: He is alert and oriented to person, place, and time. Mental status is at baseline.   Psychiatric:         Mood and Affect: Mood normal.         Behavior: Behavior normal. Behavior is cooperative.         Thought Content: Thought content normal.         Judgment: Judgment normal.         Vital Signs:   Vitals:    01/03/22 1300   BP: 111/77   BP Location: Left arm   Patient Position: Lying   Pulse: 118   Resp: 16   Temp: 97.3 °F (36.3 °C)   TempSrc: Temporal   SpO2: 98%   Weight: 65.8 kg (145 lb)     Body mass index is 19.67 kg/m².       Assessment / Plan      Assessment/Plan:   Vipin Oakley is a very pleasant 50 y.o. 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  Tumor sent for CARIS testing and was negative for ALK, BRAF, EGFR, RET, ROS1, MET, HER2, PD-L1.  TMB was low.  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     First bone scan showed abnormal tracer activity in the right femoral neck region and left posterior eighth rib compatible with metastatic disease. Please note, these appear predominantly lytic on  CT.     We offered palliative radiation. He is now s/p XRT x2. He received 2000 cGy in 5 fractions to each painful bony mets. This included his right femur, right back, and left 8th rib. He completed his first set of treatments on 9/3/2021. He also completed cyberknife therapy at [facility] to his brain lesions. He tolerated all treatments well. He states that this helped with the pain at that time. He is currently on Fentanyl 200 mcg (100x2) and Oxycodone 20 mg 1-2 tabs every ~4hours for bony mets pain. X-Rays were obtained due to the worsening pain and it showed his lytic lesion of the proximal right femur with no pathologic fracture. A bone scan on 9/18/2021 showed interval progression of disease. Abnormal exam demonstrating diffuse osteoblastic metastatic disease of the axial and appendicular skeleton as detailed above. Of note, right femoral neck and left femoral diaphysis involvement is noted, new since prior exam.  He asked at his first follow-up visit with us if it would be possible to radiate this area as well. It is in a different spot of the femur than what was previously radiated, therefore he was resimed and treated with another round of palliative radiation to the right femur, he was given 2000 cGy in 5 fractions. He started this on 11/29/2021 and completed those 5 treatments on 12/3/2021.  He tolerated this well.  Today is 1 month out from his last treatment.  He complains of some pain still, but it is better than it was before radiation was done.  He is still limping today.    Mr. Oakley has an appointment for a bone scan to be performed on 1/4/2022.  He currently underwent a CT of the chest and abdomen/pelvis on 12/30/2021.  He also has a MRI scheduled on 1/10/2022, as well as a follow-up with Dr. De Los Santos.  And a neurosurgery follow-up with Dr. Jin on the same day as well.  He has an appointment with Dr. Foster on 1/11/2022 with lab work.  He continues with chemotherapy.  He will not need to see us back  for follow-up unless he develops new or worsening pain.  Instructed the patient to let Dr. Foster know if he develops new or worsening pain and she can reconsult us if needed.      Follow Up:   Return if symptoms worsen or fail to improve.    Aleisha Gaines, VITOR  01/03/22 13:17 EST

## 2022-01-10 NOTE — PROGRESS NOTES
TELEMEDICINE FOLLOW UP NOTE    PATIENT:                                                      Vipin Oakley  MEDICAL RECORD #:                        [id number]  :                                                          1970  COMPLETION DATE:   2021  DIAGNOSIS:     Brain metastases    This visit has been rescheduled as a phone visit to comply with patient safety concerns in accordance with CDC recommendations in light of the COVID-19 pandemic.  Total time of discussion was <10 minutes.  Verbal consent given.    COVID-19 RISK SCREEN     1. Has the patient had close contact or exposure without PPE with a lab confirmed COVID-19 (+) person or a person under investigation (PUI) for COVID-19 infection?  -- No     2. Has the patient had respiratory symptoms, worsened/new cough and/or SOA, unexplained fever, or sudden loss of smell and/or taste in the past week? --  No    3. Has the patient completed COVID vaccination and Booster Dose? --  Unknown         BRIEF HISTORY:   Vipin Oakley is a very pleasant 51 y.o. 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  Patient was seen by Dr. Foster who sent the patient's pathology for [facility].  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  The patient has previously undergone CyberKnife stereotactic radiosurgery here at [facility] to a total of 16 intracranial lesions compatible with metastatic disease.  In 2021, the patient underwent 18 Gy in a single fraction delivered to each of the 16 total lesions.  He tolerated SRS well.  Initially, the patient was found to have a good response to treatment in the brain with interval reduction in size of the treated lesions, and no clearly identified new intracranial metastatic disease on serial MRI imaging  of the brain.    Since we saw him last, the patient has continued systemic chemotherapy in addition to Xgeva.  He has completed multiple short courses of palliative radiotherapy locally under the direction of Dr. Moura, including palliative radiotherapy to the right hip, left rib, and right femur with symptomatic improvement in pain.    The patient now returns today via telemedicine for short interval repeat MRI of the brain for continued surveillance of intracranial disease burden.  From a neurologic standpoint, he reports occasional nausea which is not new.  He otherwise denies headaches, vision change, altered mental status, focal weakness or paresthesias, vomiting, or new neurologic deficits.  The patient's wife describes some increased myoclonic jerking, particularly at night when the patient is asleep, without overt focal seizures.  The patient is on Tegretol for history of seizures.          MEDICATIONS: Medication reconciliation for the patient was reviewed and confirmed in the electronic medical record.    Review of Systems   Constitutional: Positive for fatigue.   Gastrointestinal: Positive for nausea.   Musculoskeletal: Positive for arthralgias.   All other systems reviewed and are negative.    KPS 80%        Physical Exam  Pulmonary:      Respirations even, unlabored. No audible wheezing or cough.  Neurological:      A+Ox4, conversant, answers questions appropriately.  Psychiatric:     Judgement, affect, and decision-making WNL.    Limited physical exam as visit was conducted remotely via telephone in light of the COVID-19 pandemic.          The following portions of the patient's history were reviewed and updated as appropriate: allergies, current medications, past family history, past medical history, past social history, past surgical history and problem list.        IMAGING:  I have personally reviewed the following imaging study:    MRI brain 1/10/2022:  FINDINGS: There is a tiny area of restricted  diffusion involving the  right periventricular white matter to suggest a tiny area of acute  ischemia. There is an increased abnormal contrast enhancement seen  throughout the brain parenchyma bilaterally as well as new lesions  identified predominantly within the cerebellum, occipital lobes as well  as diffusely throughout the cerebral hemispheres bilaterally. The  lesions are too numerous to count with the largest ring enhancement  abnormality seen in the right temporal lobe. This area of ring  enhancement when compared to the prior study today measures 1.2 x 1.0 cm  and previously measured 0.9 x 0.6 cm. There is increasing edema seen  surrounding the multiple too numerous to count metastatic lesions. There  is no evidence of hemorrhage. No hydrocephalus. No abnormal extra-axial  fluid collections identified. Visualized paranasal sinuses are grossly  clear. Mastoid air cells are patent. Pituitary and sella are  unremarkable in appearance. Craniovertebral junction is preserved.     IMPRESSION:  Interval progression of disease with interval increase seen  in size and number of the multiple metastatic lesions throughout the  cerebral and cerebellar hemispheres bilaterally. There is a tiny  punctate area of restricted diffusion in the right frontal  periventricular white matter suggesting an acute ischemic insult.      D:  01/10/2022  E:  01/10/2022         Diagnoses and all orders for this visit:    1. Metastasis to brain (HCC) (Primary)  -     Ambulatory Referral to Radiation Oncology-External         IMPRESSION:  Mr. Oakley is a very pleasant 51-year-old gentleman with metastatic squamous cell carcinoma of the lung with distant metastasis including spine, liver, bone, and brain.  He is now 5 months status post completion of CyberKnife stereotactic radiosurgery to a total of 16 intracranial metastases.  He tolerated SRS and is relatively asymptomatic from his brain.  He is no longer on steroids.  Initially, repeat  MRI imaging of the brain showed good response to treatment, though unfortunately recent MRI brain obtained 1/10/2022 demonstrates interval progression in size and number of metastatic lesions throughout the brain parenchyma bilaterally, as well as within the cerebellum, occipital lobes, and diffusely throughout the cerebral hemispheres bilaterally.  There is increasing edema seen surrounding the innumerable metastatic lesions.  MRI imaging reviewed alongside Dr. De Los Santos, who has also discussed findings with Dr. Jin.  Unfortunately, additional stereotactic radiosurgery is no longer an appropriate option.  The patient would be best suited with a course of palliative whole brain radiotherapy.  I anticipate 30 Gray in 10 fractions.  The patient would prefer to have this done closer to home.  I will coordinate referral back to Dr. Moura, and I have explained we will remain available as needed at this point.  In the interim, the patient will return to Dr. Foster for management of systemic disease burden, also appearing to have progressed including increased number of metastatic hepatic lesions on CT abdomen/pelvis as well as increased intensity of uptake in multiple bone lesions on whole body bone scan.  I suspect a change in his systemic therapy should he wish to continue to pursue treatment.      RECOMMENDATIONS:    Brain metastases  -Limited number on initial MRI  -Status post CyberKnife SRS to 16 CNS lesions, completed 7/30/2021  -Follows with Dr. Jin  -MRI brain 10/4/2021 with interval improvement with decrease in size of multiple lesions and no new CNS disease  -MRI brain 11/3/2021 with overall improvement in CNS metastatic disease, questionable left occipital progression, no new intracranial pathology identified  -MRI brain 1/10/2021 with evidence of disease progression with too numerous to count CNS metastatic lesions and increasing vasogenic edema  -Relatively asymptomatic   -Completed recent Decadron  taper  -Not a surgical candidate  -No longer considered candidate for CyberKnife SRS  -We will refer back to Dr. Moura for consideration of palliative whole brain radiotherapy    Seizures  -On tegretol  -Referral to neurology pending per Dr. Jin     Systemic disease  -Has a p53 mutation but no other targetable mutations based on [facility] report  -Status post 6 cycles systemic chemotherapy carboplatin/Taxol  -Follows with Dr. Foster  -Recent CT CAP, bone scan with progressive bone and liver disease  -Upcoming medical oncology visit 1/11/2022     Painful bone metastases  -Status post several courses of palliative XRT with Dr. Moura  -Receiving Xgeva     Smoking cessation/tobacco abuse  -Currently stopped smoking  -Using Nicorette lozenges  -Previously used Nicotene patches in the past  -Continue to encourage/congratulate efforts      Return if symptoms worsen or fail to improve, for Office Visit.    Karson Pozo, APRN

## 2022-01-10 NOTE — PROGRESS NOTES
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    Tumor sent for CARIS testing and was negative for ALK, BRAF, EGFR, RET, ROS1, MET, HER2, PD-L1.  TMB was low.  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      TREATMENT HISTORY:   1.  Stereotactic Radiation to the Brain     Treatment Site  Current Dose  Modality  From  To  Elapsed Days  Fx.    6 Brain Mets - Fx 2  1,800 cGy  x06  2021  1    5 Brain Mets - Fx 3  1,800 cGy  x06  2021  1    5 Brain Mets - Fx 1  1,800 cGy  x06  2021  1      2.  Dr. Moura delivered palliative radiation to the R femoral neck and 8th rib.  Further palliative radiation planned but currently on hold.     3. Xgeva bone therapy started 21    4.         5.  Received palliative radiation with proton therapy to ST metastases    CHIEF COMPLAINT:  Follow up of metastatic squamous cell lung cancer/toxicity check    HISTORY OF PRESENT ILLNESS:   Vipin Oakley is a very pleasant 51 y.o. male who was referred by Dr. Johnson for evaluation and treatment of squamous cell lung cancer.  He initially started to feel unwell in 2021.  At that time, he presented to an urgent care with cough and was diagnosed with Flu B.  His cough worsened and his voice started to become hoarse.  He ultimately came to the ER where he was diagnosed with FluB again as well as pneumonia and was told he had a lung mass.  From there he saw Dr. Johnson.  Bronchoscopy was delayed because of mucus retention cyst which was removed by Dr. Malone and then he returned to Dr. Johnson and had bronchoscopy.  This revealed heaped up / friable mucosa in thedistal L bronchus.  Brushings and biopsies were done and EBUS biopsies of Station 4L/10L LNs.   This revealed squamous cell carcinoma.  Meanwhile he was found to have a soft tissue lesion on his L upper back and FNA showed metastatic squamous cell carcinoma.      INTERVAL HISTORY:  Mr. Oakley is here today with his wife for follow up of metastatic squamous cell carcinoma. He is feeling poorly.  He has had increasing pain despite Fentanyl 200 mcg and Oxycodone  20 mg 6 tabs/kyle.  He has ihad increasing headaches as well as diffuse weakness.  He asked if we could order him a wheelchair.  He has had some nausea and vomiting but not consistently. He had imaging as below with evidence of diffuse progression of disease.        PAST MEDICAL HISTORY:  Past Medical History:   Diagnosis Date   • Anxiety    • Arthritis    • Back pain    • Bone cancer (HCC)    • Brain cancer (HCC)    • Carpal tunnel syndrome     bilateral   • COPD (chronic obstructive pulmonary disease) (HCC)    • Frequency of urination    • GERD (gastroesophageal reflux disease)    • Heartburn    • Herniated cervical disc    • Lung cancer (HCC)    • Seizures (HCC) 2014    treated with Tegretol as a child/teenager since 9 months old, currently well controlled   • Squamous cell lung cancer (HCC) 6/10/2021         PAST SURGICAL HISTORY:  Past Surgical History:   Procedure Laterality Date   • ABDOMINAL SURGERY     • BIOPSY OF BACK/FLANK N/A 5/18/2021    Procedure: BIOPSY SOFT TISSUE BACK / FLANK;  Surgeon: Hans Malone MD;  Location: [facility] OR;  Service: ENT;  Laterality: N/A;   • BRONCHOSCOPY N/A 5/26/2021    Procedure: BRONCHOSCOPY WITH ENDOBRONCHIAL ULTRASOUND;  Surgeon: Saurabh Johnson MD;  Location: [facility] OR;  Service: Pulmonary;  Laterality: N/A;   • CARDIAC CATHETERIZATION     • EPIDIDYMECTOMY Right 6/27/2018    Procedure: EPIDIDYMECTOMY;  Surgeon: Chino Aviles MD;  Location: [facility] OR;  Service: Urology   • HERNIA REPAIR     • LARYNGOSCOPY N/A 5/18/2021    Procedure: MICRODIRECT LARYNGOSCOPY;  Surgeon: Hans Malone  MD;  Location: [facility] OR;  Service: ENT;  Laterality: N/A;   • MOUTH SURGERY      teeth    • ORCHIECTOMY Right 10/19/2020    Procedure: ORCHIECTOMY;  Surgeon: Chino Aviles MD;  Location: [facility] OR;  Service: Urology;  Laterality: Right;   • OTHER SURGICAL HISTORY  06/2021    mass removal from throat   • TESTICLE SURGERY     • VENOUS ACCESS DEVICE (PORT) INSERTION Left 7/13/2021    Procedure: INSERTION VENOUS ACCESS DEVICE;  Surgeon: Eugenia Lundy MD;  Location: [facility] OR;  Service: General;  Laterality: Left;       FAMILY HISTORY:  Family History   Problem Relation Age of Onset   • Cancer Father         liver   • Hypertension Father    • Diabetes Father    • Hypertension Mother    • Cancer Maternal Uncle    • Heart disease Maternal Uncle    • Heart disease Paternal Aunt    • Cancer Maternal Grandmother    • Heart disease Maternal Grandmother        SOCIAL HISTORY:  Social History     Socioeconomic History   • Marital status:    Tobacco Use   • Smoking status: Current Every Day Smoker     Packs/day: 1.00     Years: 14.00     Pack years: 14.00     Types: Cigarettes   • Smokeless tobacco: Former User     Types: Snuff   Vaping Use   • Vaping Use: Never used   Substance and Sexual Activity   • Alcohol use: Yes     Alcohol/week: 2.0 standard drinks     Types: 2 Shots of liquor per week     Comment: occasionally   • Drug use: No   • Sexual activity: Defer     Birth control/protection: None     Social History     Social History Narrative    , lives with wife. Worked in furniture building, as a salesman,     Exposed to burning rubber at his current job with [employer]    Current smoker 1 ppd     REVIEW OF SYSTEMS:   A comprehensive 14 point review of systems was performed.  Significant findings as mentioned above.  All other systems reviewed and are negative.      MEDICATIONS:  The current medication list was reviewed in the EMR    Current Outpatient Medications:   •  albuterol (PROVENTIL) (2.5  MG/3ML) 0.083% nebulizer solution, Take 2.5 mg by nebulization 2 (two) times a day., Disp: , Rfl:   •  Calcium Carb-Cholecalciferol (Calcium-Vitamin D) 600-400 MG-UNIT tablet, Take 1 tablet by mouth 2 (Two) Times a Day., Disp: 60 tablet, Rfl: 5  •  carBAMazepine (TEGretol) 200 MG tablet, Take 200 mg by mouth 2 (Two) Times a Day. Takes 1.5 tabs in am and 2 tabs at hs, Disp: , Rfl:   •  dexamethasone (DECADRON) 1 MG tablet, Take 4 tabs qAM and 2 tabs qPM x 3 days, then 2 tabs BID x 3 days, then 1 tab BID x 3 days, then 1 tab daily x 3 days, then stop, Disp: 39 tablet, Rfl: 0  •  diazePAM (VALIUM) 5 MG tablet, Take 1 tablet by mouth Every 8 (Eight) Hours As Needed for Anxiety., Disp: 90 tablet, Rfl: 0  •  EPINEPHrine (ADRENALIN) 1 MG/ML injection, Inject 1 mcg/mL under the skin into the appropriate area as directed. FOR BEE STINGS, Disp: , Rfl:   •  escitalopram (Lexapro) 10 MG tablet, Take 1 tablet by mouth Daily., Disp: 30 tablet, Rfl: 11  •  famotidine (PEPCID) 20 MG tablet, Take 1 tablet by mouth 2 (two) times a day., Disp: , Rfl:   •  fentaNYL (DURAGESIC) 100 MCG/HR patch, Place 2 patches on the skin as directed by provider Every 72 (Seventy-Two) Hours., Disp: 10 patch, Rfl: 0  •  fluconazole (DIFLUCAN) 100 MG tablet, Take 2 tablets by mouth Daily., Disp: 14 tablet, Rfl: 0  •  gabapentin (NEURONTIN) 600 MG tablet, 800 mg 3 (Three) Times a Day., Disp: , Rfl:   •  ibuprofen (ADVIL,MOTRIN) 800 MG tablet, ibuprofen 800 mg tablet  TAKE ONE TABLET BY MOUTH THREE TIMES A DAY, Disp: , Rfl:   •  K Phos [place]-Sod Phos Di & Mono (K-Phos-Neutral) 155-852-130 MG tablet, K-Phos-Neutral 250 mg tablet, Disp: , Rfl:   •  lactulose (CHRONULAC) 10 GM/15ML solution, Take 30 mL by mouth 2 (Two) Times a Day As Needed (for refractory constipation.)., Disp: 473 mL, Rfl: 1  •  Lidocaine Viscous HCl (XYLOCAINE) 2 % solution, SWISH, SPIT, OR SWALLOW 5 TO 10 ML BY MOUTH EVERY 3 HOURS AS NEEDED., Disp: 100 mL, Rfl: 5  •  lidocaine-prilocaine  "(EMLA) 2.5-2.5 % cream, Apply to port-a-cath about 30 minutes to 1 hour prior to chemotherapy, Disp: 30 g, Rfl: 3  •  methocarbamol (ROBAXIN) 500 MG tablet, Take 500 mg by mouth 2 (Two) Times a Day., Disp: , Rfl:   •  nicotine (NICODERM CQ) 21 MG/24HR patch, Place 1 patch on the skin as directed by provider Daily., Disp: 28 each, Rfl: 0  •  nicotine polacrilex (Nicorette Mini) 4 MG lozenge, Dissolve 1 lozenge in the mouth As Needed for Smoking Cessation., Disp: 72 lozenge, Rfl: 5  •  nystatin (MYCOSTATIN) 252161 UNIT/ML suspension, Swish and swallow 5 mL 4 (Four) Times a Day., Disp: 473 mL, Rfl: 1  •  ondansetron (Zofran) 8 MG tablet, Take 1 tablet by mouth Every 8 (Eight) Hours As Needed for Nausea or Vomiting., Disp: 60 tablet, Rfl: 6  •  ondansetron ODT (Zofran ODT) 8 MG disintegrating tablet, Place 1 tablet on the tongue Every 8 (Eight) Hours As Needed for Nausea or Vomiting., Disp: 60 tablet, Rfl: 5  •  oxyCODONE (ROXICODONE) 20 MG tablet, Take 1 tablet by mouth Every 4 (Four) Hours As Needed for Moderate Pain ., Disp: 112 tablet, Rfl: 0  •  prochlorperazine (COMPAZINE) 10 MG tablet, Take 1 tablet by mouth Every 6 (Six) Hours As Needed for Nausea., Disp: 60 tablet, Rfl: 3  •  sennosides-docusate (Senna-S) 8.6-50 MG per tablet, Take 2 tablets by mouth 2 (Two) Times a Day., Disp: 120 tablet, Rfl: 5  •  Stimulant Laxative 8.6-50 MG per tablet, TAKE TWO TABLETS BY MOUTH TWO TIMES A DAY, Disp: 120 tablet, Rfl: 5  •  Syringe 25G X 5/8\" 3 ML misc, Use as directed 2 x weekly, Disp: 24 each, Rfl: 3  •  Testosterone Cypionate (Depo-Testosterone) 200 MG/ML injection, Inject 1/2 cc subcutaneously every Monday and Thursday, Disp: 10 mL, Rfl: 2  No current facility-administered medications for this visit.    ALLERGIES:    Allergies   Allergen Reactions   • Bee Venom Anaphylaxis   • Tramadol Other (See Comments)     Seizures  Ultram         PHYSICAL EXAM:  Vitals:    01/11/22 1345   BP: 127/88   Pulse: 86   Resp: 18   Temp: " "97.8 °F (36.6 °C)   TempSrc: Temporal   SpO2: 97%   Weight: 66.1 kg (145 lb 12.8 oz)   Height: 182.9 cm (72.01\")   PainSc: 0-No pain   ECOG score: 2     General:  Awake, alert and oriented, in no acute distress. Appears chronically ill, uncomfortable.  Has lost weight  HEENT:  Pupils are equal, round and reactive to light and accommodation, Extra-ocular movements full, Oropharyx clear, mucous membranes moist  Neck:  No JVD, thyromegaly or lymphadenopathy  CV:  Regular tachycardia, no murmurs, rubs or gallops  Resp:  Lungs are clear to auscultation bilaterally, no wheezing  Abd:  Soft, non-tender, non-distended, bowel sounds present, no organomegaly or masses  Ext:  No clubbing, cyanosis or edema  Skin:  No palpable ST nodules over his back or ribcage anywhere.  Neuro:  MS as above, grossly non focal exam    PATHOLOGY:  05-18-21 05-26-21              ENDOSCOPY:  Bronchoscopy 05-26-21  Findings: Irregular, friable mucosa in the distal left main stem bronchus at the left upper lobe takeoff      IMAGING:  CT Chest wo contrast 04-05-21  FINDINGS:  Small pericardial effusion is noted. There is no pleural effusion. There is AP window adenopathy measuring 2.1 cm. There is probably some left hilar adenopathy as well, poorly characterized without IV contrast. There is also some soft tissue abutting the  distal aortic arch and proximal descending aorta suggesting tumor or adenopathy. Upper abdominal images show a contracted gallbladder.     Lung windows show COPD. There is some mild narrowing of both proximal upper and lower lobe bronchi on the left side possibly due to extrinsic compression. Bronchoscopy may be beneficial. No suspicious pulmonary nodules are identified. There is some  scarring in the left upper lobe. The lungs are otherwise clear. No CT findings present to indicate pneumonia. Note: CT may be negative in the earliest stages of COVID-19. There are no suspicious osseous lesions.     IMPRESSION:     1. " COPD with no evidence of acute pneumonia.  2. AP window and probably left hilar and perihilar adenopathy concerning for malignancy. There is some mild extrinsic compression of the proximal left upper and left lower lobe bronchi. Bronchoscopy may be beneficial. While a contrast-enhanced chest CT will provide some additional diagnostic information, PET CT would be more beneficial.      CTCAP 06-18-21  FINDINGS:     LUNGS: 7 mm parenchymal nodule anteriorly in the left lung on image 35  of the axial series.  Airspace disease in the left upper lobe adjacent to the hilum.  1 cm parenchymal nodule in the right lung on image 48 of the axial  series.     HEART: Trace pericardial effusion.     MEDIASTINUM: Abnormal mediastinal adenopathy particularly in the  aorticopulmonary window, azygoesophageal recess and most notably  surrounding the left main pulmonary artery.     PLEURA: No pleural effusion. No pleural mass or abnormal calcification.  No pneumothorax.     VASCULATURE: No evidence of aneurysm. There is extrinsic mass effect on  the left lower lobe pulmonary artery but I do not see a pulmonary  embolus on the presented exam.     BONES: No acute bony abnormality.     VISUALIZED UPPER ABDOMEN:Please see the CT report for the abdomen and  pelvis.     Other: There is bilateral axillary adenopathy. 1.2 cm left axillary  lymph node and a 1.3 cm right axillary lymph node.     IMPRESSION:     1. Mediastinal, left hilar, and bilateral axillary adenopathy.  2. Right parenchymal nodule and left parenchymal nodule.  3. Also airspace disease in the left upper lobe concerning for  pneumonia.  4. Trace pericardial effusion.    FINDINGS:     Lower thorax: Please see the CT report for the chest.     Abdomen:     Liver: Multiple low-attenuation lesions throughout the liver compatible  with extensive hepatic metastasis.     Gallbladder: No dilation or stone identified.     Pancreas: Unremarkable. No mass or ductal dilatation.     Spleen:  Homogeneous. No splenomegaly.     Adrenals: Small bilateral adrenal nodules. Given the history, these are  concerning for metastatic disease.     Kidneys/ureters: No mass. No obstructive uropathy.  No evidence of  urolithiasis.     GI tract: Moderate volume stool.     MESENTERY: No free fluid, walled off fluid collections, mesenteric  stranding, or enlarged lymph nodes         Vasculature: Evidence of atherosclerotic vascular disease.     Abdominal wall: No focal hernia or mass.        Bladder: Mild thickening of the urinary bladder wall.     Reproductive: Unremarkable as visualized     Bones: No acute bony abnormality.     IMPRESSION:     1. Multiple low-attenuation lesions in the liver compatible with  metastatic disease.     2.Small bilateral adrenal nodules, also concerning for metastatic  disease.     3. Mild thickening of the urinary bladder wall.      Bone scan 06-21-21     FINDINGS: Focal area of increased tracer uptake corresponds to known  pathologic lesion of the left posterior eighth rib. Focal area of  increased uptake in the left inguinal region appears to localize to  tracer activity in the adjacent soft tissues or external to the patient.  There is focal area of increased tracer activity in the right femoral  neck which when correlated to previous CT corresponds to pathologic  lesion. Both kidneys are visualized.      IMPRESSION:  1. Abnormal tracer activity in the right femoral neck region and left  posterior eighth rib compatible with metastatic disease. Please note,  these appear predominantly lytic on CT.  2. Otherwise physiologic distribution of tracer.      MRI Brain w/wo contrast 06-25-21  FINDINGS:    Brain:  There are numerous ring-enhancing cystic type lesions  throughout the cerebral hemispheres compatible with cystic type  metastases in light of patient history.  For example, a right temporal  lobe ring-enhancing lesion is 1.3 cm.  A left inferior medial frontal  lobe lesion is 8.7 mm.  A  left parietal lobe region within the  postcentral gyrus is 0.7 cm.  Smaller subcortical and parenchymal  metastases are noted.  No significant mass effect or vasogenic edema  identified.  No obvious cerebellar lesions identified.  No restricted  diffusion to indicate acute infarct.  No hemorrhage.    Midline shift:  There is no midline shift.    Ventricles:  No hydrocephalus.    Bones/joints:  No destructive calvarial lesions identified on MRI.    Sinuses:  Unremarkable as visualized.  No acute sinusitis.    Mastoid air cells:  Fluid in the right mastoid air cells noted.    Orbits:  Unremarkable as visualized.     IMPRESSION:  1.  Numerous predominantly cystic but rim-enhancing lesions throughout  the brain which are most consistent with numerous metastatic lesions.  2.  No significant vasogenic edema identified. No mass effect or midline  shift.  3.  Right mastoid effusion.  4.  No acute intracranial findings identified.       Bone scan 09-18-21  FINDINGS: Abnormal intense increased tracer uptake involving the sternum  and manubrium.  Abnormal intense increased tracer uptake involving 2 sites of the  calvaria.  Abnormal intense increased tracer uptake involving thoracic and lumbar  vertebral bodies.  Left posterior rib uptake is noted as well as lower levels of increased  tracer uptake involving mid left posterior ribs.  Abnormal uptake right femoral neck region.  Abnormal uptake involving the left ischium.  Mid left femur diaphysis lesion.     IMPRESSION:  Interval progression of disease. Abnormal exam demonstrating  diffuse osteoblastic metastatic disease of the axial and appendicular  skeleton as detailed above. Of note, right femoral neck and left femoral  diaphysis involvement is noted, new since prior exam.      CTCAP 09-22-21  CT FINDINGS: On the lung windows there are emphysematous changes in both  lungs. There is some increased density in the left upper lung.  A  portion of this may represent post  radiation change. The left lower lobe  and right lung are clear. There is no evidence of supraclavicular  lymphadenopathy. In the mediastinum there are enlarged lymph nodes. The  overall volume of adenopathy however is decreasing. The heart was not  enlarged.  There is fluid in the pericardial sac surrounding the heart.     IMPRESSION:  There is some volume loss in the left upper lobe with what  appears to be some post radiation changes in the lungs. There continues  to be adenopathy in the mediastinum this is however improved in  comparing with the June exam. The lungs continue to show changes of  emphysema throughout both lungs. There is a moderate-sized pericardial  Effusion.    CT FINDINGS: The liver continues to be abnormal. There are low density  areas in all segments of the liver consistent with metastatic disease.  These are stable in size to perhaps slightly smaller than on the earlier  CT. The spleen was unremarkable. There is no evidence of mass in the  pancreas. The aorta is normal in caliber. No adrenal lesions are  identified. The kidneys enhance appropriately and show no evidence of  obstruction. There is no evidence of ascites. In the pelvis there were  no masses or fluid collections. On the bone windows there are lesions in  the lumbar spine and sacrum consistent with metastatic disease.     IMPRESSION:  Continued evidence of metastatic disease in the liver. The  size of the lesions are decreasing. No new lesions have developed. There  are multiple bony metastatic deposits in the lumbar spine and sacrum.        Bone scan 09-18-21  FINDINGS: Abnormal intense increased tracer uptake involving the sternum  and manubrium.  Abnormal intense increased tracer uptake involving 2 sites of the  calvaria.  Abnormal intense increased tracer uptake involving thoracic and lumbar  vertebral bodies.  Left posterior rib uptake is noted as well as lower levels of increased  tracer uptake involving mid left posterior  ribs.  Abnormal uptake right femoral neck region.  Abnormal uptake involving the left ischium.  Mid left femur diaphysis lesion.     IMPRESSION:  Interval progression of disease. Abnormal exam demonstrating  diffuse osteoblastic metastatic disease of the axial and appendicular  skeleton as detailed above. Of note, right femoral neck and left femoral  diaphysis involvement is noted, new since prior exam.        CTCAP 09-22-21  CT FINDINGS: On the lung windows there are emphysematous changes in both  lungs. There is some increased density in the left upper lung.  A  portion of this may represent post radiation change. The left lower lobe  and right lung are clear. There is no evidence of supraclavicular  lymphadenopathy. In the mediastinum there are enlarged lymph nodes. The  overall volume of adenopathy however is decreasing. The heart was not  enlarged.  There is fluid in the pericardial sac surrounding the heart.     IMPRESSION:  There is some volume loss in the left upper lobe with what  appears to be some post radiation changes in the lungs. There continues  to be adenopathy in the mediastinum this is however improved in  comparing with the June exam. The lungs continue to show changes of  emphysema throughout both lungs. There is a moderate-sized pericardial  Effusion.    CT FINDINGS: The liver continues to be abnormal. There are low density  areas in all segments of the liver consistent with metastatic disease.  These are stable in size to perhaps slightly smaller than on the earlier  CT. The spleen was unremarkable. There is no evidence of mass in the  pancreas. The aorta is normal in caliber. No adrenal lesions are  identified. The kidneys enhance appropriately and show no evidence of  obstruction. There is no evidence of ascites. In the pelvis there were  no masses or fluid collections. On the bone windows there are lesions in  the lumbar spine and sacrum consistent with metastatic disease.      IMPRESSION:  Continued evidence of metastatic disease in the liver. The  size of the lesions are decreasing. No new lesions have developed. There  are multiple bony metastatic deposits in the lumbar spine and sacrum      MRI Brain w/wo contrast 10-04-21  FINDINGS:  Rim-enhancing metastatic lesions throughout the cerebral hemispheres  have improved since previous exam. A right temporal lobe lesion is now  10.3 mm and was previously 12.9 mm. A right frontal lobe lesion is now  5.6 mm and was previously 7.6 mm. A left frontal lobe lesion is now 3.9  mm and was previously 8.7 mm. All other subcortical lesions have  decreased in size.     No significant vasogenic edema identified with the exception of the  right temporal lobe lesion.     No midline shift.     Right mastoid effusion is noted.     No new rim-enhancing or solid enhancing lesions identified.     IMPRESSION:  Interval improvement compared to the previous exam with  decrease size and prominence of multiple enhancing brain metastases.      XR Pelvis 1 or 2 View 10-28-21  FINDINGS:    Bones/joints:  Lytic lesion of the proximal right femur is noted  without evidence of associated periosteal reaction. No evidence of  pathologic fracture.  No dislocation.    Soft tissues:  Unremarkable.     IMPRESSION:    Lytic type lesion of the proximal right femur. No radiographic  evidence of pathologic fracture.      XR Femur 2 View Bilateral 10-28-21  FINDINGS:    Bones/joints:  No pathologic fractures are identified. Lytic lesion of  the proximal right femur is noted. No periosteal reaction.  No  dislocation.    Soft tissues:  Unremarkable.     IMPRESSION:  Lytic lesion of the proximal right femur. No radiographic evidence of  pathologic fracture.        MRI Brain With & Without Contrast 11-03-21  FINDINGS: Previous exam by report showed numerous peripherally enhancing  centrally cystic or necrotic brain parenchymal lesions which in general  are markedly diminished.      As an example, regarding the larger lesions, medial right temporal lobe  lesion, 17 mm in diameter on the prior study is diminished to 10 mm. The  9 mm lesion of the inferior right frontal lobe is reduced to 5 mm. The 9  mm left postcentral gyrus lesion has been reduced to 5.5 mm, practically  all of the remaining ring-enhancing lesions have been reduced to  punctate size.     There are very few faint punctate lesions scattered throughout the brain  elsewhere, some barely distinguishable from vessels in cross section,  but which can all be identified in retrospect on the prior study and are  within 1 or 2 mm of the previous size. Central left occipital lesion,  axial image 68 of CyberKnife series, measures 2.5 mm and may be slightly  larger.     No new areas of edema are identified. There is no evidence of restricted  diffusion to suggest acute infarction. The sagittal images show the  midline structures to appear grossly normal. There are no signal changes  suggestive of hemorrhage, no evidence of hydrocephalus, or abnormal  extra-axial collection. There is expected flow signal in the major  intracranial arteries and in the median sagittal sinus. No gross  abnormalities are noted of the orbits. There is mild left sphenoid sinus  disease.      IMPRESSION:  1. Significantly improved intracranial metastatic disease, with the  previously noted ring-enhancing lesions reduced to small or punctate  size.     2. No clearly new intracranial metastatic disease.     3. A single punctate lesion in the left occipital white matter may be  slightly increased but this is a minimal finding compared to the prior  study. No other new intracranial pathology is identified.      CTCAP 12-30-21  FINDINGS:     LUNGS: Consolidation in the left upper lobe. There is consolidation in  the left lower lobe, likely pneumonia.  COPD.     HEART: Small pericardial effusion.     MEDIASTINUM: No masses. No enlarged lymph nodes.  No fluid  collections.     PLEURA: No pleural effusion. No pleural mass or abnormal calcification.  No pneumothorax.     VASCULATURE: No evidence of aneurysm.     BONES: Sclerotic lesions are seen in the spine compatible with  metastatic disease. Sclerotic change in the sternum and likely  pathologic fracture of uncertain age.     VISUALIZED UPPER ABDOMEN: Please see the CT report for the abdomen and  pelvis.     Other: None.     IMPRESSION:  1. Metastatic disease to the skeleton.  2. Left lower lobe pneumonia.  3. Dense consolidation filling the left upper lobe with volume loss and  mediastinal shift to the left.  4. Small pericardial effusion.  5. Other findings as above.    FINDINGS:     Lower thorax: Please see the CT report for the chest.     Abdomen:     Liver: Increased number of metastatic lesions to the liver.     Gallbladder: No dilation or stone identified.     Pancreas: Unremarkable. No mass or ductal dilatation.     Spleen: Homogeneous. No splenomegaly.     Adrenals: No mass.     Kidneys/ureters: No mass. No obstructive uropathy.  No evidence of  urolithiasis.     GI tract: Moderate to large stool burden. There is no evidence of  appendicitis.     MESENTERY: No free fluid, walled off fluid collections, mesenteric  stranding, or enlarged lymph nodes.         Vasculature: Evidence of atherosclerotic vascular disease.     Abdominal wall: No focal hernia or mass.        Bladder: No focal mass or significant wall thickening.     Reproductive: Unremarkable as visualized.     Bones: Sclerotic metastasis.     IMPRESSION:  1. Increasing number of metastatic lesions to the liver.  2. Continued evidence of bony metastasis.  3. Other findings as above.      NM Bone Scan Whole Body 01-05-22  FINDINGS:     Increase intensity of uptake involving the pelvis multiple vertebral  bodies, multiple left ribs, sternum and skull. Number of lesions have  not changed but intensity appears greater.       JOINTS:  Unremarkable.  No focal  increased or decreased uptake.    SOFT TISSUES:  Unremarkable.    RENAL/BLADDER:  Within normal limits.     IMPRESSION:    Increase intensity of uptake involving the pelvis multiple vertebral  bodies, multiple left ribs, sternum and skull. Number of lesions have  not changed but intensity appears greater.      MRI Brain With & Without Contrast 01-10-22  FINDINGS: There is a tiny area of restricted diffusion involving the  right periventricular white matter to suggest a tiny area of acute  ischemia. There is an increased abnormal contrast enhancement seen  throughout the brain parenchyma bilaterally as well as new lesions  identified probably within the cerebellum, occipital lobes as well as  diffusely throughout the cerebral hemispheres bilaterally. The lesions  are too numerous to count with the largest ring enhancement abnormality  seen in the right temporal lobe. This area of ring enhancement when  compared to the prior study today measures 1.2 x 1.0 cm and previously  measured 0.9 x 0.6 cm. There is increasing edema seen surrounding the  multiple too numerous to count metastatic lesions. There is no evidence  of hemorrhage. No hydrocephalus. No abnormal extra-axial fluid  collections identified. Visualized paranasal sinuses are grossly clear.  Mastoid air cells are patent. Pituitary and sellar are unremarkable in  appearance.. Craniovertebral junctions preserved.        IMPRESSION:  Interval progression of disease with interval increase seen  in size and number of the multiple metastatic lesions throughout the  cerebral and cerebellar hemispheres bilaterally. There is a tiny  punctate punctate area of restricted diffusion in the right frontal  periventricular white matter suggesting an acute ischemic insult.      RECENT LABS:  Lab Results   Component Value Date    WBC 9.79 01/11/2022    HGB 10.2 (L) 01/11/2022    HCT 30.7 (L) 01/11/2022    .0 (H) 01/11/2022    RDW 13.4 01/11/2022     01/11/2022     NEUTRORELPCT 65.6 01/11/2022    LYMPHORELPCT 16.5 (L) 01/11/2022    MONORELPCT 13.1 (H) 01/11/2022    EOSRELPCT 3.7 01/11/2022    BASORELPCT 0.6 01/11/2022    NEUTROABS 6.42 01/11/2022    LYMPHSABS 1.62 01/11/2022       Lab Results   Component Value Date     (L) 01/11/2022    K 4.1 01/11/2022    CO2 25.6 01/11/2022    CL 95 (L) 01/11/2022    BUN 6 01/11/2022    CREATININE 0.61 (L) 01/11/2022    EGFRIFNONA 140 01/11/2022    GLUCOSE 142 (H) 01/11/2022    CALCIUM 10.4 01/11/2022    ALKPHOS 440 (H) 01/11/2022    AST 21 01/11/2022    ALT 46 (H) 01/11/2022    BILITOT 0.2 01/11/2022    ALBUMIN 3.40 (L) 01/11/2022    PROTEINTOT 7.4 01/11/2022    MG 1.4 (L) 01/11/2022    PHOS 2.0 (L) 01/11/2022     LDH   Date Value Ref Range Status   06/09/2021 278 (H) 135 - 225 U/L Final     Uric Acid   Date Value Ref Range Status   06/09/2021 3.8 3.4 - 7.0 mg/dL Final     Lab Results   Component Value Date    FERRITIN 171.10 06/09/2021    IRON 35 (L) 06/09/2021    TIBC 311 06/09/2021    LABIRON 11 (L) 06/09/2021    ZHIMBMYZ83 511 06/09/2021    FOLATE 4.79 06/09/2021     Lab Results   Component Value Date    TSH 0.503 06/09/2021         ASSESSMENT & PLAN:  Vipin Oakley is a very pleasant 51 y.o. 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  Tumor sent for CARIS testing and was negative for ALK, BRAF, EGFR, RET, ROS1, MET, HER2, PD-L1.  TMB was low.  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    1.  Metastatic squamous cell Carcinoma of the lung:  -  He understands that his cancer is treatable but not curable.  -  I have always been concerned with the extensive nature of his metastatic disease as well as with aggressive nature of disease with rapid progression when his disease is uncontrolled.  -  He has seen Filiberto Brown and Davin.   He completed cyberknife radiosurgery to his brain lesions and palliatve radiation to the R hip, L rib and subsequently proton radiation to ST metastases.   - Started treatment with Carboplatin and Taxol as well as Xgeva bone therapy.  After 4 cycles of treatment, his restaging imaging overall showed improvement.  Lesions in the brain, CAP improved.  He did have evidence of progression in the bone. It is likely that this progression occurred during radiation before he started systemic therapy.  He completed 6 cycles of therapy but then had relatively rapid progression.  -  Given uncontrolled disease in the brain with many new lesions and lesions peppered throughout, he is not a candidate for further cyberknife therapy and Lisset De Los Santos/Davin have recommended WBXRT.  He also has a new lesion along the L jaw / parotid area.  Perhaps this could be treated at the same time.  -  Will refer him back to Dr. Moura.  -  I have recommended reinitiation of chemotherapy after WBXRT is completed.  He didn't receive immunotherapy with his initial therapy b/c of uncontrolled disease in the brain and multiple rapidly growing lesions with concern for intiial pseudoprogression on immunotherapy.  I have that same concern at this juncture.  Given this, I have recommended palliative chemotherapy with Carboplatin and Gemcitabine to follow systemic therapy with continued Xgeva bone therapy.  We discussed potential risks, benefits and side effects and he would like to proceed.  Will work on approvals.  -  Given brain lesions with increased n/v, will restart Dexamethasone 1 mg ii tabs BID with plan to wean with radiation.    2.  Neoplasm related pain:  -  Currently taking Fentanyl 200 mcg (100x2) and Oxycodone 20 mg 1-2 tabs every ~4hours (8 tabs/day)..     -  Previously recommended pain specialist but he refused and wants to continue with current regimen.   -  Pain poorly controlled.  At high levels narcotic medications eventually both  stimulate and block the mu receptors.  -  Will continue current doses of Oxycodone, Fentanyl.  -  Start Neurontin 800 mg q8h (which he took previously)  -  Add Dilaudid 2 mg q8h prn for breakthrough pain.  -  Continue Senna/ Colace ii BID and lactulose as needed for refractory constipation currently doing well in this regard.     3.  Nausea  -  Continue zofran and compazine prn. Will restart Dexamethasone as above temporarily.    4. Depression / Anxiety:  -Currently doing well in this regard. Continue Lexapro 10 mg daily and Valum 5 mg 1-2 tabs BID (he is taking 2 tabs BID).     5. H/o Seizure d/o from the age of 9 months:  -  Says he hasn't had a seizure in a very long time despite brain lesions.  Takes Tegretol to prevent seizures. He hasn't had any changes in dose, etc for some time. Sodium is sl low today.  Will monitor carefully.    6.  Prophylaxis:  Received 2020 influenza vaccine and Prevnar 13 vaccinations.  COVID vaccination  X 2 completed. He refused 2021 flu vaccine.    7. ACO / VIRI/Other  Quality measures  -  Vipin Oakley did not receive 2021 flu vaccine.  This was recommended but declined.  -  Vipin Oakely reports a pain score of 0.  Given his pain assessment as noted, treatment options were discussed and the following options were decided upon as a follow-up plan to address the patient's pain: Adjustments to regimen as above.   -  Current outpatient and discharge medications have been reconciled for the patient.  Reviewed by: Yu Foster MD       8.  F/u:  -  Refer back to Dr. Moura for WBXRT and consideration of radiation to the jaw lesion  -  Start Neurontin 800 mg TID  -  Add Dilaudid 2 mg q8h prn breakthrough pain not responsive to current regimen.  -  Start Dexamethasone 1 mg ii tabs BID  -  Work on approvals for Carboplatin /. Gemcitabine to follow radiation with plan to continue Xgeva  -  Order for wheelchair    This note was scribed for Yu Foster MD by Liliya Brandt RN.    I,  Yu Foster MD, personally performed the services described in this documentation as scribed by the above named individual in my presence, and it is both accurate and complete.  01/11/2022    I spent 40 minutes with Vipin Oakley today.  In the office today, more than 50% of this time was spent face-to-face with him  in counseling / coordination of care, reviewing his medical history and counseling on the current treatment plan.  All questions were answered to his satisfaction      Electronically Signed by: Yu Foster MD       CC:     VITOR Mckee MD Frederick Bunge, MD Weisi Yan, MD Barbara Michna, MD Thomas Hunter, MD Charles Benjamin Newman, MD

## 2022-01-10 NOTE — PROGRESS NOTES
Subjective     Chief Complaint: Metastatic lung cancer    Patient ID: Vipin Oakley is a 51 y.o. male is here today for follow-up.    History of Present Illness    This is a 51-year-old man in whom I performed stereotactic radiosurgery for metastatic lung cancer.  He presents today to discuss the results of his most recent surveillance MRI.  He has stopped smoking and is using his Nicorette lozenges.  His wife is reporting some increased myoclonic jerking particularly at night when he is asleep.  He does not seem to endorse any rhythmic jerking or other symptoms concerning for focal seizures.  He is on Tegretol and does have a history of seizures.    The following portions of the patient's history were reviewed and updated as appropriate: allergies, current medications, past family history, past medical history, past social history, past surgical history and problem list.    Family history:   Family History   Problem Relation Age of Onset   • Cancer Father         liver   • Hypertension Father    • Diabetes Father    • Hypertension Mother    • Cancer Maternal Uncle    • Heart disease Maternal Uncle    • Heart disease Paternal Aunt    • Cancer Maternal Grandmother    • Heart disease Maternal Grandmother        Social history:   Social History     Socioeconomic History   • Marital status:    Tobacco Use   • Smoking status: Current Every Day Smoker     Packs/day: 1.00     Years: 14.00     Pack years: 14.00     Types: Cigarettes   • Smokeless tobacco: Former User     Types: Snuff   Vaping Use   • Vaping Use: Never used   Substance and Sexual Activity   • Alcohol use: Yes     Alcohol/week: 2.0 standard drinks     Types: 2 Shots of liquor per week     Comment: occasionally   • Drug use: No   • Sexual activity: Defer     Birth control/protection: None       Review of Systems   Constitutional: Negative for activity change, appetite change, chills, diaphoresis, fatigue, fever and unexpected weight change.   HENT:  "Negative for congestion, dental problem, drooling, ear discharge, ear pain, facial swelling, hearing loss, mouth sores, nosebleeds, postnasal drip, rhinorrhea, sinus pressure, sinus pain, sneezing, sore throat, tinnitus, trouble swallowing and voice change.    Eyes: Negative for photophobia, pain, discharge, redness, itching and visual disturbance.   Respiratory: Negative for apnea, cough, choking, chest tightness, shortness of breath, wheezing and stridor.    Cardiovascular: Negative for chest pain, palpitations and leg swelling.   Gastrointestinal: Negative for abdominal distention, abdominal pain, anal bleeding, blood in stool, constipation, diarrhea, nausea, rectal pain and vomiting.   Endocrine: Negative for cold intolerance, heat intolerance, polydipsia, polyphagia and polyuria.   Genitourinary: Negative for decreased urine volume, difficulty urinating, dysuria, enuresis, flank pain, frequency, genital sores, hematuria and urgency.   Musculoskeletal: Negative for arthralgias, back pain, gait problem, joint swelling, myalgias, neck pain and neck stiffness.   Skin: Negative for color change, pallor, rash and wound.   Allergic/Immunologic: Negative for environmental allergies, food allergies and immunocompromised state.   Neurological: Negative for dizziness, tremors, seizures, syncope, facial asymmetry, speech difficulty, weakness, light-headedness, numbness and headaches.   Hematological: Negative for adenopathy. Does not bruise/bleed easily.   Psychiatric/Behavioral: Negative for agitation, behavioral problems, confusion, decreased concentration, dysphoric mood, hallucinations, self-injury, sleep disturbance and suicidal ideas. The patient is not nervous/anxious and is not hyperactive.        Objective   Blood pressure 105/76, pulse 72, resp. rate 18, height 182.9 cm (72\"), weight 67.3 kg (148 lb 6.4 oz).  Body mass index is 20.13 kg/m².    Physical Exam    Assessment/Plan     Independent Review of " Radiographic Studies:      His MRI of the brain which was performed earlier today unfortunately demonstrates progression of his intracranial disease.  There are numerous lesions.    Medical Decision Making:      I spoke with Dr. De Los Santos of radiation oncology and unfortunately I do not think we have any more stereotactic options for him.  I think he might benefit from whole brain radiation and a change in his chemotherapy regimen.  He has an appointment with his oncologist tomorrow.  I made a referral to neurology to have his antiepileptic drug regimen optimized.  I would be happy to follow-up with him on an as-needed basis, however at this point I do not think I have anything further to offer.    Diagnoses and all orders for this visit:    1. Seizure (HCC) (Primary)    2. Metastasis to brain (HCC)    3. Squamous cell carcinoma of left lung (HCC)        No follow-ups on file.           This document signed by YNES Jin MD January 10, 2022 16:04 EST

## 2022-01-11 NOTE — PROGRESS NOTES
Office Follow Up Note      Patient Name: Vipin Oakley  : 1970   MRN: [id number]     Requesting Physician: Karson Pozo APRN    Chief Complaint:  Brain Mets  Staging: IV (T4,N3,M1)    History of Present Illness: Vipin Oakley is a pleasant 51 y.o. male who is here today for a follow up to start and consult for WBRT.    He initially started to feel unwell in 2021.  At that time, he presented to an urgent care with cough and was diagnosed with Flu B.  His cough worsened and his voice started to become hoarse.  He ultimately came to the ER where he was diagnosed with Flu B again as well as pneumonia and was told he had a lung mass.  From there he saw pulmonology, Dr. Johnson.  His bronchoscopy was delayed because of mucus retention cyst, which was removed by Dr. Malone, and then he returned to Dr. Johnson and had the bronchoscopy.  This revealed heaped up/friable mucosa in the distal left bronchus.  Brushings and biopsies were done and EBUS biopsies of Station 4L/10L LNs.  This revealed squamous cell carcinoma.  Meanwhile he was found to have a soft tissue lesion on his L upper back and FNA showed metastatic squamous cell carcinoma.       Since this time he was found to have metastases to hilar and mediastinal LNs, as well as metastatic disease to the R lung, liver, bone and brain and a soft tissue metastasis to his L posterior back.       He continues chemotherapy with medical oncology and has received Carboplatin and Taxol as well as Xgeva bone therapy.  After 4 cycles of treatment, his restaging imaging overall showed improvement.  Lesions in the brain, CAP improved.  He does have evidence of progression in the bone. He continues on with chemotherapy Carboplatin and Taxol.    Interval History:  He is currently status post XRT x2.  He received 2000 cGy in 5 fractions to each painful bony mets. This included his right femur, right back, and left 8th rib. He completed his treatments on  Chief Complaint  Knee Pain (TR MRI RIGHT KNEE)    Follow-up recurrent anterior medial knee pain with known history of chondromalacia patella and osteoarthritis with status post remote meniscectomy with ongoing knee synovitis. Review right knee imaging    History of Present Illness:  Petrona Brewster is a 73 y.o. 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 Nissa Light who is seen today upon self-referral for recurrence of right knee pain. He states it has been having pain to his right knee since about June 2018. There is no history of injury or no activity prior to becoming symptomatic. It is gotten particularly bad over the last 3 months since roughly September 2018. There is no history of injury or new activity prior to becoming symptomatic. He is complaining of a dull achy pain at 3-4-10 with more sharp 6-7 out of 10 pain distance walking positional change going up and down stairs and squatting and kneeling. He has not had any definitive locking or catching but has had swelling. He has taken only over-the-counter analgesics at times and is not really complaining of locking or catching. He is uncertain as to whether or not this feels similar to previous symptoms of meniscus tear. He did have arthroscopy to this right knee back in 2013 with Dr. Ashley Cancino. He has occasionally had some pseudo-buckling but his old brace seems to have worn out. Initially he was having some night pain. He has been taking 1 Aleve twice daily which is helped to some degree. He has been able to golf. He is being seen today for orthopedic and sports consultation with updated imaging. He was last seen some 12/5/2018 for his right knee.   He is known to have right knee anterior and medial compartment osteoarthritis with chondromalacia patella and is about 5-6 years status post medial and lateral 9/3/2021. He also completed cyberknife therapy at [facility] to his brain lesions.  At his 1 month follow-up after his first radiation with us his pain was significantly better in the thigh, rib, and back.  However, he was complaining of new right femur pain.  He was re-simed and treated with 2000 cGy in 5 fractions to the right femur.  He started this on 11/29/2021 and completed those 5 treatments on 12/3/2021.  He tolerated all treatment well.  He is here today after having a MRI of the brain that revealed interval progression of disease with interval increase seen in size and number of the multiple metastatic lesions throughout the cerebral and cerebellar hemispheres bilaterally.  He is no longer considered a candidate for CyberKnife therapy as he was before.  Therefore, he was referred back here for palliative whole brain radiation.  He also has a visible mass to the left jaw area as well.    Subjective      Review of Systems:   Review of Systems   Constitutional: Positive for fatigue.   HENT:  Negative.    Eyes: Negative.    Respiratory: Negative.    Cardiovascular: Negative.    Gastrointestinal: Positive for nausea.   Endocrine: Negative.    Genitourinary: Negative.     Musculoskeletal: Positive for arthralgias.   Skin: Negative.    Neurological: Positive for weakness. Negative for light-headedness, seizures and speech difficulty.   Hematological: Negative.    Psychiatric/Behavioral: Negative.  Negative for confusion.     Review of Systems   Constitutional: Positive for fatigue.   HENT: Negative.    Eyes: Negative.    Respiratory: Negative.    Cardiovascular: Negative.    Gastrointestinal: Positive for nausea.   Endocrine: Negative.    Genitourinary: Negative.    Musculoskeletal: Positive for arthralgias.   Skin: Negative.    Neurological: Positive for weakness. Negative for seizures, speech difficulty, light-headedness, headache, memory problem and confusion.   Hematological: Negative.    Psychiatric/Behavioral:  meniscectomies and did have a previous left total ankle arthroplasty by Dr. Karime Francisco on his contralateral left ankle. He did get some temporary improvement with his steroid injection several months ago but this is worn out and with the onset of golf season is now having worsening anterior medial knee pain. He tries to perform his home-based exercises and does take Aleve. He may have recently been noticing some catching with ptosis pain being medial and anterior in nature. He tries to perform his home-based exercise program and does utilize a knee brace periodically. He has not had tien locking. He does have a baseline dull achy pain at about a 3-4 out of 10 with rest and standing and walking but if he pivots and walks for long distances and goes up and down stairs and can still be in the range of 5-7 out of 10. He is not really experiencing high-grade night pain. He was last seen in the office on 4/8/2019 and due to persistence of his ongoing right knee pain anteriorly and medially, he was sent for an MRI. His MRI was obtained at [facility] on 4/12/2019 did show evidence of previous medial and lateral meniscectomies with a likely remnant tear involving the posterior horn body remnant without high-grade displacement. He did have focal high-grade anterior and medial compartment osteoarthritic change with some cruciate ligament sheath synovitis and chronic synovitis with Baker's cyst.  He did have evidence of a previous MCL sprain but no additional signs of internal derangement. Since taking his Medrol pack, his symptoms have improved about 80% and he is going out of town later this week for a family vacation to [place]. Denies locking catching or true instability. He has been taking his Aleve and does use his knee sleeve. Medical History    Patient's medications, allergies, past medical, surgical, social and family histories were reviewed 12/5/2018 and updated as appropriate.     Review of Systems Negative.        I have reviewed and confirmed the accuracy of the ROS as documented by the MA/LPN/RN VITOR Rodriguez     The following portions of the patient's history were reviewed and updated as appropriate: allergies, current medications, past family history, past medical history, past social history, past surgical history and problem list.    Past Oncology History:   Oncology/Hematology History   Squamous cell lung cancer (HCC)   6/10/2021 Initial Diagnosis    Squamous cell lung cancer (CMS/HCC)     7/28/2021 -  Chemotherapy    OP SUPPORTIVE Denosumab (Xgeva) Q28D     8/18/2021 - 12/2/2021 Chemotherapy    OP LUNG PACLitaxel / CARBOplatin AUC=6 (Q21D)      1/31/2022 -  Chemotherapy    OP LUNG Gemcitabine / CARBOplatin AUC=5     Metastasis to brain (HCC)   7/1/2021 Initial Diagnosis    Metastasis to brain (CMS/HCC)     7/28/2021 - 7/2028 Radiation    Radiation OncologyTreatment Course:  Vipin Oakley received 1800 cGy in 1 fraction to five brain metastases via Stereotactic Radiation Therapy - SRT.     7/29/2021 - 7/29/2021 Radiation    Radiation OncologyTreatment Course:  Vipin Oakley received 1800 cGy in 1 fraction to six brain metastases via Stereotactic Radiation Therapy - SRT.     7/30/2021 - 7/30/2021 Radiation    Radiation OncologyTreatment Course:  Vipin Oakley received 1800 cGy in 1 fraction to five brain metastases via Stereotactic Radiation Therapy - SRT.     8/18/2021 - 12/2/2021 Chemotherapy    OP LUNG PACLitaxel / CARBOplatin AUC=6 (Q21D)      1/31/2022 -  Chemotherapy    OP LUNG Gemcitabine / CARBOplatin AUC=5     Metastasis to bone (HCC)   7/1/2021 Initial Diagnosis    Metastasis to bone (CMS/HCC)     7/28/2021 -  Chemotherapy    OP SUPPORTIVE Denosumab (Xgeva) Q28D     8/18/2021 - 12/2/2021 Chemotherapy    OP LUNG PACLitaxel / CARBOplatin AUC=6 (Q21D)      1/31/2022 -  Chemotherapy    OP LUNG Gemcitabine / CARBOplatin AUC=5     Metastasis to liver (HCC)   7/1/2021 Initial Diagnosis     Relevant review of systems reviewed 12/5/2018 and available in the patient's chart     Vital Signs  Vitals:    04/15/19 1112   BP: 133/85   Pulse: 74       General Exam:     Constitutional: Patient is adequately groomed with no evidence of malnutrition  DTRs: Deep tendon reflexes are intact  Mental Status: The patient is oriented to time, place and person. The patient's mood and affect are appropriate. Lymphatic: The lymphatic examination bilaterally reveals all areas to be without enlargement or induration. Vascular: Examination reveals no swelling or calf tenderness. Peripheral pulses are palpable and 2+. Neurological: The patient has good coordination. There is no weakness or sensory deficit. Right knee Examination  Inspection:  He does have mild swelling to his knee with some patellofemoral crepitation but no high-grade deformities. Palpation:  He does have less prominent tenderness medial and lateral patellofemoral facet and mild pain with patellar grind testing was noted. He does have diffuse anterior posterior 3rd medial joint line tenderness with less pain with lateral joint line palpation. Rang of Motion:  He is stiff at terminal 5° of extension with flexion to about 120 today. Hamstrings are tight. Strength:  He does have some weakness at 4-4+ out of 5 with the flexion and extension. Special Tests:  He does have a mildly positive patellar grind test.  Only mild residual pain with medial Emily's but no high-grade click. I do not sense high-grade instability and a screening hip testing is benign. Skin: There are no rashes, ulcerations or lesions. Distal motor sensory and vascular exam is intact. Gait: Improved gait. He is status post total ankle arthroplasty in the past by Dr. Danica Nelson. Reflex symmetrically preserved. Additional Comments:   Left knee examination does reveal a trace knee joint effusion with patellofemoral crepitation.   He does have a mildly Metastasis to liver (CMS/HCC)     8/18/2021 - 12/2/2021 Chemotherapy    OP LUNG PACLitaxel / CARBOplatin AUC=6 (Q21D)      1/31/2022 -  Chemotherapy    OP LUNG Gemcitabine / CARBOplatin AUC=5     Squamous cell carcinoma of left lung (HCC)   7/9/2021 Initial Diagnosis    Squamous cell carcinoma of left lung (CMS/HCC)     7/28/2021 -  Chemotherapy    OP CENTRAL VENOUS ACCESS DEVICE ACCESS, CARE, AND MAINTENANCE (CVAD)          KPS: 70%     Results Review:   The following data was reviewed by: VITOR Rodriguez on 01/11/2022:  Common labs    Common Labsle 11/11/21 11/11/21 12/1/21 12/1/21 1/11/22 1/11/22    0916 0916 1301 1301 1400 1400   Glucose  127 (A)  97  142 (A)   BUN  19  10  6   Creatinine  0.60 (A)  0.71 (A)  0.61 (A)   eGFR Non African Am  143  118  140   Sodium  135 (A)  136  132 (A)   Potassium  4.4  4.1  4.1   Chloride  102  98  95 (A)   Calcium  9.3  8.8  10.4   Albumin  4.40  4.33  3.40 (A)   Total Bilirubin  0.2  0.2  0.2   Alkaline Phosphatase  108  125 (A)  440 (A)   AST (SGOT)  12  17  21   ALT (SGPT)  17  17  46 (A)   WBC 8.06  5.14  9.79    Hemoglobin 10.2 (A)  11.5 (A)  10.2 (A)    Hematocrit 31.1 (A)  34.8 (A)  30.7 (A)    Platelets 171  136 (A)  165    (A) Abnormal value              Imaging:     CT Chest wo contrast 04-05-21  FINDINGS:  Small pericardial effusion is noted. There is no pleural effusion. There is AP window adenopathy measuring 2.1 cm. There is probably some left hilar adenopathy as well, poorly characterized without IV contrast. There is also some soft tissue abutting the  distal aortic arch and proximal descending aorta suggesting tumor or adenopathy. Upper abdominal images show a contracted gallbladder.     Lung windows show COPD. There is some mild narrowing of both proximal upper and lower lobe bronchi on the left side possibly due to extrinsic compression. Bronchoscopy may be beneficial. No suspicious pulmonary nodules are identified. There is some  scarring in the  positive grind test.  He does have some mild anterior 3rd medial joint line tenderness. Most of his knee pain seems to be reproduced with patellar grind testing. There does not appear to be high-grade instability. His hamstrings are somewhat tight. He is able to completely extend with flexion 140. Negative Emily's testing. Negative stability testing. Additional Examinations:       Contralateral Exam: Examination of the right ankle reveals intact skin. There is no focal tenderness or swelling. The patient demonstrates full painless range of motion with regards to plantarflexion, dorsiflexion, inversion, eversion. Strength is 5/5 thorough out all planes. Ligamentous stability is grossly intact. Examination of the right knee reveals intact skin. There is no focal tenderness. The patient demonstrates full painless range of motion with regards to flexion and extension. Strength is 5/5 thorough out all planes. Ligamentous stability is grossly intact. Right Lower Extremity: Examination of the right lower extremity does not show any tenderness, deformity or injury. Range of motion is unremarkable. There is no gross instability. There are no rashes, ulcerations or lesions. Strength and tone are normal.  Left Lower Extremity: Examination of the left lower extremity does not show any tenderness, deformity or injury. Range of motion is unremarkable. There is no gross instability. There are no rashes, ulcerations or lesions. Strength and tone are normal.  Lower Back: Examination of the lower back does not show any tenderness, deformity or injury. Range of motion is unremarkable. There is no gross instability. There are no rashes, ulcerations or lesions.   Strength and tone are normal.        Diagnostic Test Findings:     MRI right knee obtained 4/12/2019 as listed above     Narrative   [facility] #: [id number] #: [id number] Procedure: MR Left Knee w/o Contrast ; Reason for left upper lobe. The lungs are otherwise clear. No CT findings present to indicate pneumonia. Note: CT may be negative in the earliest stages of COVID-19. There are no suspicious osseous lesions.     IMPRESSION:     1. COPD with no evidence of acute pneumonia.  2. AP window and probably left hilar and perihilar adenopathy concerning for malignancy. There is some mild extrinsic compression of the proximal left upper and left lower lobe bronchi. Bronchoscopy may be beneficial. While a contrast-enhanced chest CT will provide some additional diagnostic information, PET CT would be more beneficial.        CTCAP 06-18-21  FINDINGS:     LUNGS: 7 mm parenchymal nodule anteriorly in the left lung on image 35  of the axial series.  Airspace disease in the left upper lobe adjacent to the hilum.  1 cm parenchymal nodule in the right lung on image 48 of the axial  series.     HEART: Trace pericardial effusion.     MEDIASTINUM: Abnormal mediastinal adenopathy particularly in the  aorticopulmonary window, azygoesophageal recess and most notably  surrounding the left main pulmonary artery.     PLEURA: No pleural effusion. No pleural mass or abnormal calcification.  No pneumothorax.     VASCULATURE: No evidence of aneurysm. There is extrinsic mass effect on  the left lower lobe pulmonary artery but I do not see a pulmonary  embolus on the presented exam.     BONES: No acute bony abnormality.     VISUALIZED UPPER ABDOMEN:Please see the CT report for the abdomen and  pelvis.     Other: There is bilateral axillary adenopathy. 1.2 cm left axillary  lymph node and a 1.3 cm right axillary lymph node.     IMPRESSION:     1. Mediastinal, left hilar, and bilateral axillary adenopathy.  2. Right parenchymal nodule and left parenchymal nodule.  3. Also airspace disease in the left upper lobe concerning for  pneumonia.  4. Trace pericardial effusion.     FINDINGS:     Lower thorax: Please see the CT report for the chest.     Abdomen:     Liver:  Multiple low-attenuation lesions throughout the liver compatible  with extensive hepatic metastasis.     Gallbladder: No dilation or stone identified.     Pancreas: Unremarkable. No mass or ductal dilatation.     Spleen: Homogeneous. No splenomegaly.     Adrenals: Small bilateral adrenal nodules. Given the history, these are  concerning for metastatic disease.     Kidneys/ureters: No mass. No obstructive uropathy.  No evidence of  urolithiasis.     GI tract: Moderate volume stool.     MESENTERY: No free fluid, walled off fluid collections, mesenteric  stranding, or enlarged lymph nodes         Vasculature: Evidence of atherosclerotic vascular disease.     Abdominal wall: No focal hernia or mass.        Bladder: Mild thickening of the urinary bladder wall.     Reproductive: Unremarkable as visualized     Bones: No acute bony abnormality.     IMPRESSION:     1. Multiple low-attenuation lesions in the liver compatible with  metastatic disease.     2.Small bilateral adrenal nodules, also concerning for metastatic  disease.     3. Mild thickening of the urinary bladder wall.        Bone scan 06-21-21     FINDINGS: Focal area of increased tracer uptake corresponds to known  pathologic lesion of the left posterior eighth rib. Focal area of  increased uptake in the left inguinal region appears to localize to  tracer activity in the adjacent soft tissues or external to the patient.  There is focal area of increased tracer activity in the right femoral  neck which when correlated to previous CT corresponds to pathologic  lesion. Both kidneys are visualized.      IMPRESSION:  1. Abnormal tracer activity in the right femoral neck region and left  posterior eighth rib compatible with metastatic disease. Please note,  these appear predominantly lytic on CT.  2. Otherwise physiologic distribution of tracer.        MRI Brain w/wo contrast 06-25-21  FINDINGS:    Brain:  There are numerous ring-enhancing cystic type  condyle and less so medial tibia. 2. Intermediate-grade-to-high-grade patellofemoral chondromalacia. 3. ACL and PCL sheath thickening or sheath synovitis.  No tear is demonstrated. 4. Dissecting septated, leaking or dehisced Baker's cyst posteromedially.  Synovial thickening    is present within the Baker's cyst.  Semimembranosus bursal inflammation is contributory. 5. Chronic MCL sprain and scarring.  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 6. Please see above.                       Thank you for the opportunity to provide your interpretation.       Emile Tavarez MD       A: JERARDO/davis 04/12/2019 9:51 AM   T: DAVIS 04/12/2019 9:01 AM              Assessment :  #1.  6-9+ months status post recurrent aggravation right knee MRI documented a focal high-grade medial and anterior compartment osteoarthritis with chondromalacia patella and synovitis 5+ year status post medial and lateral meniscectomies with probable nonmechanical remnant tear. #2 status post left total ankle arthroplasty per Dr. Edy Browning. #3.. Reasonably stable left knee medial anterior compartment osteoarthritis. #4. Currently seeing rheumatology with history of PMR and rheumatoid arthritis (patient is stable on Embrel)  Impression:    Encounter Diagnoses   Name Primary?     Primary osteoarthritis of right knee Yes    Other tear of medial meniscus of right knee as current injury, initial encounter     Chondromalacia patellae of right knee     Right knee pain, unspecified chronicity        Office Procedures:    Orders Placed This Encounter   Procedures   Brayan Hebert MD, Orthopedic Surgery, [facility]     Referral Priority:   Routine     Referral Type:   Eval and Treat     Referral Reason:   Specialty Services Required     Referred to Provider:   Yolanda Lundberg MD     Requested Specialty:   Orthopedic Surgery     Number of Visits Requested:   1            Treatment Plan:  Treatment options were discussed lesions  throughout the cerebral hemispheres compatible with cystic type  metastases in light of patient history.  For example, a right temporal  lobe ring-enhancing lesion is 1.3 cm.  A left inferior medial frontal  lobe lesion is 8.7 mm.  A left parietal lobe region within the  postcentral gyrus is 0.7 cm.  Smaller subcortical and parenchymal  metastases are noted.  No significant mass effect or vasogenic edema  identified.  No obvious cerebellar lesions identified.  No restricted  diffusion to indicate acute infarct.  No hemorrhage.    Midline shift:  There is no midline shift.    Ventricles:  No hydrocephalus.    Bones/joints:  No destructive calvarial lesions identified on MRI.    Sinuses:  Unremarkable as visualized.  No acute sinusitis.    Mastoid air cells:  Fluid in the right mastoid air cells noted.    Orbits:  Unremarkable as visualized.     IMPRESSION:  1.  Numerous predominantly cystic but rim-enhancing lesions throughout  the brain which are most consistent with numerous metastatic lesions.  2.  No significant vasogenic edema identified. No mass effect or midline  shift.  3.  Right mastoid effusion.  4.  No acute intracranial findings identified.     Chest X-Ray- 7/13/2021  IMPRESSION:  1.  Left Port-A-Cath placement with tip in the distal SVC region. No  pneumothorax.  2.  Increase attenuation of the left lung field is most probably related  to patient's underlying malignancy as no obvious effusion is identified.     MRI Cyberknife w/ and w/o contrast- 7/18/2021   IMPRESSION:  At least 12 peripherally enhancing centrally cystic or  necrotic brachial lesions are present compatible with diffuse metastatic  involvement as described above.     MRI Brain w/ and w/o contrast- 11/3/2021  IMPRESSION:  1. Significantly improved intracranial metastatic disease, with the  previously noted ring-enhancing lesions reduced to small or punctate size.  2. No clearly new intracranial metastatic disease.  3. A single  with Dotson Basket. We did once again review his exam and recent right knee MRI film findings. .  Does have medial and anterior compartment osteoarthritis to his right knee and is about 5+ years out from his left knee arthroscopy with medial and lateral meniscectomy. He does appear to have a small remnant tear to the medial meniscus which likely is part of his pain generation although he does have focal high-grade medial anterior compartment osteo-arthritic change. Since being on the Medrol pack, his symptoms have improved about 80% and will continue with his Aleve 2 pills twice daily as well as his home in knee brace and his patellar protection program.  I like for him to sit down with Dr. Jace Lira to discuss potential remnant medial meniscectomy and chondroplasty. He is aware that this is more for any type of meniscal tear generated pain and that he does have underlying arthritic change. He would not consider knee arthroplasty at this time although the potential for post-arthroscopic viscose supplementation was also discussed. He will continue with his Aleve 2 pills twice daily and is continuing to get his Enbrel per rheumatology. Icing and activity modification was discussed. He will set up an appointment to see Dr. Franchesca Kat after returning from a family vacation in [place]. He will contact us to interim with questions or concerns. punctate lesion in the left occipital white matter may be  slightly increased but this is a minimal finding compared to the prior  study. No other new intracranial pathology is identified.     NM Bone Scan- 9/18/2021  FINDINGS: Abnormal intense increased tracer uptake involving the sternum and manubrium.  Abnormal intense increased tracer uptake involving 2 sites of the calvaria.  Abnormal intense increased tracer uptake involving thoracic and lumbar vertebral bodies.  Left posterior rib uptake is noted as well as lower levels of increased tracer uptake involving mid left posterior ribs.  Abnormal uptake right femoral neck region.  Abnormal uptake involving the left ischium.  Mid left femur diaphysis lesion.  IMPRESSION:  Interval progression of disease. Abnormal exam demonstrating diffuse osteoblastic metastatic disease of the axial and appendicular skeleton as detailed above. Of note, right femoral neck and left femoral diaphysis involvement is noted, new since prior exam.     CT Abdomen/Pelvis w/ contrast- 9/22/2021     IMPRESSION:  Continued evidence of metastatic disease in the liver. The  size of the lesions are decreasing. No new lesions have developed. There  are multiple bony metastatic deposits in the lumbar spine and sacrum.     CT Chest w/ contrast- 9/22/2021     IMPRESSION:  There is some volume loss in the left upper lobe with what  appears to be some post radiation changes in the lungs. There continues  to be adenopathy in the mediastinum this is however improved in  comparing with the June exam. The lungs continue to show changes of  emphysema throughout both lungs. There is a moderate-sized pericardial  effusion.     MRI Brain w/ and w/o contrast- 10/4/2021     IMPRESSION:  Interval improvement compared to the previous exam with  decrease size and prominence of multiple enhancing brain metastases.     XR Pelvis and Femur- 10/28/2021     IMPRESSION:  Lytic type lesion of the proximal right femur. No  radiographic evidence of pathologic fracture.        CT of the chest with contrast-12/30/2021  FINDINGS:     LUNGS: Consolidation in the left upper lobe. There is consolidation in  the left lower lobe, likely pneumonia.  COPD.     HEART: Small pericardial effusion.     MEDIASTINUM: No masses. No enlarged lymph nodes.  No fluid collections.     PLEURA: No pleural effusion. No pleural mass or abnormal calcification.  No pneumothorax.     VASCULATURE: No evidence of aneurysm.     BONES: Sclerotic lesions are seen in the spine compatible with  metastatic disease. Sclerotic change in the sternum and likely  pathologic fracture of uncertain age.     VISUALIZED UPPER ABDOMEN: Please see the CT report for the abdomen and  pelvis.     Other: None.     IMPRESSION:  1. Metastatic disease to the skeleton.  2. Left lower lobe pneumonia.  3. Dense consolidation filling the left upper lobe with volume loss and  mediastinal shift to the left.  4. Small pericardial effusion.  5. Other findings as above.     CT of the abdomen pelvis with contrast-12/30/2021  FINDINGS:     Lower thorax: Please see the CT report for the chest.     Abdomen:     Liver: Increased number of metastatic lesions to the liver.     Gallbladder: No dilation or stone identified.     Pancreas: Unremarkable. No mass or ductal dilatation.     Spleen: Homogeneous. No splenomegaly.     Adrenals: No mass.     Kidneys/ureters: No mass. No obstructive uropathy.  No evidence of  urolithiasis.     GI tract: Moderate to large stool burden. There is no evidence of  appendicitis.     MESENTERY: No free fluid, walled off fluid collections, mesenteric  stranding, or enlarged lymph nodes.         Vasculature: Evidence of atherosclerotic vascular disease.     Abdominal wall: No focal hernia or mass.        Bladder: No focal mass or significant wall thickening.     Reproductive: Unremarkable as visualized.     Bones: Sclerotic metastasis.     IMPRESSION:  1. Increasing number of  metastatic lesions to the liver.  2. Continued evidence of bony metastasis.  3. Other findings as above.    MRI of the brain with and without contrast-1/10/2022  FINDINGS: There is a tiny area of restricted diffusion involving the  right periventricular white matter to suggest a tiny area of acute  ischemia. There is an increased abnormal contrast enhancement seen  throughout the brain parenchyma bilaterally as well as new lesions  identified predominantly within the cerebellum, occipital lobes as well  as diffusely throughout the cerebral hemispheres bilaterally. The  lesions are too numerous to count with the largest ring enhancement  abnormality seen in the right temporal lobe. This area of ring  enhancement when compared to the prior study today measures 1.2 x 1.0 cm  and previously measured 0.9 x 0.6 cm. There is increasing edema seen  surrounding the multiple too numerous to count metastatic lesions. There  is no evidence of hemorrhage. No hydrocephalus. No abnormal extra-axial  fluid collections identified. Visualized paranasal sinuses are grossly  clear. Mastoid air cells are patent. Pituitary and sella are  unremarkable in appearance. Craniovertebral junction is preserved.     IMPRESSION:  Interval progression of disease with interval increase seen  in size and number of the multiple metastatic lesions throughout the  cerebral and cerebellar hemispheres bilaterally. There is a tiny  punctate area of restricted diffusion in the right frontal  periventricular white matter suggesting an acute ischemic insult.      Bone scan whole body-1/5/2022  FINDINGS:     Increase intensity of uptake involving the pelvis multiple vertebral  bodies, multiple left ribs, sternum and skull. Number of lesions have  not changed but intensity appears greater.       JOINTS:  Unremarkable.  No focal increased or decreased uptake.    SOFT TISSUES:  Unremarkable.    RENAL/BLADDER:  Within normal limits.     IMPRESSION:    Increase  intensity of uptake involving the pelvis multiple vertebral  bodies, multiple left ribs, sternum and skull. Number of lesions have  not changed but intensity appears greater.         Pathology:    05-18-21 05-26-21                 Objective     Physical Exam:  Physical Exam  Vitals reviewed.   Constitutional:       General: He is not in acute distress.     Appearance: He is underweight. He is ill-appearing.   HENT:      Head: Normocephalic.        Nose: Nose normal.      Mouth/Throat:      Mouth: Mucous membranes are moist.      Pharynx: Oropharynx is clear.   Eyes:      Extraocular Movements: Extraocular movements intact.      Conjunctiva/sclera: Conjunctivae normal.      Pupils: Pupils are equal, round, and reactive to light.   Cardiovascular:      Rate and Rhythm: Normal rate and regular rhythm.      Pulses: Normal pulses.      Heart sounds: Normal heart sounds.   Pulmonary:      Effort: Pulmonary effort is normal. No respiratory distress.      Breath sounds: Normal breath sounds.   Abdominal:      General: Abdomen is flat. Bowel sounds are normal. There is no distension.      Palpations: Abdomen is soft. There is no mass.   Musculoskeletal:         General: No swelling or tenderness. Normal range of motion.      Cervical back: Normal range of motion.   Skin:     General: Skin is warm and dry.      Capillary Refill: Capillary refill takes less than 2 seconds.   Neurological:      General: No focal deficit present.      Mental Status: He is alert and oriented to person, place, and time. Mental status is at baseline.      GCS: GCS eye subscore is 4. GCS verbal subscore is 5. GCS motor subscore is 6.      Cranial Nerves: Cranial nerves are intact.      Sensory: Sensation is intact.      Motor: Motor function is intact.      Coordination: Coordination is intact.      Gait: Gait is intact.   Psychiatric:         Mood and Affect: Mood normal.         Behavior: Behavior normal.         Thought Content:  Thought content normal.         Judgment: Judgment normal.         Vital Signs:   Vitals:    01/11/22 1545   BP: 123/80   Pulse: (!) 131   Resp: 18   Temp: 97.8 °F (36.6 °C)   TempSrc: Temporal   SpO2: 96%   PainSc: 0-No pain     There is no height or weight on file to calculate BMI.       Assessment / Plan      Assessment/Plan:   Vipin Oakley is a very pleasant 50 y.o. 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  Tumor sent for CARIS testing and was negative for ALK, BRAF, EGFR, RET, ROS1, MET, HER2, PD-L1.  TMB was low.  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     First bone scan showed abnormal tracer activity in the right femoral neck region and left posterior eighth rib compatible with metastatic disease. Please note, these appear predominantly lytic on CT.     We offered palliative radiation and he received 2000 cGy in 5 fractions to each painful bony mets. This included his right femur, right back, and left 8th rib. He completed his first set of treatments on 9/3/2021. He also completed cyberknife therapy at [facility] to his brain lesions. He tolerated all treatments well. He states that this helped with the pain at that time.  A bone scan on 9/18/2021 showed interval progression of disease. Abnormal exam demonstrating diffuse osteoblastic metastatic disease of the axial and appendicular skeleton as detailed above. Of note, right femoral neck and left femoral diaphysis involvement is noted, new since prior exam.  He asked at his 1 month follow-up visit after his first set of radiation treatments with us if it would be possible to radiate this area as well. It is in a different spot of the femur than what was previously radiated, therefore he was resimed and treated with another round of palliative  radiation to the right femur, he was given 2000 cGy in 5 fractions. He started this on 11/29/2021 and completed those 5 fractions on 12/3/2021.  He tolerated this well.  He is now currently status post XRT x2 with us.      A bone scan on 1/5/2022 revealed an increased intensity of uptake involving the pelvis multiple vertebral bodies, multiple left ribs, sternum and skull. The number of lesions have not changed but intensity appears greater.    He is here today after having a MRI of the brain on 1/10/2022 that revealed interval progression of disease with interval increase seen in size and number of the multiple metastatic lesions throughout the cerebral and cerebellar hemispheres bilaterally.  He is no longer considered a candidate for CyberKnife therapy as he was before.  Therefore, he was referred back here for palliative whole brain radiation.  He also has a visible mass to the left jaw area as well.  He continues with chemotherapy.    We will resim him tomorrow and treat his whole brain and the mass to the left jaw.  We will plan to give him WBRT of 30Gy/10fx, simultaneously with the jaw mass in the same portal.     The Jaw mass might get more boost after the WBRT dose.    I, Luda Moura MD, personally performed the services described in this documentation as scribed by the above named individual in my presence, and it is both accurate and complete.  1/12/2022  10:05 EST     Electronically signed by Luda Moura MD, 01/12/22, 10:05 AM EST.      Follow Up:   CT SIM scheduled for tomorrow    Aleisha Gaines, VITOR  01/11/22 16:03 EST

## 2022-01-13 NOTE — TELEPHONE ENCOUNTER
From: Vipin Oakley  To: Office of Noa Peraza MD  Sent: 1/13/2022 6:35 AM EST  Subject: Medication Renewal Request    Refills have been requested for the following medications:     oxyCODONE (ROXICODONE) 20 MG tablet [Noa Peraza MD]     fentaNYL (DURAGESIC) 100 MCG/HR patch [Noa Peraza MD]    Preferred pharmacy: [facility] [address] [phone] [place] [phone]  Delivery method: Pickup

## 2022-01-14 NOTE — TELEPHONE ENCOUNTER
Caller: LUZMA    Relationship: WIFE    Best call back number: [phone]    Requested Prescriptions:   Requested Prescriptions     Pending Prescriptions Disp Refills   • fentaNYL (DURAGESIC) 100 MCG/HR patch 10 patch 0     Sig: Place 2 patches on the skin as directed by provider Every 72 (Seventy-Two) Hours.   • oxyCODONE (ROXICODONE) 20 MG tablet 112 tablet 0     Sig: Take 1 tablet by mouth Every 4 (Four) Hours As Needed for Moderate Pain .        Pharmacy where request should be sent:    [facility] [address] [phone] [facility] [phone]   [phone]    Additional details provided by patient: PT NEEDS MEDICINE SENT IN SO HE CAN  BY TOMORROW AFTERNOON.    Does the patient have less than a 3 day supply:  [x] Yes  [] No    Bhavana Shepherd   01/14/22 13:01 EST

## 2022-01-18 NOTE — TELEPHONE ENCOUNTER
From: Vipin Oakley  To: Office of Noa Peraza MD  Sent: 1/14/2022 4:58 AM EST  Subject: Medication Renewal Request    Refills have been requested for the following medications:     oxyCODONE (ROXICODONE) 20 MG tablet [Noa Peraza MD]     fentaNYL (DURAGESIC) 100 MCG/HR patch [Noa Peraza MD]    Preferred pharmacy: [address] [phone] [place] [phone] FX  Delivery method: Pickup

## 2022-01-24 NOTE — TELEPHONE ENCOUNTER
Caller: Vipin Oakley    Relationship: Self    Best call back number: [phone]      What was the call regarding: PATIENT WAS EXPOSED TO COVID AND HE DOES HAVE A COUGH, NO FEVER, SINUS ISSUES.  WHAT DOES HE NEED TO DO ?    Do you require a callback: YES

## 2022-01-26 NOTE — TELEPHONE ENCOUNTER
Caller: Radha Oakley    Relationship: Emergency Contact    Best call back number: [phone]    What is the best time to reach you: ANYTIME    Who are you requesting to speak with (clinical staff, provider,  specific staff member): DR BARONE OR NURSE    What was the call regarding: PTS WIFE CALLED, SAID PT TESTED POSITIVE FOR COVID YESTERDAY. PT DOESN'T HAVE MANY SYMPTOMS AT THIS POINT. THEY JUST WANTED TO KNOW WHAT HE SHOULD SINCE HE IS POSITIVE.     Do you require a callback: YES

## 2022-01-27 NOTE — TELEPHONE ENCOUNTER
From: Vipin Oakley  To: Office of Yu Foster MD  Sent: 1/27/2022 3:51 AM EST  Subject: Medication Renewal Request    Refills have been requested for the following medications:     oxyCODONE (ROXICODONE) 20 MG tablet [Yu Foster MD]     fentaNYL (DURAGESIC) 100 MCG/HR patch [Yu Foster MD]    Preferred pharmacy: [facility] [address] [phone] [facility] [phone]   Delivery method: Pickup

## 2022-01-27 NOTE — TELEPHONE ENCOUNTER
Caller: MELVINA    Relationship to patient: SELF    Best call back number: [phone]    Patient is needing: PT STATES HE IS COVID-19 +. HE DOES NOT TRUST THE TEST AND WANTS TO KNOW IF HE CAN STILL COME TOMORROW.

## 2022-02-03 NOTE — PROGRESS NOTES
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    Tumor sent for CARIS testing and was negative for ALK, BRAF, EGFR, RET, ROS1, MET, HER2, PD-L1.  TMB was low.  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      TREATMENT HISTORY:   1.  Stereotactic Radiation to the Brain     Treatment Site  Current Dose  Modality  From  To  Elapsed Days  Fx.    6 Brain Mets - Fx 2  1,800 cGy  x06  2021  1    5 Brain Mets - Fx 3  1,800 cGy  x06  2021  1    5 Brain Mets - Fx 1  1,800 cGy  x06  2021  1      2.  Dr. Moura delivered palliative radiation to the R femoral neck and 8th rib.  Further palliative radiation planned but currently on hold.     3. Xgeva bone therapy started 21    4.         5.  Received palliative radiation with proton therapy to ST metastases    CHIEF COMPLAINT:  Follow up of metastatic squamous cell lung cancer/toxicity check    HISTORY OF PRESENT ILLNESS:   Vipin Oakley is a very pleasant 51 y.o. male who was referred by Dr. Johnson for evaluation and treatment of squamous cell lung cancer.  He initially started to feel unwell in 2021.  At that time, he presented to an urgent care with cough and was diagnosed with Flu B.  His cough worsened and his voice started to become hoarse.  He ultimately came to the ER where he was diagnosed with FluB again as well as pneumonia and was told he had a lung mass.  From there he saw Dr. Johnson.  Bronchoscopy was delayed because of mucus retention cyst which was removed by Dr. Malone and then he returned to Dr. Johnson and had bronchoscopy.  This revealed heaped up / friable mucosa in thedistal L bronchus.  Brushings and biopsies were done and EBUS biopsies of Station 4L/10L LNs.   This revealed squamous cell carcinoma.  Meanwhile he was found to have a soft tissue lesion on his L upper back and FNA showed metastatic squamous cell carcinoma.      INTERVAL HISTORY:  Mr. Oakley is here today with his wife for follow up of metastatic squamous cell carcinoma. He is feeling poorly.  At his last visit, he had disease progression and plan was made for WBXRT followed by salvage chemotherapy. He was then diagnosed with COVID and treatment was delayed by quarantine.  He is here today with his wife for f/u.  She says he has really declined over the last 2 weeks.  He has gotten really weak.  It was hard to get him here this morning.  He has been sleeping a lot and the cancer nodules have rapidly progressed.  He has been more short of breath and has been unable to ambulate more than a few steps.  He hasn't been eating and has no appetite.  He drinks a little water.      PAST MEDICAL HISTORY:  Past Medical History:   Diagnosis Date   • Anxiety    • Arthritis    • Back pain    • Bone cancer (HCC)    • Brain cancer (HCC)    • Carpal tunnel syndrome     bilateral   • COPD (chronic obstructive pulmonary disease) (HCC)    • Frequency of urination    • GERD (gastroesophageal reflux disease)    • Heartburn    • Herniated cervical disc    • Lung cancer (HCC)    • Seizures (HCC) 2014    treated with Tegretol as a child/teenager since 9 months old, currently well controlled   • Squamous cell lung cancer (HCC) 6/10/2021         PAST SURGICAL HISTORY:  Past Surgical History:   Procedure Laterality Date   • ABDOMINAL SURGERY     • BIOPSY OF BACK/FLANK N/A 5/18/2021    Procedure: BIOPSY SOFT TISSUE BACK / FLANK;  Surgeon: Hans Malone MD;  Location: [facility] OR;  Service: ENT;  Laterality: N/A;   • BRONCHOSCOPY N/A 5/26/2021    Procedure: BRONCHOSCOPY WITH ENDOBRONCHIAL ULTRASOUND;  Surgeon: Saurabh Johnson MD;  Location: [facility] OR;  Service: Pulmonary;  Laterality: N/A;   • CARDIAC CATHETERIZATION     •  CYBERKNIFE  07/30/2021    brain mets   • EPIDIDYMECTOMY Right 6/27/2018    Procedure: EPIDIDYMECTOMY;  Surgeon: Chino Aviles MD;  Location: [facility] OR;  Service: Urology   • HERNIA REPAIR     • LARYNGOSCOPY N/A 5/18/2021    Procedure: MICRODIRECT LARYNGOSCOPY;  Surgeon: Hans Malone MD;  Location: [facility] OR;  Service: ENT;  Laterality: N/A;   • MOUTH SURGERY      teeth    • ORCHIECTOMY Right 10/19/2020    Procedure: ORCHIECTOMY;  Surgeon: Chino Aviles MD;  Location: [facility] OR;  Service: Urology;  Laterality: Right;   • OTHER SURGICAL HISTORY  06/2021    mass removal from throat   • TESTICLE SURGERY     • VENOUS ACCESS DEVICE (PORT) INSERTION Left 7/13/2021    Procedure: INSERTION VENOUS ACCESS DEVICE;  Surgeon: Eugenia Lundy MD;  Location: [facility] OR;  Service: General;  Laterality: Left;       FAMILY HISTORY:  Family History   Problem Relation Age of Onset   • Cancer Father         liver   • Hypertension Father    • Diabetes Father    • Hypertension Mother    • Cancer Maternal Uncle    • Heart disease Maternal Uncle    • Heart disease Paternal Aunt    • Cancer Maternal Grandmother    • Heart disease Maternal Grandmother        SOCIAL HISTORY:  Social History     Socioeconomic History   • Marital status:    Tobacco Use   • Smoking status: Current Every Day Smoker     Packs/day: 1.00     Years: 14.00     Pack years: 14.00     Types: Cigarettes   • Smokeless tobacco: Former User     Types: Snuff   Vaping Use   • Vaping Use: Never used   Substance and Sexual Activity   • Alcohol use: Yes     Alcohol/week: 2.0 standard drinks     Types: 2 Shots of liquor per week     Comment: occasionally   • Drug use: No   • Sexual activity: Defer     Birth control/protection: None     Social History     Social History Narrative    , lives with wife. Worked in furniture building, as a salesman,     Exposed to burning rubber at his current job with [employer]    Current smoker 1 ppd     REVIEW OF  SYSTEMS:   A comprehensive 14 point review of systems was performed.  Significant findings as mentioned above.  All other systems reviewed and are negative.      MEDICATIONS:  The current medication list was reviewed in the EMR    Current Outpatient Medications:   •  albuterol (PROVENTIL) (2.5 MG/3ML) 0.083% nebulizer solution, Take 2.5 mg by nebulization 2 (two) times a day., Disp: , Rfl:   •  Calcium Carb-Cholecalciferol (Calcium-Vitamin D) 600-400 MG-UNIT tablet, Take 1 tablet by mouth 2 (Two) Times a Day., Disp: 60 tablet, Rfl: 5  •  carBAMazepine (TEGretol) 200 MG tablet, Take 200 mg by mouth 2 (Two) Times a Day. Takes 1.5 tabs in am and 2 tabs at hs, Disp: , Rfl:   •  dexamethasone (DECADRON) 1 MG tablet, Take 2 tabs twice a day, Disp: 60 tablet, Rfl: 0  •  diazePAM (VALIUM) 5 MG tablet, Take 1 tablet by mouth Every 8 (Eight) Hours As Needed for Anxiety., Disp: 90 tablet, Rfl: 0  •  EPINEPHrine (ADRENALIN) 1 MG/ML injection, Inject 1 mcg/mL under the skin into the appropriate area as directed. FOR BEE STINGS, Disp: , Rfl:   •  escitalopram (Lexapro) 10 MG tablet, Take 1 tablet by mouth Daily., Disp: 30 tablet, Rfl: 11  •  famotidine (PEPCID) 20 MG tablet, Take 1 tablet by mouth 2 (two) times a day., Disp: , Rfl:   •  fentaNYL (DURAGESIC) 100 MCG/HR patch, Place 2 patches on the skin as directed by provider Every 72 (Seventy-Two) Hours., Disp: 10 patch, Rfl: 0  •  fluconazole (DIFLUCAN) 100 MG tablet, Take 2 tablets by mouth Daily., Disp: 14 tablet, Rfl: 0  •  gabapentin (Neurontin) 800 MG tablet, Take 1 tablet by mouth 3 (Three) Times a Day., Disp: 90 tablet, Rfl: 0  •  HYDROmorphone (DILAUDID) 2 MG tablet, Take 1 tablet by mouth Every 8 (Eight) Hours As Needed for Moderate Pain . Two week supply, Disp: 45 tablet, Rfl: 0  •  ibuprofen (ADVIL,MOTRIN) 800 MG tablet, ibuprofen 800 mg tablet  TAKE ONE TABLET BY MOUTH THREE TIMES A DAY, Disp: , Rfl:   •  K Phos [place]-Sod Phos Di & Mono (K-Phos-Neutral) [phone]  "MG tablet, K-Phos-Neutral 250 mg tablet, Disp: , Rfl:   •  lactulose (CHRONULAC) 10 GM/15ML solution, Take 30 mL by mouth 2 (Two) Times a Day As Needed (for refractory constipation.)., Disp: 473 mL, Rfl: 1  •  Lidocaine Viscous HCl (XYLOCAINE) 2 % solution, SWISH, SPIT, OR SWALLOW 5 TO 10 ML BY MOUTH EVERY 3 HOURS AS NEEDED., Disp: 100 mL, Rfl: 5  •  lidocaine-prilocaine (EMLA) 2.5-2.5 % cream, Apply to port-a-cath about 30 minutes to 1 hour prior to chemotherapy, Disp: 30 g, Rfl: 3  •  methocarbamol (ROBAXIN) 500 MG tablet, Take 500 mg by mouth 2 (Two) Times a Day., Disp: , Rfl:   •  nicotine (NICODERM CQ) 21 MG/24HR patch, Place 1 patch on the skin as directed by provider Daily., Disp: 28 each, Rfl: 0  •  nicotine polacrilex (Nicorette Mini) 4 MG lozenge, Dissolve 1 lozenge in the mouth As Needed for Smoking Cessation., Disp: 72 lozenge, Rfl: 5  •  nystatin (MYCOSTATIN) [id number] UNIT/ML suspension, Swish and swallow 5 mL 4 (Four) Times a Day., Disp: 473 mL, Rfl: 1  •  ondansetron (Zofran) 8 MG tablet, Take 1 tablet by mouth Every 8 (Eight) Hours As Needed for Nausea or Vomiting., Disp: 60 tablet, Rfl: 6  •  ondansetron ODT (Zofran ODT) 8 MG disintegrating tablet, Place 1 tablet on the tongue Every 8 (Eight) Hours As Needed for Nausea or Vomiting., Disp: 60 tablet, Rfl: 5  •  oxyCODONE (ROXICODONE) 20 MG tablet, Take 1-2 tabs q6h prn pain.  NTE 8 tabs/24hrs, Disp: 112 tablet, Rfl: 0  •  prochlorperazine (COMPAZINE) 10 MG tablet, Take 1 tablet by mouth Every 6 (Six) Hours As Needed for Nausea., Disp: 60 tablet, Rfl: 3  •  sennosides-docusate (Senna-S) 8.6-50 MG per tablet, Take 2 tablets by mouth 2 (Two) Times a Day., Disp: 120 tablet, Rfl: 5  •  Stimulant Laxative 8.6-50 MG per tablet, TAKE TWO TABLETS BY MOUTH TWO TIMES A DAY, Disp: 120 tablet, Rfl: 5  •  Syringe 25G X 5/8\" 3 ML misc, Use as directed 2 x weekly, Disp: 24 each, Rfl: 3  •  Testosterone Cypionate (Depo-Testosterone) 200 MG/ML injection, Inject 1/2 cc " "subcutaneously every Monday and Thursday, Disp: 10 mL, Rfl: 2    ALLERGIES:    Allergies   Allergen Reactions   • Bee Venom Anaphylaxis   • Tramadol Other (See Comments)     Seizures  Ultram         PHYSICAL EXAM:  Vitals:    02/08/22 1240   BP: 102/70   Pulse: (!) 162   Resp: 18   Temp: 97.2 °F (36.2 °C)   TempSrc: Temporal   SpO2: 98%  Comment: 2L o2   Weight: 59.9 kg (132 lb)   Height: 182.9 cm (72\")   PainSc: 0-No pain   ECOG score: 4     General:  Cachectic and somnolent.  He is intermittently awake and can say a word or two or occasionally smile or wink.  In between this, he falls asleep or sometimes shifts around in his chair. Cachectic  HEENT:  Pupils are equal, and reactive to, Extra-ocular movements full, Oropharyx clear, mucous membranes dry. ST met over L mandible has progressed significantly  Neck:  No JVD, thyromegaly or lymphadenopathy  CV:  Irregularly irregular,  Tachycardic w/ 's to 160s, no murmurs, rubs or gallops  Resp:  He has diffuse matteo wheezes, no rhonchi or crackles  Abd:  Soft, non-tender, non-distended, bowel sounds present  Ext:  No clubbing, cyanosis or edema  Skin:  Multiple large ST nodules over his back, markedly progressed.  Largest is about 8 cm.    Neuro:  MS as above. Moves 4 extremities.  Able to say a word or two but doesn't reliably answer questions.     PATHOLOGY:  05-18-21 05-26-21              ENDOSCOPY:  Bronchoscopy 05-26-21  Findings: Irregular, friable mucosa in the distal left main stem bronchus at the left upper lobe takeoff      IMAGING:  CT Chest wo contrast 04-05-21  FINDINGS:  Small pericardial effusion is noted. There is no pleural effusion. There is AP window adenopathy measuring 2.1 cm. There is probably some left hilar adenopathy as well, poorly characterized without IV contrast. There is also some soft tissue abutting the  distal aortic arch and proximal descending aorta suggesting tumor or adenopathy. Upper abdominal images show a contracted " gallbladder.     Lung windows show COPD. There is some mild narrowing of both proximal upper and lower lobe bronchi on the left side possibly due to extrinsic compression. Bronchoscopy may be beneficial. No suspicious pulmonary nodules are identified. There is some  scarring in the left upper lobe. The lungs are otherwise clear. No CT findings present to indicate pneumonia. Note: CT may be negative in the earliest stages of COVID-19. There are no suspicious osseous lesions.     IMPRESSION:     1. COPD with no evidence of acute pneumonia.  2. AP window and probably left hilar and perihilar adenopathy concerning for malignancy. There is some mild extrinsic compression of the proximal left upper and left lower lobe bronchi. Bronchoscopy may be beneficial. While a contrast-enhanced chest CT will provide some additional diagnostic information, PET CT would be more beneficial.      CTCAP 06-18-21  FINDINGS:     LUNGS: 7 mm parenchymal nodule anteriorly in the left lung on image 35  of the axial series.  Airspace disease in the left upper lobe adjacent to the hilum.  1 cm parenchymal nodule in the right lung on image 48 of the axial  series.     HEART: Trace pericardial effusion.     MEDIASTINUM: Abnormal mediastinal adenopathy particularly in the  aorticopulmonary window, azygoesophageal recess and most notably  surrounding the left main pulmonary artery.     PLEURA: No pleural effusion. No pleural mass or abnormal calcification.  No pneumothorax.     VASCULATURE: No evidence of aneurysm. There is extrinsic mass effect on  the left lower lobe pulmonary artery but I do not see a pulmonary  embolus on the presented exam.     BONES: No acute bony abnormality.     VISUALIZED UPPER ABDOMEN:Please see the CT report for the abdomen and  pelvis.     Other: There is bilateral axillary adenopathy. 1.2 cm left axillary  lymph node and a 1.3 cm right axillary lymph node.     IMPRESSION:     1. Mediastinal, left hilar, and bilateral  axillary adenopathy.  2. Right parenchymal nodule and left parenchymal nodule.  3. Also airspace disease in the left upper lobe concerning for  pneumonia.  4. Trace pericardial effusion.    FINDINGS:     Lower thorax: Please see the CT report for the chest.     Abdomen:     Liver: Multiple low-attenuation lesions throughout the liver compatible  with extensive hepatic metastasis.     Gallbladder: No dilation or stone identified.     Pancreas: Unremarkable. No mass or ductal dilatation.     Spleen: Homogeneous. No splenomegaly.     Adrenals: Small bilateral adrenal nodules. Given the history, these are  concerning for metastatic disease.     Kidneys/ureters: No mass. No obstructive uropathy.  No evidence of  urolithiasis.     GI tract: Moderate volume stool.     MESENTERY: No free fluid, walled off fluid collections, mesenteric  stranding, or enlarged lymph nodes         Vasculature: Evidence of atherosclerotic vascular disease.     Abdominal wall: No focal hernia or mass.        Bladder: Mild thickening of the urinary bladder wall.     Reproductive: Unremarkable as visualized     Bones: No acute bony abnormality.     IMPRESSION:     1. Multiple low-attenuation lesions in the liver compatible with  metastatic disease.     2.Small bilateral adrenal nodules, also concerning for metastatic  disease.     3. Mild thickening of the urinary bladder wall.      Bone scan 06-21-21     FINDINGS: Focal area of increased tracer uptake corresponds to known  pathologic lesion of the left posterior eighth rib. Focal area of  increased uptake in the left inguinal region appears to localize to  tracer activity in the adjacent soft tissues or external to the patient.  There is focal area of increased tracer activity in the right femoral  neck which when correlated to previous CT corresponds to pathologic  lesion. Both kidneys are visualized.      IMPRESSION:  1. Abnormal tracer activity in the right femoral neck region and  left  posterior eighth rib compatible with metastatic disease. Please note,  these appear predominantly lytic on CT.  2. Otherwise physiologic distribution of tracer.      MRI Brain w/wo contrast 06-25-21  FINDINGS:    Brain:  There are numerous ring-enhancing cystic type lesions  throughout the cerebral hemispheres compatible with cystic type  metastases in light of patient history.  For example, a right temporal  lobe ring-enhancing lesion is 1.3 cm.  A left inferior medial frontal  lobe lesion is 8.7 mm.  A left parietal lobe region within the  postcentral gyrus is 0.7 cm.  Smaller subcortical and parenchymal  metastases are noted.  No significant mass effect or vasogenic edema  identified.  No obvious cerebellar lesions identified.  No restricted  diffusion to indicate acute infarct.  No hemorrhage.    Midline shift:  There is no midline shift.    Ventricles:  No hydrocephalus.    Bones/joints:  No destructive calvarial lesions identified on MRI.    Sinuses:  Unremarkable as visualized.  No acute sinusitis.    Mastoid air cells:  Fluid in the right mastoid air cells noted.    Orbits:  Unremarkable as visualized.     IMPRESSION:  1.  Numerous predominantly cystic but rim-enhancing lesions throughout  the brain which are most consistent with numerous metastatic lesions.  2.  No significant vasogenic edema identified. No mass effect or midline  shift.  3.  Right mastoid effusion.  4.  No acute intracranial findings identified.       Bone scan 09-18-21  FINDINGS: Abnormal intense increased tracer uptake involving the sternum  and manubrium.  Abnormal intense increased tracer uptake involving 2 sites of the  calvaria.  Abnormal intense increased tracer uptake involving thoracic and lumbar  vertebral bodies.  Left posterior rib uptake is noted as well as lower levels of increased  tracer uptake involving mid left posterior ribs.  Abnormal uptake right femoral neck region.  Abnormal uptake involving the left  ischium.  Mid left femur diaphysis lesion.     IMPRESSION:  Interval progression of disease. Abnormal exam demonstrating  diffuse osteoblastic metastatic disease of the axial and appendicular  skeleton as detailed above. Of note, right femoral neck and left femoral  diaphysis involvement is noted, new since prior exam.      CTCAP 09-22-21  CT FINDINGS: On the lung windows there are emphysematous changes in both  lungs. There is some increased density in the left upper lung.  A  portion of this may represent post radiation change. The left lower lobe  and right lung are clear. There is no evidence of supraclavicular  lymphadenopathy. In the mediastinum there are enlarged lymph nodes. The  overall volume of adenopathy however is decreasing. The heart was not  enlarged.  There is fluid in the pericardial sac surrounding the heart.     IMPRESSION:  There is some volume loss in the left upper lobe with what  appears to be some post radiation changes in the lungs. There continues  to be adenopathy in the mediastinum this is however improved in  comparing with the June exam. The lungs continue to show changes of  emphysema throughout both lungs. There is a moderate-sized pericardial  Effusion.    CT FINDINGS: The liver continues to be abnormal. There are low density  areas in all segments of the liver consistent with metastatic disease.  These are stable in size to perhaps slightly smaller than on the earlier  CT. The spleen was unremarkable. There is no evidence of mass in the  pancreas. The aorta is normal in caliber. No adrenal lesions are  identified. The kidneys enhance appropriately and show no evidence of  obstruction. There is no evidence of ascites. In the pelvis there were  no masses or fluid collections. On the bone windows there are lesions in  the lumbar spine and sacrum consistent with metastatic disease.     IMPRESSION:  Continued evidence of metastatic disease in the liver. The  size of the lesions are  decreasing. No new lesions have developed. There  are multiple bony metastatic deposits in the lumbar spine and sacrum.        Bone scan 09-18-21  FINDINGS: Abnormal intense increased tracer uptake involving the sternum  and manubrium.  Abnormal intense increased tracer uptake involving 2 sites of the  calvaria.  Abnormal intense increased tracer uptake involving thoracic and lumbar  vertebral bodies.  Left posterior rib uptake is noted as well as lower levels of increased  tracer uptake involving mid left posterior ribs.  Abnormal uptake right femoral neck region.  Abnormal uptake involving the left ischium.  Mid left femur diaphysis lesion.     IMPRESSION:  Interval progression of disease. Abnormal exam demonstrating  diffuse osteoblastic metastatic disease of the axial and appendicular  skeleton as detailed above. Of note, right femoral neck and left femoral  diaphysis involvement is noted, new since prior exam.        CTCAP 09-22-21  CT FINDINGS: On the lung windows there are emphysematous changes in both  lungs. There is some increased density in the left upper lung.  A  portion of this may represent post radiation change. The left lower lobe  and right lung are clear. There is no evidence of supraclavicular  lymphadenopathy. In the mediastinum there are enlarged lymph nodes. The  overall volume of adenopathy however is decreasing. The heart was not  enlarged.  There is fluid in the pericardial sac surrounding the heart.     IMPRESSION:  There is some volume loss in the left upper lobe with what  appears to be some post radiation changes in the lungs. There continues  to be adenopathy in the mediastinum this is however improved in  comparing with the June exam. The lungs continue to show changes of  emphysema throughout both lungs. There is a moderate-sized pericardial  Effusion.    CT FINDINGS: The liver continues to be abnormal. There are low density  areas in all segments of the liver consistent with  metastatic disease.  These are stable in size to perhaps slightly smaller than on the earlier  CT. The spleen was unremarkable. There is no evidence of mass in the  pancreas. The aorta is normal in caliber. No adrenal lesions are  identified. The kidneys enhance appropriately and show no evidence of  obstruction. There is no evidence of ascites. In the pelvis there were  no masses or fluid collections. On the bone windows there are lesions in  the lumbar spine and sacrum consistent with metastatic disease.     IMPRESSION:  Continued evidence of metastatic disease in the liver. The  size of the lesions are decreasing. No new lesions have developed. There  are multiple bony metastatic deposits in the lumbar spine and sacrum      MRI Brain w/wo contrast 10-04-21  FINDINGS:  Rim-enhancing metastatic lesions throughout the cerebral hemispheres  have improved since previous exam. A right temporal lobe lesion is now  10.3 mm and was previously 12.9 mm. A right frontal lobe lesion is now  5.6 mm and was previously 7.6 mm. A left frontal lobe lesion is now 3.9  mm and was previously 8.7 mm. All other subcortical lesions have  decreased in size.     No significant vasogenic edema identified with the exception of the  right temporal lobe lesion.     No midline shift.     Right mastoid effusion is noted.     No new rim-enhancing or solid enhancing lesions identified.     IMPRESSION:  Interval improvement compared to the previous exam with  decrease size and prominence of multiple enhancing brain metastases.      XR Pelvis 1 or 2 View 10-28-21  FINDINGS:    Bones/joints:  Lytic lesion of the proximal right femur is noted  without evidence of associated periosteal reaction. No evidence of  pathologic fracture.  No dislocation.    Soft tissues:  Unremarkable.     IMPRESSION:    Lytic type lesion of the proximal right femur. No radiographic  evidence of pathologic fracture.      XR Femur 2 View Bilateral 10-28-21  FINDINGS:     Bones/joints:  No pathologic fractures are identified. Lytic lesion of  the proximal right femur is noted. No periosteal reaction.  No  dislocation.    Soft tissues:  Unremarkable.     IMPRESSION:  Lytic lesion of the proximal right femur. No radiographic evidence of  pathologic fracture.        MRI Brain With & Without Contrast 11-03-21  FINDINGS: Previous exam by report showed numerous peripherally enhancing  centrally cystic or necrotic brain parenchymal lesions which in general  are markedly diminished.     As an example, regarding the larger lesions, medial right temporal lobe  lesion, 17 mm in diameter on the prior study is diminished to 10 mm. The  9 mm lesion of the inferior right frontal lobe is reduced to 5 mm. The 9  mm left postcentral gyrus lesion has been reduced to 5.5 mm, practically  all of the remaining ring-enhancing lesions have been reduced to  punctate size.     There are very few faint punctate lesions scattered throughout the brain  elsewhere, some barely distinguishable from vessels in cross section,  but which can all be identified in retrospect on the prior study and are  within 1 or 2 mm of the previous size. Central left occipital lesion,  axial image 68 of CyberKnife series, measures 2.5 mm and may be slightly  larger.     No new areas of edema are identified. There is no evidence of restricted  diffusion to suggest acute infarction. The sagittal images show the  midline structures to appear grossly normal. There are no signal changes  suggestive of hemorrhage, no evidence of hydrocephalus, or abnormal  extra-axial collection. There is expected flow signal in the major  intracranial arteries and in the median sagittal sinus. No gross  abnormalities are noted of the orbits. There is mild left sphenoid sinus  disease.      IMPRESSION:  1. Significantly improved intracranial metastatic disease, with the  previously noted ring-enhancing lesions reduced to small or punctate  size.     2. No  clearly new intracranial metastatic disease.     3. A single punctate lesion in the left occipital white matter may be  slightly increased but this is a minimal finding compared to the prior  study. No other new intracranial pathology is identified.      CTCAP 12-30-21  FINDINGS:     LUNGS: Consolidation in the left upper lobe. There is consolidation in  the left lower lobe, likely pneumonia.  COPD.     HEART: Small pericardial effusion.     MEDIASTINUM: No masses. No enlarged lymph nodes.  No fluid collections.     PLEURA: No pleural effusion. No pleural mass or abnormal calcification.  No pneumothorax.     VASCULATURE: No evidence of aneurysm.     BONES: Sclerotic lesions are seen in the spine compatible with  metastatic disease. Sclerotic change in the sternum and likely  pathologic fracture of uncertain age.     VISUALIZED UPPER ABDOMEN: Please see the CT report for the abdomen and  pelvis.     Other: None.     IMPRESSION:  1. Metastatic disease to the skeleton.  2. Left lower lobe pneumonia.  3. Dense consolidation filling the left upper lobe with volume loss and  mediastinal shift to the left.  4. Small pericardial effusion.  5. Other findings as above.    FINDINGS:     Lower thorax: Please see the CT report for the chest.     Abdomen:     Liver: Increased number of metastatic lesions to the liver.     Gallbladder: No dilation or stone identified.     Pancreas: Unremarkable. No mass or ductal dilatation.     Spleen: Homogeneous. No splenomegaly.     Adrenals: No mass.     Kidneys/ureters: No mass. No obstructive uropathy.  No evidence of  urolithiasis.     GI tract: Moderate to large stool burden. There is no evidence of  appendicitis.     MESENTERY: No free fluid, walled off fluid collections, mesenteric  stranding, or enlarged lymph nodes.         Vasculature: Evidence of atherosclerotic vascular disease.     Abdominal wall: No focal hernia or mass.        Bladder: No focal mass or significant wall  thickening.     Reproductive: Unremarkable as visualized.     Bones: Sclerotic metastasis.     IMPRESSION:  1. Increasing number of metastatic lesions to the liver.  2. Continued evidence of bony metastasis.  3. Other findings as above.      NM Bone Scan Whole Body 01-05-22  FINDINGS:     Increase intensity of uptake involving the pelvis multiple vertebral  bodies, multiple left ribs, sternum and skull. Number of lesions have  not changed but intensity appears greater.       JOINTS:  Unremarkable.  No focal increased or decreased uptake.    SOFT TISSUES:  Unremarkable.    RENAL/BLADDER:  Within normal limits.     IMPRESSION:    Increase intensity of uptake involving the pelvis multiple vertebral  bodies, multiple left ribs, sternum and skull. Number of lesions have  not changed but intensity appears greater.      MRI Brain With & Without Contrast 01-10-22  FINDINGS: There is a tiny area of restricted diffusion involving the  right periventricular white matter to suggest a tiny area of acute  ischemia. There is an increased abnormal contrast enhancement seen  throughout the brain parenchyma bilaterally as well as new lesions  identified probably within the cerebellum, occipital lobes as well as  diffusely throughout the cerebral hemispheres bilaterally. The lesions  are too numerous to count with the largest ring enhancement abnormality  seen in the right temporal lobe. This area of ring enhancement when  compared to the prior study today measures 1.2 x 1.0 cm and previously  measured 0.9 x 0.6 cm. There is increasing edema seen surrounding the  multiple too numerous to count metastatic lesions. There is no evidence  of hemorrhage. No hydrocephalus. No abnormal extra-axial fluid  collections identified. Visualized paranasal sinuses are grossly clear.  Mastoid air cells are patent. Pituitary and sellar are unremarkable in  appearance.. Craniovertebral junctions preserved.        IMPRESSION:  Interval progression of  disease with interval increase seen  in size and number of the multiple metastatic lesions throughout the  cerebral and cerebellar hemispheres bilaterally. There is a tiny  punctate punctate area of restricted diffusion in the right frontal  periventricular white matter suggesting an acute ischemic insult.      RECENT LABS:  Lab Results   Component Value Date    WBC 9.79 01/11/2022    HGB 10.2 (L) 01/11/2022    HCT 30.7 (L) 01/11/2022    .0 (H) 01/11/2022    RDW 13.4 01/11/2022     01/11/2022    NEUTRORELPCT 65.6 01/11/2022    LYMPHORELPCT 16.5 (L) 01/11/2022    MONORELPCT 13.1 (H) 01/11/2022    EOSRELPCT 3.7 01/11/2022    BASORELPCT 0.6 01/11/2022    NEUTROABS 6.42 01/11/2022    LYMPHSABS 1.62 01/11/2022       Lab Results   Component Value Date     (L) 01/11/2022    K 4.1 01/11/2022    CO2 25.6 01/11/2022    CL 95 (L) 01/11/2022    BUN 6 01/11/2022    CREATININE 0.61 (L) 01/11/2022    EGFRIFNONA 140 01/11/2022    GLUCOSE 142 (H) 01/11/2022    CALCIUM 10.4 01/11/2022    ALKPHOS 440 (H) 01/11/2022    AST 21 01/11/2022    ALT 46 (H) 01/11/2022    BILITOT 0.2 01/11/2022    ALBUMIN 3.40 (L) 01/11/2022    PROTEINTOT 7.4 01/11/2022    MG 1.4 (L) 01/11/2022    PHOS 2.0 (L) 01/11/2022     LDH   Date Value Ref Range Status   06/09/2021 278 (H) 135 - 225 U/L Final     Uric Acid   Date Value Ref Range Status   06/09/2021 3.8 3.4 - 7.0 mg/dL Final     Lab Results   Component Value Date    FERRITIN 171.10 06/09/2021    IRON 35 (L) 06/09/2021    TIBC 311 06/09/2021    LABIRON 11 (L) 06/09/2021    [id number] 511 06/09/2021    FOLATE 4.79 06/09/2021     Lab Results   Component Value Date    TSH 0.503 06/09/2021         ASSESSMENT & PLAN:  Vipin Oakley is a very pleasant 51 y.o. 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  Tumor sent for CARIS testing and was negative for ALK, BRAF, EGFR, RET, ROS1, MET, HER2, PD-L1.  TMB was low.  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    1.  Metastatic squamous cell Carcinoma of the lung:  -  He understands that his cancer is treatable but not curable.  -  I have always been concerned with the extensive nature of his metastatic disease as well as with aggressive nature of disease with rapid progression when his disease is uncontrolled.  -  He has seen Filiberto Brown and Davin.  He completed cyberknife radiosurgery to his brain lesions and palliatve radiation to the R hip, L rib and subsequently proton radiation to ST metastases.   - Started treatment with Carboplatin and Taxol as well as Xgeva bone therapy.  After 4 cycles of treatment, his restaging imaging overall showed improvement.  Lesions in the brain, CAP improved.  He did have evidence of progression in the bone. It is likely that this progression occurred during radiation before he started systemic therapy.  He completed 6 cycles of therapy but then had relatively rapid progression.  -  Given uncontrolled disease in the brain with many new lesions and lesions peppered throughout, he was not a candidate for further cyberknife therapy and Lisset De Los Santos/Davin have recommended WBXRT.  He also had a new lesion along the L jaw / parotid area.     -  Referred back to Dr. Moura with plan for WBXRT followed by Carboplatin/Gemcitabine.  Unfortunately, he had treatment delay due to COVID infection and in this last 2 weeks, his disease had markedly progressed and his functional status has worsened.  At this point, I don't believe either palliative radiation or chemotherapy would be of benefit to him.  I don't think he couldn't tolerate either.  We discussed home hospice care.  His wife tells me that she and Vipin have been discussing this.  She didn't expect it to come so soon.  They are in agreement.  Will place referral today.  -  Will  arrange for Oxygen because his SpO2 was 83% on arrival. Came up to 99% on 2L but would drop with removal of oxygen.      2.  Neoplasm related pain:  -  Currently taking Fentanyl 200 mcg (100x2) and Oxycodone 20 mg 1-2 tabs every ~4hours (8 tabs/day), Neurontin 800 mg q8h and Dilaudid 2 mg q8h prn for breakthrough pain.  His wife says his pain has been under much better control  Will refill today.  -  Continue Senna/ Colace ii BID and lactulose as needed for refractory constipation currently doing well in this regard.     3.  Nausea  -  Continue zofran and compazine prn. Will restart Dexamethasone as above temporarily.    4. Depression / Anxiety:  -Currently doing well in this regard. Continue Lexapro 10 mg daily and Valum 5 mg 1-2 tabs BID (he is taking 2 tabs BID).     5. H/o Seizure d/o from the age of 9 months:  -  Says he hasn't had a seizure in a very long time despite brain lesions.  Takes Tegretol to prevent seizures. He hasn't had any changes in dose, etc for some time. .    6.  Prophylaxis:  Received 2020 influenza vaccine and Prevnar 13 vaccinations.  COVID vaccination  X 2 completed. He refused 2021 flu vaccine.    7. ACO / VIRI/Other  Quality measures  -  Vipin Oakley did not receive 2021 flu vaccine.  This was recommended but declined.  -  Vipin Oakley reports a pain score of 0.  Given his pain assessment as noted, treatment options were discussed and the following options were decided upon as a follow-up plan to address the patient's pain: Adjustments to regimen as above.   -  Current outpatient and discharge medications have been reconciled for the patient.  Reviewed by: Yu Foster MD       8.  F/u:  -  Refer to home hospice  -  Arrange for Home O2  -  Refill pain medications as above    This note was scribed for Yu Foster MD by Liliya Brandt RN.    IYu MD, personally performed the services described in this documentation as scribed by the above named individual in my  presence, and it is both accurate and complete.  02/08/2022       I spent 35 minutes with Vipin Oakley today.  In the office today, more than 50% of this time was spent face-to-face with him  in counseling / coordination of care, reviewing his medical history and counseling on the current treatment plan.  All questions were answered to his satisfaction      Electronically Signed by: Yu Foster MD       CC:     VITOR Mckee MD Frederick Bunge, MD Weisi Yan, MD Barbara Michna, MD Thomas Hunter, MD Charles Benjamin Newman, MD

## 2022-02-08 NOTE — PROGRESS NOTES
SS received referral to arrange hospice for patient.  SS contacted [facility] [phone] per [facility] to give referral.  SS faxed [phone] face sheet, MD order, and dictation notes.  SS request for hospice to admit patient this date.  Valerie states that oxygen will need to be arranged at another durable medical equipment agency due to it taking a few days to get oxygen arranged at home.    SS received call from Liliya Brandt RN who states that patient needs home oxygen and needs portable brought to office for transport home today.  SS contacted [facility] [phone] per Crystal to arrange oxygen and portable to be brought to hospital.  SS faxed face sheet, MD order, and dictation notes [phone].    SS will follow as needed.

## 2022-02-14 ENCOUNTER — TELEPHONE (OUTPATIENT)
Dept: ONCOLOGY | Facility: CLINIC | Age: 52
End: 2022-02-14

## (undated) DEVICE — DECANT BG O JET

## (undated) DEVICE — BRUSH CYTO BRONCOSCOPE

## (undated) DEVICE — TUBING, SUCTION, 3/16" X 6', STRAIGHT: Brand: MEDLINE

## (undated) DEVICE — FRCP BX RADJAW4 PULM WO NDL STD1.8X2 100

## (undated) DEVICE — GOWN,REINF,POLY,ECL,PP SLV,XL: Brand: MEDLINE

## (undated) DEVICE — PACKING 8004006 NEURAY 200PK 13X51MM: Brand: NEURAY ®

## (undated) DEVICE — SUT VIC 3/0 SH 27IN J416H

## (undated) DEVICE — TRAP,MUCUS SPECIMEN,40CC: Brand: MEDLINE

## (undated) DEVICE — PK ENT 70

## (undated) DEVICE — FRCP BIOP RADIALJAW4 PULM LG 2.2MM 100CM 1P/U

## (undated) DEVICE — HOLDER: Brand: DEROYAL

## (undated) DEVICE — TUBING, SUCTION, 1/4" X 20', STRAIGHT: Brand: MEDLINE INDUSTRIES, INC.

## (undated) DEVICE — ANTIBACTERIAL UNDYED BRAIDED (POLYGLACTIN 910), SYNTHETIC ABSORBABLE SUTURE: Brand: COATED VICRYL

## (undated) DEVICE — GLV SURG PREMIERPRO MIC LTX PF SZ7 BRN

## (undated) DEVICE — SINGLE USE SUCTION VALVE MAJ-209: Brand: SINGLE USE SUCTION VALVE (STERILE)

## (undated) DEVICE — DRSNG TELFA PAD NONADH STR 1S 3X4IN

## (undated) DEVICE — ELECTRD NDL EDGE/STD 2.84IN

## (undated) DEVICE — SUT GUT CHRM 4/0 RB1 27IN U203H

## (undated) DEVICE — BITEBLOCK 1P/U

## (undated) DEVICE — SUT PROLN 3/0 8832H

## (undated) DEVICE — SINGLE USE BIOPSY VALVE MAJ-210: Brand: SINGLE USE BIOPSY VALVE (STERILE)

## (undated) DEVICE — GLV SURG PREMIERPRO MIC LTX PF SZ8 BRN

## (undated) DEVICE — PK BASIC 70

## (undated) DEVICE — SYR LUER SLPTP 50ML

## (undated) DEVICE — PATIENT RETURN ELECTRODE, SINGLE-USE, CONTACT QUALITY MONITORING, ADULT, WITH 9FT CORD, FOR PATIENTS WEIGING OVER 33LBS. (15KG): Brand: MEGADYNE

## (undated) DEVICE — Device: Brand: SINGLE USE ASPIRATION NEEDLE NA-U401SX

## (undated) DEVICE — DRAPE,T,LAPARO,TRANS,STERILE: Brand: MEDLINE

## (undated) DEVICE — SYR LUERLOK 30CC

## (undated) DEVICE — DRAPE,LAPAROTOMY,PED,STERILE: Brand: MEDLINE

## (undated) DEVICE — Device

## (undated) DEVICE — SUT VIC 4/0 P3 18IN J494G

## (undated) DEVICE — APPL CHLORAPREP HI/LITE 26ML ORNG

## (undated) DEVICE — SYR LUERLOK 5CC

## (undated) DEVICE — ADAPT SWVL FIBROPTIC BRONCH

## (undated) DEVICE — GLV SURG SENSICARE SLT PF LF 8.5 STRL

## (undated) DEVICE — SUT VIC 0 TIES 54IN J608H

## (undated) DEVICE — DRAPE,UTILTY,TAPE,15X26, 4EA/PK: Brand: MEDLINE

## (undated) DEVICE — SUT GUT CHRM 3/0 PS2 27IN 1638H

## (undated) DEVICE — CONTAINER,SPECIMEN,OR STERILE,4OZ: Brand: MEDLINE

## (undated) DEVICE — DBD-DRAPE,LAP,CHOLE,W/TROUGHS,STERILE: Brand: MEDLINE

## (undated) DEVICE — SPNG GZ WOVN 4X4IN 12PLY 10/BX STRL

## (undated) DEVICE — GAUZE FLUFF 1 PLY: Brand: DEROYAL

## (undated) DEVICE — PENCL ES MEGADINE EZ/CLEAN BUTN W/HOLSTR 10FT

## (undated) DEVICE — DRP C/ARM W/BAND W/CLIPS 41X74IN

## (undated) DEVICE — SKIN AFFIX SURG ADHESIVE 72/CS 0.55ML: Brand: MEDLINE

## (undated) DEVICE — TRY SKINPREP PVP SCRB W PAINT

## (undated) DEVICE — ANTIBACTERIAL VIOLET BRAIDED (POLYGLACTIN 910), SYNTHETIC ABSORBABLE SURGICAL SUTURE: Brand: COATED VICRYL

## (undated) DEVICE — DEV NDL ASP TRNSBRNCH EXCELON 19G 15MM 130CM

## (undated) DEVICE — BANDAGE,GAUZE,BULKEE II,4.5"X4.1YD,STRL: Brand: MEDLINE

## (undated) DEVICE — PAD GRND REM POLYHESIVE A/ DISP